# Patient Record
Sex: FEMALE | Race: WHITE | NOT HISPANIC OR LATINO
[De-identification: names, ages, dates, MRNs, and addresses within clinical notes are randomized per-mention and may not be internally consistent; named-entity substitution may affect disease eponyms.]

---

## 2017-02-03 ENCOUNTER — APPOINTMENT (OUTPATIENT)
Dept: PULMONOLOGY | Facility: CLINIC | Age: 57
End: 2017-02-03

## 2017-02-03 VITALS
OXYGEN SATURATION: 94 % | WEIGHT: 165 LBS | HEIGHT: 62 IN | SYSTOLIC BLOOD PRESSURE: 130 MMHG | DIASTOLIC BLOOD PRESSURE: 80 MMHG | HEART RATE: 76 BPM | RESPIRATION RATE: 16 BRPM | BODY MASS INDEX: 30.36 KG/M2

## 2017-02-03 DIAGNOSIS — Z86.69 PERSONAL HISTORY OF OTHER DISEASES OF THE NERVOUS SYSTEM AND SENSE ORGANS: ICD-10-CM

## 2017-02-03 DIAGNOSIS — R06.02 SHORTNESS OF BREATH: ICD-10-CM

## 2017-02-03 DIAGNOSIS — J20.9 ACUTE BRONCHITIS, UNSPECIFIED: ICD-10-CM

## 2017-02-03 DIAGNOSIS — R06.83 SNORING: ICD-10-CM

## 2017-02-03 DIAGNOSIS — J45.901 ACUTE BRONCHITIS, UNSPECIFIED: ICD-10-CM

## 2017-04-04 ENCOUNTER — APPOINTMENT (OUTPATIENT)
Dept: PULMONOLOGY | Facility: CLINIC | Age: 57
End: 2017-04-04

## 2017-04-27 ENCOUNTER — RX RENEWAL (OUTPATIENT)
Age: 57
End: 2017-04-27

## 2017-04-27 ENCOUNTER — MEDICATION RENEWAL (OUTPATIENT)
Age: 57
End: 2017-04-27

## 2017-05-24 ENCOUNTER — APPOINTMENT (OUTPATIENT)
Dept: PULMONOLOGY | Facility: CLINIC | Age: 57
End: 2017-05-24

## 2017-07-19 ENCOUNTER — OUTPATIENT (OUTPATIENT)
Dept: OUTPATIENT SERVICES | Facility: HOSPITAL | Age: 57
LOS: 1 days | Discharge: HOME | End: 2017-07-19

## 2017-07-19 ENCOUNTER — APPOINTMENT (OUTPATIENT)
Dept: GASTROENTEROLOGY | Facility: CLINIC | Age: 57
End: 2017-07-19

## 2017-07-19 VITALS
SYSTOLIC BLOOD PRESSURE: 120 MMHG | DIASTOLIC BLOOD PRESSURE: 80 MMHG | WEIGHT: 183 LBS | HEART RATE: 66 BPM | BODY MASS INDEX: 33.47 KG/M2 | OXYGEN SATURATION: 98 % | TEMPERATURE: 97.3 F

## 2017-07-19 DIAGNOSIS — K21.9 GASTRO-ESOPHAGEAL REFLUX DISEASE WITHOUT ESOPHAGITIS: ICD-10-CM

## 2017-07-19 DIAGNOSIS — K21.9 GASTRO-ESOPHAGEAL REFLUX DISEASE W/OUT ESOPHAGITIS: ICD-10-CM

## 2017-07-19 DIAGNOSIS — R13.10 DYSPHAGIA, UNSPECIFIED: ICD-10-CM

## 2017-07-19 DIAGNOSIS — K59.00 CONSTIPATION, UNSPECIFIED: ICD-10-CM

## 2017-07-19 DIAGNOSIS — R10.9 UNSPECIFIED ABDOMINAL PAIN: ICD-10-CM

## 2017-07-19 DIAGNOSIS — K58.1 IRRITABLE BOWEL SYNDROME WITH CONSTIPATION: ICD-10-CM

## 2017-07-19 LAB
24R-OH-CALCIDIOL SERPL-MCNC: 24.8 NG/ML — LOW (ref 30–100)
ALBUMIN SERPL ELPH-MCNC: 3.6 G/DL — SIGNIFICANT CHANGE UP (ref 3.3–5)
ALP SERPL-CCNC: 95 U/L — SIGNIFICANT CHANGE UP (ref 40–120)
ALT FLD-CCNC: 15 U/L — SIGNIFICANT CHANGE UP (ref 12–78)
ANION GAP SERPL CALC-SCNC: 8 MMOL/L — SIGNIFICANT CHANGE UP (ref 5–17)
AST SERPL-CCNC: 16 U/L — SIGNIFICANT CHANGE UP (ref 15–37)
BASOPHILS # BLD AUTO: 0.1 K/UL — SIGNIFICANT CHANGE UP (ref 0–0.2)
BASOPHILS NFR BLD AUTO: 1.6 % — SIGNIFICANT CHANGE UP (ref 0–2)
BILIRUB SERPL-MCNC: 0.4 MG/DL — SIGNIFICANT CHANGE UP (ref 0.2–1.2)
BUN SERPL-MCNC: 21 MG/DL — SIGNIFICANT CHANGE UP (ref 7–23)
CALCIUM SERPL-MCNC: 8.4 MG/DL — LOW (ref 8.5–10.1)
CHLORIDE SERPL-SCNC: 105 MMOL/L — SIGNIFICANT CHANGE UP (ref 96–108)
CO2 SERPL-SCNC: 29 MMOL/L — SIGNIFICANT CHANGE UP (ref 22–31)
CREAT SERPL-MCNC: 0.76 MG/DL — SIGNIFICANT CHANGE UP (ref 0.5–1.3)
CRP SERPL-MCNC: 0.2 MG/DL — SIGNIFICANT CHANGE UP (ref 0–0.4)
EOSINOPHIL # BLD AUTO: 0.2 K/UL — SIGNIFICANT CHANGE UP (ref 0–0.5)
EOSINOPHIL NFR BLD AUTO: 3.2 % — SIGNIFICANT CHANGE UP (ref 0–6)
GLUCOSE SERPL-MCNC: 97 MG/DL — SIGNIFICANT CHANGE UP (ref 70–99)
HCT VFR BLD CALC: 43.1 % — SIGNIFICANT CHANGE UP (ref 34.5–45)
HGB BLD-MCNC: 15.3 G/DL — SIGNIFICANT CHANGE UP (ref 11.5–15.5)
LYMPHOCYTES # BLD AUTO: 2.8 K/UL — SIGNIFICANT CHANGE UP (ref 1–3.3)
LYMPHOCYTES # BLD AUTO: 39.5 % — SIGNIFICANT CHANGE UP (ref 13–44)
MCHC RBC-ENTMCNC: 32.7 PG — SIGNIFICANT CHANGE UP (ref 27–34)
MCHC RBC-ENTMCNC: 35.5 GM/DL — SIGNIFICANT CHANGE UP (ref 32–36)
MCV RBC AUTO: 92.1 FL — SIGNIFICANT CHANGE UP (ref 80–100)
MONOCYTES # BLD AUTO: 0.5 K/UL — SIGNIFICANT CHANGE UP (ref 0–0.9)
MONOCYTES NFR BLD AUTO: 7.3 % — SIGNIFICANT CHANGE UP (ref 2–14)
NEUTROPHILS # BLD AUTO: 3.5 K/UL — SIGNIFICANT CHANGE UP (ref 1.8–7.4)
NEUTROPHILS NFR BLD AUTO: 48.4 % — SIGNIFICANT CHANGE UP (ref 43–77)
PLATELET # BLD AUTO: 326 K/UL — SIGNIFICANT CHANGE UP (ref 150–400)
POTASSIUM SERPL-MCNC: 3.5 MMOL/L — SIGNIFICANT CHANGE UP (ref 3.5–5.3)
POTASSIUM SERPL-SCNC: 3.5 MMOL/L — SIGNIFICANT CHANGE UP (ref 3.5–5.3)
PROT SERPL-MCNC: 7.2 GM/DL — SIGNIFICANT CHANGE UP (ref 6–8.3)
RBC # BLD: 4.68 M/UL — SIGNIFICANT CHANGE UP (ref 3.8–5.2)
RBC # FLD: 11.9 % — SIGNIFICANT CHANGE UP (ref 11–15)
SODIUM SERPL-SCNC: 142 MMOL/L — SIGNIFICANT CHANGE UP (ref 135–145)
T4 AB SER-ACNC: 7.9 UG/DL — SIGNIFICANT CHANGE UP (ref 4.6–12)
TSH SERPL-MCNC: 1.11 UIU/ML — SIGNIFICANT CHANGE UP (ref 0.36–3.74)
VIT B12 SERPL-MCNC: 608 PG/ML — SIGNIFICANT CHANGE UP (ref 243–894)
WBC # BLD: 7.2 K/UL — SIGNIFICANT CHANGE UP (ref 3.8–10.5)
WBC # FLD AUTO: 7.2 K/UL — SIGNIFICANT CHANGE UP (ref 3.8–10.5)

## 2017-07-19 RX ORDER — RANITIDINE HYDROCHLORIDE 300 MG/1
300 CAPSULE ORAL
Qty: 30 | Refills: 5 | Status: DISCONTINUED | COMMUNITY
Start: 2017-07-19 | End: 2017-07-19

## 2017-07-20 LAB
GLIADIN PEPTIDE IGA SER-ACNC: <5 UNITS — SIGNIFICANT CHANGE UP
GLIADIN PEPTIDE IGA SER-ACNC: NEGATIVE — SIGNIFICANT CHANGE UP
GLIADIN PEPTIDE IGG SER-ACNC: <5 UNITS — SIGNIFICANT CHANGE UP
GLIADIN PEPTIDE IGG SER-ACNC: NEGATIVE — SIGNIFICANT CHANGE UP
TTG IGA SER-ACNC: <5 UNITS — SIGNIFICANT CHANGE UP
TTG IGA SER-ACNC: NEGATIVE — SIGNIFICANT CHANGE UP
TTG IGG SER IA-ACNC: NEGATIVE — SIGNIFICANT CHANGE UP
TTG IGG SER-ACNC: <5 UNITS — SIGNIFICANT CHANGE UP

## 2017-07-25 ENCOUNTER — MESSAGE (OUTPATIENT)
Age: 57
End: 2017-07-25

## 2017-08-16 ENCOUNTER — TRANSCRIPTION ENCOUNTER (OUTPATIENT)
Age: 57
End: 2017-08-16

## 2017-08-30 ENCOUNTER — APPOINTMENT (OUTPATIENT)
Dept: GASTROENTEROLOGY | Facility: CLINIC | Age: 57
End: 2017-08-30

## 2018-02-07 ENCOUNTER — OUTPATIENT (OUTPATIENT)
Dept: OUTPATIENT SERVICES | Facility: HOSPITAL | Age: 58
LOS: 1 days | Discharge: HOME | End: 2018-02-07
Payer: MEDICARE

## 2018-02-07 DIAGNOSIS — I73.9 PERIPHERAL VASCULAR DISEASE, UNSPECIFIED: ICD-10-CM

## 2018-02-07 PROCEDURE — 93306 TTE W/DOPPLER COMPLETE: CPT | Mod: 26

## 2018-06-22 ENCOUNTER — TRANSCRIPTION ENCOUNTER (OUTPATIENT)
Age: 58
End: 2018-06-22

## 2018-11-21 ENCOUNTER — OUTPATIENT (OUTPATIENT)
Dept: OUTPATIENT SERVICES | Facility: HOSPITAL | Age: 58
LOS: 1 days | End: 2018-11-21
Payer: COMMERCIAL

## 2018-11-21 VITALS
DIASTOLIC BLOOD PRESSURE: 92 MMHG | HEIGHT: 62 IN | HEART RATE: 63 BPM | SYSTOLIC BLOOD PRESSURE: 134 MMHG | TEMPERATURE: 99 F | WEIGHT: 173.06 LBS | RESPIRATION RATE: 16 BRPM

## 2018-11-21 DIAGNOSIS — Z98.890 OTHER SPECIFIED POSTPROCEDURAL STATES: Chronic | ICD-10-CM

## 2018-11-21 DIAGNOSIS — Z98.891 HISTORY OF UTERINE SCAR FROM PREVIOUS SURGERY: Chronic | ICD-10-CM

## 2018-11-21 DIAGNOSIS — M20.11 HALLUX VALGUS (ACQUIRED), RIGHT FOOT: ICD-10-CM

## 2018-11-21 DIAGNOSIS — I10 ESSENTIAL (PRIMARY) HYPERTENSION: ICD-10-CM

## 2018-11-21 LAB
BUN SERPL-MCNC: 23 MG/DL — SIGNIFICANT CHANGE UP (ref 7–23)
CALCIUM SERPL-MCNC: 9.5 MG/DL — SIGNIFICANT CHANGE UP (ref 8.4–10.5)
CHLORIDE SERPL-SCNC: 102 MMOL/L — SIGNIFICANT CHANGE UP (ref 98–107)
CO2 SERPL-SCNC: 24 MMOL/L — SIGNIFICANT CHANGE UP (ref 22–31)
CREAT SERPL-MCNC: 0.59 MG/DL — SIGNIFICANT CHANGE UP (ref 0.5–1.3)
GLUCOSE SERPL-MCNC: 79 MG/DL — SIGNIFICANT CHANGE UP (ref 70–99)
HCT VFR BLD CALC: 45.2 % — HIGH (ref 34.5–45)
HGB BLD-MCNC: 14.9 G/DL — SIGNIFICANT CHANGE UP (ref 11.5–15.5)
MCHC RBC-ENTMCNC: 31.8 PG — SIGNIFICANT CHANGE UP (ref 27–34)
MCHC RBC-ENTMCNC: 33 % — SIGNIFICANT CHANGE UP (ref 32–36)
MCV RBC AUTO: 96.4 FL — SIGNIFICANT CHANGE UP (ref 80–100)
NRBC # FLD: 0 — SIGNIFICANT CHANGE UP
PLATELET # BLD AUTO: 384 K/UL — SIGNIFICANT CHANGE UP (ref 150–400)
PMV BLD: 11.6 FL — SIGNIFICANT CHANGE UP (ref 7–13)
POTASSIUM SERPL-MCNC: 4 MMOL/L — SIGNIFICANT CHANGE UP (ref 3.5–5.3)
POTASSIUM SERPL-SCNC: 4 MMOL/L — SIGNIFICANT CHANGE UP (ref 3.5–5.3)
RBC # BLD: 4.69 M/UL — SIGNIFICANT CHANGE UP (ref 3.8–5.2)
RBC # FLD: 12.3 % — SIGNIFICANT CHANGE UP (ref 10.3–14.5)
SODIUM SERPL-SCNC: 140 MMOL/L — SIGNIFICANT CHANGE UP (ref 135–145)
WBC # BLD: 5.82 K/UL — SIGNIFICANT CHANGE UP (ref 3.8–10.5)
WBC # FLD AUTO: 5.82 K/UL — SIGNIFICANT CHANGE UP (ref 3.8–10.5)

## 2018-11-21 PROCEDURE — 93010 ELECTROCARDIOGRAM REPORT: CPT

## 2018-11-21 NOTE — H&P PST ADULT - HISTORY OF PRESENT ILLNESS
57y/o female presents for preop eval for scheduled right foot cheilectomy proximal phalanx osteotomy  correction hammertoe 2, 3 capsulotomy metatarsophalangeal joint 2, 3, osteotomy other metatarsal extensor lengthening 2,3, flexor tenotomy 2,3 extensor tenotomy 2,3, correction of hammertoe right on 12/3/18.  Pt with c/o right bunion & pain for past several years that has progressively worsened.

## 2018-11-21 NOTE — H&P PST ADULT - NSANTHOSAYNRD_GEN_A_CORE
No. NELLIE screening performed.  STOP BANG Legend: 0-2 = LOW Risk; 3-4 = INTERMEDIATE Risk; 5-8 = HIGH Risk

## 2018-11-21 NOTE — H&P PST ADULT - PROBLEM SELECTOR PLAN 1
for scheduled right foot cheilectomy proximal phalanx osteotomy  correction hammertoe 2, 3 capsulotomy metatarsophalangeal joint 2, 3, osteotomy other metatarsal extensor lengthening 2,3, flexor tenotomy 2,3 extensor tenotomy 2,3, correction of hammertoe right on 12/3/18.    preop instructions, gi prophylaxis & surgical soap given  pt verbalized understanding for scheduled right foot cheilectomy proximal phalanx osteotomy  correction hammertoe 2, 3 capsulotomy metatarsophalangeal joint 2, 3, osteotomy other metatarsal extensor lengthening 2,3, flexor tenotomy 2,3 extensor tenotomy 2,3, correction of hammertoe right on 12/3/18.    preop instructions, gi prophylaxis & surgical soap given  pt verbalized understanding  NELLIE precautions recommended.  OR booking notified via fax.

## 2018-12-02 ENCOUNTER — TRANSCRIPTION ENCOUNTER (OUTPATIENT)
Age: 58
End: 2018-12-02

## 2018-12-03 ENCOUNTER — OUTPATIENT (OUTPATIENT)
Dept: OUTPATIENT SERVICES | Facility: HOSPITAL | Age: 58
LOS: 1 days | Discharge: HOME | End: 2018-12-03

## 2018-12-03 VITALS
SYSTOLIC BLOOD PRESSURE: 138 MMHG | OXYGEN SATURATION: 98 % | HEIGHT: 62 IN | TEMPERATURE: 98 F | RESPIRATION RATE: 16 BRPM | WEIGHT: 173.06 LBS | HEART RATE: 65 BPM | DIASTOLIC BLOOD PRESSURE: 65 MMHG

## 2018-12-03 VITALS
TEMPERATURE: 98 F | HEART RATE: 60 BPM | DIASTOLIC BLOOD PRESSURE: 68 MMHG | SYSTOLIC BLOOD PRESSURE: 124 MMHG | RESPIRATION RATE: 12 BRPM | OXYGEN SATURATION: 100 %

## 2018-12-03 DIAGNOSIS — Z98.890 OTHER SPECIFIED POSTPROCEDURAL STATES: Chronic | ICD-10-CM

## 2018-12-03 DIAGNOSIS — Z98.891 HISTORY OF UTERINE SCAR FROM PREVIOUS SURGERY: Chronic | ICD-10-CM

## 2018-12-03 DIAGNOSIS — M20.11 HALLUX VALGUS (ACQUIRED), RIGHT FOOT: ICD-10-CM

## 2018-12-03 RX ORDER — OXYCODONE HYDROCHLORIDE 5 MG/1
5 TABLET ORAL EVERY 4 HOURS
Qty: 0 | Refills: 0 | Status: DISCONTINUED | OUTPATIENT
Start: 2018-12-03 | End: 2018-12-03

## 2018-12-03 RX ORDER — ACETAMINOPHEN 500 MG
650 TABLET ORAL EVERY 6 HOURS
Qty: 0 | Refills: 0 | Status: DISCONTINUED | OUTPATIENT
Start: 2018-12-03 | End: 2018-12-18

## 2018-12-03 RX ORDER — SODIUM CHLORIDE 9 MG/ML
1000 INJECTION, SOLUTION INTRAVENOUS
Qty: 0 | Refills: 0 | Status: DISCONTINUED | OUTPATIENT
Start: 2018-12-03 | End: 2018-12-18

## 2018-12-03 NOTE — ASU DISCHARGE PLAN (ADULT/PEDIATRIC). - NOTIFY
Fever greater than 101/Bleeding that does not stop/Pain not relieved by Medications Fever greater than 101/Numbness, color, or temperature change to extremity/Unable to Urinate/Persistent Nausea and Vomiting/Pain not relieved by Medications/Bleeding that does not stop/Swelling that continues Swelling that continues/Inability to Tolerate Liquids or Foods/Unable to Urinate/Pain not relieved by Medications/Numbness, color, or temperature change to extremity/Fever greater than 101/Bleeding that does not stop/Persistent Nausea and Vomiting

## 2018-12-03 NOTE — ASU DISCHARGE PLAN (ADULT/PEDIATRIC). - FOLLOWUP APPOINTMENT CLINIC/PHYSICIAN
Please follow up with Dr. Grijalva within 1-2 weeks. You may call [phone] to schedule an appointment.

## 2018-12-03 NOTE — ASU DISCHARGE PLAN (ADULT/PEDIATRIC). - MEDICATION SUMMARY - MEDICATIONS TO TAKE
I will START or STAY ON the medications listed below when I get home from the hospital:    prevagen  -- 1 tab(s) by mouth once a day, last dose 11/25/18  -- Indication: For Home Med    hydroCHLOROthiazide 12.5 mg oral capsule  -- 1 cap(s) by mouth once a day (at bedtime)  -- Indication: For HTN    Vitamin D3  -- 1 tab(s) by mouth once a day  -- Indication: For Supplement

## 2018-12-03 NOTE — BRIEF OPERATIVE NOTE - PRE-OP DX
Hallux valgus  12/03/2018    Active  Lucien Alvarez Hallux valgus  12/03/2018    Active  Lucien Alvarez  Hammer toe  12/03/2018    Active  Lucien Alvarez

## 2018-12-03 NOTE — ASU DISCHARGE PLAN (ADULT/PEDIATRIC). - INSTRUCTIONS
Please do not participate in heavy lifting or strenuous exercise. Do not drive while taking pain medication.    Please go to the emergency department if you experience pain not controlled by pain medication, bleeding that will not stop, fevers or chills. start with clear liquids and gradually increase your diet as you can, until you return to your normal diet. Please Remain Non-Weight Bearing on your right foot. Please do not participate in heavy lifting or strenuous exercise. Do not drive while taking pain medication.    Please go to the emergency department if you experience pain not controlled by pain medication, bleeding that will not stop, fevers or chills.

## 2018-12-03 NOTE — BRIEF OPERATIVE NOTE - PROCEDURE
<<-----Click on this checkbox to enter Procedure Hallux valgus correction  12/03/2018    Active  CHAU Hammer toe correction  12/03/2018    Active  CHAU

## 2018-12-03 NOTE — BRIEF OPERATIVE NOTE - OPERATION/FINDINGS
Hallux Valgus Correction, proximal metatarsal osteotomy, hammertoe correction R Great Toe Proximal Phalanx Osteotomy, percutaneous pinning  R 2nd/3rd Toe Proximal Phalanx slide osteotomy, percutaneous pinning

## 2019-02-01 ENCOUNTER — TRANSCRIPTION ENCOUNTER (OUTPATIENT)
Age: 59
End: 2019-02-01

## 2019-04-01 PROBLEM — I10 ESSENTIAL (PRIMARY) HYPERTENSION: Chronic | Status: ACTIVE | Noted: 2018-11-21

## 2019-05-13 ENCOUNTER — OUTPATIENT (OUTPATIENT)
Dept: OUTPATIENT SERVICES | Facility: HOSPITAL | Age: 59
LOS: 1 days | End: 2019-05-13

## 2019-05-13 VITALS
HEIGHT: 62 IN | SYSTOLIC BLOOD PRESSURE: 110 MMHG | WEIGHT: 160.06 LBS | TEMPERATURE: 98 F | RESPIRATION RATE: 16 BRPM | DIASTOLIC BLOOD PRESSURE: 70 MMHG | HEART RATE: 64 BPM | OXYGEN SATURATION: 97 %

## 2019-05-13 DIAGNOSIS — Z98.891 HISTORY OF UTERINE SCAR FROM PREVIOUS SURGERY: Chronic | ICD-10-CM

## 2019-05-13 DIAGNOSIS — M20.12 HALLUX VALGUS (ACQUIRED), LEFT FOOT: ICD-10-CM

## 2019-05-13 DIAGNOSIS — Z98.890 OTHER SPECIFIED POSTPROCEDURAL STATES: Chronic | ICD-10-CM

## 2019-05-13 LAB
ANION GAP SERPL CALC-SCNC: 16 MMO/L — HIGH (ref 7–14)
BUN SERPL-MCNC: 16 MG/DL — SIGNIFICANT CHANGE UP (ref 7–23)
CALCIUM SERPL-MCNC: 9.5 MG/DL — SIGNIFICANT CHANGE UP (ref 8.4–10.5)
CHLORIDE SERPL-SCNC: 108 MMOL/L — HIGH (ref 98–107)
CO2 SERPL-SCNC: 27 MMOL/L — SIGNIFICANT CHANGE UP (ref 22–31)
CREAT SERPL-MCNC: 0.59 MG/DL — SIGNIFICANT CHANGE UP (ref 0.5–1.3)
GLUCOSE SERPL-MCNC: 78 MG/DL — SIGNIFICANT CHANGE UP (ref 70–99)
HCT VFR BLD CALC: 45.5 % — HIGH (ref 34.5–45)
HGB BLD-MCNC: 14.8 G/DL — SIGNIFICANT CHANGE UP (ref 11.5–15.5)
MCHC RBC-ENTMCNC: 31.4 PG — SIGNIFICANT CHANGE UP (ref 27–34)
MCHC RBC-ENTMCNC: 32.5 % — SIGNIFICANT CHANGE UP (ref 32–36)
MCV RBC AUTO: 96.6 FL — SIGNIFICANT CHANGE UP (ref 80–100)
NRBC # FLD: 0 K/UL — SIGNIFICANT CHANGE UP (ref 0–0)
PLATELET # BLD AUTO: 369 K/UL — SIGNIFICANT CHANGE UP (ref 150–400)
PMV BLD: 11.9 FL — SIGNIFICANT CHANGE UP (ref 7–13)
POTASSIUM SERPL-MCNC: 3.9 MMOL/L — SIGNIFICANT CHANGE UP (ref 3.5–5.3)
POTASSIUM SERPL-SCNC: 3.9 MMOL/L — SIGNIFICANT CHANGE UP (ref 3.5–5.3)
RBC # BLD: 4.71 M/UL — SIGNIFICANT CHANGE UP (ref 3.8–5.2)
RBC # FLD: 12.1 % — SIGNIFICANT CHANGE UP (ref 10.3–14.5)
SODIUM SERPL-SCNC: 151 MMOL/L — HIGH (ref 135–145)
WBC # BLD: 6.19 K/UL — SIGNIFICANT CHANGE UP (ref 3.8–10.5)
WBC # FLD AUTO: 6.19 K/UL — SIGNIFICANT CHANGE UP (ref 3.8–10.5)

## 2019-05-13 RX ORDER — CHOLECALCIFEROL (VITAMIN D3) 125 MCG
1 CAPSULE ORAL
Qty: 0 | Refills: 0 | DISCHARGE

## 2019-05-13 NOTE — H&P PST ADULT - HISTORY OF PRESENT ILLNESS
59 y/o female presents for preop eval for scheduled left foot correction hammertoe 2nd, 3rd, capsulotomy metatarsophalangeal joint 2nd 3rd osteotomy other metatarsal 2nd, extensor lengthening 2nd 3rd flexor tenotomy 2nd 3rd extensor tenotomy 2nd 3rd Brush/cavanaugh bunionectomy, combined osteotomy for 5/23/19. Pt underwent right foot bunion and hammertoe surgery in 11/2018 now planning correction of left foot.

## 2019-05-13 NOTE — H&P PST ADULT - NSICDXFAMILYHX_GEN_ALL_CORE_FT
FAMILY HISTORY:  Father  Still living? No  Family history of heart disease, Age at diagnosis: Age Unknown    Mother  Still living? No  Family history of diabetes mellitus, Age at diagnosis: Age Unknown

## 2019-05-13 NOTE — H&P PST ADULT - NEGATIVE OPHTHALMOLOGIC SYMPTOMS
no blurred vision R/no pain L/no blurred vision L/no pain R/no loss of vision R/no diplopia/no photophobia/no loss of vision L

## 2019-05-13 NOTE — H&P PST ADULT - RS GEN PE MLT RESP DETAILS PC
respirations non-labored/breath sounds equal/airway patent/no rhonchi/no wheezes/no rales/clear to auscultation bilaterally/good air movement

## 2019-05-13 NOTE — H&P PST ADULT - MALLAMPATI CLASS
Class II - visualization of the soft palate, fauces, and uvula Class IV (difficult) - the soft palate is not visible at all Class I (easy) - visualization of the soft palate, fauces, uvula, and both anterior and posterior pillars

## 2019-05-13 NOTE — H&P PST ADULT - NEGATIVE NEUROLOGICAL SYMPTOMS
no difficulty walking/no syncope/no transient paralysis/no paresthesias/no tremors/no weakness/no generalized seizures

## 2019-05-13 NOTE — H&P PST ADULT - NSICDXPASTMEDICALHX_GEN_ALL_CORE_FT
PAST MEDICAL HISTORY:  Acquired hammertoe of left foot     Hallux valgus (acquired), right foot s/p correction 11/2018    HV (hallux valgus), left     Hypertension     Obesity

## 2019-05-13 NOTE — H&P PST ADULT - NEGATIVE ENMT SYMPTOMS
no vertigo/no dysphagia/no sinus symptoms/no ear pain/no nose bleeds/no tinnitus/no hearing difficulty

## 2019-05-13 NOTE — H&P PST ADULT - NSICDXPROBLEM_GEN_ALL_CORE_FT
PROBLEM DIAGNOSES  Problem: Hallux valgus, left  Assessment and Plan:   Pt is scheduled left foot correction hammertoe 2nd, 3rd, capsulotomy metatarsophalangeal joint 2nd 3rd osteotomy other metatarsal 2nd, extensor lengthening 2nd 3rd flexor tenotomy 2nd 3rd extensor tenotomy 2nd 3rd Brush/cavanaugh bunionectomy, combined osteotomy for 5/23/19. Pre-op instructions provided. Pepcid provided for GI prophylaxis. Chlorhexidine soap provided for skin prep. Pt stated understanding.

## 2019-05-13 NOTE — H&P PST ADULT - NEGATIVE GENERAL GENITOURINARY SYMPTOMS
no hematuria/no flank pain R/no bladder infections/no dysuria/no flank pain L/normal urinary frequency

## 2019-05-13 NOTE — H&P PST ADULT - MUSCULOSKELETAL
detailed exam ROM intact/normal strength/no calf tenderness/no joint erythema/no joint swelling/no joint warmth details…

## 2019-05-13 NOTE — H&P PST ADULT - NEGATIVE CARDIOVASCULAR SYMPTOMS
no peripheral edema/no claudication/no dyspnea on exertion/no palpitations/no paroxysmal nocturnal dyspnea/no orthopnea/no chest pain

## 2019-05-13 NOTE — H&P PST ADULT - ANESTHESIA, PREVIOUS REACTION, PROFILE
bronchospasm during endoscopy/colonoscopy, denies recurrent complications or asthma, denies family hx

## 2019-05-13 NOTE — H&P PST ADULT - NSICDXPASTSURGICALHX_GEN_ALL_CORE_FT
PAST SURGICAL HISTORY:  H/O bilateral breast reduction surgery     H/O  section X 2    H/O endoscopy & colonoscopy    S/P bunionectomy Right and hammertoe correction 2018

## 2019-05-22 ENCOUNTER — TRANSCRIPTION ENCOUNTER (OUTPATIENT)
Age: 59
End: 2019-05-22

## 2019-05-23 ENCOUNTER — OUTPATIENT (OUTPATIENT)
Dept: OUTPATIENT SERVICES | Facility: HOSPITAL | Age: 59
LOS: 1 days | Discharge: HOME | End: 2019-05-23

## 2019-05-23 VITALS
SYSTOLIC BLOOD PRESSURE: 121 MMHG | HEIGHT: 62 IN | WEIGHT: 160.06 LBS | HEART RATE: 66 BPM | RESPIRATION RATE: 16 BRPM | TEMPERATURE: 98 F | OXYGEN SATURATION: 99 % | DIASTOLIC BLOOD PRESSURE: 58 MMHG

## 2019-05-23 VITALS — HEART RATE: 65 BPM | RESPIRATION RATE: 20 BRPM | OXYGEN SATURATION: 99 %

## 2019-05-23 DIAGNOSIS — Z98.891 HISTORY OF UTERINE SCAR FROM PREVIOUS SURGERY: Chronic | ICD-10-CM

## 2019-05-23 DIAGNOSIS — Z98.890 OTHER SPECIFIED POSTPROCEDURAL STATES: Chronic | ICD-10-CM

## 2019-05-23 DIAGNOSIS — M20.12 HALLUX VALGUS (ACQUIRED), LEFT FOOT: ICD-10-CM

## 2019-05-23 RX ORDER — ONDANSETRON 8 MG/1
1 TABLET, FILM COATED ORAL
Qty: 10 | Refills: 0
Start: 2019-05-23

## 2019-05-23 NOTE — BRIEF OPERATIVE NOTE - NSICDXBRIEFPREOP_GEN_ALL_CORE_FT
PRE-OP DIAGNOSIS:  Hammertoe of right foot 23-May-2019 10:03:26  Harley Grijalva  Hallux valgus, right 23-May-2019 10:03:15  Harley Grijalva

## 2019-05-23 NOTE — ASU PREOP CHECKLIST - SURGICAL CONSENT
left foot bunionectomy and hammer toe correction left foot bunionectomy and hammer toe correction/done

## 2019-05-23 NOTE — ASU DISCHARGE PLAN (ADULT/PEDIATRIC) - CARE PROVIDER_API CALL
Harley Grijalva)  Orthopaedic Surgery  [address] [place] [address]  Phone: [phone]  Fax: [phone]  Follow Up Time:

## 2019-05-23 NOTE — BRIEF OPERATIVE NOTE - NSICDXBRIEFPROCEDURE_GEN_ALL_CORE_FT
PROCEDURES:  Double osteotomy, for bunion correction 23-May-2019 10:05:24  Harley Grijalva  Subcutaneous tenotomy of toe 23-May-2019 10:05:14  Harley Grijalva  Release, MTP joint 23-May-2019 10:05:05  Harley Grijalva  Tenotomy, extensor, foot 23-May-2019 10:05:00  Harley Grijalva  Repair of extensor tendon of toe 23-May-2019 10:04:54  Harley Grijalva  Camacho operation 23-May-2019 10:04:43  Harley Grijalva  Hammer toe repair 23-May-2019 10:04:36  Harley Grijalva  Osteotomy, metatarsal, second 23-May-2019 10:04:15  Harley Grijalva

## 2019-05-23 NOTE — ASU DISCHARGE PLAN (ADULT/PEDIATRIC) - ACTIVITY LEVEL
No weight bearing/Elevate extremity Elevate extremity/heel walk with crutches, non weight bearing first 2 days after/No weight bearing/No tub baths

## 2019-05-23 NOTE — ASU DISCHARGE PLAN (ADULT/PEDIATRIC) - ASU DC SPECIAL INSTRUCTIONSFT
Take Percocet for severe pain only. Can take up to 6 Tylenol 325 mg a day while taking Percocet. Alternate between taking Ibuprofen and Tylenol so you are taking pain medication every 3-4 hours if your pain is severe.    Narcotic pain medicine can cause extreme nausea and constipation. Drink plenty of water and take diuretics (colace, Miralax) as needed. You can get them from your local pharmacy.    It is important to ice and elevate your leg to keep swelling down and the pain manageable. Keep the ice on for 20 minutes, and then keep off for 20 minutes. Repeat while awake. Take Percocet for severe pain only. Can take up to 6 Tylenol 325 mg a day while taking Percocet. Alternate between taking Ibuprofen and Tylenol so you are taking pain medication every 3-4 hours if your pain is severe.    Narcotic pain medicine can cause extreme nausea and constipation. Drink plenty of water and take diuretics (colace, Miralax) as needed. You can get them from your local pharmacy.    It is important to ice and elevate your leg to keep swelling down and the pain manageable. Keep the ice on for 20 minutes, and then keep off for 20 minutes. Repeat while awake.    Follow up with Dr. Grijalva in 7-10 days Take Percocet for severe pain only. Can take up to 6 Tylenol 325 mg a day while taking Percocet. Alternate between taking Ibuprofen and Tylenol so you are taking pain medication every 3-4 hours if your pain is severe.    Narcotic pain medicine can cause extreme nausea and constipation. Drink plenty of water and take diuretics (colace, Miralax) as needed. You can get them from your local pharmacy.    It is important to ice and elevate your leg to keep swelling down and the pain manageable. Keep the ice on for 20 minutes, and then keep off for 20 minutes. Repeat while awake.    Tylenol last at 9am, next does of tylenol or percocet in 6 hours  Motrin last at 9am, next dose motrin in 6 hours    Follow up with Dr. Grijalva in 7-10 days

## 2019-05-23 NOTE — ASU DISCHARGE PLAN (ADULT/PEDIATRIC) - PAIN MANAGEMENT
Prescriptions electronically submitted to pharmacy from [facility] Prescriptions electronically submitted to pharmacy from doctor's office

## 2019-05-23 NOTE — ASU DISCHARGE PLAN (ADULT/PEDIATRIC) - CALL YOUR DOCTOR IF YOU HAVE ANY OF THE FOLLOWING:
Wound/Surgical Site with redness, or foul smelling discharge or pus/Swelling that gets worse/Fever greater than (need to indicate Fahrenheit or Celsius) Swelling that gets worse/Numbness, tingling, color or temperature change to extremity/Fever greater than (need to indicate Fahrenheit or Celsius)/Wound/Surgical Site with redness, or foul smelling discharge or pus

## 2019-10-01 PROBLEM — M20.11 HALLUX VALGUS (ACQUIRED), RIGHT FOOT: Chronic | Status: ACTIVE | Noted: 2018-11-21

## 2019-10-01 PROBLEM — M20.12 HALLUX VALGUS (ACQUIRED), LEFT FOOT: Chronic | Status: ACTIVE | Noted: 2019-05-13

## 2019-10-01 PROBLEM — M20.42 OTHER HAMMER TOE(S) (ACQUIRED), LEFT FOOT: Chronic | Status: ACTIVE | Noted: 2019-05-13

## 2019-10-31 ENCOUNTER — APPOINTMENT (OUTPATIENT)
Dept: INTERNAL MEDICINE | Facility: CLINIC | Age: 59
End: 2019-10-31
Payer: COMMERCIAL

## 2019-10-31 ENCOUNTER — TRANSCRIPTION ENCOUNTER (OUTPATIENT)
Age: 59
End: 2019-10-31

## 2019-10-31 VITALS
SYSTOLIC BLOOD PRESSURE: 127 MMHG | OXYGEN SATURATION: 96 % | BODY MASS INDEX: 30.51 KG/M2 | HEIGHT: 62 IN | WEIGHT: 165.8 LBS | HEART RATE: 75 BPM | RESPIRATION RATE: 16 BRPM | DIASTOLIC BLOOD PRESSURE: 70 MMHG | TEMPERATURE: 98.1 F

## 2019-10-31 VITALS — SYSTOLIC BLOOD PRESSURE: 118 MMHG | DIASTOLIC BLOOD PRESSURE: 68 MMHG

## 2019-10-31 DIAGNOSIS — Z00.00 ENCOUNTER FOR GENERAL ADULT MEDICAL EXAMINATION W/OUT ABNORMAL FINDINGS: ICD-10-CM

## 2019-10-31 DIAGNOSIS — K58.1 IRRITABLE BOWEL SYNDROME WITH CONSTIPATION: ICD-10-CM

## 2019-10-31 DIAGNOSIS — K44.9 DIAPHRAGMATIC HERNIA W/OUT OBSTRUCTION OR GANGRENE: ICD-10-CM

## 2019-10-31 PROCEDURE — 99386 PREV VISIT NEW AGE 40-64: CPT | Mod: 25

## 2019-10-31 PROCEDURE — G0444 DEPRESSION SCREEN ANNUAL: CPT | Mod: 59

## 2019-10-31 PROCEDURE — 36415 COLL VENOUS BLD VENIPUNCTURE: CPT

## 2019-10-31 NOTE — HEALTH RISK ASSESSMENT
[Yes] : Yes [No falls in past year] : Patient reported no falls in the past year [0] : 2) Feeling down, depressed, or hopeless: Not at all (0) [No Retinopathy] : No retinopathy [Patient reported mammogram was normal] : Patient reported mammogram was normal [Patient reported PAP Smear was normal] : Patient reported PAP Smear was normal [With Significant Other] : lives with significant other [] :  [Fully functional (bathing, dressing, toileting, transferring, walking, feeding)] : Fully functional (bathing, dressing, toileting, transferring, walking, feeding) [Fully functional (using the telephone, shopping, preparing meals, housekeeping, doing laundry, using] : Fully functional and needs no help or supervision to perform IADLs (using the telephone, shopping, preparing meals, housekeeping, doing laundry, using transportation, managing medications and managing finances) [Reports normal functional visual acuity (ie: able to read med bottle)] : Reports normal functional visual acuity [Smoke Detector] : smoke detector [Carbon Monoxide Detector] : carbon monoxide detector [Safety elements used in home] : safety elements used in home [Seat Belt] :  uses seat belt [With Patient/Caregiver] : With Patient/Caregiver [] : No [de-identified] : social [[id number]] : 0 [EyeExamDate] : 10/19 [Change in mental status noted] : No change in mental status noted [Reports changes in hearing] : Reports no changes in hearing [Reports changes in vision] : Reports no changes in vision [Reports changes in dental health] : Reports no changes in dental health [Guns at Home] : no guns at home [Sunscreen] : does not use sunscreen [Travel to Developing Areas] : does not  travel to developing areas [TB Exposure] : is not being exposed to tuberculosis [Caregiver Concerns] : does not have caregiver concerns [MammogramDate] : 6/16 [PapSmearDate] : 6/16 [PapSmearComments] : linda [ColonoscopyDate] : 6/16 [AdvancecareDate] : 10/19

## 2019-10-31 NOTE — HISTORY OF PRESENT ILLNESS
[de-identified] : was seeing  (cardio) at [facility]--had neg prior cardiac w/u 2016--recently lost  20lbs with lifestyle changes--was prev on benicar and inderal for palpitations

## 2019-11-01 LAB
25(OH)D3 SERPL-MCNC: 21.6 NG/ML
ALBUMIN SERPL ELPH-MCNC: 4.5 G/DL
ALP BLD-CCNC: 80 U/L
ALT SERPL-CCNC: 9 U/L
ANION GAP SERPL CALC-SCNC: 15 MMOL/L
APPEARANCE: CLEAR
AST SERPL-CCNC: 20 U/L
BACTERIA: NEGATIVE
BASOPHILS # BLD AUTO: 0.1 K/UL
BASOPHILS NFR BLD AUTO: 1.2 %
BILIRUB SERPL-MCNC: 0.2 MG/DL
BILIRUBIN URINE: NEGATIVE
BLOOD URINE: NEGATIVE
BUN SERPL-MCNC: 21 MG/DL
CALCIUM SERPL-MCNC: 9.6 MG/DL
CHLORIDE SERPL-SCNC: 102 MMOL/L
CHOLEST SERPL-MCNC: 239 MG/DL
CHOLEST/HDLC SERPL: 3.1 RATIO
CO2 SERPL-SCNC: 22 MMOL/L
COLOR: COLORLESS
CREAT SERPL-MCNC: 0.57 MG/DL
EOSINOPHIL # BLD AUTO: 0.25 K/UL
EOSINOPHIL NFR BLD AUTO: 3 %
GLUCOSE QUALITATIVE U: NEGATIVE
GLUCOSE SERPL-MCNC: 100 MG/DL
HCT VFR BLD CALC: 46.4 %
HDLC SERPL-MCNC: 77 MG/DL
HGB BLD-MCNC: 14.9 G/DL
HYALINE CASTS: 0 /LPF
IMM GRANULOCYTES NFR BLD AUTO: 0.4 %
KETONES URINE: NEGATIVE
LDLC SERPL CALC-MCNC: 147 MG/DL
LEUKOCYTE ESTERASE URINE: NEGATIVE
LYMPHOCYTES # BLD AUTO: 2.83 K/UL
LYMPHOCYTES NFR BLD AUTO: 34 %
MAN DIFF?: NORMAL
MCHC RBC-ENTMCNC: 31.6 PG
MCHC RBC-ENTMCNC: 32.1 GM/DL
MCV RBC AUTO: 98.5 FL
MICROSCOPIC-UA: NORMAL
MONOCYTES # BLD AUTO: 0.71 K/UL
MONOCYTES NFR BLD AUTO: 8.5 %
NEUTROPHILS # BLD AUTO: 4.41 K/UL
NEUTROPHILS NFR BLD AUTO: 52.9 %
NITRITE URINE: NEGATIVE
PH URINE: 5.5
PLATELET # BLD AUTO: 354 K/UL
POTASSIUM SERPL-SCNC: 4 MMOL/L
PROT SERPL-MCNC: 7 G/DL
PROTEIN URINE: NEGATIVE
RBC # BLD: 4.71 M/UL
RBC # FLD: 12.3 %
RED BLOOD CELLS URINE: 0 /HPF
SODIUM SERPL-SCNC: 139 MMOL/L
SPECIFIC GRAVITY URINE: 1.01
SQUAMOUS EPITHELIAL CELLS: 0 /HPF
TRIGL SERPL-MCNC: 75 MG/DL
TSH SERPL-ACNC: 1.26 UIU/ML
UROBILINOGEN URINE: NORMAL
WBC # FLD AUTO: 8.33 K/UL
WHITE BLOOD CELLS URINE: 0 /HPF

## 2019-11-18 ENCOUNTER — TRANSCRIPTION ENCOUNTER (OUTPATIENT)
Age: 59
End: 2019-11-18

## 2020-04-13 ENCOUNTER — RX RENEWAL (OUTPATIENT)
Age: 60
End: 2020-04-13

## 2020-04-25 ENCOUNTER — MESSAGE (OUTPATIENT)
Age: 60
End: 2020-04-25

## 2020-05-02 LAB
SARS-COV-2 IGG SERPL IA-ACNC: 0 INDEX
SARS-COV-2 IGG SERPL QL IA: NEGATIVE

## 2020-09-23 ENCOUNTER — RX RENEWAL (OUTPATIENT)
Age: 60
End: 2020-09-23

## 2020-10-05 ENCOUNTER — RX RENEWAL (OUTPATIENT)
Age: 60
End: 2020-10-05

## 2020-10-08 ENCOUNTER — APPOINTMENT (OUTPATIENT)
Dept: INTERNAL MEDICINE | Facility: CLINIC | Age: 60
End: 2020-10-08
Payer: COMMERCIAL

## 2020-10-08 VITALS
WEIGHT: 164 LBS | BODY MASS INDEX: 30.18 KG/M2 | OXYGEN SATURATION: 98 % | TEMPERATURE: 97.6 F | RESPIRATION RATE: 16 BRPM | DIASTOLIC BLOOD PRESSURE: 73 MMHG | SYSTOLIC BLOOD PRESSURE: 131 MMHG | HEART RATE: 77 BPM | HEIGHT: 62 IN

## 2020-10-08 PROCEDURE — 99213 OFFICE O/P EST LOW 20 MIN: CPT

## 2020-10-08 NOTE — HISTORY OF PRESENT ILLNESS
[FreeTextEntry1] : f/u htn [de-identified] : went of hctz 12.5 for 3 wks--noted elevated bps to 150-170's and leg edema\par Had prior cardiac w/u neg --no cardiac symps

## 2020-12-11 NOTE — H&P PST ADULT - MALLAMPATI CLASS
Per spouse pt has hx of tonsil cancer, NEEDS ALL LIQUIDS THICKENED   Class II - visualization of the soft palate, fauces, and uvula

## 2020-12-15 PROBLEM — Z86.69 HISTORY OF ACUTE OTITIS MEDIA: Status: RESOLVED | Noted: 2017-02-03 | Resolved: 2020-12-15

## 2020-12-21 ENCOUNTER — TRANSCRIPTION ENCOUNTER (OUTPATIENT)
Age: 60
End: 2020-12-21

## 2021-02-07 ENCOUNTER — TRANSCRIPTION ENCOUNTER (OUTPATIENT)
Age: 61
End: 2021-02-07

## 2021-02-08 ENCOUNTER — EMERGENCY (EMERGENCY)
Facility: HOSPITAL | Age: 61
LOS: 0 days | Discharge: HOME | End: 2021-02-08
Attending: EMERGENCY MEDICINE
Payer: MEDICARE

## 2021-02-08 VITALS
HEART RATE: 82 BPM | SYSTOLIC BLOOD PRESSURE: 149 MMHG | TEMPERATURE: 99 F | RESPIRATION RATE: 18 BRPM | DIASTOLIC BLOOD PRESSURE: 98 MMHG | OXYGEN SATURATION: 99 % | HEIGHT: 62 IN
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
## 2021-02-08 DIAGNOSIS — Z98.890 OTHER SPECIFIED POSTPROCEDURAL STATES: Chronic | ICD-10-CM

## 2021-02-08 DIAGNOSIS — Z98.891 HISTORY OF UTERINE SCAR FROM PREVIOUS SURGERY: Chronic | ICD-10-CM

## 2021-02-08 DIAGNOSIS — M79.645 PAIN IN LEFT FINGER(S): ICD-10-CM

## 2021-02-08 DIAGNOSIS — S62.617A DISPLACED FRACTURE OF PROXIMAL PHALANX OF LEFT LITTLE FINGER, INITIAL ENCOUNTER FOR CLOSED FRACTURE: ICD-10-CM

## 2021-02-08 PROCEDURE — 73130 X-RAY EXAM OF HAND: CPT | Mod: 26,LT

## 2021-02-08 PROCEDURE — 99284 EMERGENCY DEPT VISIT MOD MDM: CPT

## 2021-02-08 PROCEDURE — 73120 X-RAY EXAM OF HAND: CPT | Mod: 26,LT

## 2021-02-08 NOTE — ED PROVIDER NOTE - PHYSICAL EXAMINATION
Gen: Alert, Well appearing. NAD    Head: NC, AT, normal lids/conjunctiva   Mskel: left 5th finger in splint  Skin: no rash, no bruising  Neuro: AAOx3

## 2021-02-08 NOTE — ED PROVIDER NOTE - PATIENT PORTAL LINK FT
You can access the FollowMyHealth Patient Portal offered by [facility] by registering at the following website: [email or web address]. By joining [employer]’s FollowMyHealth portal, you will also be able to view your health information using other applications (apps) compatible with our system.

## 2021-02-08 NOTE — ED PROVIDER NOTE - OBJECTIVE STATEMENT
61yo female with no significant medical history presents with left fifth phalanx pain, s/p twisting her finger yesterday. Pt had unofficial xray showing fifth phalanx spiral fx, sent for official xray and meeting dr hendrickson for reduction and splint. Denies any other symptoms.     No fever/chills, No ear pain/sore throat/dysphagia, No chest pain/palpitations, no SOB/cough,  No abdominal pain, No N/V/D, No neck/back pain, no rash, no changes in neurological status/function.

## 2021-02-08 NOTE — ED ADULT NURSE NOTE - PMH
Acquired hammertoe of left foot    Hallux valgus (acquired), right foot  s/p correction 11/2018  HV (hallux valgus), left    Hypertension    Obesity

## 2021-02-08 NOTE — ED PROVIDER NOTE - PRINCIPAL DIAGNOSIS
Closed displaced fracture of distal phalanx of left little finger, initial encounter Closed displaced fracture of proximal phalanx of left little finger, initial encounter

## 2021-02-08 NOTE — ED PROVIDER NOTE - CARE PLAN
Principal Discharge DX:	Closed displaced fracture of distal phalanx of left little finger, initial encounter   Principal Discharge DX:	Closed displaced fracture of proximal phalanx of left little finger, initial encounter

## 2021-02-08 NOTE — ED ADULT TRIAGE NOTE - CHIEF COMPLAINT QUOTE
pt states she tripped and fell yesterday. complaining of left pinky finger pain and swelling. sent by dr hendrickson for eval

## 2021-02-09 ENCOUNTER — APPOINTMENT (OUTPATIENT)
Dept: ORTHOPEDIC SURGERY | Facility: CLINIC | Age: 61
End: 2021-02-09
Payer: COMMERCIAL

## 2021-02-09 DIAGNOSIS — S62.619D DISPLACED FRACTURE OF PROXIMAL PHALANX OF UNSPECIFIED FINGER, SUBSEQUENT ENCOUNTER FOR FRACTURE WITH ROUTINE HEALING: ICD-10-CM

## 2021-02-09 PROCEDURE — 99204 OFFICE O/P NEW MOD 45 MIN: CPT | Mod: 25

## 2021-02-09 PROCEDURE — 99072 ADDL SUPL MATRL&STAF TM PHE: CPT

## 2021-02-09 PROCEDURE — 29085 APPL CAST HAND&LWR FOREARM: CPT | Mod: LT

## 2021-02-16 PROCEDURE — 73130 X-RAY EXAM OF HAND: CPT | Mod: 26,LT

## 2021-03-16 ENCOUNTER — APPOINTMENT (OUTPATIENT)
Dept: ORTHOPEDIC SURGERY | Facility: CLINIC | Age: 61
End: 2021-03-16

## 2021-03-30 ENCOUNTER — APPOINTMENT (OUTPATIENT)
Dept: PULMONOLOGY | Facility: CLINIC | Age: 61
End: 2021-03-30
Payer: COMMERCIAL

## 2021-03-30 DIAGNOSIS — U07.1 COVID-19: ICD-10-CM

## 2021-03-30 PROCEDURE — 99204 OFFICE O/P NEW MOD 45 MIN: CPT | Mod: GC,95

## 2021-03-30 NOTE — PLAN
[FreeTextEntry1] : 60F hx HTN, GERD, obesity (BMI 30), who comes for initial CROWN visit. Pt has symptoms of fever (now resolved), diarrhea, headache, chest congestion, nonproductive cough. Symptoms initially started on 3/17. Had COVID vaccine 12/29 and 1/19 (Pfizer). Tested COVID positive x2 (3/22 PCR, 3/23 rapid). At this time, she does not qualify for monoclonal antibodies because she is outside the treatment window. She appears to have mild symptoms. However she does not have a pulse oximeter to evaluate oxygen saturation. Will order home labs (sent to home draw) and will order pt a pulse oximeter.

## 2021-03-30 NOTE — HISTORY OF PRESENT ILLNESS
[Home] : at home, [unfilled] , at the time of the visit. [Medical Office: ([facility])___] : at the medical office located in  [Verbal consent obtained from patient] : the patient, [unfilled] [FreeTextEntry1] : 60F hx HTN, GERD, obesity (BMI 30), who comes for initial CROWN visit. Pt initially had symptoms of headache starting 3/17. Thought that her symptoms were a head cold since she had received her COVID vaccine on 12/29 and 1/19 (Pfizer). But pt then developed diarrhea, chest congestion and abdominal pain. She was tested with COVID PCR on 3/22 which was positive and had a rapid COVID test on 3/23. She had a fever initially but fever curve has improved. She also reported loss of smell/taste. Reports BRITO now. Does not have a working pulse oximeter at home.\par \par No history of smoking or asthma in the past. But she did have pneumonia and bronchitis.

## 2021-03-31 ENCOUNTER — NON-APPOINTMENT (OUTPATIENT)
Age: 61
End: 2021-03-31

## 2021-03-31 LAB
ALBUMIN SERPL ELPH-MCNC: 4.4 G/DL
ALP BLD-CCNC: 92 U/L
ALT SERPL-CCNC: 10 U/L
ANION GAP SERPL CALC-SCNC: 14 MMOL/L
AST SERPL-CCNC: 19 U/L
BASOPHILS # BLD AUTO: 0.06 K/UL
BASOPHILS NFR BLD AUTO: 0.8 %
BILIRUB SERPL-MCNC: 0.2 MG/DL
BUN SERPL-MCNC: 25 MG/DL
CALCIUM SERPL-MCNC: 9.6 MG/DL
CHLORIDE SERPL-SCNC: 102 MMOL/L
CO2 SERPL-SCNC: 25 MMOL/L
CREAT SERPL-MCNC: 0.64 MG/DL
CRP SERPL-MCNC: <3 MG/L
DEPRECATED D DIMER PPP IA-ACNC: <150 NG/ML DDU
EOSINOPHIL # BLD AUTO: 0.19 K/UL
EOSINOPHIL NFR BLD AUTO: 2.6 %
FERRITIN SERPL-MCNC: 182 NG/ML
GLUCOSE SERPL-MCNC: 93 MG/DL
HCT VFR BLD CALC: 43.5 %
HGB BLD-MCNC: 14.4 G/DL
IMM GRANULOCYTES NFR BLD AUTO: 0.3 %
LYMPHOCYTES # BLD AUTO: 3.03 K/UL
LYMPHOCYTES NFR BLD AUTO: 41.8 %
MAN DIFF?: NORMAL
MCHC RBC-ENTMCNC: 32.1 PG
MCHC RBC-ENTMCNC: 33.1 GM/DL
MCV RBC AUTO: 96.9 FL
MONOCYTES # BLD AUTO: 0.61 K/UL
MONOCYTES NFR BLD AUTO: 8.4 %
NEUTROPHILS # BLD AUTO: 3.34 K/UL
NEUTROPHILS NFR BLD AUTO: 46.1 %
PLATELET # BLD AUTO: 374 K/UL
POTASSIUM SERPL-SCNC: 4.1 MMOL/L
PROCALCITONIN SERPL-MCNC: 0.04 NG/ML
PROT SERPL-MCNC: 6.7 G/DL
RBC # BLD: 4.49 M/UL
RBC # FLD: 11.6 %
SODIUM SERPL-SCNC: 141 MMOL/L
WBC # FLD AUTO: 7.25 K/UL

## 2021-04-09 ENCOUNTER — RX RENEWAL (OUTPATIENT)
Age: 61
End: 2021-04-09

## 2021-05-05 ENCOUNTER — RX RENEWAL (OUTPATIENT)
Age: 61
End: 2021-05-05

## 2021-05-08 ENCOUNTER — APPOINTMENT (OUTPATIENT)
Dept: INTERNAL MEDICINE | Facility: CLINIC | Age: 61
End: 2021-05-08
Payer: COMMERCIAL

## 2021-05-08 VITALS
OXYGEN SATURATION: 97 % | TEMPERATURE: 97.8 F | WEIGHT: 173.25 LBS | HEART RATE: 73 BPM | BODY MASS INDEX: 31.88 KG/M2 | HEIGHT: 62 IN

## 2021-05-08 VITALS — DIASTOLIC BLOOD PRESSURE: 82 MMHG | SYSTOLIC BLOOD PRESSURE: 142 MMHG

## 2021-05-08 DIAGNOSIS — U07.1 COVID-19: ICD-10-CM

## 2021-05-08 PROCEDURE — 99213 OFFICE O/P EST LOW 20 MIN: CPT

## 2021-05-08 PROCEDURE — 99072 ADDL SUPL MATRL&STAF TM PHE: CPT

## 2021-05-08 NOTE — ASSESSMENT
[FreeTextEntry1] : increase hctz to 25mg--pt declines adding additional bp rxn\par pt to message provider in few wks\par pt to loose wt. and increase activity

## 2021-05-08 NOTE — HISTORY OF PRESENT ILLNESS
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

## 2021-06-02 ENCOUNTER — TRANSCRIPTION ENCOUNTER (OUTPATIENT)
Age: 61
End: 2021-06-02

## 2021-06-02 ENCOUNTER — RX RENEWAL (OUTPATIENT)
Age: 61
End: 2021-06-02

## 2021-07-21 ENCOUNTER — TRANSCRIPTION ENCOUNTER (OUTPATIENT)
Age: 61
End: 2021-07-21

## 2021-07-21 RX ORDER — HYDROCHLOROTHIAZIDE 12.5 MG/1
12.5 CAPSULE ORAL
Qty: 90 | Refills: 1 | Status: COMPLETED | COMMUNITY
Start: 2017-05-10 | End: 2021-07-21

## 2021-07-22 ENCOUNTER — TRANSCRIPTION ENCOUNTER (OUTPATIENT)
Age: 61
End: 2021-07-22

## 2021-11-06 ENCOUNTER — APPOINTMENT (OUTPATIENT)
Dept: INTERNAL MEDICINE | Facility: CLINIC | Age: 61
End: 2021-11-06

## 2021-11-09 ENCOUNTER — INPATIENT (INPATIENT)
Facility: HOSPITAL | Age: 61
LOS: 0 days | Discharge: HOME | End: 2021-11-10
Attending: HOSPITALIST | Admitting: HOSPITALIST
Payer: MEDICARE

## 2021-11-09 VITALS
WEIGHT: 169.54 LBS | OXYGEN SATURATION: 100 % | SYSTOLIC BLOOD PRESSURE: 126 MMHG | HEIGHT: 62 IN | HEART RATE: 99 BPM | TEMPERATURE: 99 F | DIASTOLIC BLOOD PRESSURE: 85 MMHG | RESPIRATION RATE: 22 BRPM

## 2021-11-09 DIAGNOSIS — Z98.891 HISTORY OF UTERINE SCAR FROM PREVIOUS SURGERY: Chronic | ICD-10-CM

## 2021-11-09 DIAGNOSIS — Z98.890 OTHER SPECIFIED POSTPROCEDURAL STATES: Chronic | ICD-10-CM

## 2021-11-09 LAB
ALBUMIN SERPL ELPH-MCNC: 3.8 G/DL — SIGNIFICANT CHANGE UP (ref 3.3–5)
ALP SERPL-CCNC: 77 U/L — SIGNIFICANT CHANGE UP (ref 40–120)
ALT FLD-CCNC: 15 U/L — SIGNIFICANT CHANGE UP (ref 12–78)
ANION GAP SERPL CALC-SCNC: 13 MMOL/L — SIGNIFICANT CHANGE UP (ref 5–17)
ANION GAP SERPL CALC-SCNC: 5 MMOL/L — SIGNIFICANT CHANGE UP (ref 5–17)
APPEARANCE UR: CLEAR — SIGNIFICANT CHANGE UP
APTT BLD: 24.9 SEC — LOW (ref 27.5–35.5)
AST SERPL-CCNC: 21 U/L — SIGNIFICANT CHANGE UP (ref 15–37)
BACTERIA # UR AUTO: ABNORMAL
BASE EXCESS BLDA CALC-SCNC: 2.8 MMOL/L — HIGH (ref -2–2)
BASOPHILS # BLD AUTO: 0.06 K/UL — SIGNIFICANT CHANGE UP (ref 0–0.2)
BASOPHILS NFR BLD AUTO: 0.8 % — SIGNIFICANT CHANGE UP (ref 0–2)
BILIRUB SERPL-MCNC: 0.7 MG/DL — SIGNIFICANT CHANGE UP (ref 0.2–1.2)
BILIRUB UR-MCNC: NEGATIVE — SIGNIFICANT CHANGE UP
BLOOD GAS COMMENTS: SIGNIFICANT CHANGE UP
BLOOD GAS COMMENTS: SIGNIFICANT CHANGE UP
BLOOD GAS SOURCE: SIGNIFICANT CHANGE UP
BUN SERPL-MCNC: 12 MG/DL — SIGNIFICANT CHANGE UP (ref 7–23)
BUN SERPL-MCNC: 8 MG/DL — SIGNIFICANT CHANGE UP (ref 7–23)
CALCIUM SERPL-MCNC: 7.6 MG/DL — LOW (ref 8.5–10.1)
CALCIUM SERPL-MCNC: 9.5 MG/DL — SIGNIFICANT CHANGE UP (ref 8.5–10.1)
CHLORIDE SERPL-SCNC: 103 MMOL/L — SIGNIFICANT CHANGE UP (ref 96–108)
CHLORIDE SERPL-SCNC: 98 MMOL/L — SIGNIFICANT CHANGE UP (ref 96–108)
CO2 SERPL-SCNC: 25 MMOL/L — SIGNIFICANT CHANGE UP (ref 22–31)
CO2 SERPL-SCNC: 28 MMOL/L — SIGNIFICANT CHANGE UP (ref 22–31)
COLOR SPEC: YELLOW — SIGNIFICANT CHANGE UP
CREAT SERPL-MCNC: 0.5 MG/DL — SIGNIFICANT CHANGE UP (ref 0.5–1.3)
CREAT SERPL-MCNC: 0.59 MG/DL — SIGNIFICANT CHANGE UP (ref 0.5–1.3)
DIFF PNL FLD: NEGATIVE — SIGNIFICANT CHANGE UP
EOSINOPHIL # BLD AUTO: 0.14 K/UL — SIGNIFICANT CHANGE UP (ref 0–0.5)
EOSINOPHIL NFR BLD AUTO: 1.8 % — SIGNIFICANT CHANGE UP (ref 0–6)
EPI CELLS # UR: SIGNIFICANT CHANGE UP
FLUAV AG NPH QL: SIGNIFICANT CHANGE UP
FLUBV AG NPH QL: SIGNIFICANT CHANGE UP
GLUCOSE SERPL-MCNC: 126 MG/DL — HIGH (ref 70–99)
GLUCOSE SERPL-MCNC: 142 MG/DL — HIGH (ref 70–99)
GLUCOSE UR QL: 100 MG/DL
HCO3 BLDA-SCNC: 27 MMOL/L — SIGNIFICANT CHANGE UP (ref 21–29)
HCT VFR BLD CALC: 42 % — SIGNIFICANT CHANGE UP (ref 34.5–45)
HGB BLD-MCNC: 15.1 G/DL — SIGNIFICANT CHANGE UP (ref 11.5–15.5)
HOROWITZ INDEX BLDA+IHG-RTO: 28 — SIGNIFICANT CHANGE UP
IMM GRANULOCYTES NFR BLD AUTO: 0.3 % — SIGNIFICANT CHANGE UP (ref 0–1.5)
INR BLD: 1.05 RATIO — SIGNIFICANT CHANGE UP (ref 0.88–1.16)
KETONES UR-MCNC: NEGATIVE — SIGNIFICANT CHANGE UP
LACTATE SERPL-SCNC: 1.6 MMOL/L — SIGNIFICANT CHANGE UP (ref 0.7–2)
LACTATE SERPL-SCNC: 2.4 MMOL/L — HIGH (ref 0.7–2)
LEUKOCYTE ESTERASE UR-ACNC: ABNORMAL
LYMPHOCYTES # BLD AUTO: 2.84 K/UL — SIGNIFICANT CHANGE UP (ref 1–3.3)
LYMPHOCYTES # BLD AUTO: 36.3 % — SIGNIFICANT CHANGE UP (ref 13–44)
MAGNESIUM SERPL-MCNC: 1.8 MG/DL — SIGNIFICANT CHANGE UP (ref 1.6–2.6)
MCHC RBC-ENTMCNC: 32.3 PG — SIGNIFICANT CHANGE UP (ref 27–34)
MCHC RBC-ENTMCNC: 36 GM/DL — SIGNIFICANT CHANGE UP (ref 32–36)
MCV RBC AUTO: 89.9 FL — SIGNIFICANT CHANGE UP (ref 80–100)
MONOCYTES # BLD AUTO: 0.69 K/UL — SIGNIFICANT CHANGE UP (ref 0–0.9)
MONOCYTES NFR BLD AUTO: 8.8 % — SIGNIFICANT CHANGE UP (ref 2–14)
NEUTROPHILS # BLD AUTO: 4.07 K/UL — SIGNIFICANT CHANGE UP (ref 1.8–7.4)
NEUTROPHILS NFR BLD AUTO: 52 % — SIGNIFICANT CHANGE UP (ref 43–77)
NITRITE UR-MCNC: NEGATIVE — SIGNIFICANT CHANGE UP
NRBC # BLD: 0 /100 WBCS — SIGNIFICANT CHANGE UP (ref 0–0)
PCO2 BLDA: 40 MMHG — SIGNIFICANT CHANGE UP (ref 32–46)
PH BLD: 7.44 — SIGNIFICANT CHANGE UP (ref 7.35–7.45)
PH UR: 7 — SIGNIFICANT CHANGE UP (ref 5–8)
PHOSPHATE SERPL-MCNC: 2.3 MG/DL — LOW (ref 2.5–4.5)
PLATELET # BLD AUTO: 353 K/UL — SIGNIFICANT CHANGE UP (ref 150–400)
PO2 BLDA: 84 MMHG — SIGNIFICANT CHANGE UP (ref 74–108)
POTASSIUM SERPL-MCNC: 2.6 MMOL/L — CRITICAL LOW (ref 3.5–5.3)
POTASSIUM SERPL-MCNC: 3.5 MMOL/L — SIGNIFICANT CHANGE UP (ref 3.5–5.3)
POTASSIUM SERPL-SCNC: 2.6 MMOL/L — CRITICAL LOW (ref 3.5–5.3)
POTASSIUM SERPL-SCNC: 3.5 MMOL/L — SIGNIFICANT CHANGE UP (ref 3.5–5.3)
PROT SERPL-MCNC: 7.5 GM/DL — SIGNIFICANT CHANGE UP (ref 6–8.3)
PROT UR-MCNC: NEGATIVE MG/DL — SIGNIFICANT CHANGE UP
PROTHROM AB SERPL-ACNC: 12.2 SEC — SIGNIFICANT CHANGE UP (ref 10.6–13.6)
RBC # BLD: 4.67 M/UL — SIGNIFICANT CHANGE UP (ref 3.8–5.2)
RBC # FLD: 11.7 % — SIGNIFICANT CHANGE UP (ref 10.3–14.5)
SAO2 % BLDA: 96 % — SIGNIFICANT CHANGE UP (ref 92–96)
SARS-COV-2 RNA SPEC QL NAA+PROBE: SIGNIFICANT CHANGE UP
SODIUM SERPL-SCNC: 136 MMOL/L — SIGNIFICANT CHANGE UP (ref 135–145)
SODIUM SERPL-SCNC: 136 MMOL/L — SIGNIFICANT CHANGE UP (ref 135–145)
SP GR SPEC: 1.01 — SIGNIFICANT CHANGE UP (ref 1.01–1.02)
TROPONIN I, HIGH SENSITIVITY RESULT: 3.3 NG/L — SIGNIFICANT CHANGE UP
TROPONIN I, HIGH SENSITIVITY RESULT: 5.3 NG/L — SIGNIFICANT CHANGE UP
UROBILINOGEN FLD QL: NEGATIVE MG/DL — SIGNIFICANT CHANGE UP
WBC # BLD: 7.82 K/UL — SIGNIFICANT CHANGE UP (ref 3.8–10.5)
WBC # FLD AUTO: 7.82 K/UL — SIGNIFICANT CHANGE UP (ref 3.8–10.5)
WBC UR QL: SIGNIFICANT CHANGE UP

## 2021-11-09 PROCEDURE — 0042T: CPT

## 2021-11-09 PROCEDURE — 70498 CT ANGIOGRAPHY NECK: CPT | Mod: 26,MA

## 2021-11-09 PROCEDURE — 99285 EMERGENCY DEPT VISIT HI MDM: CPT

## 2021-11-09 PROCEDURE — 93010 ELECTROCARDIOGRAM REPORT: CPT

## 2021-11-09 PROCEDURE — 71045 X-RAY EXAM CHEST 1 VIEW: CPT | Mod: 26

## 2021-11-09 PROCEDURE — 99223 1ST HOSP IP/OBS HIGH 75: CPT

## 2021-11-09 PROCEDURE — 70496 CT ANGIOGRAPHY HEAD: CPT | Mod: 26,MA

## 2021-11-09 RX ORDER — ALTEPLASE 100 MG
62.3 KIT INTRAVENOUS ONCE
Refills: 0 | Status: DISCONTINUED | OUTPATIENT
Start: 2021-11-09 | End: 2021-11-09

## 2021-11-09 RX ORDER — POTASSIUM CHLORIDE 20 MEQ
10 PACKET (EA) ORAL
Refills: 0 | Status: COMPLETED | OUTPATIENT
Start: 2021-11-09 | End: 2021-11-09

## 2021-11-09 RX ORDER — ONDANSETRON 8 MG/1
4 TABLET, FILM COATED ORAL EVERY 6 HOURS
Refills: 0 | Status: DISCONTINUED | OUTPATIENT
Start: 2021-11-09 | End: 2021-11-10

## 2021-11-09 RX ORDER — ONDANSETRON 8 MG/1
4 TABLET, FILM COATED ORAL ONCE
Refills: 0 | Status: COMPLETED | OUTPATIENT
Start: 2021-11-09 | End: 2021-11-09

## 2021-11-09 RX ORDER — POTASSIUM CHLORIDE 20 MEQ
40 PACKET (EA) ORAL EVERY 4 HOURS
Refills: 0 | Status: COMPLETED | OUTPATIENT
Start: 2021-11-09 | End: 2021-11-09

## 2021-11-09 RX ORDER — ENOXAPARIN SODIUM 100 MG/ML
40 INJECTION SUBCUTANEOUS DAILY
Refills: 0 | Status: DISCONTINUED | OUTPATIENT
Start: 2021-11-09 | End: 2021-11-10

## 2021-11-09 RX ORDER — IBUPROFEN 200 MG
600 TABLET ORAL EVERY 8 HOURS
Refills: 0 | Status: DISCONTINUED | OUTPATIENT
Start: 2021-11-09 | End: 2021-11-10

## 2021-11-09 RX ORDER — SODIUM CHLORIDE 9 MG/ML
1000 INJECTION INTRAMUSCULAR; INTRAVENOUS; SUBCUTANEOUS
Refills: 0 | Status: DISCONTINUED | OUTPATIENT
Start: 2021-11-09 | End: 2021-11-10

## 2021-11-09 RX ORDER — MORPHINE SULFATE 50 MG/1
2 CAPSULE, EXTENDED RELEASE ORAL EVERY 6 HOURS
Refills: 0 | Status: DISCONTINUED | OUTPATIENT
Start: 2021-11-09 | End: 2021-11-10

## 2021-11-09 RX ORDER — POTASSIUM CHLORIDE 20 MEQ
40 PACKET (EA) ORAL ONCE
Refills: 0 | Status: COMPLETED | OUTPATIENT
Start: 2021-11-09 | End: 2021-11-09

## 2021-11-09 RX ORDER — ALTEPLASE 100 MG
6.9 KIT INTRAVENOUS ONCE
Refills: 0 | Status: DISCONTINUED | OUTPATIENT
Start: 2021-11-09 | End: 2021-11-09

## 2021-11-09 RX ORDER — DIPHENHYDRAMINE HCL 50 MG
25 CAPSULE ORAL ONCE
Refills: 0 | Status: COMPLETED | OUTPATIENT
Start: 2021-11-09 | End: 2021-11-09

## 2021-11-09 RX ORDER — SODIUM CHLORIDE 9 MG/ML
2400 INJECTION INTRAMUSCULAR; INTRAVENOUS; SUBCUTANEOUS ONCE
Refills: 0 | Status: COMPLETED | OUTPATIENT
Start: 2021-11-09 | End: 2021-11-09

## 2021-11-09 RX ORDER — METOCLOPRAMIDE HCL 10 MG
10 TABLET ORAL ONCE
Refills: 0 | Status: COMPLETED | OUTPATIENT
Start: 2021-11-09 | End: 2021-11-09

## 2021-11-09 RX ADMIN — SODIUM CHLORIDE 125 MILLILITER(S): 9 INJECTION INTRAMUSCULAR; INTRAVENOUS; SUBCUTANEOUS at 13:51

## 2021-11-09 RX ADMIN — Medication 10 MILLIGRAM(S): at 13:51

## 2021-11-09 RX ADMIN — ENOXAPARIN SODIUM 40 MILLIGRAM(S): 100 INJECTION SUBCUTANEOUS at 16:30

## 2021-11-09 RX ADMIN — Medication 600 MILLIGRAM(S): at 19:55

## 2021-11-09 RX ADMIN — Medication 40 MILLIEQUIVALENT(S): at 14:45

## 2021-11-09 RX ADMIN — Medication 100 MILLIEQUIVALENT(S): at 16:28

## 2021-11-09 RX ADMIN — Medication 100 MILLIEQUIVALENT(S): at 20:31

## 2021-11-09 RX ADMIN — Medication 25 MILLIGRAM(S): at 14:14

## 2021-11-09 RX ADMIN — Medication 600 MILLIGRAM(S): at 18:45

## 2021-11-09 RX ADMIN — Medication 40 MILLIEQUIVALENT(S): at 22:51

## 2021-11-09 RX ADMIN — SODIUM CHLORIDE 2400 MILLILITER(S): 9 INJECTION INTRAMUSCULAR; INTRAVENOUS; SUBCUTANEOUS at 16:29

## 2021-11-09 RX ADMIN — ONDANSETRON 4 MILLIGRAM(S): 8 TABLET, FILM COATED ORAL at 13:51

## 2021-11-09 RX ADMIN — Medication 100 MILLIEQUIVALENT(S): at 18:22

## 2021-11-09 RX ADMIN — Medication 40 MILLIEQUIVALENT(S): at 18:21

## 2021-11-09 NOTE — ED PROVIDER NOTE - OBJECTIVE STATEMENT
presents with headache and n/v for the past hour.  actively vomiting and has remote hx of migraine.  dizzy and the headache was 'driving her crazy'    Some dyarthria presents with headache and n/v for the past hour.  actively vomiting and has remote hx of migraine.  dizzy and the headache was 'driving her crazy'    Some dysarthria

## 2021-11-09 NOTE — ED PROVIDER NOTE - PHYSICAL EXAMINATION
See NIHSS    [place]:  General: No distress.  Mentation at baseline.   HEENT: WNL  Chest/Lungs: CTAB, No wheeze, No retractions, No increased work of breathing, Normal rate  Heart: S1S2 RRR, No M/R/G, Pules equal Bilaterally in upper and lower extremities distally  Abd: soft, NT/ND, No guarding, No rebound.  No hernias, no palpable masses.  Extrem: FROM in all joints, no significant edema noted, No ulcers.  Cap refil < 2sec.  Skin: No rash noted, warm dry.  Neuro:  Grossly normal.  No difficulty ambulating. No focal deficits.  Psychiatric: No evidence of delusions. No SI/HI.

## 2021-11-09 NOTE — H&P ADULT - ASSESSMENT
59 y/o female employee with history of hypertension presents with N/V  near syncopal event secondary to intractable migraine, Patient is hypokalemic secondary to thiazide diuretic use and N/V.    #Admit to Medicine Telemetry Attending [place]  #Fluid Hydration with LR or NS   #Pain control with ibuprofen or Opoid if severe patient to follow up as ot patient wi neurology   #Agressive repletion of Hypokalenmia with K-riders and PO will follow basic in AM     DVT prophalaxis with lovenox     Dispo Likely home in am

## 2021-11-09 NOTE — STROKE CODE NOTE - NIH STROKE SCALE: 10. DYSARTHRIA, QM
(1) Mild-to-moderate dysarthria; patient slurs at least some words and, at worst, can be understood with some difficulty (0) Normal

## 2021-11-09 NOTE — H&P ADULT - NSHPPHYSICALEXAM_GEN_ALL_CORE
GENERAL: pale diaphoretic   HEAD:  Atraumatic, Normocephalic slight facial droop?  EYES: EOMI, PERRLA, conjunctiva and sclera clear  ENMT: No tonsillar erythema, exudates, or enlargement; Moist mucous membranes, Good dentition, No lesions  NECK: Supple, No JVD, Normal thyroid  NERVOUS SYSTEM:  Alert & Oriented X3, Good concentration; Motor Strength 5/5 B/L upper and lower extremities; DTRs 2+ intact and symmetric  CHEST/LUNG: Clear to percussion bilaterally; No rales, rhonchi, wheezing, or rubs  HEART: Regular rate and rhythm; No murmurs, rubs, or gallops  ABDOMEN: Soft, Nontender, Nondistended; Bowel sounds present  EXTREMITIES:  2+ Peripheral Pulses, No clubbing, cyanosis, or edema  SKIN: pale diaphoretic

## 2021-11-09 NOTE — ED ADULT NURSE NOTE - OBJECTIVE STATEMENT
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 Multiple episodes of vomiting. HX migraines. Code stroke called. No facial droop, no pronator drift. Slight weakness on left upper extremity compared to right.  Speech slightly slurred. IV placed, labs drawn and sent. Sent to CT on CM, NSR. Aox3, Breathing on 2LNC

## 2021-11-09 NOTE — H&P ADULT - NSHPREVIEWOFSYSTEMS_GEN_ALL_CORE
CONSTITUTIONAL: fatigue  EYES: No eye pain, visual disturbances, or discharge  ENMT:  No difficulty hearing, tinnitus, vertigo; No sinus or throat pain  NECK: No pain or stiffness  BREASTS: No pain, masses, or nipple discharge  RESPIRATORY: No cough, wheezing, chills or hemoptysis; No shortness of breath  CARDIOVASCULAR: No chest pain, palpitations, dizziness, or leg swelling  GASTROINTESTINAL: No abdominal or epigastric pain. No nausea, vomiting, or hematemesis; No diarrhea or constipation. No melena or hematochezia.  GENITOURINARY: No dysuria, frequency, hematuria, or incontinence  NEUROLOGICAL:  headaches  loss of strength,   SKIN: No itching, burning, rashes, or lesions   LYMPH NODES: No enlarged glands  ENDOCRINE: No heat or cold intolerance; No hair loss  MUSCULOSKELETAL: No joint pain or swelling; No muscle, back, or extremity pain  PSYCHIATRIC: No depression, anxiety, mood swings, or difficulty sleeping  HEME/LYMPH: No easy bruising, or bleeding gums  ALLERGY AND IMMUNOLOGIC: No hives or eczema

## 2021-11-09 NOTE — H&P ADULT - NSHPLABSRESULTS_GEN_ALL_CORE
15.1   7.82  )-----------( 353      ( 09 Nov 2021 13:48 )             42.0     11-09    136  |  98  |  12  ----------------------------<  126<H>  2.6<LL>   |  25  |  0.59    Ca    9.5      09 Nov 2021 13:49    TPro  7.5  /  Alb  3.8  /  TBili  0.7  /  DBili  x   /  AST  21  /  ALT  15  /  AlkPhos  77  11-09    PT/INR - ( 09 Nov 2021 13:48 )   PT: 12.2 sec;   INR: 1.05 ratio         PTT - ( 09 Nov 2021 13:48 )  PTT:24.9 sec  < from: CT Angio Head w/ IV Cont (11.09.21 @ 13:41) >    Mismatch ratio: infinite    IMPRESSION:      1. No significant hypoperfusion or penumbra is suggested. There is no area of core infarction.  2. Vessels are widely patent. There isno intraluminal thrombus in the right middle cerebral artery. No carotid stenosis or dissection or occlusion noted. Vertebrals are patent, dominant on the left. Intracerebral vasculature is unremarkable.  3. Degenerative changes are noted in the cervical spine with facet disease at multiple levels but no significant central stenosis.    < end of copied text >

## 2021-11-09 NOTE — ED ADULT TRIAGE NOTE - CHIEF COMPLAINT QUOTE
pt a&o x4 diaphoretic n//v/headache, numbness to right side of face, right had grasp weak. code stroke initiated in triage md covington. last known normal this morning unknown time.

## 2021-11-09 NOTE — STROKE CODE NOTE - ASSESSMENT/PLAN
HISTORY OF PRESENT ILLNESS:  Ms. Xavier is a 59 y/o Female with history of HTN migraines who was evaluated in [facility] ED for sudden onset headache with possible dysarthria. CODE STROKE was called for headache and dysarthria with possible L Nasolabial fold flattening. Patient reported sudden onset headache with nausea, vomiting -- reported similar to prior migraines but more sudden onset than prior migraines. Denies any unilateral weakness, problems with coordination, visual changes, or word finding difficulty.  At baseline, patient is IADL.    LKN: 10:50am  NIHSS 0  pre-MRS: 0    . Stroke risk factors are () and PMH/PSH is PAST MEDICAL & SURGICAL HISTORY:  Hypertension    Hallux valgus (acquired), right foot  s/p correction 2018    Obesity    HV (hallux valgus), left    Acquired hammertoe of left foot    H/O  section  X 2    H/O bilateral breast reduction surgery    H/O endoscopy  &amp; colonoscopy    S/P bunionectomy  Right and hammertoe correction 2018    . Presented to (OSH) on (date/time). Last known normal time was ().     Pre-stroke MRS= 0     *Modified Barnwell Score: 0      HOME MEDICATIONS:  Home Medications:  hydroCHLOROthiazide 25 mg oral tablet: 1 tab(s) orally once a day (2021 15:55)      VITALS/DATA/ORDERS: [x] Reviewed    CT Head: Negative to my eye  CTA Neck: No significant stenosis  CTA Head: No LVO  CTP: CBF<30%= 0                                                     Tmax>6 secs= 8 cc (likely artifactual)    Labs:  CAPILLARY BLOOD GLUCOSE        Platelet Count - Automated: 353 K/uL (21 @ 13:48)    PT/INR - ( 2021 13:48 )   PT: 12.2 sec;   INR: 1.05 ratio         PTT - ( 2021 13:48 )  PTT:24.9 sec  CT Head:     EXAMINATION: Assisted by (OSH staff)  NIHSS: 0    NEUROLOGIC EXAMINATION (choose or modify optional elements which supplement NIHSS): Assisted by CONCHITA Reno and ED physician, Dr. Grullon  Mental status: The patient is oriented to person, place, time and date.   Fund of knowledge is intact and normal.  Language is fluent with normal repetition, comprehension and naming.  No dysarthria.    Cranial nerves:  VFF.  EOMI w/o nystagmus or skew.  No ptosis.  Facial sensation is normal.  No facial asymmetry.    Motor examination:   Normal tone, bulk and range of motion.  No tremors or involuntary movements.  Formal Muscle Strength Testing: (MRC grade R/L) 5/5 UE; 5/5 LE.  No drift.  Sensory examination:   Intact to light touch and pinprick, pain  Cerebellum:   FTN/HKS intact.        IV Alteplase CONTRAINDICATIONS  [] None    ABSOLUTE EXCLUSION CRITERIA:       RELATIVE EXCLUSION CRITERIA: Isolated/resolved deficits    ASSESSMENT: Patient with sudden onset headache with minimal dysarthria likely in the setting of migraine; low suspicion for cerebrovascular event. tPA not given as symptoms mild and isolated. Not a candidate for endovascular therapy as no LVO.        RECOMMENDATIONS:  [] Given isolated symptoms with NIHSS 0, would not administer tPA at this time  [] No indication for transfer as no LVO noted on CTA     Plan discussed with Stroke Attending, Dr. Libman.        Consultation provided via live, two-way video streaming. Consultation provided in patient's preferred language. HISTORY OF PRESENT ILLNESS:  Ms. Xavier is a 61 y/o Female with history of HTN migraines who was evaluated in [facility] ED for sudden onset headache with possible dysarthria. CODE STROKE was called for headache and dysarthria with possible L Nasolabial fold flattening. Patient reported sudden onset headache with nausea, vomiting -- reported similar to prior migraines but more sudden onset than prior migraines. Denies any unilateral weakness, problems with coordination, visual changes, or word finding difficulty.  At baseline, patient is IADL.    LKN: 10:50am  NIHSS 0  pre-MRS: 0    . Stroke risk factors are () and PMH/PSH is PAST MEDICAL & SURGICAL HISTORY:  Hypertension    Hallux valgus (acquired), right foot  s/p correction 2018    Obesity    HV (hallux valgus), left    Acquired hammertoe of left foot    H/O  section  X 2    H/O bilateral breast reduction surgery    H/O endoscopy  &amp; colonoscopy    S/P bunionectomy  Right and hammertoe correction 2018    . Presented to (OSH) on (date/time). Last known normal time was ().     Pre-stroke MRS= 0     *Modified Kendall Score: 0      HOME MEDICATIONS:  Home Medications:  hydroCHLOROthiazide 25 mg oral tablet: 1 tab(s) orally once a day (2021 15:55)      VITALS/DATA/ORDERS: [x] Reviewed    CT Head: Negative to my eye  CTA Neck: No significant stenosis  CTA Head: No LVO  CTP: CBF<30%= 0                                                     Tmax>6 secs= 8 cc (likely artifactual)    Labs:  CAPILLARY BLOOD GLUCOSE        Platelet Count - Automated: 353 K/uL (21 @ 13:48)    PT/INR - ( 2021 13:48 )   PT: 12.2 sec;   INR: 1.05 ratio         PTT - ( 2021 13:48 )  PTT:24.9 sec  CT Head:     EXAMINATION: Assisted by (OSH staff)  NIHSS: 0    NEUROLOGIC EXAMINATION (choose or modify optional elements which supplement NIHSS): Assisted by CONCHITA Reno and ED physician, Dr. Grullon  Mental status: The patient is oriented to person, place, time and date.   Fund of knowledge is intact and normal.  Language is fluent with normal repetition, comprehension and naming.  No dysarthria.    Cranial nerves:  VFF.  EOMI w/o nystagmus or skew.  No ptosis.  Facial sensation is normal.  No facial asymmetry.    Motor examination:   Normal tone, bulk and range of motion.  No tremors or involuntary movements.  Formal Muscle Strength Testing: (MRC grade R/L) 5/5 UE; 5/5 LE.  No drift.  Sensory examination:   Intact to light touch and pinprick, pain  Cerebellum:   FTN/HKS intact.        IV Alteplase CONTRAINDICATIONS  [] None    ABSOLUTE EXCLUSION CRITERIA:       RELATIVE EXCLUSION CRITERIA: Isolated/resolved deficits    ASSESSMENT: Patient with sudden onset headache with minimal dysarthria likely in the setting of migraine; low suspicion for cerebrovascular event. tPA not given as symptoms mild and isolated. Not a candidate for endovascular therapy as no LVO.        RECOMMENDATIONS:  [] Given isolated symptoms with NIHSS 0, would not administer tPA at this time  [] No indication for transfer as no LVO noted on CTA     Plan discussed with Stroke Attending, Dr. Libman.        Consultation provided via live, two-way video streaming. Consultation provided in patient's preferred language.    Stroke attending. Agree with above

## 2021-11-10 ENCOUNTER — TRANSCRIPTION ENCOUNTER (OUTPATIENT)
Age: 61
End: 2021-11-10

## 2021-11-10 VITALS
TEMPERATURE: 98 F | RESPIRATION RATE: 18 BRPM | DIASTOLIC BLOOD PRESSURE: 67 MMHG | HEART RATE: 86 BPM | SYSTOLIC BLOOD PRESSURE: 130 MMHG | OXYGEN SATURATION: 98 %

## 2021-11-10 LAB
A1C WITH ESTIMATED AVERAGE GLUCOSE RESULT: 5.4 % — SIGNIFICANT CHANGE UP (ref 4–5.6)
CA-I BLD-SCNC: 1.09 MMOL/L — LOW (ref 1.15–1.33)
CA-I BLD-SCNC: 1.15 MMOL/L — SIGNIFICANT CHANGE UP (ref 1.15–1.33)
CHOLEST SERPL-MCNC: 155 MG/DL — SIGNIFICANT CHANGE UP
COVID-19 NUCLEOCAPSID GAM AB INTERP: POSITIVE
COVID-19 NUCLEOCAPSID TOTAL GAM ANTIBODY RESULT: 6.61 INDEX — HIGH
COVID-19 SPIKE DOMAIN AB INTERP: POSITIVE
COVID-19 SPIKE DOMAIN ANTIBODY RESULT: >250 U/ML — HIGH
ESTIMATED AVERAGE GLUCOSE: 108 MG/DL — SIGNIFICANT CHANGE UP (ref 68–114)
HCT VFR BLD CALC: 38.4 % — SIGNIFICANT CHANGE UP (ref 34.5–45)
HCV AB S/CO SERPL IA: 0.1 S/CO — SIGNIFICANT CHANGE UP (ref 0–0.99)
HCV AB SERPL-IMP: SIGNIFICANT CHANGE UP
HDLC SERPL-MCNC: 50 MG/DL — LOW
HGB BLD-MCNC: 12.9 G/DL — SIGNIFICANT CHANGE UP (ref 11.5–15.5)
LACTATE SERPL-SCNC: 0.9 MMOL/L — SIGNIFICANT CHANGE UP (ref 0.7–2)
LIPID PNL WITH DIRECT LDL SERPL: 85 MG/DL — SIGNIFICANT CHANGE UP
MAGNESIUM SERPL-MCNC: 2.2 MG/DL — SIGNIFICANT CHANGE UP (ref 1.6–2.6)
MCHC RBC-ENTMCNC: 32.1 PG — SIGNIFICANT CHANGE UP (ref 27–34)
MCHC RBC-ENTMCNC: 33.6 GM/DL — SIGNIFICANT CHANGE UP (ref 32–36)
MCV RBC AUTO: 95.5 FL — SIGNIFICANT CHANGE UP (ref 80–100)
NON HDL CHOLESTEROL: 104 MG/DL — SIGNIFICANT CHANGE UP
NRBC # BLD: 0 /100 WBCS — SIGNIFICANT CHANGE UP (ref 0–0)
PHOSPHATE SERPL-MCNC: 2.7 MG/DL — SIGNIFICANT CHANGE UP (ref 2.5–4.5)
PLATELET # BLD AUTO: 264 K/UL — SIGNIFICANT CHANGE UP (ref 150–400)
RBC # BLD: 4.02 M/UL — SIGNIFICANT CHANGE UP (ref 3.8–5.2)
RBC # FLD: 12.1 % — SIGNIFICANT CHANGE UP (ref 10.3–14.5)
SARS-COV-2 IGG+IGM SERPL QL IA: 6.61 INDEX — HIGH
SARS-COV-2 IGG+IGM SERPL QL IA: >250 U/ML — HIGH
SARS-COV-2 IGG+IGM SERPL QL IA: POSITIVE
SARS-COV-2 IGG+IGM SERPL QL IA: POSITIVE
TRIGL SERPL-MCNC: 97 MG/DL — SIGNIFICANT CHANGE UP
WBC # BLD: 5.1 K/UL — SIGNIFICANT CHANGE UP (ref 3.8–10.5)
WBC # FLD AUTO: 5.1 K/UL — SIGNIFICANT CHANGE UP (ref 3.8–10.5)

## 2021-11-10 PROCEDURE — 99239 HOSP IP/OBS DSCHRG MGMT >30: CPT

## 2021-11-10 PROCEDURE — 70551 MRI BRAIN STEM W/O DYE: CPT | Mod: 26

## 2021-11-10 RX ORDER — IBUPROFEN 200 MG
1 TABLET ORAL
Qty: 0 | Refills: 0 | DISCHARGE
Start: 2021-11-10

## 2021-11-10 RX ADMIN — ENOXAPARIN SODIUM 40 MILLIGRAM(S): 100 INJECTION SUBCUTANEOUS at 13:29

## 2021-11-10 RX ADMIN — SODIUM CHLORIDE 125 MILLILITER(S): 9 INJECTION INTRAMUSCULAR; INTRAVENOUS; SUBCUTANEOUS at 03:24

## 2021-11-10 RX ADMIN — Medication 600 MILLIGRAM(S): at 09:33

## 2021-11-10 RX ADMIN — Medication 600 MILLIGRAM(S): at 10:30

## 2021-11-10 NOTE — DISCHARGE NOTE PROVIDER - HOSPITAL COURSE
HPI:   59 y/o female with PMH significant  for HTN  presents with headache and n/v for the past hour.  Actively vomiting and has remote hx of migraine.  Dizzy and the headache was 'driving her crazy'   (09 Nov 2021 15:22)     Patient  admitted to   and observed on telemetry  no evidence of u waves  patient had headache control with NSAIDs       hypokalemia was replaced with return to normal   hypocalcemia was noted resolved with fluid    discussed with patient need to follow up with PMD and to HOLD HCTZ for now     patient would like to follow up with a Neurologist for migraine

## 2021-11-10 NOTE — DISCHARGE NOTE PROVIDER - CARE PROVIDER_API CALL
PMD, Your  Phone: (   )    -  Fax: (   )    -  Follow Up Time: 1 week    Nitin Tavares)  Clinical Neurophysiology; Neurology  [address]  [place] [address]  Phone: [phone]  Fax: [phone]  Follow Up Time: 1 week    Clint Morales)  Cardiology; Cardiovascular Disease; Nuclear Cardiology  [address] [place] [address]  Phone: [phone]  Fax: [phone]  Follow Up Time: Routine

## 2021-11-10 NOTE — DISCHARGE NOTE PROVIDER - NSDCCPCAREPLAN_GEN_ALL_CORE_FT
PRINCIPAL DISCHARGE DIAGNOSIS  Diagnosis: Vascular migraine  Assessment and Plan of Treatment: patient to follow up with neurology NSAIDs (ibuprofen as needed)      SECONDARY DISCHARGE DIAGNOSES  Diagnosis: Dehydration  Assessment and Plan of Treatment: rewsolved with IV hydration

## 2021-11-10 NOTE — PHYSICAL THERAPY INITIAL EVALUATION ADULT - ADDITIONAL COMMENTS
Pt reporting that she lives in a private home with 5 steps to enter with 1 handrail. Home is split level with varies steps at home all with 1 handrail. Pt does not have/use assistive devices, is R handed, uses contacts, drives. Pt with no previous history of falls, buckling.

## 2021-11-10 NOTE — DISCHARGE NOTE PROVIDER - PROVIDER TOKENS
FREE:[LAST:[PMD],FIRST:[Your],PHONE:[(   )    -],FAX:[(   )    -],FOLLOWUP:[1 week]],PROVIDER:[TOKEN:[[id number]],FOLLOWUP:[1 week]],PROVIDER:[TOKEN:[[id number]],FOLLOWUP:[Routine]]

## 2021-11-10 NOTE — DISCHARGE NOTE NURSING/CASE MANAGEMENT/SOCIAL WORK - PATIENT PORTAL LINK FT
You can access the FollowMyHealth Patient Portal offered by [facility] by registering at the following website: [email or web address]. By joining [employer]’s FollowMyHealth portal, you will also be able to view your health information using other applications (apps) compatible with our system.

## 2021-11-10 NOTE — DISCHARGE NOTE PROVIDER - CARE PROVIDERS DIRECT ADDRESSES
,DirectAddress_Unknown,[email or web address]

## 2021-11-10 NOTE — PHYSICAL THERAPY INITIAL EVALUATION ADULT - PERTINENT HX OF CURRENT PROBLEM, REHAB EVAL
59 y/o female with PMH significant  for HTN  presents with headache and n/v for the past hour.  Actively vomiting and has remote hx of migraine.  Dizzy and the headache was 'driving her crazy'. CT imaging suggestive MRI follow up secondary finding of subtle R hyperdensity in R MCA - possible thrombus.

## 2021-11-11 ENCOUNTER — APPOINTMENT (OUTPATIENT)
Dept: NEUROLOGY | Facility: CLINIC | Age: 61
End: 2021-11-11
Payer: COMMERCIAL

## 2021-11-11 VITALS
HEIGHT: 62 IN | WEIGHT: 170 LBS | SYSTOLIC BLOOD PRESSURE: 131 MMHG | BODY MASS INDEX: 31.28 KG/M2 | HEART RATE: 89 BPM | DIASTOLIC BLOOD PRESSURE: 82 MMHG

## 2021-11-11 PROCEDURE — 99204 OFFICE O/P NEW MOD 45 MIN: CPT

## 2021-11-11 RX ORDER — PREDNISONE 10 MG/1
10 TABLET ORAL
Qty: 100 | Refills: 0 | Status: DISCONTINUED | COMMUNITY
Start: 2017-02-03 | End: 2021-11-11

## 2021-11-11 RX ORDER — OMEPRAZOLE, SODIUM BICARBONATE 20; 1100 MG/1; MG/1
20-1100 CAPSULE ORAL
Refills: 0 | Status: DISCONTINUED | COMMUNITY
End: 2021-11-11

## 2021-11-11 RX ORDER — OMEPRAZOLE 20 MG/1
20 CAPSULE, DELAYED RELEASE ORAL TWICE DAILY
Qty: 180 | Refills: 3 | Status: DISCONTINUED | COMMUNITY
Start: 2017-07-19 | End: 2021-11-11

## 2021-11-11 RX ORDER — OFLOXACIN OTIC 3 MG/ML
0.3 SOLUTION AURICULAR (OTIC) TWICE DAILY
Qty: 1 | Refills: 0 | Status: DISCONTINUED | COMMUNITY
Start: 2017-02-03 | End: 2021-11-11

## 2021-11-11 RX ORDER — OLOPATADINE HYDROCHLORIDE 665 UG/1
0.6 SPRAY, METERED NASAL
Qty: 3 | Refills: 1 | Status: DISCONTINUED | COMMUNITY
Start: 2017-02-03 | End: 2021-11-11

## 2021-11-11 RX ORDER — LINACLOTIDE 290 UG/1
290 CAPSULE, GELATIN COATED ORAL
Qty: 30 | Refills: 5 | Status: DISCONTINUED | COMMUNITY
Start: 2017-07-19 | End: 2021-11-11

## 2021-11-11 RX ORDER — OFLOXACIN OTIC 3 MG/ML
0.3 SOLUTION AURICULAR (OTIC) TWICE DAILY
Qty: 30 | Refills: 0 | Status: DISCONTINUED | COMMUNITY
Start: 2017-02-03 | End: 2021-11-11

## 2021-11-11 RX ORDER — ALBUTEROL SULFATE 90 UG/1
108 (90 BASE) AEROSOL, METERED RESPIRATORY (INHALATION) EVERY 6 HOURS
Qty: 3 | Refills: 1 | Status: DISCONTINUED | COMMUNITY
Start: 2017-02-03 | End: 2021-11-11

## 2021-11-11 RX ORDER — TRAMADOL HYDROCHLORIDE 50 MG/1
50 TABLET, COATED ORAL
Qty: 20 | Refills: 0 | Status: DISCONTINUED | COMMUNITY
Start: 2021-02-09 | End: 2021-11-11

## 2021-11-11 RX ORDER — CLARITHROMYCIN 500 MG/1
500 TABLET, FILM COATED ORAL
Qty: 20 | Refills: 0 | Status: DISCONTINUED | COMMUNITY
Start: 2017-02-03 | End: 2021-11-11

## 2021-11-11 RX ORDER — PROPRANOLOL HYDROCHLORIDE 10 MG/1
10 TABLET ORAL TWICE DAILY
Qty: 180 | Refills: 2 | Status: DISCONTINUED | COMMUNITY
Start: 2017-05-11 | End: 2021-11-11

## 2021-11-11 RX ORDER — LINACLOTIDE 145 UG/1
145 CAPSULE, GELATIN COATED ORAL
Qty: 90 | Refills: 3 | Status: DISCONTINUED | COMMUNITY
Start: 2017-04-27 | End: 2021-11-11

## 2021-11-11 RX ORDER — FLUTICASONE FUROATE AND VILANTEROL TRIFENATATE 200; 25 UG/1; UG/1
200-25 POWDER RESPIRATORY (INHALATION) DAILY
Qty: 3 | Refills: 1 | Status: DISCONTINUED | COMMUNITY
Start: 2017-02-03 | End: 2021-11-11

## 2021-11-11 NOTE — REVIEW OF SYSTEMS
[As Noted in HPI] : as noted in HPI [Confused or Disoriented] : no confusion [Memory Lapses or Loss] : no memory loss [Decr. Concentrating Ability] : no decrease in concentrating ability [Difficulty with Language] : no ~M difficulty with language [Changed Thought Patterns] : no change in thought patterns [Repeating Questions] : no repeated questioning about recent events [Facial Weakness] : no facial weakness [Arm Weakness] : no arm weakness [Hand Weakness] : no hand weakness [Leg Weakness] : no leg weakness [Poor Coordination] : good coordination [Difficulty Writing] : no difficulty writing [Difficulties in Speech] : no speech difficulties [Numbness] : no numbness [Tingling] : no tingling [Abnormal Sensation] : no abnormal sensation [Hypersensitivity] : no hypersensitivity [Seizures] : no convulsions [Dizziness] : no dizziness [Fainting] : no fainting [Lightheadedness] : no lightheadedness [Vertigo] : no vertigo [Cluster Headache] : no cluster headache [Migraine Headache] : migraine headaches [Tension Headache] : no tension-type headache [Difficulty Walking] : no difficulty walking [Inability to Walk] : able to walk [Ataxia] : no ataxia [Frequent Falls] : not falling [Limping] : not limping [Negative] : Endocrine

## 2021-11-11 NOTE — CONSULT LETTER
[Dear  ___] : Dear  [unfilled], [Please see my note below.] : Please see my note below. [Sincerely,] : Sincerely, [FreeTextEntry3] : Jessica Somers, FNP-BC

## 2021-11-11 NOTE — DISCUSSION/SUMMARY
[Intensive Blood Pressure Control] : intensive blood pressure control [Patient encouraged to discuss with Primary MD] : I encouraged the patient to discuss these important issues with ~his/her~ primary care doctor [Goals and Counseling] : I have reviewed the goals of stroke risk factor modification. I counseled the patient on measures to reduce stroke risk, including the importance of medication compliance, risk factor control, exercise, healthy diet and avoidance of smoking. I reviewed stroke warning signs and symptoms and appropriate actions to take if such occur. [FreeTextEntry1] : Gila Xavier is a 60 year old female who presents following hospitalization from [facility] for workup following episode of headache, nausea, vomiting, slight speech hesitancy, numbness in hands b/l L>R, numbness in legs b/l, and feeling like vision was "closing in" on her. NIHSS was 0.  MRI head was negative for acute infarction, acute hemorrhage, cerebral edema, or intracranial mass effect. She was found to have some metabolic derangements; hypocalcemia and hypokalemia. The headache felt similar to her typical migraines except it came in more suddenly than usual. She feels that she got anxious from everyone seeing her vomiting and the attention she was getting in the ED, and that her anxiety may have contributed to her other symptoms. Her symptoms resolved within an hour and today she is feeling well. Her neurologic exam is non focal. Her presentation was likely due to a migraine or metabolic derangements secondary to the nausea and vomiting. \par \par Overall, she is neurologically stable. We discussed the importance of managing risk factors to decrease risk of stroke. Her LDL is 85 and A1C is 5.4%. She exercises daily, does not smoke, and is currently on a diet to try and lose weight. We discussed the importance of managing blood pressure. I recommended she keep a headache journal and document headache occurrence along with triggers, duration, and alleviating factors. I recommend that she purchase the following vitamins for migraine prophylaxis: Vitamin B2 (Riboflavin) 400mg/day, Magnesium Oxide 400mg at night, and CoQ10 300mg in the morning. I recommended she start with one supplement for a week, prior to adding a second one, and then waiting another week prior to adding the third; in the event that she develops intolerance to one of the supplements, it will be easier to distinguish which one she cannot tolerate. I provided her with a referral to general neurology for further management of her migraines, and told he to bring her headache journal to that visit. I recommend she follow up with her PCP as soon as possible to follow up about her blood pressure management and for all routine primary care needs.

## 2021-11-11 NOTE — HISTORY OF PRESENT ILLNESS
[FreeTextEntry1] : Gila Xavier is a 59 y/o RH Female with history of hypertension and migraines who was evaluated in [facility] ED on 11/9/21 for sudden onset headache with possible L Nasolabial fold flattening. She reported a sudden onset headache with nausea, and vomiting  which was similar in quality to prior migraines but the onset was more sudden than that of her typical migraines.  She denied any unilateral weakness, problems with coordination, visual changes, or word finding difficulty. Her hospital course was significant for hypokalemia and hypocalcemia, which were corrected. NIHSS  was 0. tPA was not administered as symptoms were mild and isolated. She was not a candidate for endovascular therapy as there was no large vessel occlusion. \par CT head demonstrated no  evidence of an acute territorial infarct or hemorrhage, there is subtle hyperdensity in the right middle cerebral artery, which raised the possibility of thrombus. CT Perfusion demonstrated no significant hypoperfusion or penumbra. There is no area of core infarction. CTA Head and Neck demonstrated widely patent vessels. There is no intraluminal thrombus in the right middle cerebral artery. No carotid stenosis or dissection or occlusion noted. Vertebrals are patent, dominant on the left. Intracerebral vasculature is unremarkable.Degenerative changes are noted in the cervical spine with facet disease at multiple levels but no significant central stenosis. MRI head was negative for acute infarction, acute hemorrhage, cerebral edema, or intracranial mass effect.There are a few scattered T2/FLAIR hyperintensities in the subcortical and periventricular white matter which are nonspecific but most commonly represent chronic microvascular ischemic changes\par \par Today, she reports feeling well and states that she has a mild headache. She reports that she typically experiences about two migraines a month which are relieved by Excedrin and sleep. In the past she had used Fioricet which gave her rebound headaches and has made her apprehensive about starting any other headache therapy. She recounts that during the episode at [facility] she also felt that she was having difficulty speaking clearly , she had numbness in her hands bilaterally, but more on the left which made her hand "claw up", legs started getting numb, and she felt like vision was "closing in on her". The entire episode lasted under an hour.  She received potassium repletion and IV hydration which helped her feel much better. She states that her left arm is often numb upon awakening, she feels it can be secondary to sleeping on it, and it always resolves once she gets out of bed. She endorses a past medical history of significant uncontrollable hypertension, in 2004 she was worked up for pheochromocytoma which was negative. Her blood pressure has since been much better controlled. She is currently on Hydrochlorothiazide 25mg, but is holding it until she follows up with her PCP because of the hypokalemia she experienced in the hospital.\par \par \par Gabe Norman PCP\par

## 2021-11-12 ENCOUNTER — APPOINTMENT (OUTPATIENT)
Dept: PAIN MANAGEMENT | Facility: CLINIC | Age: 61
End: 2021-11-12

## 2021-11-12 ENCOUNTER — APPOINTMENT (OUTPATIENT)
Dept: NEUROLOGY | Facility: CLINIC | Age: 61
End: 2021-11-12
Payer: COMMERCIAL

## 2021-11-12 VITALS
HEART RATE: 69 BPM | SYSTOLIC BLOOD PRESSURE: 137 MMHG | WEIGHT: 170 LBS | BODY MASS INDEX: 31.28 KG/M2 | HEIGHT: 62 IN | DIASTOLIC BLOOD PRESSURE: 84 MMHG

## 2021-11-12 PROCEDURE — 99214 OFFICE O/P EST MOD 30 MIN: CPT

## 2021-11-12 NOTE — HISTORY OF PRESENT ILLNESS
[FreeTextEntry1] : 60-year-old woman who is here for consultation of migraine with neurologic symptoms.  Patient states that she has a history of migraines her whole life and it usually occurs around her menses.  Since menopause the headache frequency has decreased to only once in a while.\par Location: Starts in the neck and then it radiates to the right side of her head.  It occasionally switches sides\par Quality constant\par Severity 10 out of 10\par Frequency 1-2 a month\par Duration hours if she does not get to go to sleep\par Associated with nausea, vomiting, photophobia and phonophobia, no TACs.  Patient states that even when the headache is gone patient has allodynia in the back of her neck.\par Patient has tried Fioricet in the past however she had medication overuse headache and has avoided the medication since.  Patient finds that Excedrin Migraine and Tylenol does help however headache returns.\par \par Recently patient was at work when she started to experience olfactory sensitivity.  Patient started having migraine at work and she started having diaphoresis and vomited.  Patient was thought to have decreased nasolabial fold on the left so a stroke code was called at work and she was brought down to the emergency room.  Patient was noted to have decreased electrolyte levels especially in potassium and calcium and phosphorus and she was repleted.  Patient may have experienced left-sided weakness.  Patient had numbness and tingling all over. No medication changes and she has started intermittent fasting but she usually starts fasting at noon but this occurred around 1pm.  Patient was not given TPA and MRI along with evaluation with Dr. Alonso was unremarkable for stroke.

## 2021-11-12 NOTE — DISCUSSION/SUMMARY
[FreeTextEntry1] : 60-year-old woman who is here for initial consultation of migraine exacerbation at work along with possible left nasolabial fold decrease and left arm and leg weakness.  This may have been due to the electrolyte abnormalities from her excessive vomiting and hyperventilation when stroke code was called.  Will have patient continue to monitor her headache frequency by keeping a headache diary.  Will try sumatriptan nasal spray as patient has vomiting as a strong associated symptom.  Patient may also benefit from Zofran as needed along with the sumatriptan nasal spray.  The frequency of her migraine is only 1-2 a month.  If it ever increases we may then start prophylactic medication.  Patient will follow up with me in 3 months\par \par I spent the time noted on the day of this patient encounter preparing for, providing and documenting the above E/M service and counseling and educate patient on differential, workup, disease course, and treatment/management. Education was provided to the patient during this encounter. All questions and concerns were answered and addressed in detail. The patient verbalized understanding and agreed to plan. Patient was advised to continue to monitor for neurologic symptoms and to notify my office or go to the nearest emergency room if there are any changes. Any orders/referrals and communications were provided as well. \par Side effects of the above medications were discussed in detail including but not limited to applicable black box warning and teratogenicity as appropriate. \par Patient was advised to bring previous records to my office. \par \par \par

## 2021-11-12 NOTE — PHYSICAL EXAM
[General Appearance - Alert] : alert [Oriented To Time, Place, And Person] : oriented to person, place, and time [Person] : oriented to person [Place] : oriented to place [Time] : oriented to time [Short Term Intact] : short term memory intact [Fluency] : fluency intact [Current Events] : adequate knowledge of current events [Cranial Nerves Optic (II)] : visual acuity intact bilaterally,  visual fields full to confrontation, pupils equal round and reactive to light [Cranial Nerves Oculomotor (III)] : extraocular motion intact [Cranial Nerves Facial (VII)] : face symmetrical [Cranial Nerves Vestibulocochlear (VIII)] : hearing was intact bilaterally [Cranial Nerves Accessory (XI - Cranial And Spinal)] : head turning and shoulder shrug symmetric [Cranial Nerves Hypoglossal (XII)] : there was no tongue deviation with protrusion [Motor Tone] : muscle tone was normal in all four extremities [Motor Strength] : muscle strength was normal in all four extremities [Sensation Tactile Decrease] : light touch was intact [Sensation Pain / Temperature Decrease] : pain and temperature was intact [Abnormal Walk] : normal gait [Coordination - Dysmetria Impaired Finger-to-Nose Bilateral] : not present [1+] : Patella left 1+ [FreeTextEntry5] : left eye strabimus and occasional eye twitch noted.

## 2021-11-15 ENCOUNTER — NON-APPOINTMENT (OUTPATIENT)
Age: 61
End: 2021-11-15

## 2021-11-15 ENCOUNTER — TRANSCRIPTION ENCOUNTER (OUTPATIENT)
Age: 61
End: 2021-11-15

## 2021-11-16 ENCOUNTER — APPOINTMENT (OUTPATIENT)
Dept: INTERNAL MEDICINE | Facility: CLINIC | Age: 61
End: 2021-11-16
Payer: COMMERCIAL

## 2021-11-16 VITALS
WEIGHT: 174 LBS | HEIGHT: 62 IN | HEART RATE: 84 BPM | OXYGEN SATURATION: 99 % | BODY MASS INDEX: 32.02 KG/M2 | TEMPERATURE: 98.1 F

## 2021-11-16 VITALS — DIASTOLIC BLOOD PRESSURE: 76 MMHG | SYSTOLIC BLOOD PRESSURE: 128 MMHG

## 2021-11-16 PROCEDURE — 99496 TRANSJ CARE MGMT HIGH F2F 7D: CPT | Mod: 25

## 2021-11-16 PROCEDURE — 36415 COLL VENOUS BLD VENIPUNCTURE: CPT

## 2021-11-16 NOTE — HISTORY OF PRESENT ILLNESS
[FreeTextEntry1] : f/u neuro status [de-identified] : reviewed neuro notes and hosp d/c summary--\par pt without further symps\par only on prn imitrex\par had transient chest pain and b/l numbness thought to be migrane variant. Had vomiting with hypocalcemia/kalemia in er\par admitted [facility] from nov 9-10\par off hctz

## 2021-11-16 NOTE — PHYSICAL EXAM
[Normal] : soft, non-tender, non-distended, no masses palpated, no HSM and normal bowel sounds [de-identified] : anxious

## 2021-11-17 ENCOUNTER — TRANSCRIPTION ENCOUNTER (OUTPATIENT)
Age: 61
End: 2021-11-17

## 2021-11-17 LAB
ANION GAP SERPL CALC-SCNC: 12 MMOL/L
BUN SERPL-MCNC: 10 MG/DL
CALCIUM SERPL-MCNC: 9.5 MG/DL
CHLORIDE SERPL-SCNC: 105 MMOL/L
CO2 SERPL-SCNC: 23 MMOL/L
CREAT SERPL-MCNC: 0.56 MG/DL
GLUCOSE SERPL-MCNC: 80 MG/DL
MAGNESIUM SERPL-MCNC: 2.3 MG/DL
PHOSPHATE SERPL-MCNC: 3 MG/DL
POTASSIUM SERPL-SCNC: 4.4 MMOL/L
SODIUM SERPL-SCNC: 141 MMOL/L

## 2021-11-18 ENCOUNTER — TRANSCRIPTION ENCOUNTER (OUTPATIENT)
Age: 61
End: 2021-11-18

## 2021-11-18 DIAGNOSIS — G43.919 MIGRAINE, UNSPECIFIED, INTRACTABLE, WITHOUT STATUS MIGRAINOSUS: ICD-10-CM

## 2021-11-18 DIAGNOSIS — E86.0 DEHYDRATION: ICD-10-CM

## 2021-11-18 DIAGNOSIS — I10 ESSENTIAL (PRIMARY) HYPERTENSION: ICD-10-CM

## 2021-11-18 DIAGNOSIS — T50.2X5A ADVERSE EFFECT OF CARBONIC-ANHYDRASE INHIBITORS, BENZOTHIADIAZIDES AND OTHER DIURETICS, INITIAL ENCOUNTER: ICD-10-CM

## 2021-11-18 DIAGNOSIS — E83.51 HYPOCALCEMIA: ICD-10-CM

## 2021-11-18 DIAGNOSIS — G43.809 OTHER MIGRAINE, NOT INTRACTABLE, WITHOUT STATUS MIGRAINOSUS: ICD-10-CM

## 2021-11-18 DIAGNOSIS — E87.6 HYPOKALEMIA: ICD-10-CM

## 2021-12-13 ENCOUNTER — TRANSCRIPTION ENCOUNTER (OUTPATIENT)
Age: 61
End: 2021-12-13

## 2021-12-23 NOTE — ASU DISCHARGE PLAN (ADULT/PEDIATRIC) - SIGNS AND SYMPTOMS OF INFECTION: FEVER, REDNESS, SWELLING, FOUL SMELLING DISCHARGE
Acute Care - Physical Therapy Treatment Note   Valerio     Patient Name: Katarzyna Norris  : 1968  MRN: [id number]  Today's Date: 2021 Gait- individual; ther- ex -group setting; 5 participants       Visit Dx:     ICD-10-CM ICD-9-CM   1. Difficulty in walking  R26.2 719.7   2. Osteoarthritis of left knee  M17.12 715.96   3. Decreased activities of daily living (ADL)  Z78.9 V49.89     Patient Active Problem List   Diagnosis   • Gout   • Anxiety   • Hypertension   • Sinus problem   • Chronic pain of left knee   • Primary osteoarthritis of left knee   • Anemia   • Impaired fasting glucose   • Hyperlipidemia   • Screening mammogram, encounter for   • Osteoarthritis of left knee   • Acute non-recurrent maxillary sinusitis     Past Medical History:   Diagnosis Date   • Anxiety    • Arthritis     GILBERT KNEES   • Asthma     SEASONAL ALLERGIES   • Elevated cholesterol    • Gout 2021   • Hiatal hernia    • Hypertension    • Renal insufficiency     NOT ON DIALYSIS/NATALIIA   • Sinus problem     SEASONAL ALLERGIES     Past Surgical History:   Procedure Laterality Date   •  SECTION   and    • CHOLECYSTECTOMY     • COLONOSCOPY     • ENDOSCOPY     • TOTAL KNEE ARTHROPLASTY Left 2021    Procedure: TOTAL KNEE ARTHROPLASTY;  Surgeon: Marco Nelson MD;  Location: [facility] MAIN OR;  Service: Orthopedics;  Laterality: Left;   • TUBAL ABDOMINAL LIGATION       PT Assessment (last 12 hours)     PT Evaluation and Treatment     Row Name 21 1407 21 1000       Physical Therapy Time and Intention    Subjective Information complains of; pain  -RH no complaints  -BO    Document Type therapy note (daily note)  -RH evaluation  -BO    Mode of Treatment physical therapy; group therapy; individual therapy  -RH individual therapy; physical therapy  -BO    Patient Effort fair  -RH good  -BO    Row Name 21 1000          General Information    Patient Observations alert;  cooperative; agree to therapy  -BO     Prior Level of Function independent:; all household mobility; community mobility  -BO     Existing Precautions/Restrictions fall; weight bearing  -BO     Barriers to Rehab none identified  -BO     Row Name 12/23/21 1000          Living Environment    Current Living Arrangements home/apartment/condo  -BO     Lives With spouse  -BO     Row Name 12/23/21 1407          Pain Scale: Word Pre/Post-Treatment    Pain: Word Scale, Pretreatment 2 - mild pain  -RH     Posttreatment Pain Rating 2 - mild pain  -RH     Pain Location - Side Left  -RH     Pain Location knee  -RH     Row Name 12/23/21 1000          Range of Motion (ROM)    Range of Motion left lower extremity  10-60°  -BO     Row Name 12/23/21 1000          Strength (Manual Muscle Testing)    Strength (Manual Muscle Testing) left lower extremity  3+/5  -BO     [facility] Name 12/23/21 1000          Mobility    Extremity Weight-bearing Status left lower extremity  -BO     Left Lower Extremity (Weight-bearing Status) weight-bearing as tolerated (WBAT)  -BO     [facility] Name 12/23/21 1000          Bed Mobility    Bed Mobility bed mobility (all) activities; supine-sit  -BO     All Activities, [place] (Bed Mobility) minimum assist (75% patient effort)  -BO     Supine-Sit [place] (Bed Mobility) minimum assist (75% patient effort)  -BO     [facility] Name 12/23/21 1407 12/23/21 1000       Transfers    Transfers sit-stand transfer; stand-sit transfer  -RH bed-chair transfer; sit-stand transfer  -BO    Bed-Chair [place] (Transfers) -- contact guard  -BO    Assistive Device (Bed-Chair Transfers) -- walker, front-wheeled  -BO    Sit-Stand [place] (Transfers) contact guard  -RH contact guard  -BO    Stand-Sit [place] (Transfers) contact guard  -RH --    [facility] Name 12/23/21 1407 12/23/21 1000       Sit-Stand Transfer    Assistive Device (Sit-Stand Transfers) walker, front-wheeled  - walker, front-wheeled  -BO    Row Name 12/23/21 1407           Stand-Sit Transfer    Assistive Device (Stand-Sit Transfers) walker, front-wheeled  -RH     Row Name 12/23/21 1407 12/23/21 1000       Gait/Stairs (Locomotion)    Gait/Stairs Locomotion gait/ambulation independence; gait/ambulation assistive device; distance ambulated; gait pattern  - gait/ambulation assistive device  -BO    [place] Level (Gait) contact guard  -RH contact guard  -BO    Assistive Device (Gait) walker, front-wheeled  -RH walker, front-wheeled  -BO    Distance in Feet (Gait) 85  -  -BO    Pattern (Gait) 3-point; step-through  -RH step-to  -BO    Deviations/Abnormal Patterns (Gait) parth decreased; base of support, narrow; gait speed decreased; stride length decreased  -RH --    Negotiation (Stairs) stairs independence; handrail location; number of steps; ascending technique; descending technique  -RH --    [place] Level (Stairs) contact guard; minimum assist (75% patient effort)  -RH --    Handrail Location (Stairs) both sides  -RH --    Number of Steps (Stairs) 5 x 2  -RH --    Row Name 12/23/21 1000          Safety Issues, Functional Mobility    Impairments Affecting Function (Mobility) balance; endurance/activity tolerance; pain; strength; range of motion (ROM)  -BO     Row Name 12/23/21 1000          Balance    Balance Assessment standing dynamic balance  -BO     Dynamic Standing Balance mild impairment  -BO     Row Name             Wound 12/22/21 1421 Left anterior knee Incision    Wound - Properties Group Placement Date: 12/22/21  -ES Placement Time: 1421  -ES Present on Hospital Admission: N  -ES Side: Left  -ES Orientation: anterior  -ES Location: knee  -ES Primary Wound Type: Incision  -ES     Retired Wound - Properties Group Date first assessed: 12/22/21  -ES Time first assessed: 1421  -ES Present on Hospital Admission: N  -ES Side: Left  -ES Location: knee  -ES Primary Wound Type: Incision  -ES     Row Name 12/23/21 1000          Plan of Care Review    Plan of  Care Reviewed With patient  -BO     Outcome Summary Patient presents with decreased strength, transfers and ambulation.  Skilled physical therapy services will be required to address these mobility deficits.  -BO     Row Name 12/23/21 1000          Physical Therapy Goals    Transfer Goal Selection (PT) transfer, PT goal 1  -BO     Gait Training Goal Selection (PT) gait training, PT goal 1  -BO     ROM Goal Selection (PT) ROM, PT goal 1  -BO     Row Name 12/23/21 1000          Transfer Goal 1 (PT)    Activity/Assistive Device (Transfer Goal 1, PT) transfers, all  -BO     [place] Level/Cues Needed (Transfer Goal 1, PT) independent  -BO     Time Frame (Transfer Goal 1, PT) long term goal (LTG); 10 days  -BO     Row Name 12/23/21 1000          Gait Training Goal 1 (PT)    Activity/Assistive Device (Gait Training Goal 1, PT) gait (walking locomotion); assistive device use; walker, rolling  -BO     [place] Level (Gait Training Goal 1, PT) independent  -BO     Distance (Gait Training Goal 1, PT) 300  -BO     Time Frame (Gait Training Goal 1, PT) long term goal (LTG); 10 days  -BO     Row Name 12/23/21 1000          ROM Goal 1 (PT)    ROM Goal 1 (PT) Left knee 0-100°  -BO     Time Frame (ROM Goal 1, PT) long-term goal (LTG); 2 weeks  -BO     Row Name 12/23/21 1000          Therapy Assessment/Plan (PT)    Patient/Family Therapy Goals Statement (PT) Patient wants to walk better with less pain  -BO     Rehab Potential (PT) good, to achieve stated therapy goals  -BO     Criteria for Skilled Interventions Met (PT) skilled treatment is necessary  -BO     Predicted Duration of Therapy Intervention (PT) 10 days  -BO     Problem List (PT) problems related to; balance; mobility; range of motion (ROM); strength; pain  -BO     Activity Limitations Related to Problem List (PT) unable to ambulate safely; unable to transfer safely  -BO     Row Name 12/23/21 1000          PT Evaluation Complexity    History, PT Evaluation  Statement Selected Complexity no personal factors and/or comorbidities  -BO     Examination of Body Systems (PT Eval Complexity) total of 4 or more elements  -BO     Clinical Presentation (PT Evaluation Complexity) stable  -BO     Clinical Decision Making (PT Evaluation Complexity) low complexity  -BO     Overall Complexity (PT Evaluation Complexity) low complexity  -BO     Row Name 12/23/21 1407          Progress Summary (PT)    Progress Toward Functional Goals (PT) progress toward functional goals is fair  -RH           User Key  (r) = Recorded By, (t) = Taken By, (c) = Cosigned By    Initials Name Provider Type    RH Joshua Haddad, PTA Physical Therapy Assistant    John Sawyer, PT Physical Therapist    Meri Perez RN Registered Nurse               Left Knee Ther-ex   Exercise  Reps  Sets    Long arc Quads   10 2   Short arc Quads   10 2   Heel Slides  10 2   Ankle Pumps  10 2   Quad sets  10 2   Glut sets  10 2   Straight leg raise  10 2          Physical Therapy Education                 Title: PT OT SLP Therapies (Done)     Topic: Physical Therapy (Done)     Point: Mobility training (Done)     Learning Progress Summary           Patient Acceptance, E,TB, VU by BO at 12/23/2021 1026                   Point: Precautions (Done)     Learning Progress Summary           Patient Acceptance, E,TB, VU by BO at 12/23/2021 1026                               User Key     Initials Effective Dates Name Provider Type Discipline     06/03/21 -  John Ospina, PT Physical Therapist PT              PT Recommendation and Plan     Progress Summary (PT)  Progress Toward Functional Goals (PT): progress toward functional goals is fair   Outcome Measures     Row Name 12/23/21 1000             How much help from another person do you currently need...    Turning from your back to your side while in flat bed without using bedrails? 3  -BO      Moving from lying on back to sitting on the side of a flat bed without bedrails? 3  -BO       Moving to and from a bed to a chair (including a wheelchair)? 3  -BO      Standing up from a chair using your arms (e.g., wheelchair, bedside chair)? 3  -BO      Climbing 3-5 steps with a railing? 3  -BO      To walk in hospital room? 3  -BO      AM-PAC 6 Clicks Score (PT) 18  -BO              Functional Assessment    Outcome Measure Options AM-PAC 6 Clicks Basic Mobility (PT)  -BO            User Key  (r) = Recorded By, (t) = Taken By, (c) = Cosigned By    Initials Name Provider Type    John Sawyer, PT Physical Therapist                 Time Calculation:    PT Charges     Row Name 12/23/21 1406 12/23/21 1018          Time Calculation    PT Received On 12/23/21  -RH 12/23/21  -BO     PT Goal Re-Cert Due Date -- 01/01/22  -BO            Timed Charges    33256 - PT Therapeutic Exercise Minutes -- 5  -BO     51386 - Gait Training Minutes  9  -RH 12  -BO     87712 - PT Therapeutic Activity Minutes 3  -RH --            Untimed Charges    PT Eval/Re-eval Minutes -- 20  -BO     PT Group Therapy Minutes 30  -RH --            Total Minutes    Timed Charges Total Minutes 12  -RH 17  -BO     Untimed Charges Total Minutes 30  -RH 20  -BO      Total Minutes 42  -RH 37  -BO           User Key  (r) = Recorded By, (t) = Taken By, (c) = Cosigned By    Initials Name Provider Type     Joshua Haddad PTA Physical Therapy Assistant    John Sawyer, PT Physical Therapist              Therapy Charges for Today     Code Description Service Date Service Provider Modifiers Qty    49250316985 HC GAIT TRAINING EA 15 MIN 12/23/2021 Joshua Haddad PTA GP 1    13468016451 HC PT THER PROC GROUP 12/23/2021 Joshua Haddad PTA GP 1          PT G-Codes  Outcome Measure Options: AM-PAC 6 Clicks Daily Activity (OT), Optimal Instrument  AM-PAC 6 Clicks Score (PT): 18  AM-PAC 6 Clicks Score (OT): 21    Joshua Haddad PTA  12/23/2021

## 2022-01-01 PROBLEM — Z01.419 VISIT FOR GYNECOLOGIC EXAMINATION: Status: ACTIVE | Noted: 2022-01-01

## 2022-01-06 ENCOUNTER — APPOINTMENT (OUTPATIENT)
Dept: OBGYN | Facility: CLINIC | Age: 62
End: 2022-01-06

## 2022-01-06 DIAGNOSIS — Z01.419 ENCOUNTER FOR GYNECOLOGICAL EXAMINATION (GENERAL) (ROUTINE) W/OUT ABNORMAL FINDINGS: ICD-10-CM

## 2022-01-21 ENCOUNTER — RX RENEWAL (OUTPATIENT)
Age: 62
End: 2022-01-21

## 2022-03-07 ENCOUNTER — APPOINTMENT (OUTPATIENT)
Dept: NEUROLOGY | Facility: CLINIC | Age: 62
End: 2022-03-07

## 2022-03-11 ENCOUNTER — APPOINTMENT (OUTPATIENT)
Dept: OBGYN | Facility: CLINIC | Age: 62
End: 2022-03-11
Payer: COMMERCIAL

## 2022-03-11 VITALS
HEIGHT: 62 IN | BODY MASS INDEX: 32.39 KG/M2 | SYSTOLIC BLOOD PRESSURE: 160 MMHG | HEART RATE: 82 BPM | WEIGHT: 176 LBS | DIASTOLIC BLOOD PRESSURE: 71 MMHG

## 2022-03-11 DIAGNOSIS — Z01.419 ENCOUNTER FOR GYNECOLOGICAL EXAMINATION (GENERAL) (ROUTINE) W/OUT ABNORMAL FINDINGS: ICD-10-CM

## 2022-03-11 DIAGNOSIS — Z13.820 ENCOUNTER FOR SCREENING FOR OSTEOPOROSIS: ICD-10-CM

## 2022-03-11 DIAGNOSIS — R92.2 INCONCLUSIVE MAMMOGRAM: ICD-10-CM

## 2022-03-11 DIAGNOSIS — K21.9 GASTRO-ESOPHAGEAL REFLUX DISEASE W/OUT ESOPHAGITIS: ICD-10-CM

## 2022-03-11 DIAGNOSIS — E87.8 OTHER DISORDERS OF ELECTROLYTE AND FLUID BALANCE, NOT ELSEWHERE CLASSIFIED: ICD-10-CM

## 2022-03-11 DIAGNOSIS — Z87.09 PERSONAL HISTORY OF OTHER DISEASES OF THE RESPIRATORY SYSTEM: ICD-10-CM

## 2022-03-11 DIAGNOSIS — R07.89 OTHER CHEST PAIN: ICD-10-CM

## 2022-03-11 DIAGNOSIS — Z87.19 PERSONAL HISTORY OF OTHER DISEASES OF THE DIGESTIVE SYSTEM: ICD-10-CM

## 2022-03-11 DIAGNOSIS — R10.13 EPIGASTRIC PAIN: ICD-10-CM

## 2022-03-11 PROCEDURE — 99386 PREV VISIT NEW AGE 40-64: CPT

## 2022-03-12 LAB — HPV HIGH+LOW RISK DNA PNL CVX: NOT DETECTED

## 2022-03-17 LAB — CYTOLOGY CVX/VAG DOC THIN PREP: ABNORMAL

## 2022-04-13 ENCOUNTER — TRANSCRIPTION ENCOUNTER (OUTPATIENT)
Age: 62
End: 2022-04-13

## 2022-04-13 ENCOUNTER — APPOINTMENT (OUTPATIENT)
Dept: OPHTHALMOLOGY | Facility: CLINIC | Age: 62
End: 2022-04-13

## 2022-05-02 ENCOUNTER — NON-APPOINTMENT (OUTPATIENT)
Age: 62
End: 2022-05-02

## 2022-05-04 ENCOUNTER — APPOINTMENT (OUTPATIENT)
Dept: NEUROLOGY | Facility: CLINIC | Age: 62
End: 2022-05-04

## 2022-06-09 ENCOUNTER — TRANSCRIPTION ENCOUNTER (OUTPATIENT)
Age: 62
End: 2022-06-09

## 2022-06-15 ENCOUNTER — APPOINTMENT (OUTPATIENT)
Dept: INTERNAL MEDICINE | Facility: CLINIC | Age: 62
End: 2022-06-15
Payer: COMMERCIAL

## 2022-06-15 VITALS
HEIGHT: 62 IN | SYSTOLIC BLOOD PRESSURE: 136 MMHG | OXYGEN SATURATION: 97 % | WEIGHT: 175.13 LBS | BODY MASS INDEX: 32.23 KG/M2 | DIASTOLIC BLOOD PRESSURE: 86 MMHG | TEMPERATURE: 98.2 F | HEART RATE: 86 BPM

## 2022-06-15 DIAGNOSIS — G43.809 OTHER MIGRAINE, NOT INTRACTABLE, W/OUT STATUS MIGRAINOSUS: ICD-10-CM

## 2022-06-15 PROCEDURE — 99213 OFFICE O/P EST LOW 20 MIN: CPT

## 2022-06-15 NOTE — HISTORY OF PRESENT ILLNESS
[FreeTextEntry1] : f/u anxiety [de-identified] : using lorazepam prn for anxiety related to presentations\par using hctz qod with home bps gen ok\par migranes less severe--imitrex nasal spray\par ativan helped perf. anxiety

## 2022-06-16 RX ORDER — LORAZEPAM 0.5 MG/1
0.5 TABLET ORAL
Qty: 15 | Refills: 0 | Status: DISCONTINUED | COMMUNITY
Start: 2021-11-17 | End: 2022-06-16

## 2022-08-05 NOTE — H&P ADULT - HISTORY OF PRESENT ILLNESS
Dr. George,    This patient had previous SI joint injections with you and got good relief from them in the past.  She was wanting to consider another injection.  She had some increased right leg pain so I got a new MRI lumbar spine and I did not see anything regarding new nerve impingement on the right side.  The radiologist read an acute T12 compression fracture.  I ordered MRI thoracic spine.    Let me know regarding the above if you want to follow up with her in clinic or order the right SI joint injection.    Thanks,  Renetta Castellanos, [facility], PA-C  Neurosurgery  [facility]  08/05/2022    
 61 y/o female with PMH significant  for HTN  presents with headache and n/v for the past hour.  Actively vomiting and has remote hx of migraine.  Dizzy and the headache was 'driving her crazy'

## 2022-08-31 ENCOUNTER — INPATIENT (INPATIENT)
Facility: HOSPITAL | Age: 62
LOS: 5 days | Discharge: NURSING, REHAB OR LONG-TERM CARE | DRG: 70 | End: 2022-09-06
Attending: STUDENT IN AN ORGANIZED HEALTH CARE EDUCATION/TRAINING PROGRAM | Admitting: PSYCHIATRY & NEUROLOGY
Payer: COMMERCIAL

## 2022-08-31 VITALS
DIASTOLIC BLOOD PRESSURE: 105 MMHG | RESPIRATION RATE: 18 BRPM | SYSTOLIC BLOOD PRESSURE: 172 MMHG | TEMPERATURE: 99 F | HEIGHT: 62 IN | HEART RATE: 85 BPM | OXYGEN SATURATION: 98 %

## 2022-08-31 DIAGNOSIS — I63.9 CEREBRAL INFARCTION, UNSPECIFIED: ICD-10-CM

## 2022-08-31 DIAGNOSIS — Z98.891 HISTORY OF UTERINE SCAR FROM PREVIOUS SURGERY: Chronic | ICD-10-CM

## 2022-08-31 DIAGNOSIS — Z98.890 OTHER SPECIFIED POSTPROCEDURAL STATES: Chronic | ICD-10-CM

## 2022-08-31 LAB
ALBUMIN SERPL ELPH-MCNC: 4.7 G/DL — SIGNIFICANT CHANGE UP (ref 3.3–5)
ALP SERPL-CCNC: 134 U/L — HIGH (ref 40–120)
ALT FLD-CCNC: 7 U/L — LOW (ref 10–45)
ANION GAP SERPL CALC-SCNC: 15 MMOL/L — SIGNIFICANT CHANGE UP (ref 5–17)
APPEARANCE UR: CLEAR — SIGNIFICANT CHANGE UP
APTT BLD: 29.1 SEC — SIGNIFICANT CHANGE UP (ref 27.5–35.5)
AST SERPL-CCNC: 17 U/L — SIGNIFICANT CHANGE UP (ref 10–40)
BACTERIA # UR AUTO: NEGATIVE — SIGNIFICANT CHANGE UP
BASOPHILS # BLD AUTO: 0 K/UL — SIGNIFICANT CHANGE UP (ref 0–0.2)
BASOPHILS NFR BLD AUTO: 0 % — SIGNIFICANT CHANGE UP (ref 0–2)
BILIRUB SERPL-MCNC: 0.6 MG/DL — SIGNIFICANT CHANGE UP (ref 0.2–1.2)
BILIRUB UR-MCNC: NEGATIVE — SIGNIFICANT CHANGE UP
BUN SERPL-MCNC: 10 MG/DL — SIGNIFICANT CHANGE UP (ref 7–23)
CALCIUM SERPL-MCNC: 10.1 MG/DL — SIGNIFICANT CHANGE UP (ref 8.4–10.5)
CHLORIDE SERPL-SCNC: 92 MMOL/L — LOW (ref 96–108)
CO2 SERPL-SCNC: 25 MMOL/L — SIGNIFICANT CHANGE UP (ref 22–31)
COLOR SPEC: SIGNIFICANT CHANGE UP
CREAT SERPL-MCNC: 0.68 MG/DL — SIGNIFICANT CHANGE UP (ref 0.5–1.3)
DIFF PNL FLD: NEGATIVE — SIGNIFICANT CHANGE UP
EGFR: 99 ML/MIN/1.73M2 — SIGNIFICANT CHANGE UP
EOSINOPHIL # BLD AUTO: 0 K/UL — SIGNIFICANT CHANGE UP (ref 0–0.5)
EOSINOPHIL NFR BLD AUTO: 0 % — SIGNIFICANT CHANGE UP (ref 0–6)
EPI CELLS # UR: 1 /HPF — SIGNIFICANT CHANGE UP
GLUCOSE SERPL-MCNC: 152 MG/DL — HIGH (ref 70–99)
GLUCOSE UR QL: ABNORMAL
HCT VFR BLD CALC: 49.8 % — HIGH (ref 34.5–45)
HGB BLD-MCNC: 17 G/DL — HIGH (ref 11.5–15.5)
INR BLD: 1.07 RATIO — SIGNIFICANT CHANGE UP (ref 0.88–1.16)
KETONES UR-MCNC: ABNORMAL
LEUKOCYTE ESTERASE UR-ACNC: NEGATIVE — SIGNIFICANT CHANGE UP
LYMPHOCYTES # BLD AUTO: 1.54 K/UL — SIGNIFICANT CHANGE UP (ref 1–3.3)
LYMPHOCYTES # BLD AUTO: 12.9 % — LOW (ref 13–44)
MANUAL SMEAR VERIFICATION: SIGNIFICANT CHANGE UP
MCHC RBC-ENTMCNC: 31.7 PG — SIGNIFICANT CHANGE UP (ref 27–34)
MCHC RBC-ENTMCNC: 34.1 GM/DL — SIGNIFICANT CHANGE UP (ref 32–36)
MCV RBC AUTO: 92.9 FL — SIGNIFICANT CHANGE UP (ref 80–100)
MONOCYTES # BLD AUTO: 0.78 K/UL — SIGNIFICANT CHANGE UP (ref 0–0.9)
MONOCYTES NFR BLD AUTO: 6.5 % — SIGNIFICANT CHANGE UP (ref 2–14)
NEUTROPHILS # BLD AUTO: 9.63 K/UL — HIGH (ref 1.8–7.4)
NEUTROPHILS NFR BLD AUTO: 79 % — HIGH (ref 43–77)
NEUTS BAND # BLD: 1.6 % — SIGNIFICANT CHANGE UP (ref 0–8)
NITRITE UR-MCNC: NEGATIVE — SIGNIFICANT CHANGE UP
PH UR: 7 — SIGNIFICANT CHANGE UP (ref 5–8)
PLAT MORPH BLD: NORMAL — SIGNIFICANT CHANGE UP
PLATELET # BLD AUTO: 476 K/UL — HIGH (ref 150–400)
POTASSIUM SERPL-MCNC: 3.4 MMOL/L — LOW (ref 3.5–5.3)
POTASSIUM SERPL-SCNC: 3.4 MMOL/L — LOW (ref 3.5–5.3)
PROT SERPL-MCNC: 8.8 G/DL — HIGH (ref 6–8.3)
PROT UR-MCNC: ABNORMAL
PROTHROM AB SERPL-ACNC: 12.4 SEC — SIGNIFICANT CHANGE UP (ref 10.5–13.4)
RBC # BLD: 5.36 M/UL — HIGH (ref 3.8–5.2)
RBC # FLD: 11.5 % — SIGNIFICANT CHANGE UP (ref 10.3–14.5)
RBC BLD AUTO: SIGNIFICANT CHANGE UP
RBC CASTS # UR COMP ASSIST: 4 /HPF — SIGNIFICANT CHANGE UP (ref 0–4)
SARS-COV-2 RNA SPEC QL NAA+PROBE: SIGNIFICANT CHANGE UP
SODIUM SERPL-SCNC: 132 MMOL/L — LOW (ref 135–145)
SP GR SPEC: 1.05 — HIGH (ref 1.01–1.02)
TROPONIN T, HIGH SENSITIVITY RESULT: 10 NG/L — SIGNIFICANT CHANGE UP (ref 0–51)
UROBILINOGEN FLD QL: NEGATIVE — SIGNIFICANT CHANGE UP
WBC # BLD: 11.95 K/UL — HIGH (ref 3.8–10.5)
WBC # FLD AUTO: 11.95 K/UL — HIGH (ref 3.8–10.5)
WBC UR QL: 2 /HPF — SIGNIFICANT CHANGE UP (ref 0–5)

## 2022-08-31 PROCEDURE — 71045 X-RAY EXAM CHEST 1 VIEW: CPT | Mod: 26

## 2022-08-31 PROCEDURE — 70551 MRI BRAIN STEM W/O DYE: CPT | Mod: 26

## 2022-08-31 PROCEDURE — 95720 EEG PHY/QHP EA INCR W/VEEG: CPT

## 2022-08-31 PROCEDURE — 70498 CT ANGIOGRAPHY NECK: CPT | Mod: 26,MA

## 2022-08-31 PROCEDURE — 70496 CT ANGIOGRAPHY HEAD: CPT | Mod: 26,MA

## 2022-08-31 PROCEDURE — 0042T: CPT | Mod: MA

## 2022-08-31 PROCEDURE — 99285 EMERGENCY DEPT VISIT HI MDM: CPT | Mod: 25

## 2022-08-31 PROCEDURE — 93010 ELECTROCARDIOGRAM REPORT: CPT

## 2022-08-31 RX ORDER — ATORVASTATIN CALCIUM 80 MG/1
40 TABLET, FILM COATED ORAL AT BEDTIME
Refills: 0 | Status: DISCONTINUED | OUTPATIENT
Start: 2022-08-31 | End: 2022-09-06

## 2022-08-31 RX ORDER — KETOROLAC TROMETHAMINE 30 MG/ML
30 SYRINGE (ML) INJECTION ONCE
Refills: 0 | Status: DISCONTINUED | OUTPATIENT
Start: 2022-08-31 | End: 2022-08-31

## 2022-08-31 RX ORDER — ENOXAPARIN SODIUM 100 MG/ML
40 INJECTION SUBCUTANEOUS EVERY 24 HOURS
Refills: 0 | Status: DISCONTINUED | OUTPATIENT
Start: 2022-08-31 | End: 2022-09-06

## 2022-08-31 RX ORDER — ONDANSETRON 8 MG/1
4 TABLET, FILM COATED ORAL ONCE
Refills: 0 | Status: COMPLETED | OUTPATIENT
Start: 2022-08-31 | End: 2022-08-31

## 2022-08-31 RX ORDER — SODIUM CHLORIDE 9 MG/ML
1000 INJECTION INTRAMUSCULAR; INTRAVENOUS; SUBCUTANEOUS ONCE
Refills: 0 | Status: DISCONTINUED | OUTPATIENT
Start: 2022-08-31 | End: 2022-08-31

## 2022-08-31 RX ORDER — METOCLOPRAMIDE HCL 10 MG
10 TABLET ORAL ONCE
Refills: 0 | Status: DISCONTINUED | OUTPATIENT
Start: 2022-08-31 | End: 2022-08-31

## 2022-08-31 RX ORDER — DIPHENHYDRAMINE HCL 50 MG
25 CAPSULE ORAL ONCE
Refills: 0 | Status: DISCONTINUED | OUTPATIENT
Start: 2022-08-31 | End: 2022-08-31

## 2022-08-31 RX ORDER — ACETAMINOPHEN 500 MG
1000 TABLET ORAL ONCE
Refills: 0 | Status: COMPLETED | OUTPATIENT
Start: 2022-08-31 | End: 2022-08-31

## 2022-08-31 RX ORDER — METOCLOPRAMIDE HCL 10 MG
10 TABLET ORAL ONCE
Refills: 0 | Status: COMPLETED | OUTPATIENT
Start: 2022-08-31 | End: 2022-08-31

## 2022-08-31 RX ORDER — DIPHENHYDRAMINE HCL 50 MG
25 CAPSULE ORAL ONCE
Refills: 0 | Status: COMPLETED | OUTPATIENT
Start: 2022-08-31 | End: 2022-08-31

## 2022-08-31 RX ORDER — ASPIRIN/CALCIUM CARB/MAGNESIUM 324 MG
81 TABLET ORAL DAILY
Refills: 0 | Status: DISCONTINUED | OUTPATIENT
Start: 2022-08-31 | End: 2022-09-06

## 2022-08-31 RX ORDER — POTASSIUM CHLORIDE 20 MEQ
20 PACKET (EA) ORAL
Refills: 0 | Status: COMPLETED | OUTPATIENT
Start: 2022-08-31 | End: 2022-08-31

## 2022-08-31 RX ADMIN — Medication 25 MILLIGRAM(S): at 16:23

## 2022-08-31 RX ADMIN — Medication 20 MILLIEQUIVALENT(S): at 17:19

## 2022-08-31 RX ADMIN — ENOXAPARIN SODIUM 40 MILLIGRAM(S): 100 INJECTION SUBCUTANEOUS at 12:58

## 2022-08-31 RX ADMIN — Medication 30 MILLIGRAM(S): at 16:23

## 2022-08-31 RX ADMIN — Medication 81 MILLIGRAM(S): at 12:58

## 2022-08-31 RX ADMIN — Medication 20 MILLIEQUIVALENT(S): at 19:53

## 2022-08-31 RX ADMIN — Medication 30 MILLIGRAM(S): at 16:53

## 2022-08-31 RX ADMIN — ATORVASTATIN CALCIUM 40 MILLIGRAM(S): 80 TABLET, FILM COATED ORAL at 22:09

## 2022-08-31 RX ADMIN — Medication 10 MILLIGRAM(S): at 16:23

## 2022-08-31 RX ADMIN — Medication 1000 MILLIGRAM(S): at 10:00

## 2022-08-31 RX ADMIN — ONDANSETRON 4 MILLIGRAM(S): 8 TABLET, FILM COATED ORAL at 09:28

## 2022-08-31 RX ADMIN — Medication 400 MILLIGRAM(S): at 09:28

## 2022-08-31 NOTE — ED PROVIDER NOTE - PHYSICAL EXAMINATION
PHYSICAL EXAM:  GENERAL: NAD, lying in bed comfortably  HEAD:  Atraumatic, Normocephalic  EYES: EOMI, PERRLA, conjunctiva and sclera clear  ENT: No erythema/pallor/petechiae/lesions; TMs clear b/l  NECK: Supple, No JVD  LUNG: CTA b/l; no r/r/w  HEART: RRR, +S1/S2; No m/r/g  ABDOMEN: soft, NT/ND; BS audible   EXTREMITIES:  2+ Peripheral Pulses, brisk cap refill. No clubbing, cyanosis, or edema  NERVOUS SYSTEM:  AAOx3, unable to articulate consistently.  Cranial nerves 2-12 grossly intact bilaterally, negative pronator drift, cerebellar signs intact - finger-to-nose exam, ambulates with steady gait, no meningismus  MSK: FROM all 4 extremities, full and equal strength  SKIN: No rashes or lesions PHYSICAL EXAM:  GENERAL: NAD, lying in bed comfortably  HEAD:  Atraumatic, Normocephalic  EYES: EOMI, PERRLA, conjunctiva and sclera clear  ENT: No erythema/pallor/petechiae/lesions; TMs clear b/l  NECK: Supple, No JVD  LUNG: CTA b/l; no r/r/w  HEART: RRR, +S1/S2; No m/r/g  ABDOMEN: soft, NT/ND; BS audible   EXTREMITIES:  2+ Peripheral Pulses, brisk cap refill. No clubbing, cyanosis, or edema  NERVOUS SYSTEM:  Awake, alert, unable to assess orientation as pt is answering questions appropritaely (for example, when asked where she is right now, she says yes. she will also say things like, I am in a refrigerator right now), unable to articulate consistently.  Cranial nerves 2-12 grossly intact bilaterally, negative pronator drift, cerebellar signs intact - finger-to-nose exam, ambulates with steady gait, no meningismus;   MSK: FROM all 4 extremities, full and equal strength  SKIN: No rashes or lesions

## 2022-08-31 NOTE — ED PROVIDER NOTE - OBJECTIVE STATEMENT
Patient is a 61 year old female with no significant past medical history presents to the Emergency Department with chief complaint of difficulty speaking.  Patient states she had a stressful day the day prior and had a headache.  Patient went to sleep around 11pm, then awoke with difficulty speaking.  Patient states she is unable to articulate her thoughts.  Patient's  states she has been speaking inappropriately since awaking.  No other physical complaints reported.  No sick contacts or recent travel. 60 yo F (RH dominant), PMHx HTN, migraine HA, TIAs in past, presents to the ED for word-finding difficulties, headache, and answering questions inappropriately. Pt's last known well 8/31 at 6:30AM. SHe went to sleep the night prior to presentation and was nl per . Upon waking this AM,  noted that pt forgot her commonly used passwords, her mother's name, and generally confused. History is limited as pt is answering questions inappropriately.     Chart review shows that she had a Left ICA dissection found in Nov 2021.

## 2022-08-31 NOTE — H&P ADULT - HISTORY OF PRESENT ILLNESS
61 right handed, F with history of hypertension, migraine (not on any medications) and TIA (seen at [facility] in 11/2021) presented to the hospital with headache, word-finding difficulties and n/v. She woke up normal the day before as confirmed by her . TKN 8/31 at 0630. She went to work but had to drive back home because she developed a headache. Her  recalled checking up on her in bed at 2330 for which she was still responsive and answering questions. However, when the patient woke up the day of admission, the  noticed that the patient forgot her commonly used passwords, her mother's name and generally seemed confused. At that point, the  decided to send the patient to the hospital. [facility] called at 0901. The patient received CT head non con, angio, and perfusion. NIHSS 13. No tpa and thrombectomy given because the patient was outside window.     Of note, the patient presented to [facility] with headaches in 11/2021. [facility] was called and the patient received stroke imaging. 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 Patient was discharged on ibuprofen for headache and not on aspirin and plavix.

## 2022-08-31 NOTE — H&P ADULT - ASSESSMENT
ASSESSMENT     IMPRESSION     RECOMMENDATION   INCOMPLETE    Assessment: ***. Initial VS in ED: ***. Exam: ***.      mRS: ***  LKN: ***  NIHSS: ***    Impression: ***    Recommendations:    Diagnostics:  [] MRI brain without contrast   [] TTE w/ bubble   [] Lipid panel, HbA1c  [] CBC, CMP, coagulation panel, troponin    Medications:  [] ASA 81 mg PO   [] Atorvastatin 80mg (titrate to LDL < 70)   [] Lovenox SQ for DVT prophylaxis     Miscellaneous:  [] NPO unless passes dysphagia screen; swallow eval if fails  [] Keep BP permissive up to 220/110 for 48 hours followed by gradual normotension over 2-3 days   [] Telemonitoring  [] Neurological checks and vital signs per unit protocol  [] BGM goals 140-180  [] PT/OT; S/S evaluation    * Case and plan not final until Attending attestation *  The management and treatment decisions which include alteplase and mechanical thrombectomy were discussed with stroke fellow Davi Blandon under the supervision of neurovascular attending Tony Drake.  ASSESSMENT   61 right handed, F with history of hypertension, migraine (not on any medications) and TIA (seen at [facility] in 11/2021) presented to the hospital with headache, word-finding difficulties and n/v. LKN 8/31 at 0630. Imaging with no acute hemorrhage or mass effect but with a L occipital hypoattenuation and chronic L ICA dissection.     IMPRESSION   Headache and new word finding difficulties with decreased ability to repeat and comprehend possibly due to diffuse cerebral dysfunction. Mechanism ESUS.     RECOMMENDATION     mRS: 0  LKN: 8/30/2022 0630  NIHSS: 13    Diagnostics:  [] MRI brain without contrast   [] TTE w/ bubble   [] Lipid panel, HbA1c  [] CBC, CMP, coagulation panel, troponin    Medications:  [] ASA 81 mg PO   [] Atorvastatin 40mg (titrate to LDL < 70)   [] Lovenox SQ for DVT prophylaxis     Miscellaneous:  [] NPO unless passes dysphagia screen; swallow eval if fails  [] Keep BP permissive up to 220/110 for 48 hours followed by gradual normotension over 2-3 days   [] Telemonitoring  [] Neurological checks and vital signs per unit protocol  [] BGM goals 140-180  [] PT/OT; S/S evaluation    * Case and plan not final until Attending attestation *  The management and treatment decisions which include alteplase and mechanical thrombectomy were discussed with stroke fellow Davi Blandon under the supervision of neurovascular attending Tony Drake.  ASSESSMENT   61 right handed, F with history of hypertension, migraine (not on any medications) and TIA (seen at [facility] in 11/2021) presented to the hospital with headache, word-finding difficulties and n/v. LKN 8/31 at 0630. Imaging with no acute hemorrhage or mass effect but with a L occipital hypoattenuation and chronic L ICA dissection.     IMPRESSION   Headache and new word finding difficulties with decreased ability to repeat and comprehend possibly due to diffuse cerebral dysfunction. Mechanism ESUS.     RECOMMENDATION     mRS: 0  LKN: 8/30/2022 0630  NIHSS: 13    Diagnostics:  [] MRI brain without contrast   [] TTE w/ bubble   [] Lipid panel, HbA1c  [] CBC, CMP, coagulation panel, troponin    Medications:  [] ASA 81 mg PO   [] Atorvastatin 40mg (titrate to LDL < 70)   [] Lovenox SQ for DVT prophylaxis     Miscellaneous:  [] Passed S/S will start on pureed diet   [] Keep BP permissive up to 220/110 for 48 hours followed by gradual normotension over 2-3 days   [] Telemonitoring  [] Neurological checks and vital signs per unit protocol  [] BGM goals 140-180  [] PT/OT    * Case and plan not final until Attending attestation *  The management and treatment decisions which include alteplase and mechanical thrombectomy were discussed with stroke fellow Davi Blandon under the supervision of neurovascular attending Tony Drake.  ASSESSMENT   61 right handed, F with history of hypertension, migraine (not on any medications) and TIA (seen at [facility] in 11/2021) presented to the hospital with headache, word-finding difficulties and n/v. LKN 8/31 at 0630. Imaging with no acute hemorrhage or mass effect but with a L occipital hypoattenuation and chronic L ICA dissection.     IMPRESSION   Headache and new word finding difficulties with decreased ability to repeat and comprehend possibly due to diffuse cerebral dysfunction vs new onset infarct vs hypoperfusion from chronic dissection in L ICA. Mechanism ESUS.     RECOMMENDATION     mRS: 0  LKN: 8/30/2022 0630  NIHSS: 13    Diagnostics:  [] MRI brain without contrast   [] TTE w/ bubble   [] Lipid panel, HbA1c  [] CBC, CMP, coagulation panel, troponin    Medications:  [] ASA 81 mg PO   [] Atorvastatin 40mg (titrate to LDL < 70)   [] Lovenox SQ for DVT prophylaxis     Miscellaneous:  [] Passed S/S will start on pureed diet   [] Keep BP permissive up to 220/110 for 48 hours followed by gradual normotension over 2-3 days   [] Telemonitoring  [] Neurological checks and vital signs per unit protocol  [] BGM goals 140-180  [] PT/OT    * Case and plan not final until Attending attestation *  The management and treatment decisions which include alteplase and mechanical thrombectomy were discussed with stroke fellow Davi Blandon under the supervision of neurovascular attending Tony Drake.

## 2022-08-31 NOTE — H&P ADULT - NSICDXPASTMEDICALHX_GEN_ALL_CORE_FT
PAST MEDICAL HISTORY:  Acquired hammertoe of left foot     Hallux valgus (acquired), right foot s/p correction 11/2018    HV (hallux valgus), left     Hypertension     Migraine

## 2022-08-31 NOTE — H&P ADULT - NSHPLABSRESULTS_GEN_ALL_CORE
LABS: Personally reviewed labs, imaging, and ECG                          17.0   11.95 )-----------( 476      ( 31 Aug 2022 09:11 )             49.8       08-31    132<L>  |  92<L>  |  10  ----------------------------<  152<H>  3.4<L>   |  25  |  0.68    Ca    10.1      31 Aug 2022 09:11    TPro  8.8<H>  /  Alb  4.7  /  TBili  0.6  /  DBili  x   /  AST  17  /  ALT  7<L>  /  AlkPhos  134<H>  08-31       LIVER FUNCTIONS - ( 31 Aug 2022 09:11 )  Alb: 4.7 g/dL / Pro: 8.8 g/dL / ALK PHOS: 134 U/L / ALT: 7 U/L / AST: 17 U/L / GGT: x                        PT/INR - ( 31 Aug 2022 09:11 )   PT: 12.4 sec;   INR: 1.07 ratio         PTT - ( 31 Aug 2022 09:11 )  PTT:29.1 sec    Lactate Trend            CAPILLARY BLOOD GLUCOSE  148 (31 Aug 2022 09:49)      POCT Blood Glucose.: 148 mg/dL (31 Aug 2022 09:02)            RADIOLOGY & ADDITIONAL TESTS: LABS: Personally reviewed labs, imaging, and ECG                          17.0   11.95 )-----------( 476      ( 31 Aug 2022 09:11 )             49.8       08-31    132<L>  |  92<L>  |  10  ----------------------------<  152<H>  3.4<L>   |  25  |  0.68    Ca    10.1      31 Aug 2022 09:11    TPro  8.8<H>  /  Alb  4.7  /  TBili  0.6  /  DBili  x   /  AST  17  /  ALT  7<L>  /  AlkPhos  134<H>  08-31       LIVER FUNCTIONS - ( 31 Aug 2022 09:11 )  Alb: 4.7 g/dL / Pro: 8.8 g/dL / ALK PHOS: 134 U/L / ALT: 7 U/L / AST: 17 U/L / GGT: x                        PT/INR - ( 31 Aug 2022 09:11 )   PT: 12.4 sec;   INR: 1.07 ratio         PTT - ( 31 Aug 2022 09:11 )  PTT:29.1 sec    Lactate Trend            CAPILLARY BLOOD GLUCOSE  148 (31 Aug 2022 09:49)      POCT Blood Glucose.: 148 mg/dL (31 Aug 2022 09:02)            RADIOLOGY & ADDITIONAL TESTS:  < from: CT Angio Brain Stroke Protocol  w/ IV Cont (08.31.22 @ 09:35) >    MPRESSION:    CT PERFUSION: No perfusion deficit.    CTA NECK:  1. No large vessel occlusion or major stenosis.  2. 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    CTA HEAD:  1. No large vessel occlusion or major stenosis.    < end of copied text >

## 2022-08-31 NOTE — H&P ADULT - CRITICAL CARE ATTENDING COMMENT
seen in stroke unit   Briefly     61 right handed  F with hypertension, migraine (improved after menopause but recently returned) and TIA (seen at [facility] in 11/2021 - but not on asa/statin for unclear reason) presented to the hospital with headache, word-finding difficulties and n/v. LKN 8/31 at 0630.   MRI brain 11/10/21 unremarkable   CTH 8/31/22: left occipital lobe hypoattenuation c/f PRES  CTA H/N: chronic focal dissection flap of L ICA at C2   A1c 5.6   MRI prelim with no AIS but L occipital FLAIR changes  prelim EEG neg for seiuzres   mRS: 0  LKN: 8/30/2022 0630  NIHSS: 13    o/e this AM 9/1 with expressive > receptive aphasia and RHH.   BP elevated, SBP in 190s     IMPRESSION   aphasia without HP c/f embolic stroke, migraine vs seizures, however also has R HHA, posterior circulation FLAIR changes and elevated BP which also raises suspicion for PRES   - f/u MRI brain report, would pursue MRI brain w/ and w/o  - f/u official EEG  - given h/o of TIA should be on asa and statin therapy regardless for secondary stroke prevention   - needs better BP control. cardio called.  - check Utox    -  lipid panel  - TTE  - telemetry  - PT/OT/SS/SLP, OOBC  - check FS, glucose control <180  - GI/DVT ppx  - Counseling on diet, exercise, and medication adherence was done  - Counseling on smoking cessation and alcohol consumption offered when appropriate.  - Pain assessed and judicious use of narcotics when appropriate was discussed.    - Stroke education given when appropriate.  - Importance of fall prevention discussed.   - Differential diagnosis and plan of care discussed with patient and/or family and primary team  - Thank you for allowing me to participate in the care of this patient. Call with questions.   Tony Drake MD  Vascular Neurology  Office: [phone]

## 2022-08-31 NOTE — ED PROVIDER NOTE - ATTENDING CONTRIBUTION TO CARE
I, Shukri Osborne, performed a history and physical exam of the patient and discussed their management with the resident and/or advanced care provider. I reviewed the resident and/or advanced care provider's note and agree with the documented findings and plan of care except where noted. I was present and available for all procedures.     62 yo F (RH dominant), PMHx HTN, migraine HA, TIAs in past, presents to the ED for word-finding difficulties, headache, and answering questions inappropriately. Pt's last known well 8/31 at 6:30AM. SHe went to sleep the night prior to presentation and was nl per . Upon waking this AM,  noted that pt forgot her commonly used passwords, her mother's name, and generally confused. History is limited as pt is answering questions inappropriately.   Physical Exam: no gross motor or sensory deficits however, pt answering questions inappropriately and not following all simple commands. no facial droop noted. no drift. code stroke called. concern for CVA vs ICH. pt protecting her airway at this time. neurology at bedside. will obtain CT scans, labs, reassess.

## 2022-08-31 NOTE — ED PROVIDER NOTE - NS ED ROS FT
CONSTITUTIONAL: No weakness, fevers or chills  EYES/ENT: No visual changes;  No vertigo or throat pain   NECK: No pain or stiffness  RESPIRATORY: No cough, wheezing, hemoptysis; No shortness of breath  CARDIOVASCULAR: No chest pain or palpitations  GASTROINTESTINAL: No abdominal or epigastric pain. No nausea, vomiting, or hematemesis; No diarrhea or constipation. No melena or hematochezia.  GENITOURINARY: No dysuria, frequency or hematuria  NEUROLOGICAL: + difficulty speaking  SKIN: No itching, burning, rashes, or lesions   All other review of systems is negative unless indicated above.

## 2022-08-31 NOTE — H&P ADULT - NSHPREVIEWOFSYSTEMS_GEN_ALL_CORE
HEENT: +headaches  Neuro: No focal weakness or loss of consciousness. Rest as per HPI HEENT: +headaches   Neuro: No focal weakness or loss of consciousness. +nausea Rest as per HPI

## 2022-08-31 NOTE — PATIENT PROFILE ADULT - FALL HARM RISK - HARM RISK INTERVENTIONS

## 2022-08-31 NOTE — H&P ADULT - NSHPPHYSICALEXAM_GEN_ALL_CORE
Altered mental status - Confusion or Delirium  - Pupils (SIZE DIFFERENCE WITH LIGHT SHINING i.e. 2 --> 1, L,R,B)  - If seizure, any head turning preference?   - Any eye opening to pain?  - Verbalization?   - Preseverating?  - Following commands and answer questions?   - How patient reacts to noxious stimuli or spontaneously at which limb?   - Tone?   - Reflexes (1+,2+,3+?) corneal, oculocephalic, gag, babinski    Gen: Laying in bed, no distress  CVS: RRR, no carotid bruit  CHEST: No signs of resp distress, on room air  ABD: Soft, NTTP  Neuro:  MS: awake, alerts to name but not attentive , not following commands  Language: says few sentences - requires repeated stimulation to follow simple commands  CNs: Pupils b/l equal 3mm, reactive, EOMI seems intact, face symmetric, tongue midline.  Motor: Limited due to patient not following commands but moving all 4 extremities equally and spontaneously. Roughly 4+/5 throughout  Sensory: reacts to pain in all extremities  Reflexes: 2/4 throughout, bilateral flexor plantar response, no Art's, no clonus  Coordination no observed nystagmus or appendicular ataxia on spontaneous movements Gen: Laying in bed, slightly distressed that she was not able to express her words   CVS: No LE edema   CHEST: No signs of resp distress, on room air  Neuro:  MS: awake, alerts to name but not attentive , not following simple nor complex commands (ask to bring right hand to left ear, the patient raises her right leg)  Language: says few sentences - requires repeated stimulation to follow simple commands. (says, "I am in the refrigerator right now")  CNs: Pupils asymmetric in diameter but reactive to light, EOMI seems intact, face symmetric, tongue midline and can move left and right.  Motor: Limited due to patient not following commands but moving all 4 extremities equally and spontaneously. Roughly 4+/5 throughout  Sensory: reacts to pain in all extremities, can localize to light touch   Reflexes: 2/4 throughout, bilateral flexor plantar response,   Coordination no observed nystagmus but unable to follow upon command   Gait: Unable to assess due to patient not following commands

## 2022-08-31 NOTE — ED ADULT NURSE NOTE - NSIMPLEMENTINTERV_GEN_ALL_ED
Implemented All Fall Risk Interventions:  [place] to call system. Call bell, personal items and telephone within reach. Instruct patient to call for assistance. Room bathroom lighting operational. Non-slip footwear when patient is off stretcher. Physically safe environment: no spills, clutter or unnecessary equipment. Stretcher in lowest position, wheels locked, appropriate side rails in place. Provide visual cue, wrist band, yellow gown, etc. Monitor gait and stability. Monitor for mental status changes and reorient to person, place, and time. Review medications for side effects contributing to fall risk. Reinforce activity limits and safety measures with patient and family.

## 2022-08-31 NOTE — PATIENT PROFILE ADULT - NSPROPTRIGHTNOTIFY_GEN_A_NUR
[de-identified] : 3 week followup for this 48 y/o F with ETD- she has had abx and flonase. She already had mri for asymm snhl. She had CT temporal bone which was clear but showed L maxillary sinus opacification with mat/ inspissated mucus. She is here today to review CT sinuses. When she lays on her L side she reports she has L facial discomfort and feels there is something in her maxillary sinus. She has been treated with abx, nasal spray.  Denies fevers, chills, sweats. No new ENT complaints at this time.   yes

## 2022-08-31 NOTE — ED PROVIDER NOTE - CLINICAL SUMMARY MEDICAL DECISION MAKING FREE TEXT BOX
Patient is a 61 year old female with no significant past medical history presents to the Emergency Department with chief complaint of difficulty speaking.  Patient was a stroke code upon presentation and was immediately seen by neurology and taken to CT. Patient is a 61 year old female with no significant past medical history presents to the Emergency Department with chief complaint of difficulty speaking.  Patient was a stroke code upon presentation and was immediately seen by neurology and taken to CT.  CT significant for vascular disease and possible stroke.  Neurology recommended admission to stroke unit.  Patient and patient's family agreed with decision for admission and were admitted to the stroke unit.

## 2022-08-31 NOTE — STROKE CODE NOTE - ICH HIDDEN
[facility]) I50.9    Heart failure ([facility]) I50.9    Ischemic stroke ([address] I63.9     Past Medical History:   Diagnosis Date    Diabetes ([address]     Hypertension       Family History   Problem [email or web address]  Fact sheet for Patients: [email or web address]       FARZANA          CULTURE, MRSA [[id number]] [email or web address]  Fact sheet for Patients: [email or web address]       Methodology: Isothermal Nucleic Acid Amplification                 Signed By: Boo Vigil NP     March 2, 2022 hide

## 2022-08-31 NOTE — ED ADULT NURSE NOTE - OBJECTIVE STATEMENT
61 y.o F A&Ox1 (oriented to name) with PMH of HTN presents to the ED from home c.o headache, confusion and nausea. As per family, pt. was last seen normal at 0630 AM yesterday. Developed HA yesterday while at work and returned in the afternoon and went to bed when she got home from work. Family reports pt. was still complaining of HA this AM and not making sense when speaking. Pt. ataxia, having difficulty coordinating voluntary movement, and aphasia. Pt. having difficulty following commands. Pos. strength noted to all extremities. No facial droop noted. L pupil 3 and reactive and R pupil 4 and reactive. Code stroke initiated on arrival. FS - 148. Denies recent traumas, falls, fevers, chills, cp, sob, urinary symptoms, at this time. Family at bedside. 61 y.o F A&Ox1 (oriented to name) with PMH of HTN presents to the ED from home c.o headache, confusion and nausea. As per family, pt. was last seen normal at 0630 AM yesterday. Developed HA yesterday while at work and returned in the afternoon and went to bed when she got home from work. Family reports pt. was still complaining of HA this AM and not making sense when speaking. Pt. exhibiting ataxia, having difficulty coordinating voluntary movement, and aphasia. Pt. having difficulty following commands. Pos. strength noted to all extremities. No facial droop noted. L pupil 3 and reactive and R pupil 4 and reactive. Code stroke initiated on arrival. FS - 148. Denies recent traumas, falls, fevers, chills, cp, sob, urinary symptoms, at this time. Family at bedside. neuro checks being completed and placed in pt. paper chart.

## 2022-09-01 DIAGNOSIS — I10 ESSENTIAL (PRIMARY) HYPERTENSION: ICD-10-CM

## 2022-09-01 DIAGNOSIS — R41.82 ALTERED MENTAL STATUS, UNSPECIFIED: ICD-10-CM

## 2022-09-01 LAB
A1C WITH ESTIMATED AVERAGE GLUCOSE RESULT: 5.6 % — SIGNIFICANT CHANGE UP (ref 4–5.6)
AMPHET UR-MCNC: NEGATIVE — SIGNIFICANT CHANGE UP
ANION GAP SERPL CALC-SCNC: 14 MMOL/L — SIGNIFICANT CHANGE UP (ref 5–17)
BARBITURATES UR SCN-MCNC: NEGATIVE — SIGNIFICANT CHANGE UP
BENZODIAZ UR-MCNC: POSITIVE
BUN SERPL-MCNC: 12 MG/DL — SIGNIFICANT CHANGE UP (ref 7–23)
CALCIUM SERPL-MCNC: 9.4 MG/DL — SIGNIFICANT CHANGE UP (ref 8.4–10.5)
CHLORIDE SERPL-SCNC: 91 MMOL/L — LOW (ref 96–108)
CHOLEST SERPL-MCNC: 224 MG/DL — HIGH
CO2 SERPL-SCNC: 26 MMOL/L — SIGNIFICANT CHANGE UP (ref 22–31)
COCAINE METAB.OTHER UR-MCNC: NEGATIVE — SIGNIFICANT CHANGE UP
CREAT SERPL-MCNC: 0.6 MG/DL — SIGNIFICANT CHANGE UP (ref 0.5–1.3)
EGFR: 102 ML/MIN/1.73M2 — SIGNIFICANT CHANGE UP
ESTIMATED AVERAGE GLUCOSE: 114 MG/DL — SIGNIFICANT CHANGE UP (ref 68–114)
GLUCOSE SERPL-MCNC: 123 MG/DL — HIGH (ref 70–99)
HCT VFR BLD CALC: 49.4 % — HIGH (ref 34.5–45)
HDLC SERPL-MCNC: 63 MG/DL — SIGNIFICANT CHANGE UP
HGB BLD-MCNC: 16.5 G/DL — HIGH (ref 11.5–15.5)
LIPID PNL WITH DIRECT LDL SERPL: 139 MG/DL — HIGH
MCHC RBC-ENTMCNC: 31.5 PG — SIGNIFICANT CHANGE UP (ref 27–34)
MCHC RBC-ENTMCNC: 33.4 GM/DL — SIGNIFICANT CHANGE UP (ref 32–36)
MCV RBC AUTO: 94.5 FL — SIGNIFICANT CHANGE UP (ref 80–100)
METHADONE UR-MCNC: NEGATIVE — SIGNIFICANT CHANGE UP
NON HDL CHOLESTEROL: 161 MG/DL — HIGH
NRBC # BLD: 0 /100 WBCS — SIGNIFICANT CHANGE UP (ref 0–0)
OPIATES UR-MCNC: NEGATIVE — SIGNIFICANT CHANGE UP
OSMOLALITY UR: 484 MOS/KG — SIGNIFICANT CHANGE UP (ref 300–900)
OXYCODONE UR-MCNC: NEGATIVE — SIGNIFICANT CHANGE UP
PCP SPEC-MCNC: SIGNIFICANT CHANGE UP
PCP UR-MCNC: NEGATIVE — SIGNIFICANT CHANGE UP
PLATELET # BLD AUTO: 482 K/UL — HIGH (ref 150–400)
POTASSIUM SERPL-MCNC: 3.4 MMOL/L — LOW (ref 3.5–5.3)
POTASSIUM SERPL-SCNC: 3.4 MMOL/L — LOW (ref 3.5–5.3)
RBC # BLD: 5.23 M/UL — HIGH (ref 3.8–5.2)
RBC # FLD: 11.5 % — SIGNIFICANT CHANGE UP (ref 10.3–14.5)
SODIUM SERPL-SCNC: 131 MMOL/L — LOW (ref 135–145)
SODIUM UR-SCNC: 32 MMOL/L — SIGNIFICANT CHANGE UP
THC UR QL: NEGATIVE — SIGNIFICANT CHANGE UP
TRIGL SERPL-MCNC: 112 MG/DL — SIGNIFICANT CHANGE UP
WBC # BLD: 12 K/UL — HIGH (ref 3.8–10.5)
WBC # FLD AUTO: 12 K/UL — HIGH (ref 3.8–10.5)

## 2022-09-01 PROCEDURE — 99222 1ST HOSP IP/OBS MODERATE 55: CPT

## 2022-09-01 PROCEDURE — 70552 MRI BRAIN STEM W/DYE: CPT | Mod: 26

## 2022-09-01 PROCEDURE — 93010 ELECTROCARDIOGRAM REPORT: CPT

## 2022-09-01 PROCEDURE — 93306 TTE W/DOPPLER COMPLETE: CPT | Mod: 26

## 2022-09-01 RX ORDER — LOSARTAN POTASSIUM 100 MG/1
25 TABLET, FILM COATED ORAL
Refills: 0 | Status: DISCONTINUED | OUTPATIENT
Start: 2022-09-01 | End: 2022-09-03

## 2022-09-01 RX ORDER — POTASSIUM CHLORIDE 20 MEQ
40 PACKET (EA) ORAL ONCE
Refills: 0 | Status: COMPLETED | OUTPATIENT
Start: 2022-09-01 | End: 2022-09-01

## 2022-09-01 RX ORDER — ACETAMINOPHEN 500 MG
1000 TABLET ORAL ONCE
Refills: 0 | Status: COMPLETED | OUTPATIENT
Start: 2022-09-01 | End: 2022-09-01

## 2022-09-01 RX ADMIN — LOSARTAN POTASSIUM 25 MILLIGRAM(S): 100 TABLET, FILM COATED ORAL at 17:55

## 2022-09-01 RX ADMIN — ATORVASTATIN CALCIUM 40 MILLIGRAM(S): 80 TABLET, FILM COATED ORAL at 21:45

## 2022-09-01 RX ADMIN — Medication 1000 MILLIGRAM(S): at 15:30

## 2022-09-01 RX ADMIN — ENOXAPARIN SODIUM 40 MILLIGRAM(S): 100 INJECTION SUBCUTANEOUS at 11:13

## 2022-09-01 RX ADMIN — Medication 81 MILLIGRAM(S): at 11:07

## 2022-09-01 RX ADMIN — Medication 400 MILLIGRAM(S): at 14:43

## 2022-09-01 RX ADMIN — Medication 40 MILLIEQUIVALENT(S): at 11:07

## 2022-09-01 NOTE — CONSULT NOTE ADULT - NS ATTEND AMEND GEN_ALL_CORE FT
Pt care and plan discussed and reviewed with PA. Plan as outlined above edited by me to reflect our discussion. I had a prolonged conversation with the patient regarding hospital course, differential diagnosis and results of diagnostic tests.  Plan of care discussed with patient after the evaluation. Patient expresses clear understanding and satisfaction with the plan of care. Advanced care planning/advanced directives discussed with patient/family. DNR status including forceful chest compressions to attempt to restart the heart, ventilator support/artificial breathing, electric shock, artificial nutrition, health care proxy, Molst form all discussed with pt. Pt wishes to consider.

## 2022-09-01 NOTE — EEG REPORT - NS EEG TEXT BOX
EZRA MARIE [facility]-[id number]     Study Start Date:    08/30/22 17:20  Study End Date:  09-01-22 8:00   Duration x Hours: 14 hr 40 min  --------------------------------------------------------------------------------------------------    History:  CC/ HPI Patient is a 61y old  Female who presents with a chief complaint of Word finding difficulties concerning for stroke (31 Aug 2022 09:58)    MEDICATIONS  (STANDING):  aspirin enteric coated 81 milliGRAM(s) Oral daily  atorvastatin 40 milliGRAM(s) Oral at bedtime  enoxaparin Injectable 40 milliGRAM(s) SubCutaneous every 24 hours  potassium chloride    Tablet ER 40 milliEquivalent(s) Oral once      --------------------------------------------------------------------------------------------------  Study Interpretation:    [[[Abbreviation Key:  PDR=alpha rhythm/posterior dominant rhythm. A-P=anterior posterior.  Amplitude: ‘very low’:<20; ‘low’:20-49; ‘medium’:; ‘high’:>150uV.  Persistence for periodic/rhythmic patterns (% of epoch) ‘rare’:<1%; ‘occasional’:1-10%; ‘frequent’:10-50%; ‘abundant’:50-90%; ‘continuous’:>90%.  Persistence for sporadic discharges: ‘rare’:<1/hr; ‘occasional’:1/min-1/hr; ‘frequent’:>1/min; ‘abundant’:>1/10 sec.  RPP=rhythmic and periodic patterns; GRDA=generalized rhythmic delta activity; FIRDA=frontal intermittent GRDA; LRDA=lateralized rhythmic delta activity; TIRDA=temporal intermittent rhythmic delta activity;  LPD=PLED=lateralized periodic discharges; GPD=generalized periodic discharges; BIPDs =bilateral independent periodic discharges; Mf=multifocal; SIRPDs=stimulus induced rhythmic, periodic, or ictal appearing discharges; BIRDs=brief potentially ictal rhythmic discharges >4 Hz, lasting .5-10s; PFA (paroxysmal bursts >13 Hz or =8 Hz <10s).  Modifiers: +F=with fast component; +S=with spike component; +R=with rhythmic component.  S-B=burst suppression pattern.  Max=maximal. N1-drowsy; N2-stage II sleep; N3-slow wave sleep. SSS/BETS=small sharp spikes/benign epileptiform transients of sleep. HV=hyperventilation; PS=photic stimulation]]]    Daily EEG Visual Analysis    FINDINGS:      Background:  Continuous: continuous  Symmetry: Assymmetric  PDR:  10 Hz activity, with amplitude to 40 uV, that attenuated to eye opening well formed on right hemisphere, no discerbnable PDR over the right hemisphere.  Low amplitude frontal beta noted in wakefulness.  Reactivity: present  Voltage: normal, mostly 20-150uV  Anterior Posterior Gradient: present  Other background findings: none  Breach: absent    Background Slowing:  Generalized slowing: Diffuse theta and delta slowing.   Focal slowing: Continuous left temporal polymorphic theta and delta slowing.   Brief fragmented runs of frontally predominant generalized rhythmic delta    State Changes:   -Drowsiness noted with increased slowing, attenuation of fast activity, vertex transients.  -Present with N2 sleep transients with symmetric spindles and K-complexes.    Sporadic Epileptiform Discharges:    None    Rhythmic and Periodic Patterns (RPPs):  None     Electrographic and Electroclinical seizures:  None    Other Clinical Events:  None    Activation Procedures:   Hyperventilation was not performed.    Photic stimulation was not performed.    Artifacts:  Intermittent myogenic and movement artifacts were noted. Poor impedence of left posterior electrodes.     ECG:  Leads off     EEG Classification / Summary:  Abnormal  EEG in the awake / drowsy / asleep state(s).    - Continuous slowing, focal, left temporal polymorphic theta and delta  - Asymmetry, absence of PDR and faster frequencies over the left hemisphere   - Background slowing, generalized, mild     Clinical Impression:    This is an abnormal recording due to:     - Structural abnormality over the left temporal region.   - Mild cognitive dysfunction, a non-specific finding.     There were no epileptiform abnormalities / seizures recorded.        Sita Santacruz M.D.   Epilepsy Fellow      -------------------------------------------------------------------------------------------------------  [facility] EEG Reading Room Ph#: [phone]  Epilepsy Answering Service after 5PM and before 8:30AM: Ph#: [phone]      This is a preliminary report  EZRA MARIE [facility]-[id number]     Study Start Date:    08/30/22 17:20  Study End Date:  09-01-22 8:00   Duration x Hours: 14 hr 40 min  --------------------------------------------------------------------------------------------------    History:  CC/ HPI Patient is a 61y old  Female who presents with a chief complaint of Word finding difficulties concerning for stroke (31 Aug 2022 09:58)    MEDICATIONS  (STANDING):  aspirin enteric coated 81 milliGRAM(s) Oral daily  atorvastatin 40 milliGRAM(s) Oral at bedtime  enoxaparin Injectable 40 milliGRAM(s) SubCutaneous every 24 hours  potassium chloride    Tablet ER 40 milliEquivalent(s) Oral once      --------------------------------------------------------------------------------------------------  Study Interpretation:    [[[Abbreviation Key:  PDR=alpha rhythm/posterior dominant rhythm. A-P=anterior posterior.  Amplitude: ‘very low’:<20; ‘low’:20-49; ‘medium’:; ‘high’:>150uV.  Persistence for periodic/rhythmic patterns (% of epoch) ‘rare’:<1%; ‘occasional’:1-10%; ‘frequent’:10-50%; ‘abundant’:50-90%; ‘continuous’:>90%.  Persistence for sporadic discharges: ‘rare’:<1/hr; ‘occasional’:1/min-1/hr; ‘frequent’:>1/min; ‘abundant’:>1/10 sec.  RPP=rhythmic and periodic patterns; GRDA=generalized rhythmic delta activity; FIRDA=frontal intermittent GRDA; LRDA=lateralized rhythmic delta activity; TIRDA=temporal intermittent rhythmic delta activity;  LPD=PLED=lateralized periodic discharges; GPD=generalized periodic discharges; BIPDs =bilateral independent periodic discharges; Mf=multifocal; SIRPDs=stimulus induced rhythmic, periodic, or ictal appearing discharges; BIRDs=brief potentially ictal rhythmic discharges >4 Hz, lasting .5-10s; PFA (paroxysmal bursts >13 Hz or =8 Hz <10s).  Modifiers: +F=with fast component; +S=with spike component; +R=with rhythmic component.  S-B=burst suppression pattern.  Max=maximal. N1-drowsy; N2-stage II sleep; N3-slow wave sleep. SSS/BETS=small sharp spikes/benign epileptiform transients of sleep. HV=hyperventilation; PS=photic stimulation]]]    Daily EEG Visual Analysis    FINDINGS:      Background:  Continuous: continuous  Symmetry: Assymmetric  PDR:  10 Hz activity, with amplitude to 40 uV, that attenuated to eye opening well formed on right hemisphere, no discerbnable PDR over the right hemisphere.  Low amplitude frontal beta noted in wakefulness.  Reactivity: present  Voltage: normal, mostly 20-150uV  Anterior Posterior Gradient: present  Other background findings: none  Breach: absent    Background Slowing:  Generalized slowing: Diffuse theta and delta slowing.   Focal slowing: Continuous left temporal polymorphic theta and delta slowing.   Brief fragmented runs of frontally predominant generalized rhythmic delta    State Changes:   -Drowsiness noted with increased slowing, attenuation of fast activity, vertex transients.  -Present with N2 sleep transients with symmetric spindles and K-complexes.    Sporadic Epileptiform Discharges:    None    Rhythmic and Periodic Patterns (RPPs):  None     Electrographic and Electroclinical seizures:  None    Other Clinical Events:  None    Activation Procedures:   Hyperventilation was not performed.    Photic stimulation was not performed.    Artifacts:  Intermittent myogenic and movement artifacts were noted. Poor impedence of left posterior electrodes.     ECG:  Leads off     EEG Classification / Summary:  Abnormal  EEG in the awake / drowsy / asleep state(s).    - Continuous slowing, focal, left temporal polymorphic theta and delta  - Asymmetry, absence of PDR and faster frequencies over the left hemisphere   - Background slowing, generalized, mild     Clinical Impression:    This is an abnormal recording due to:     - Structural abnormality over the left temporal region.   - Mild cognitive dysfunction, a non-specific finding.     There were no epileptiform abnormalities / seizures recorded.        Siat Santacruz M.D.   Epilepsy Fellow    Jorge Horvath MD  EEG/Epilepsy Attending     -------------------------------------------------------------------------------------------------------  [facility] EEG Reading Room Ph#: [phone]  Epilepsy Answering Service after 5PM and before 8:30AM: Ph#: [phone]     EZRA MARIE [facility]-[id number]     Study Start Date:    08/30/22 17:20  Study End Date:  09-01-22 1300   Duration x Hours: 19 hr 40 min  --------------------------------------------------------------------------------------------------    History:  CC/ HPI Patient is a 61y old  Female who presents with a chief complaint of Word finding difficulties concerning for stroke (31 Aug 2022 09:58)    MEDICATIONS  (STANDING):  aspirin enteric coated 81 milliGRAM(s) Oral daily  atorvastatin 40 milliGRAM(s) Oral at bedtime  enoxaparin Injectable 40 milliGRAM(s) SubCutaneous every 24 hours  potassium chloride    Tablet ER 40 milliEquivalent(s) Oral once      --------------------------------------------------------------------------------------------------  Study Interpretation:    [[[Abbreviation Key:  PDR=alpha rhythm/posterior dominant rhythm. A-P=anterior posterior.  Amplitude: ‘very low’:<20; ‘low’:20-49; ‘medium’:; ‘high’:>150uV.  Persistence for periodic/rhythmic patterns (% of epoch) ‘rare’:<1%; ‘occasional’:1-10%; ‘frequent’:10-50%; ‘abundant’:50-90%; ‘continuous’:>90%.  Persistence for sporadic discharges: ‘rare’:<1/hr; ‘occasional’:1/min-1/hr; ‘frequent’:>1/min; ‘abundant’:>1/10 sec.  RPP=rhythmic and periodic patterns; GRDA=generalized rhythmic delta activity; FIRDA=frontal intermittent GRDA; LRDA=lateralized rhythmic delta activity; TIRDA=temporal intermittent rhythmic delta activity;  LPD=PLED=lateralized periodic discharges; GPD=generalized periodic discharges; BIPDs =bilateral independent periodic discharges; Mf=multifocal; SIRPDs=stimulus induced rhythmic, periodic, or ictal appearing discharges; BIRDs=brief potentially ictal rhythmic discharges >4 Hz, lasting .5-10s; PFA (paroxysmal bursts >13 Hz or =8 Hz <10s).  Modifiers: +F=with fast component; +S=with spike component; +R=with rhythmic component.  S-B=burst suppression pattern.  Max=maximal. N1-drowsy; N2-stage II sleep; N3-slow wave sleep. SSS/BETS=small sharp spikes/benign epileptiform transients of sleep. HV=hyperventilation; PS=photic stimulation]]]    Daily EEG Visual Analysis    FINDINGS:      Background:  Continuous: continuous  Symmetry: Assymmetric  PDR:  10 Hz activity, with amplitude to 40 uV, that attenuated to eye opening well formed on right hemisphere, no discerbnable PDR over the right hemisphere.  Low amplitude frontal beta noted in wakefulness.  Reactivity: present  Voltage: normal, mostly 20-150uV  Anterior Posterior Gradient: present  Other background findings: none  Breach: absent    Background Slowing:  Generalized slowing: Diffuse theta and delta slowing.   Focal slowing: Continuous left temporal polymorphic theta and delta slowing.   Brief fragmented runs of frontally predominant generalized rhythmic delta    State Changes:   -Drowsiness noted with increased slowing, attenuation of fast activity, vertex transients.  -Present with N2 sleep transients with symmetric spindles and K-complexes.    Sporadic Epileptiform Discharges:    None    Rhythmic and Periodic Patterns (RPPs):  None     Electrographic and Electroclinical seizures:  None    Other Clinical Events:  None    Activation Procedures:   Hyperventilation was not performed.    Photic stimulation was not performed.    Artifacts:  Intermittent myogenic and movement artifacts were noted. Poor impedence of left posterior electrodes.     ECG:  Leads off     EEG Classification / Summary:  Abnormal  EEG in the awake / drowsy / asleep state(s).    - Continuous slowing, focal, left temporal polymorphic theta and delta  - Asymmetry, absence of PDR and faster frequencies over the left hemisphere   - Background slowing, generalized, mild     Clinical Impression:    This is an abnormal recording due to:     - Structural abnormality over the left temporal region.   - Mild cognitive dysfunction, a non-specific finding.     There were no epileptiform abnormalities / seizures recorded.        Erkam Zengin, M.D.   Epilepsy Fellow    Jorge Horvath MD  EEG/Epilepsy Attending     -------------------------------------------------------------------------------------------------------  [facility] EEG Reading Room Ph#: [phone]  Epilepsy Answering Service after 5PM and before 8:30AM: Ph#: [phone]

## 2022-09-01 NOTE — SPEECH LANGUAGE PATHOLOGY EVALUATION - SLP CONVERSATIONAL SPEECH
Fluent and grammatical. Frequent pauses for word-finding or self-correction. "I was doing a presentation at work and then all of a sudden I had a bad headache. I called my son to open the door for me- wait that's not right. I'm confused. I called my  because I was nauseous. What am I trying to say?"

## 2022-09-01 NOTE — CONSULT NOTE ADULT - ASSESSMENT
61 right handed, F with history of hypertension, migraine (not on any medications) and TIA (seen at [facility] in 11/2021) presented to the hospital with headache, word-finding difficulties and n/v. She woke up normal the day before as confirmed by her . TKN 8/31 at 0630. She went to work but had to drive back home because she developed a headache. Her  recalled checking up on her in bed at 2330 for which she was still responsive and answering questions. However, when the patient woke up the day of admission, the  noticed that the patient forgot her commonly used passwords, her mother's name and generally seemed confused. At that point, the  decided to send the patient to the hospital. [facility] called at 0901. The patient received CT head non con, angio, and perfusion. NIHSS 13. No tpa and thrombectomy given because the patient was outside window.     Of note, the patient presented to [facility] with headaches in 11/2021. [facility] was called and the patient received stroke imaging. 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 Patient was discharged on ibuprofen for headache and not on aspirin and plavix.   (31 Aug 2022 09:58)      Internal Medicine has been consulted on Ms. Xavier for medical management. Patient admits to a PMHx of hypertension (States that she is not currently on any medications as an outpatient). Patient states that about 1 week ago she experienced an episode of chest pain. Troponin X 1 is negative. Denies PMHx of HLD, DM, or cardiac history. Patient currently denies CP, palpitations, SOB or dyspnea. Denies nausea or vomiting.         Altered Mental Status   - Likely due to PRESS vs. CVA vs. 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 Patchy signal hyperintensity involving the left greater than right occipital lobe and right cerebellum which may be related to hypertensive encephalopathy. --   - A1C of 5.6  - LDL if 139; C/w Statin   - C/w ASA   - F/U EEG results   - Neuro checks per protocol   - PT/OT as able to   - Monitor on Tele   - Diet per speech and swallow   - Fall, Seizure and aspiration precautions   - Care as per neurology     Hypertension   - Started on Losartan 25 TID per cardiology  - Check EKG  - Check TTE  - F/u Cardiology recs    Benzodiazepine on UA  - Patient denies benzodiazepine use   - Monitor for signs/symptoms of withdrawal     Prolonged QTc  - QTc of 502 on EKG  - Recommend to check repeat EKG to assess QTc and for routine monitoring  - Monitor and replete electrolytes PRN  - Avoid QT prolonging agents   - Check Magnesium    Nausea  - Patient reports improvement in nausea  - Recommend to check EKG   - If QTc allows, can trial Ativan PRN or Zofran PRN for nausea, pending repeat QTc check     Leukocytosis  - WBC of 12K, continue to monitor and trend on AM labs  - Afebrile   - CXR with clear lungs  - If febrile, recommend to check pan culture   - Monitor CBC, Temp curve, VS and patient very closely     Hyponatremia   - ? SIADH   - Na of 131 on AM labs  - Recommend to continue to monitor and trend on labs  - Check Urine Na & Osmolality and Serum Na & Osmolality     Hypokalemia   - S/P K supplementation today   - Monitor closely on AM labs    PPX  - Lovenox 40 daily  - Recommend to start Pantoprazole 40 daily for GI PPX if cleared from Neuro perspective     Discussed with patient and her sister at the bedside.    61 right handed, F with history of hypertension, migraine (not on any medications) and TIA (seen at [facility] in 11/2021) presented to the hospital with headache, word-finding difficulties and n/v. She woke up normal the day before as confirmed by her . TKN 8/31 at 0630. She went to work but had to drive back home because she developed a headache. Her  recalled checking up on her in bed at 2330 for which she was still responsive and answering questions. However, when the patient woke up the day of admission, the  noticed that the patient forgot her commonly used passwords, her mother's name and generally seemed confused. At that point, the  decided to send the patient to the hospital. [facility] STROKE called at 0901. The patient received CT head non con, angio, and perfusion. NIHSS 13. No tpa and thrombectomy given because the patient was outside window.     Of note, the patient presented to [facility] with headaches in 11/2021. CODE STROKE was called and the patient received stroke imaging. 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 Patient was discharged on ibuprofen for headache and not on aspirin and plavix.   (31 Aug 2022 09:58)      Internal Medicine has been consulted on Ms. Xavier for medical management. Patient admits to a PMHx of hypertension (States that she is not currently on any medications as an outpatient). Patient states that about 1 week ago she experienced an episode of chest pain. Troponin X 1 is negative. Denies PMHx of HLD, DM, or cardiac history. Patient currently denies CP, palpitations, SOB or dyspnea. Denies nausea or vomiting.       Altered Mental Status   - Likely due to PRESS vs. CVA vs. 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 Patchy signal hyperintensity involving the left greater than right occipital lobe and right cerebellum which may be related to hypertensive encephalopathy. --   - A1C of 5.6  - LDL if 139; C/w Statin   - C/w ASA   - F/U EEG results   - Neuro checks per protocol   - PT/OT as able to   - Monitor on Tele   - Diet per speech and swallow   - Fall, Seizure and aspiration precautions   - Care as per neurology     Hypertension   - Started on Losartan 25 TID per cardiology  - Check EKG  - Check TTE  - F/u Cardiology recs    Benzodiazepine on UA  - Patient denies benzodiazepine use   - Monitor for signs/symptoms of withdrawal     Prolonged QTc  - QTc of 502 on EKG  - Recommend to check repeat EKG to assess QTc and for routine monitoring  - Monitor and replete electrolytes PRN  - Avoid QT prolonging agents   - Check Magnesium; Supplement to maintain Mg > 2    Nausea  - Patient reports improvement in nausea  - Recommend to check EKG   - If QTc allows, can trial Ativan PRN or Zofran PRN for nausea, pending repeat QTc check     Polycythemia  - Elevated Hgb  - Continue to monitor and trend  - If continues to be elevated; suggest Heme eval    Leukocytosis  - WBC of 12K, continue to monitor and trend on AM labs  - Afebrile   - CXR with clear lungs  - If febrile, recommend to check pan culture   - Monitor CBC, Temp curve, VS and patient very closely     Hyponatremia   - ? SIADH   - Na of 131 on AM labs  - Recommend to continue to monitor and trend on labs  - Check Urine Na & Osmolality and Serum Na & Osmolality     Hypokalemia   - S/P K supplementation today   - Monitor closely on AM labs    PPX  - Lovenox 40 daily  - Recommend to start Pantoprazole 40 daily for GI PPX if cleared from Neuro perspective     Discussed with patient and her sister at the bedside.

## 2022-09-01 NOTE — SPEECH LANGUAGE PATHOLOGY EVALUATION - SLP VERBAL STRATEGIES
Pt arrived to the ED from home. Pt states he has been having a headache for the last week, but over the last couple of days it has gotten worse and has also developed a chest tightness. Pt states that he does have HTN, and his pcp has been adjusting his meds to help lower his bp. Pt is alert and oriented x4.  
Pt reports to the ED via private vehicle.  Pt states that he has been having chest pain and a headache.  PT states his blood pressure has been elevated at home with his blood pressure  (195/125) this morning.  Pt states that he has been taking his medications with no missed doses  
gestures/phonemic cues/semantic cues/sentence completion

## 2022-09-01 NOTE — CONSULT NOTE ADULT - ASSESSMENT
61 right handed, F with history of hypertension, migraine (not on any medications) and TIA (seen at [facility] in 11/2021) presented to the hospital with headache, word-finding difficulties and n/v. She woke up normal the day before as confirmed by her . TKN 8/31 at 0630. She went to work but had to drive back home because she developed a headache. Her  recalled checking up on her in bed at 2330 for which she was still responsive and answering questions. However, when the patient woke up the day of admission, the  noticed that the patient forgot her commonly used passwords, her mother's name and generally seemed confused. At that point, the  decided to send the patient to the hospital. CODE STROKE called at 0901. The patient received CT head non con, angio, and perfusion. NIHSS 13. No tpa and thrombectomy given because the patient was outside window.     Of note, the patient presented to [facility] with headaches in 11/2021. CODE STROKE was called and the patient received stroke imaging. 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 Patient was discharged on ibuprofen for headache and not on aspirin and plavix.    States she had chest pain couple days ago   Feels ok now   No history of any cardiac issues.

## 2022-09-01 NOTE — SPEECH LANGUAGE PATHOLOGY EVALUATION - SLP PATIENT/FAMILY GOALS STATEMENT
"I'm saying the wrong thing. I want to say something different. All the words are messed up. 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

## 2022-09-01 NOTE — SPEECH LANGUAGE PATHOLOGY EVALUATION - SLP DIAGNOSIS
61 RH F p/w headache and new word finding difficulties possibly 2/2 diffuse cerebral dysfunction vs new onset infarct vs hypoperfusion from chronic dissection in L ICA. Pt was seen today for Speech Language Evaluation. Pt presents with Transcortical Sensory Aphasia. Speech was fluent and grammatical. Frequent pauses or self-corrections noted during conversation. Pt self-aware of language deficits, "I'm saying the wrong thing, that's not what I meant to say." Repetition intact at word/phrase level. Semantic/phonemic paraphasias and perseverations during object naming tasks. Receptive language deficits characterized by difficulty accurately following basic one step commands and answering complex Y/N questions. Pt was AAOx4. Cognitive-linguistic deficits noted in the domains of reasoning, memory, and executive function. She was distractible, impulsive, and demonstrated difficulty with topic maintenance (able to be re-directed). No evidence of dysarthria. Reading and writing impaired.

## 2022-09-01 NOTE — CONSULT NOTE ADULT - PROBLEM SELECTOR RECOMMENDATION 2
Question PRESS vs Encephalitis vs. CVA   Follow up EEG   Awaiting MRI  Monitor on Tele  Neuro checks

## 2022-09-01 NOTE — CONSULT NOTE ADULT - SUBJECTIVE AND OBJECTIVE BOX
HPI:  61 right handed, F with history of hypertension, migraine (not on any medications) and TIA (seen at [facility] in 2021) presented to the hospital with headache, word-finding difficulties and n/v. She woke up normal the day before as confirmed by her . LKN  at 0630. She went to work but had to drive back home because she developed a headache. Her  recalled checking up on her in bed at 2330 for which she was still responsive and answering questions. However, when the patient woke up the day of admission, the  noticed that the patient forgot her commonly used passwords, her mother's name and generally seemed confused. At that point, the  decided to send the patient to the hospital. CODE STROKE called at 0901. The patient received CT head non con, angio, and perfusion. NIHSS 13. No tpa and thrombectomy given because the patient was outside window.     Of note, the patient presented to [facility] with headaches in 2021. CODE STROKE was called and the patient received stroke imaging. 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 Patient was discharged on ibuprofen for headache and not on aspirin and plavix.   (31 Aug 2022 09:58)      Internal Medicine has been consulted on Ms. Xavier for medical management. Patient admits to a PMHx of hypertension (States that she is not currently on any medications as an outpatient). Patient states that about 1 week ago she experienced an episode of chest pain. Troponin X 1 is negative. Denies PMHx of HLD, DM, or cardiac history. Patient currently denies CP, palpitations, SOB or dyspnea. Denies nausea or vomiting.       REVIEW OF SYSTEMS:    CONSTITUTIONAL: Generalized weakness   EYES/ENT: + Reports she had visual changes in the past; No vertigo or throat pain   NECK: No pain or stiffness  RESPIRATORY: No cough, wheezing, hemoptysis; No shortness of breath  CARDIOVASCULAR: No chest pain or palpitations  GASTROINTESTINAL: No abdominal or epigastric pain. No nausea, vomiting, or hematemesis; No diarrhea or constipation. No melena or hematochezia.  GENITOURINARY: No dysuria, frequency or hematuria  NEUROLOGICAL: + Generalized headache; + Generalized weakness   SKIN: No itching, burning, rashes, or lesions   All other review of systems is negative unless indicated above.    PAST MEDICAL & SURGICAL HISTORY:  Hypertension      Hallux valgus (acquired), right foot  s/p correction 2018      HV (hallux valgus), left      Acquired hammertoe of left foot      Migraine      H/O  section  X 2      H/O bilateral breast reduction surgery      H/O endoscopy  &amp; colonoscopy      S/P bunionectomy  Right and hammertoe correction 2018      MEDICATIONS  (STANDING):  aspirin enteric coated 81 milliGRAM(s) Oral daily  atorvastatin 40 milliGRAM(s) Oral at bedtime  enoxaparin Injectable 40 milliGRAM(s) SubCutaneous every 24 hours  losartan 25 milliGRAM(s) Oral two times a day      Vital Signs Last 24 Hrs  T(C): 36.7 (01 Sep 2022 08:00), Max: 36.7 (01 Sep 2022 08:00)  T(F): 98.1 (01 Sep 2022 08:00), Max: 98.1 (01 Sep 2022 08:00)  HR: 97 (01 Sep 2022 14:18) (72 - 97)  BP: 150/97 (01 Sep 2022 14:18) (150/97 - 199/106)  BP(mean): 111 (01 Sep 2022 14:18) (111 - 137)  RR: 16 (01 Sep 2022 14:18) (14 - 22)  SpO2: 100% (01 Sep 2022 14:18) (92% - 100%)    Parameters below as of 01 Sep 2022 14:18  Patient On (Oxygen Delivery Method): room air        Appearance: Awake, Some word finding difficulty; EEG leads on  HEENT:  Eyes are open; EEG leads on   Lymphatic: No lymphadenopathy , no edema  Cardiovascular: Normal S1 S2, No JVD, No murmurs , Peripheral pulses palpable 2+ bilaterally  Respiratory: Lungs clear to auscultation, normal effort 	  Gastrointestinal:  Soft, Non-tender, + BS	  Skin: No rashes, No ecchymoses, No cyanosis, warm to touch  Musculoskeletal: Normal range of motion, normal strength  Psychiatry:  Mood & affect appropriate; Calm   Ext: No edema                              16.5   12.00 )-----------( 482      ( 01 Sep 2022 06:07 )             49.4               09-01    131<L>  |  91<L>  |  12  ----------------------------<  123<H>  3.4<L>   |  26  |  0.60    Ca    9.4      01 Sep 2022 06:09    TPro  8.8<H>  /  Alb  4.7  /  TBili  0.6  /  DBili  x   /  AST  17  /  ALT  7<L>  /  AlkPhos  134<H>      PT/INR - ( 31 Aug 2022 09:11 )   PT: 12.4 sec;   INR: 1.07 ratio         PTT - ( 31 Aug 2022 09:11 )  PTT:29.1 sec                   Urinalysis Basic - ( 31 Aug 2022 19:32 )    Color: Light Yellow / Appearance: Clear / S.049 / pH: x  Gluc: x / Ketone: Moderate  / Bili: Negative / Urobili: Negative   Blood: x / Protein: 300 mg/dL / Nitrite: Negative   Leuk Esterase: Negative / RBC: 4 /hpf / WBC 2 /HPF   Sq Epi: x / Non Sq Epi: 1 /hpf / Bacteria: Negative            < from: CT Angio Brain Stroke Protocol  w/ IV Cont (22 @ 09:35) >  IMPRESSION:    CT PERFUSION: No perfusion deficit.    CTA NECK:  1. No large vessel occlusion or major stenosis.  2. 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    CTA HEAD:  1. No large vessel occlusion or major stenosis.    Dr. Zheng Gonzales discussed findings with neurology resident Dr. Snyder on   2022 at 9:36 AM      --- End of Report ---    < end of copied text >      < from: MR Head w/ IV Cont (22 @ 12:55) >    IMPRESSION: No enhancement within the previously seen signal   abnormalities involving the bilateral occipitallobes, left side larger   than right, and right cerebellar hemisphere.      < end of copied text >      < from: MR Head No Cont (22 @ 18:51) >  IMPRESSION: Small vessel white matter ischemic changes. Patchy signal   hyperintensity involving the left greater than right occipital lobe and   right cerebellum which may be related to hypertensive encephalopathy.    < end of copied text >  
HPI:  61 right handed, F with history of hypertension, migraine (not on any medications) and TIA (seen at [facility] in 2021) presented to the hospital with headache, word-finding difficulties and n/v. She woke up normal the day before as confirmed by her . LKN  at 0630. She went to work but had to drive back home because she developed a headache. Her  recalled checking up on her in bed at 2330 for which she was still responsive and answering questions. However, when the patient woke up the day of admission, the  noticed that the patient forgot her commonly used passwords, her mother's name and generally seemed confused. At that point, the  decided to send the patient to the hospital. CODE STROKE called at 09. The patient received CT head non con, angio, and perfusion. NIHSS 13. No tpa and thrombectomy given because the patient was outside window.     Of note, the patient presented to [facility] with headaches in 2021. CODE STROKE was called and the patient received stroke imaging. 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 Patient was discharged on ibuprofen for headache and not on aspirin and plavix.   (31 Aug 2022 09:58)    Patient was admitted on , seen today on 4 maldonado, stated she is not feeling well.     REVIEW OF SYSTEMS  Constitutional - No fever, No weight loss, No fatigue  HEENT - No eye pain, No visual disturbances, No difficulty hearing, No tinnitus, No vertigo, No neck pain  Respiratory - No cough, No wheezing, No shortness of breath  Cardiovascular - No chest pain, No palpitations  Gastrointestinal - No abdominal pain, No nausea, No vomiting, No diarrhea, No constipation  Genitourinary - No dysuria, No frequency, No hematuria, No incontinence  Psychiatric - No depression, No anxiety    VITALS  T(C): 36.7 (22 @ 08:00), Max: 36.7 (22 @ 08:00)  HR: 77 (22 @ 12:00) (72 - 89)  BP: 191/103 (22 @ 12:00) (172/97 - 199/106)  RR: 20 (22 @ 12:00) (14 - 22)  SpO2: 92% (22 @ 12:00) (92% - 99%)  Wt(kg): --    PAST MEDICAL & SURGICAL HISTORY  Hypertension    Hallux valgus (acquired), right foot    Obesity    HV (hallux valgus), left    Status post hammer toe correction    Acquired hammertoe of left foot    Migraine    H/O  section    H/O bilateral breast reduction surgery    H/O endoscopy    S/P bunionectomy        SOCIAL HISTORY  Smoking - Denied  EtOH - Denied   Drugs - Denied    FUNCTIONAL HISTORY  Lives in private home, 5 SAI  Independent AMB and ADLs PTA     CURRENT FUNCTIONAL STATUS   SLP  transcortical sensory aphasia      PT  bed mobility min assist  transfers min assist  gait min assist x 25 feet     OT  bed mobility min assist  transfers min assist     FAMILY HISTORY   Family history of diabetes mellitus (Mother)    Family history of heart disease (Father)        RECENT LABS/IMAGING  CBC Full  -  ( 01 Sep 2022 06:07 )  WBC Count : 12.00 K/uL  RBC Count : 5.23 M/uL  Hemoglobin : 16.5 g/dL  Hematocrit : 49.4 %  Platelet Count - Automated : 482 K/uL  Mean Cell Volume : 94.5 fl  Mean Cell Hemoglobin : 31.5 pg  Mean Cell Hemoglobin Concentration : 33.4 gm/dL  Auto Neutrophil # : x  Auto Lymphocyte # : x  Auto Monocyte # : x  Auto Eosinophil # : x  Auto Basophil # : x  Auto Neutrophil % : x  Auto Lymphocyte % : x  Auto Monocyte % : x  Auto Eosinophil % : x  Auto Basophil % : x        131<L>  |  91<L>  |  12  ----------------------------<  123<H>  3.4<L>   |  26  |  0.60    Ca    9.4      01 Sep 2022 06:09    TPro  8.8<H>  /  Alb  4.7  /  TBili  0.6  /  DBili  x   /  AST  17  /  ALT  7<L>  /  AlkPhos  134<H>      Urinalysis Basic - ( 31 Aug 2022 19:32 )    Color: Light Yellow / Appearance: Clear / S.049 / pH: x  Gluc: x / Ketone: Moderate  / Bili: Negative / Urobili: Negative   Blood: x / Protein: 300 mg/dL / Nitrite: Negative   Leuk Esterase: Negative / RBC: 4 /hpf / WBC 2 /HPF   Sq Epi: x / Non Sq Epi: 1 /hpf / Bacteria: Negative    < from: CT Angio Brain Stroke Protocol  w/ IV Cont (22 @ 09:35) >    IMPRESSION:    CT PERFUSION: No perfusion deficit.    CTA NECK:  1. No large vessel occlusion or major stenosis.  2. 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    CTA HEAD:  1. No large vessel occlusion or major stenosis.    < end of copied text >    < from: MR Head No Cont (22 @ 18:51) >    IMPRESSION: Small vessel white matter ischemic changes. Patchy signal   hyperintensity involving the left greater than right occipital lobe and   right cerebellum which may be related to hypertensive encephalopathy.    < end of copied text >      ALLERGIES  No Known Allergies      MEDICATIONS   aspirin enteric coated 81 milliGRAM(s) Oral daily  atorvastatin 40 milliGRAM(s) Oral at bedtime  enoxaparin Injectable 40 milliGRAM(s) SubCutaneous every 24 hours  losartan 25 milliGRAM(s) Oral two times a day      ----------------------------------------------------------------------------------------  PHYSICAL EXAM  Constitutional - NAD, Comfortable, in chair   Chest - Breathing comfortably  Cardiovascular - S1S2   Abdomen - Soft   Extremities - No C/C/E, No calf tenderness   Neurologic Exam -          difficulty with following commands           Cognitive - Awake, Alert     Communication - aphasia         Motor - moves all ext        Sensory - Intact to LT     Psychiatric - Mood stable, Affect WNL  ----------------------------------------------------------------------------------------  ASSESSMENT/PLAN  61yFemale h/o HTN, migraine with functional deficits due to AMS, encephalopathy, with aphasia  DVT PPX - SCDs, lovenox   Rehab - Will continue to follow for ongoing rehab needs and recommendations.   continue bedside therapy    Recommend ACUTE inpatient rehabilitation for the functional deficits consisting of 3 hours of therapy/day & 24 hour RN/daily PMR physician for comorbid medical management. Patient will be able to tolerate 3 hours a day.  
CHIEF COMPLAINT:    HISTORY OF PRESENT ILLNESS:  61 right handed, F with history of hypertension, migraine (not on any medications) and TIA (seen at [facility] in 2021) presented to the hospital with headache, word-finding difficulties and n/v. She woke up normal the day before as confirmed by her . TKN  at 0630. She went to work but had to drive back home because she developed a headache. Her  recalled checking up on her in bed at 2330 for which she was still responsive and answering questions. However, when the patient woke up the day of admission, the  noticed that the patient forgot her commonly used passwords, her mother's name and generally seemed confused. At that point, the  decided to send the patient to the hospital. CODE STROKE called at 09. The patient received CT head non con, angio, and perfusion. NIHSS 13. No tpa and thrombectomy given because the patient was outside window.     Of note, the patient presented to [facility] with headaches in 2021. CODE STROKE was called and the patient received stroke imaging. 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 Patient was discharged on ibuprofen for headache and not on aspirin and plavix.    States she had chest pain couple days ago   Feels ok now   No history of any cardiac issues.     PAST MEDICAL & SURGICAL HISTORY:  Hypertension      Hallux valgus (acquired), right foot  s/p correction 2018      HV (hallux valgus), left      Acquired hammertoe of left foot      Migraine      H/O  section  X 2      H/O bilateral breast reduction surgery      H/O endoscopy  &amp; colonoscopy      S/P bunionectomy  Right and hammertoe correction 2018              MEDICATIONS:  aspirin enteric coated 81 milliGRAM(s) Oral daily  enoxaparin Injectable 40 milliGRAM(s) SubCutaneous every 24 hours            atorvastatin 40 milliGRAM(s) Oral at bedtime    potassium chloride    Tablet ER 40 milliEquivalent(s) Oral once      FAMILY HISTORY:  Family history of diabetes mellitus (Mother)    Family history of heart disease (Father)        SOCIAL HISTORY:    [ ] Non-smoker  [ ] Smoker  [ ] Alcohol    Allergies    No Known Allergies    Intolerances    	    REVIEW OF SYSTEMS:  CONSTITUTIONAL: No fever, weight loss, + fatigue  EYES: No eye pain, visual disturbances, or discharge  ENMT:  No difficulty hearing, tinnitus, vertigo; No sinus or throat pain  NECK: No pain or stiffness  RESPIRATORY: No cough, wheezing, chills or hemoptysis; No Shortness of Breath  CARDIOVASCULAR:+ chest pain, palpitations, passing out, dizziness, or leg swelling  GASTROINTESTINAL: No abdominal or epigastric pain. No nausea, vomiting, or hematemesis; No diarrhea or constipation. No melena or hematochezia.  GENITOURINARY: No dysuria, frequency, hematuria, or incontinence  NEUROLOGICAL: No headaches, memory loss, loss of strength, numbness, or tremors  SKIN: No itching, burning, rashes, or lesions   LYMPH Nodes: No enlarged glands  ENDOCRINE: No heat or cold intolerance; No hair loss  MUSCULOSKELETAL: No joint pain or swelling; No muscle, back, or extremity pain  PSYCHIATRIC: No depression, anxiety, mood swings, or difficulty sleeping  HEME/LYMPH: No easy bruising, or bleeding gums  ALLERY AND IMMUNOLOGIC: No hives or eczema	    [ ] All others negative	  [ ] Unable to obtain    PHYSICAL EXAM:  T(C): 36.7 (22 @ 08:00), Max: 36.7 (22 @ 10:37)  HR: 80 (22 @ 08:00) (72 - 86)  BP: 193/110 (22 @ 08:00) (178/95 - 200/114)  RR: 14 (22 @ 08:00) (14 - 22)  SpO2: 93% (22 @ 08:00) (93% - 98%)  Wt(kg): --  I&O's Summary    31 Aug 2022 07:01  -  01 Sep 2022 07:00  --------------------------------------------------------  IN: 0 mL / OUT: 300 mL / NET: -300 mL        Appearance: NAD EEG in progress   HEENT:   Normal oral mucosa, PERRL, EOMI	  Lymphatic: No lymphadenopathy  Cardiovascular: Normal S1 S2, No JVD, No murmurs, No edema  Respiratory: Lungs clear to auscultation	  Psychiatry: A & O x 3, Mood & affect appropriate  Gastrointestinal:  Soft, Non-tender, + BS	  Skin: No rashes, No ecchymoses, No cyanosis	  Extremities: Normal range of motion, No clubbing, cyanosis or edema  Vascular: Peripheral pulses palpable 2+ bilaterally  Neuro:  MS: awake, alerts to name but not attentive , not following simple nor complex commands (ask to bring right hand to left ear, the patient raises her right leg)  Language: says few sentences - requires repeated stimulation to follow simple commands. (says, "I am in the refrigerator right now")  CNs: Pupils asymmetric in diameter but reactive to light, EOMI seems intact, face symmetric, tongue midline and can move left and right.  Motor: Limited due to patient not following commands but moving all 4 extremities equally and spontaneously. Roughly 4+/5 throughout  Sensory: reacts to pain in all extremities, can localize to light touch   Reflexes: 2/4 throughout, bilateral flexor plantar response,   Coordination no observed nystagmus but unable to follow upon command   Gait: Unable to assess due to patient not following commands    TELEMETRY: 	 SR   ECG:  	NSR Qtc 502   RADIOLOGY:  < from: CT Angio Brain Stroke Protocol  w/ IV Cont (22 @ 09:35) >    ACC: [id number] EXAM:  CT ANGIO BRAIN STROKE PROT IC                        ACC: [id number] EXAM:  CT ANGIO NECK STROKE PROT IC                        ACC: [id number] EXAM:  CT BRAIN PERFUSION MAPS STROKE                          PROCEDURE DATE:  2022          INTERPRETATION:  .    CLINICAL INFORMATION: Code stroke. Word finding difficulty. Questionable   aphasia.    TECHNIQUE: CT perfusion was performed and parametric maps were generated   by the RAPID software package.    Thin section CT angiography from the aortic arch to the cranial vertex   was also performed using a multislice CT scanner, following the infusion   of intravenous contrast material.    120 cc's of IV Omnipaque 350 was administered without immediate   complication. 0 cc's was discarded. Sagittal and coronal MIP reformatted   images were obtained from the source data. Both the axial source and   reconstructed images were reviewed.    COMPARISON: Prior CT perfusion study and CT angiogram studies of the head   and neck dated 2021. Prior brain MRI exam from 11/10/2021.    FINDINGS:    CT PERFUSION:    CBF <30% volume: 0 mL  Tmax > 6.0s volume : 0 mL  Mismatch volume: 0  mL  Mismatch ratio: None.    CTA NECK: There is a standard anatomic configuration to the aortic arch.   The origins of the great vessels appear unremarkable as well as the   bilateral common carotid arteries. Minimal atherosclerotic plaque affects   the left carotid bifurcation without stenosis. The right carotid   bifurcation appears unremarkable. The right cervical internal carotid   artery appears unremarkable.    There is a focal chronic dissection flap involving the left internal   carotid artery at the level of C2 which appears unchanged when compared   to the prior study in retrospect (series 6, images 233-246). The left   cervical internal carotid artery remains patent extending to the skull   base.    The origins of the bilateral arteries are normal. The left vertebral   artery is dominant compared to the right. The bilateral vertebral   arteries are patent extending to the skull base.    CTA HEAD: The right V4 segment is hypoplastic compared to the left. The   left V4 segment is dominant. The vertebrobasilar confluence, basilar   artery, and basilar tip appear unremarkable as well as the bilateral   posterior cerebral arteries.    The anterior and small caliber bilateral posterior communicating arteries   are seen.    The bilateral intracranial internal carotid, anterior, and middle   cerebral arteries appear unremarkable.    The intracranial venous structures are patent.    IMPRESSION:    CT PERFUSION: No perfusion deficit.    CTA NECK:  1. No large vessel occlusion or major stenosis.  2. 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    CTA HEAD:  1. No large vessel occlusion or major stenosis.    Dr. Zheng Suazo discussed findings with neurology resident Dr. Snyder on   2022 at 9:36 AM      --- End of Report ---            ZHENG SUAZO MD; Attending Radiologist  This document has been electronically signed. Aug 31 2022  9:42AM    < end of copied text >    OTHER: 	  	  LABS:	 	    CARDIAC MARKERS:                                  16.5   12.00 )-----------( 482      ( 01 Sep 2022 06:07 )             49.4     09-01    131<L>  |  91<L>  |  12  ----------------------------<  123<H>  3.4<L>   |  26  |  0.60    Ca    9.4      01 Sep 2022 06:09    TPro  8.8<H>  /  Alb  4.7  /  TBili  0.6  /  DBili  x   /  AST  17  /  ALT  7<L>  /  AlkPhos  134<H>      proBNP:   Lipid Profile:   HgA1c:   TSH:

## 2022-09-01 NOTE — OCCUPATIONAL THERAPY INITIAL EVALUATION ADULT - ORIENTATION, REHAB EVAL
pt axo x2/3, word finding difficulties, requires increased time and repetition in direction/person/place

## 2022-09-01 NOTE — OCCUPATIONAL THERAPY INITIAL EVALUATION ADULT - LIVES WITH, PROFILE
Pt confused, however states she lives in house with . Pt I in ADLs and ambulation prior to admission

## 2022-09-01 NOTE — SPEECH LANGUAGE PATHOLOGY EVALUATION - SLP GENERAL OBSERVATIONS
Pt was received at bedside awake and alert. +room air, +tele (VSS), +vEEG. She was cooperative throughout evaluation. c/o intermittent nausea.

## 2022-09-01 NOTE — SPEECH LANGUAGE PATHOLOGY EVALUATION - SLP PERTINENT HISTORY OF CURRENT PROBLEM
61 RH F with PMH of HTN, migraines (not on any medications), and TIA (seen at [facility] in 11/2021) presented to the hospital with headache, word-finding difficulties and N/V. LKN 8/31 at 0630. Imaging with no acute hemorrhage or mass effect but with a L occipital hypoattenuation and chronic L ICA dissection. IMPRESSION: Headache and new word finding difficulties with decreased ability to repeat and comprehend possibly due to diffuse cerebral dysfunction vs new onset infarct vs hypoperfusion from chronic dissection in L ICA. Mechanism ESUS. mRS: 0. NIHSS: 13. Plan: MRI BRAIN, TTE.

## 2022-09-01 NOTE — OCCUPATIONAL THERAPY INITIAL EVALUATION ADULT - MANUAL MUSCLE TESTING RESULTS, REHAB EVAL
difficult to formally assess 2* cognition, however at least 3/5 throughout, RLE appears weaker/grossly assessed due to

## 2022-09-01 NOTE — PHYSICAL THERAPY INITIAL EVALUATION ADULT - PERTINENT HX OF CURRENT PROBLEM, REHAB EVAL
61 right handed, F with history of hypertension, migraine (not on any medications) and TIA (seen at [facility] in 11/2021) presented to the hospital with headache, word-finding difficulties and n/v. CTA head/neck 8/31, chronic focal dissection flap involving the Lt ICA. ECG 8/31, prolonged QT.

## 2022-09-01 NOTE — OCCUPATIONAL THERAPY INITIAL EVALUATION ADULT - VISUAL ACUITY
inconsistent exam 2* command follow, pt not tracking OT, pt states R eye is "not right" but unable to further explain.

## 2022-09-01 NOTE — SPEECH LANGUAGE PATHOLOGY EVALUATION - OPEN ENDED QUESTIONS
Occupational Therapy  Daily Treatment Note  Date: 11/3/2017  Patient Name: Nidia Boggs  :  1940  MRN: [id number]       Subjective Treatment started on time. Additional Pertinent Hx: Eval 10/24/17 PATRICE Olivera  Referring Practitioner: Dr. Taryn Shaikh  Diagnosis: L knee and thigh pain, lumbar DDD, and associated pain. Total # of Visits Approved: 10  Progress Note Counter: 3    Patient Currently in Pain: Yes  Pain Assessment: 0-10  Pain Level: 5  Pain Type: Chronic pain  Pain Location: Knee;Leg  Pain Orientation: Right;Left  Pain Descriptors: Aching;Burning     Treatment Activities: Provided direct treatment techniques of MFR as follows after providing infrared heat treatment as noted below. Reviewed HEP of sidelying leg stretch and posterior pelvic tilts and patient performed well. MFR Techniques:          Lumbar/Pelvic/Sacral Area:                                []      Lumbo/sacral decompression        []  Pelvic floor transv.plane                                []      Psoas release                                []  Scar releases                                []      Lateral Obliques                             []   Anterior pelvic tilts                                 [x]      Posterior Pelvic Tilts                      []  Lumbosacral junction                                 [x]      Dural tube release                                 Decompression                                 [x]      Piriformis Release L                         [x]   Soft tissue mobility    Lower Extremities:                                [x]      Suprapatellar & Quadriceps          [x]  Hamstrings                                [x]      Upper Quads soft tissue mob.        [x]  Leg pull                                [x]       Bilateral Leg pull                           [x]  Soft tissue mobility                                                                                                [x]  Transverse Planes Other: Infrared heat treatment 6J/30 seconds per site to Left piriformis area. Positioning  Bed Postion Comment: Patient is in much less pain in sidelying on her Right side. Adaptations: Patient instructed to keep a pillow between her knees for neutral positioning. Assessment  Patient tolerated treatment well today. Patient unable to tell if her pain decreased. Performance deficits / Impairments: Decreased functional mobility ; Decreased ADL status; Decreased ROM; Decreased strength;Decreased balance;Decreased high-level IADLs  Post Treatment Pain Screening  Pain at present: 5  Scale Used: Numeric Score  Intervention List: Pt educated regarding timing of pain meds  Comments / Details: Patient took pain meds prior to treatment today. Plan Continue with current POC. G-Code 10th visits     Goals  Long term goals 1-5 addressed. Time Frame for Long term goals : 2X per week, 5 weeks or 10 visits. Long term goal 1: Patient will no longer substitute hands to surfaces for mobility and balance. Patient will not furniture walk. Long term goal 2: Patient will stand for 10 minute intervals prior to resting using good postural awareness and good body mechanics for personal and household tasks. Long term goal 3: Reduce/eliminate functional leg length discrepency by reducing moderate to severe fascial restrictions of L LE. Long term goal 4: Patient will recruit neutral pelvis to support upright standing following reduction of severe fascial restrictions. Long term goal 5: Patient will follow established HEP once established.     Therapy Time   Individual Concurrent Group Co-treatment   Time In  1:00 pm         Time Out  2:00 pm         Minutes  60                 ROMARIO Lucas/L  Electronically signed by ARLIN Lucas on 11/3/17 at 3:43 PM impaired

## 2022-09-01 NOTE — SPEECH LANGUAGE PATHOLOGY EVALUATION - CONFRONTATIONAL NAMING
30% accuracy with object ID; 50% accuracy with picture ID. Frequent semantic and phonemic paraphasias and perseverations and pauses for word-finding. Improvement with semantic/phonemic/sentence completion cueing./impaired

## 2022-09-01 NOTE — SPEECH LANGUAGE PATHOLOGY EVALUATION - SLP SHORT TERM MEMORY
0/3 for recalling details of short story; 2/3 immediate recall of objects and 2/3 for delayed recall; Strimuable to semantic cue./impaired

## 2022-09-01 NOTE — OCCUPATIONAL THERAPY INITIAL EVALUATION ADULT - PERTINENT HX OF CURRENT PROBLEM, REHAB EVAL
61 right handed, F with history of hypertension, migraine (not on any medications) and TIA (seen at [facility] in 11/2021) presented to the hospital with headache, word-finding difficulties and n/v. She woke up normal the day before as confirmed by her . TKN 8/31 at 0630. She went to work but had to drive back home because she developed a headache. Her  recalled checking up on her in bed at 2330 for which she was still responsive and answering questions. However, when the patient woke up the day of admission, the  noticed that the patient forgot her commonly used passwords, her mother's name and generally seemed confused. At that point, the  decided to send the patient to the hospital. CODE STROKE called at 0901. The patient received CT head non con, angio, and perfusion. NIHSS 13. No tpa and thrombectomy given because the patient was outside window. Of note, the patient presented to [facility] with headaches in 11/2021. CODE STROKE was called and the patient received stroke imaging. 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 Patient was discharged on ibuprofen for headache and not on aspirin and plavix.    CT Head: (-)  CTA Neck/Head: (-)

## 2022-09-01 NOTE — CONSULT NOTE ADULT - REASON FOR ADMISSION
Word finding difficulties concerning for stroke

## 2022-09-01 NOTE — PHYSICAL THERAPY INITIAL EVALUATION ADULT - PLANNED THERAPY INTERVENTIONS, PT EVAL
stairs: GOAL: patient will be able to negotiated 12 stairs (I) with one HR in 2 weeks/bed mobility training/gait training/transfer training

## 2022-09-01 NOTE — SPEECH LANGUAGE PATHOLOGY EVALUATION - COMMENTS
Pt notified of stable findings, will try reduce dose of diuretics and follow-up as scheduled. IMAGING:  CT HEAD: 8/31/22  IMPRESSION:  Subtle questionable hypoattenuation in the left occipital lobe for which the differential diagnosis includes infarct versus PRES versus cerebritis versus artifact. No acute intracranial hemorrhage.    Pt is unknown to this service.    Of note, pt passed dysphagia screener 08/31 at 09:45 and is currently on a regular diet and thin liquids. Significant confusion re: clock drawing task. Unable to initiate or complete with cueing. Divergent naming abilities significantly impaired. Only able to name x2 animals in one minute.   Convergent naming abilities WFL (100% accuracy) "The words are so jumbled up. I can't read this. What does it say?" GOALS:  1. Pt will improve expressive language skills for functional communication.  2. Pt will improve receptive language skills for functional communication.  3. Pt will improve cognitive-linguistic skills for functional communication.     Discussed results and recommendations with pt, RNShukri, and Stroke Rounding Team (MD, Vidal, PA, Katherine)

## 2022-09-02 LAB
ANION GAP SERPL CALC-SCNC: 15 MMOL/L — SIGNIFICANT CHANGE UP (ref 5–17)
ANION GAP SERPL CALC-SCNC: 15 MMOL/L — SIGNIFICANT CHANGE UP (ref 5–17)
BUN SERPL-MCNC: 17 MG/DL — SIGNIFICANT CHANGE UP (ref 7–23)
BUN SERPL-MCNC: 19 MG/DL — SIGNIFICANT CHANGE UP (ref 7–23)
CALCIUM SERPL-MCNC: 9.2 MG/DL — SIGNIFICANT CHANGE UP (ref 8.4–10.5)
CALCIUM SERPL-MCNC: 9.3 MG/DL — SIGNIFICANT CHANGE UP (ref 8.4–10.5)
CHLORIDE SERPL-SCNC: 91 MMOL/L — LOW (ref 96–108)
CHLORIDE SERPL-SCNC: 93 MMOL/L — LOW (ref 96–108)
CO2 SERPL-SCNC: 23 MMOL/L — SIGNIFICANT CHANGE UP (ref 22–31)
CO2 SERPL-SCNC: 25 MMOL/L — SIGNIFICANT CHANGE UP (ref 22–31)
CREAT SERPL-MCNC: 0.85 MG/DL — SIGNIFICANT CHANGE UP (ref 0.5–1.3)
CREAT SERPL-MCNC: 0.93 MG/DL — SIGNIFICANT CHANGE UP (ref 0.5–1.3)
CULTURE RESULTS: SIGNIFICANT CHANGE UP
EGFR: 70 ML/MIN/1.73M2 — SIGNIFICANT CHANGE UP
EGFR: 78 ML/MIN/1.73M2 — SIGNIFICANT CHANGE UP
GLUCOSE SERPL-MCNC: 117 MG/DL — HIGH (ref 70–99)
GLUCOSE SERPL-MCNC: 132 MG/DL — HIGH (ref 70–99)
HCT VFR BLD CALC: 47.7 % — HIGH (ref 34.5–45)
HGB BLD-MCNC: 16 G/DL — HIGH (ref 11.5–15.5)
MAGNESIUM SERPL-MCNC: 2.3 MG/DL — SIGNIFICANT CHANGE UP (ref 1.6–2.6)
MCHC RBC-ENTMCNC: 31.3 PG — SIGNIFICANT CHANGE UP (ref 27–34)
MCHC RBC-ENTMCNC: 33.5 GM/DL — SIGNIFICANT CHANGE UP (ref 32–36)
MCV RBC AUTO: 93.3 FL — SIGNIFICANT CHANGE UP (ref 80–100)
NRBC # BLD: 0 /100 WBCS — SIGNIFICANT CHANGE UP (ref 0–0)
OSMOLALITY SERPL: 272 MOSMOL/KG — LOW (ref 280–301)
PLATELET # BLD AUTO: 441 K/UL — HIGH (ref 150–400)
POTASSIUM SERPL-MCNC: 3.2 MMOL/L — LOW (ref 3.5–5.3)
POTASSIUM SERPL-MCNC: 3.6 MMOL/L — SIGNIFICANT CHANGE UP (ref 3.5–5.3)
POTASSIUM SERPL-SCNC: 3.2 MMOL/L — LOW (ref 3.5–5.3)
POTASSIUM SERPL-SCNC: 3.6 MMOL/L — SIGNIFICANT CHANGE UP (ref 3.5–5.3)
RBC # BLD: 5.11 M/UL — SIGNIFICANT CHANGE UP (ref 3.8–5.2)
RBC # FLD: 11.4 % — SIGNIFICANT CHANGE UP (ref 10.3–14.5)
SODIUM SERPL-SCNC: 131 MMOL/L — LOW (ref 135–145)
SPECIMEN SOURCE: SIGNIFICANT CHANGE UP
WBC # BLD: 10.59 K/UL — HIGH (ref 3.8–10.5)
WBC # FLD AUTO: 10.59 K/UL — HIGH (ref 3.8–10.5)

## 2022-09-02 RX ORDER — POTASSIUM CHLORIDE 20 MEQ
40 PACKET (EA) ORAL EVERY 4 HOURS
Refills: 0 | Status: COMPLETED | OUTPATIENT
Start: 2022-09-02 | End: 2022-09-02

## 2022-09-02 RX ORDER — SODIUM CHLORIDE 9 MG/ML
1000 INJECTION INTRAMUSCULAR; INTRAVENOUS; SUBCUTANEOUS
Refills: 0 | Status: DISCONTINUED | OUTPATIENT
Start: 2022-09-02 | End: 2022-09-02

## 2022-09-02 RX ORDER — ACETAMINOPHEN 500 MG
1000 TABLET ORAL ONCE
Refills: 0 | Status: COMPLETED | OUTPATIENT
Start: 2022-09-02 | End: 2022-09-02

## 2022-09-02 RX ORDER — KETOROLAC TROMETHAMINE 30 MG/ML
15 SYRINGE (ML) INJECTION ONCE
Refills: 0 | Status: DISCONTINUED | OUTPATIENT
Start: 2022-09-02 | End: 2022-09-02

## 2022-09-02 RX ORDER — POLYETHYLENE GLYCOL 3350 17 G/17G
17 POWDER, FOR SOLUTION ORAL DAILY
Refills: 0 | Status: DISCONTINUED | OUTPATIENT
Start: 2022-09-02 | End: 2022-09-06

## 2022-09-02 RX ORDER — SENNA PLUS 8.6 MG/1
2 TABLET ORAL AT BEDTIME
Refills: 0 | Status: DISCONTINUED | OUTPATIENT
Start: 2022-09-02 | End: 2022-09-06

## 2022-09-02 RX ORDER — ONDANSETRON 8 MG/1
4 TABLET, FILM COATED ORAL ONCE
Refills: 0 | Status: COMPLETED | OUTPATIENT
Start: 2022-09-02 | End: 2022-09-02

## 2022-09-02 RX ADMIN — Medication 15 MILLIGRAM(S): at 12:20

## 2022-09-02 RX ADMIN — Medication 400 MILLIGRAM(S): at 03:55

## 2022-09-02 RX ADMIN — POLYETHYLENE GLYCOL 3350 17 GRAM(S): 17 POWDER, FOR SOLUTION ORAL at 11:50

## 2022-09-02 RX ADMIN — Medication 40 MILLIEQUIVALENT(S): at 14:25

## 2022-09-02 RX ADMIN — ONDANSETRON 4 MILLIGRAM(S): 8 TABLET, FILM COATED ORAL at 05:33

## 2022-09-02 RX ADMIN — LOSARTAN POTASSIUM 25 MILLIGRAM(S): 100 TABLET, FILM COATED ORAL at 17:14

## 2022-09-02 RX ADMIN — LOSARTAN POTASSIUM 25 MILLIGRAM(S): 100 TABLET, FILM COATED ORAL at 05:11

## 2022-09-02 RX ADMIN — Medication 81 MILLIGRAM(S): at 11:50

## 2022-09-02 RX ADMIN — Medication 15 MILLIGRAM(S): at 11:31

## 2022-09-02 RX ADMIN — Medication 1000 MILLIGRAM(S): at 14:55

## 2022-09-02 RX ADMIN — Medication 400 MILLIGRAM(S): at 14:18

## 2022-09-02 RX ADMIN — SENNA PLUS 2 TABLET(S): 8.6 TABLET ORAL at 22:22

## 2022-09-02 RX ADMIN — ATORVASTATIN CALCIUM 40 MILLIGRAM(S): 80 TABLET, FILM COATED ORAL at 22:21

## 2022-09-02 RX ADMIN — Medication 40 MILLIEQUIVALENT(S): at 11:47

## 2022-09-02 RX ADMIN — ENOXAPARIN SODIUM 40 MILLIGRAM(S): 100 INJECTION SUBCUTANEOUS at 11:57

## 2022-09-02 RX ADMIN — Medication 1000 MILLIGRAM(S): at 04:25

## 2022-09-02 NOTE — PROGRESS NOTE ADULT - ASSESSMENT
61 right handed, F with history of hypertension, migraine (not on any medications) and TIA (seen at [facility] in 11/2021) presented to the hospital with headache, word-finding difficulties and n/v. She woke up normal the day before as confirmed by her . TKN 8/31 at 0630. She went to work but had to drive back home because she developed a headache. Her  recalled checking up on her in bed at 2330 for which she was still responsive and answering questions. However, when the patient woke up the day of admission, the  noticed that the patient forgot her commonly used passwords, her mother's name and generally seemed confused. At that point, the  decided to send the patient to the hospital. CODE STROKE called at 0901. The patient received CT head non con, angio, and perfusion. NIHSS 13. No tpa and thrombectomy given because the patient was outside window.     Of note, the patient presented to [facility] with headaches in 11/2021. CODE STROKE was called and the patient received stroke imaging. 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 Patient was discharged on ibuprofen for headache and not on aspirin and plavix.    States she had chest pain couple days ago   Feels ok now   No history of any cardiac issues.

## 2022-09-02 NOTE — PROGRESS NOTE ADULT - SUBJECTIVE AND OBJECTIVE BOX
Subjective: Patient seen and examined. No new events except as noted.   remains in Stroke unit   Headache   no cp or sob   REVIEW OF SYSTEMS:    CONSTITUTIONAL: No weakness, fevers or chills + headache   EYES/ENT: No visual changes;  No vertigo or throat pain   NECK: No pain or stiffness  RESPIRATORY: No cough, wheezing, hemoptysis; No shortness of breath  CARDIOVASCULAR: No chest pain or palpitations  GASTROINTESTINAL: No abdominal or epigastric pain. No nausea, vomiting, or hematemesis; No diarrhea or constipation. No melena or hematochezia.  GENITOURINARY: No dysuria, frequency or hematuria  NEUROLOGICAL: No numbness or weakness  SKIN: No itching, burning, rashes, or lesions   All other review of systems is negative unless indicated above.    MEDICATIONS:  MEDICATIONS  (STANDING):  aspirin enteric coated 81 milliGRAM(s) Oral daily  atorvastatin 40 milliGRAM(s) Oral at bedtime  enoxaparin Injectable 40 milliGRAM(s) SubCutaneous every 24 hours  losartan 25 milliGRAM(s) Oral two times a day  polyethylene glycol 3350 17 Gram(s) Oral daily  potassium chloride    Tablet ER 40 milliEquivalent(s) Oral every 4 hours  senna 2 Tablet(s) Oral at bedtime      PHYSICAL EXAM:  T(C): 36.9 (09-01-22 @ 20:00), Max: 36.9 (09-01-22 @ 20:00)  HR: 93 (09-02-22 @ 09:12) (68 - 100)  BP: 120/62 (09-02-22 @ 09:12) (120/62 - 191/103)  RR: 15 (09-02-22 @ 09:12) (15 - 25)  SpO2: 98% (09-02-22 @ 09:12) (92% - 100%)  Wt(kg): --  I&O's Summary    01 Sep 2022 07:01  -  02 Sep 2022 07:00  --------------------------------------------------------  IN: 0 mL / OUT: 1300 mL / NET: -1300 mL          Appearance: NAD EEG in progress   HEENT:   Normal oral mucosa, PERRL, EOMI	  Lymphatic: No lymphadenopathy  Cardiovascular: Normal S1 S2, No JVD, No murmurs, No edema  Respiratory: Lungs clear to auscultation	  Psychiatry: A & O x 3, Mood & affect appropriate  Gastrointestinal:  Soft, Non-tender, + BS	  Skin: No rashes, No ecchymoses, No cyanosis	  Extremities: Normal range of motion, No clubbing, cyanosis or edema  Vascular: Peripheral pulses palpable 2+ bilaterally  Neuro:  MS: awake, alerts to name but not attentive , not following simple nor complex commands (ask to bring right hand to left ear, the patient raises her right leg)  Language: says few sentences - requires repeated stimulation to follow simple commands. (says, "I am in the refrigerator right now")  CNs: Pupils asymmetric in diameter but reactive to light, EOMI seems intact, face symmetric, tongue midline and can move left and right.  Motor: Limited due to patient not following commands but moving all 4 extremities equally and spontaneously. Roughly 4+/5 throughout  Sensory: reacts to pain in all extremities, can localize to light touch   Reflexes: 2/4 throughout, bilateral flexor plantar response,   Coordination no observed nystagmus but unable to follow upon command   Gait: Unable to assess due to patient not following commands    LABS:    CARDIAC MARKERS:                                16.0   10.59 )-----------( 441      ( 02 Sep 2022 06:00 )             47.7     09-02    131<L>  |  91<L>  |  17  ----------------------------<  117<H>  3.2<L>   |  25  |  0.85    Ca    9.3      02 Sep 2022 06:00  Mg     2.3     09-02          TELEMETRY: 	  SR  ECG:  	  RADIOLOGY:   DIAGNOSTIC TESTING:  [ ] Echocardiogram:  < from: TTE with Doppler (w/Cont) (09.01.22 @ 12:55) >    Patient name: EZRA MARIE  YOB: 1960   Age: 61 (F)   MR#: [id number]  Study Date: 9/1/2022  Location: O/PSonographer: Adeline Yoder RDCS  Study quality: Technically difficult  Referring Physician: Tony Drake MD  Blood Pressure: 191/103 mmHg  Height: 157 cm  Weight: 77 kg  BSA: 1.8 m2  ------------------------------------------------------------------------  PROCEDURE: Transthoracic echocardiogram with 2-D, M-Mode  and complete spectral and color flow Doppler. Patient was  injected with 10 cc's of aerosolized saline. Patient was  injected with 10 cc's of aerosolized saline. Verbal consent  was obtained for injection of  Ultrasonic Enhancing Agent  following a discussion of risks and benefits. Following  intravenous injection of Ultrasonic Enhancing Agent,  harmonic imaging was performed.  INDICATION: Cerebral infarction due to unspecified  occlusion or stenosis of unspecified cerebral artery  (I63.50)  ------------------------------------------------------------------------  Dimensions:    Normal Values:  LA:     3.4    2.0 - 4.0 cm  Ao:     3.3    2.0 - 3.8 cm  SEPTUM: 1.3    0.6 - 1.2 cm  PWT:    1.2    0.6 - 1.1 cm  LVIDd:  3.0    3.0 - 5.6 cm  LVIDs:  1.7    1.8 - 4.0 cm  Derived variables:  LVMI: 65 g/m2  RWT: 0.80  Fractional short: 43 %  EF (Visual Estimate): 75 %  ------------------------------------------------------------------------  Observations:  Mitral Valve: Normal mitral valve. Minimal mitral  regurgitation.  Aortic Valve/Aorta: Aortic valvenot well visualized;  appears calcified with normal opening. No aortic valve  regurgitation seen.  Aortic Root: 3.3 cm.  LVOT diameter: 1.8 cm.  Left Atrium: Left atrium not well visualized, probably  normal. Mobile interatrial septum.  Left Ventricle: Endocardial visualization enhanced with  intravenous injection of Ultrasonic Enhancing Agent  (Lumason). Left ventricle suboptimally visualized despite  injection of ultrasonic enhancing agent; grossly  hyperdynamic left ventricular systolic function. Increased  relative wall thickness with normal left ventricular mass  index, consistent with concentric left ventricular  remodeling.  Right Heart: Normal right atrium. Normal right ventricular  size and function. Tricuspid valve not well visualized,  probably normal. Minimal tricuspid regurgitation. Pulmonic  valve not well visualized. No pulmonic regurgitation.  Pericardium/Pleura: Normal pericardium with no pericardial  effusion.  Hemodynamic: Estimated right atrial pressure equals 3 mmHg.  Inadequate tricuspid regurgitation Doppler envelope  precludes estimation of RVSP. Agitated saline injection  performed with and without valsalva maneuver. Images  post-injection were technically difficult from subcostal  imaging. Agitated saline injections demonstrate no evidence  of a patent foramen ovale.  ------------------------------------------------------------------------  Conclusions:  Technically difficult study. Apical images were technically  very limited.  1. Normal mitral valve. Minimal mitral regurgitation.  2. Aortic valve not well visualized; appears calcified with  normal opening. No aortic valve regurgitation seen.  3. Endocardial visualization enhanced with intravenous  injection of Ultrasonic Enhancing Agent (Lumason). Left  ventricle suboptimally visualized despite injection of  ultrasonic enhancing agent; grossly hyperdynamic left  ventricular systolic function.  4. Normal right ventricular size and function.  5. Agitated saline injection performed with and without  valsalva maneuver. Images post-injection were technically  difficult from subcostal imaging. Agitated saline  injections demonstrate no evidence of a patent foramen  ovale.  *** No previous Echo exam.  ------------------------------------------------------------------------  Confirmed on  9/1/2022 - 18:59:10 by Trevor Campo M.D.  ------------------------------------------------------------------------    < end of copied text >    [ ]  Catheterization:  [ ] Stress Test:    OTHER: 	  TECH INFORMATION:   Patient Status: Awake, Drowsy and Asleep.     This is a Continuous Video EEG.     Recording Technique: The patient underwent continuous video-EEG monitoring, using Telemetry System hardware on the [employer] Digital [employer]ytem.  EEG and video data were stored on a computer hard drive with important events saved in digital archive files. The material was reviewed by a physician (electroencephalographer/epileptologist) on a daily basis.  Donis and seizure detection algorithms were utilized and reviewed.  An EEG Technician attended to the patient for 8 to 10 hours per day. The patient was observed by the epilepsy nursing staff 24 hours per day. The epilepsy center neurologist was available in person or on call 24 hours per day..     Placement and Labeling of Electrodes: The EEG was performed utilizing at least 20 channel referential EEG connections (coronal over temporal over parasagittal montage) with inferior temporal electrodes when indicting and using all standard 10-20 electrode placements with EKG, with additional electrodes placed in the inferior temporal region using the modified 10-10 montage electrode placements for elective admissions, or if deemed necessary.  Recording was at  a sampling rate of 256 samples per second per channel. Time synchronized digital video recording was done simultaneously with EEG recording.  A low light infrared camera was used for low light recording..    EEG REPORT:   EEG Report:  · EEG Report	  EZRA MARIE [id number]     Study Start Date:    08/30/22 17:20  Study End Date:  09-01-22 1300   Duration x Hours: 19 hr 40 min  --------------------------------------------------------------------------------------------------    History:  CC/ HPI Patient is a 61y old  Female who presents with a chief complaint of Word finding difficulties concerning for stroke (31 Aug 2022 09:58)    MEDICATIONS  (STANDING):  aspirin enteric coated 81 milliGRAM(s) Oral daily  atorvastatin 40 milliGRAM(s) Oral at bedtime  enoxaparin Injectable 40 milliGRAM(s) SubCutaneous every 24 hours  potassium chloride    Tablet ER 40 milliEquivalent(s) Oral once      --------------------------------------------------------------------------------------------------  Study Interpretation:    [[[Abbreviation Key:  PDR=alpha rhythm/posterior dominant rhythm. A-P=anterior posterior.  Amplitude: ‘very low’:<20; ‘low’:20-49; ‘medium’:; ‘high’:>150uV.  Persistence for periodic/rhythmic patterns (% of epoch) ‘rare’:<1%; ‘occasional’:1-10%; ‘frequent’:10-50%; ‘abundant’:50-90%; ‘continuous’:>90%.  Persistence for sporadic discharges: ‘rare’:<1/hr; ‘occasional’:1/min-1/hr; ‘frequent’:>1/min; ‘abundant’:>1/10 sec.  RPP=rhythmic and periodic patterns; GRDA=generalized rhythmic delta activity; FIRDA=frontal intermittent GRDA; LRDA=lateralized rhythmic delta activity; TIRDA=temporal intermittent rhythmic delta activity;  LPD=PLED=lateralized periodic discharges; GPD=generalized periodic discharges; BIPDs =bilateral independent periodic discharges; Mf=multifocal; SIRPDs=stimulus induced rhythmic, periodic, or ictal appearing discharges; BIRDs=brief potentially ictal rhythmic discharges >4 Hz, lasting .5-10s; PFA (paroxysmal bursts >13 Hz or =8 Hz <10s).  Modifiers: +F=with fast component; +S=with spike component; +R=with rhythmic component.  S-B=burst suppression pattern.  Max=maximal. N1-drowsy; N2-stage II sleep; N3-slow wave sleep. SSS/BETS=small sharp spikes/benign epileptiform transients of sleep. HV=hyperventilation; PS=photic stimulation]]]    Daily EEG Visual Analysis    FINDINGS:      Background:  Continuous: continuous  Symmetry: Assymmetric  PDR:  10 Hz activity, with amplitude to 40 uV, that attenuated to eye opening well formed on right hemisphere, no discerbnable PDR over the right hemisphere.  Low amplitude frontal beta noted in wakefulness.  Reactivity: present  Voltage: normal, mostly 20-150uV  Anterior Posterior Gradient: present  Other background findings: none  Breach: absent    Background Slowing:  Generalized slowing: Diffuse theta and delta slowing.   Focal slowing: Continuous left temporal polymorphic theta and delta slowing.   Brief fragmented runs of frontally predominant generalized rhythmic delta    State Changes:   -Drowsiness noted with increased slowing, attenuation of fast activity, vertex transients.  -Present with N2 sleep transients with symmetric spindles and K-complexes.    Sporadic Epileptiform Discharges:    None    Rhythmic and Periodic Patterns (RPPs):  None     Electrographic and Electroclinical seizures:  None    Other Clinical Events:  None    Activation Procedures:   Hyperventilation was not performed.    Photic stimulation was not performed.    Artifacts:  Intermittent myogenic and movement artifacts were noted. Poor impedence of left posterior electrodes.     ECG:  Leads off     EEG Classification / Summary:  Abnormal  EEG in the awake / drowsy / asleep state(s).    - Continuous slowing, focal, left temporal polymorphic theta and delta  - Asymmetry, absence of PDR and faster frequencies over the left hemisphere   - Background slowing, generalized, mild     Clinical Impression:    This is an abnormal recording due to:     - Structural abnormality over the left temporal region.   - Mild cognitive dysfunction, a non-specific finding.     There were no epileptiform abnormalities / seizures recorded.        Sita Santacruz M.D.   Epilepsy Fellow    Jorge Horvath MD  EEG/Epilepsy Attending     -------------------------------------------------------------------------------------------------------  [facility] EEG Reading Room Ph#: [phone]  Epilepsy Answering Service after 5PM and before 8:30AM: #: [phone]          Electronic Signatures:  Jorge Horvath)  (Signed 01-Sep-2022 14:10)  	Authored: EEG REPORT  	Co-Signer: TECH INFORMATION, EEG REPORT  Sita Santacruz)  (Signed 01-Sep-2022 09:42)  	Authored: TECH INFORMATION, EEG REPORT      Last Updated: 01-Sep-2022 14:10 by Jorge Horvath)

## 2022-09-02 NOTE — PROGRESS NOTE ADULT - PROBLEM SELECTOR PLAN 2
Neurologically with improvement, Continue close monitoring for neurologic deterioration, gradual normotension, A1C 5.6, - started on high intensity statin, CTH 8/31/22: left occipital lobe hypoattenuation c/f PRES  CTA H/N: chronic focal dissection flap of L ICA at C2. MRI brain 11/10/21 with L occipital FLAIR changes, MRI with contrast without any enhancement. EEG neg for seizures. Physical therapy/OT recommended acute TBI rehab.

## 2022-09-02 NOTE — PROGRESS NOTE ADULT - ASSESSMENT
61 right handed  F with hypertension, migraine (improved after menopause but recently returned) and TIA (seen at [facility] in 11/2021 - but not on asa/statin for unclear reason) presented to the hospital with headache, word-finding difficulties and n/v. LKN 8/31 at 0630. mRS: 0. LKN: 8/30/2022 0630. NIHSS: 13 on admission.     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 MRI brain 11/10/21 with L occipital FLAIR changes, MRI with contrast without any enhancement. EEG neg for seizures. Physical therapy/OT recommended acute TBI rehab.    ANTITHROMBOTIC THERAPY: ASA    PULMONARY: CXR clear, protecting airway, saturating well     CARDIOVASCULAR: TTE EF 75%  Minimal mitral regurgitation, no evidence of a patent foramen ovale, cardiac monitoring without reported events, hypertensive- started on losartan. Dr. Olivier following. elevated QTC- monitor EKG                             SBP goal: 100-160    GASTROINTESTINAL:  dysphagia screen passed, tolerating diet     Diet: Regular    RENAL: BUN/Cr within normal limits, good urine output ; hyponatremia      Na Goal: Greater than 135     Jackson: no    HEMATOLOGY: H/H without change, Platelets normal      DVT ppx:  LMWH      ID: afebrile, no sign of infection    OTHER: Plan endorsed to patient, her sister, her daughter and her . All questions and concerns addressed at length.     DISPOSITION: Rehab or home depending on PT eval once stable and workup is complete    CORE MEASURES:        Admission NIHSS: 13     TPA: NO      LDL/HDL: 139/63     Depression Screen: p     Statin Therapy: yes     Dysphagia Screen: PASS      Smoking  NO      Afib NO     Stroke Education YES     Obtain screening lower extremity venous ultrasound in patients who meet 1 or more of the following criteria as patient is high risk for DVT/PE on admission:   [] History of DVT/PE  []Hypercoagulable states (Factor V Leiden, Cancer, OCP, etc. )  []Prolonged immobility (hemiplegia/hemiparesis/post operative or any other extended immobilization)  [] Transferred from outside facility (Rehab or Long term care)  [] Age </= to 50 61 right handed  F with hypertension, migraine (improved after menopause but recently returned) and TIA (seen at [facility] in 11/2021 - but not on asa/statin for unclear reason) presented to the hospital with headache, word-finding difficulties and n/v. LKN 8/31 at 0630. mRS: 0. LKN: 8/30/2022 0630. NIHSS: 13 on admission.     IMPRESSION : aphasia without HP, R HHA, posterior circulation FLAIR changes and elevated BP due to PRES. etiology unclear, she reports taking benzos to deal with anxiety after the recent death of her brother, denies SSRI use.      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 MRI brain 11/10/21 with L occipital FLAIR changes, MRI with contrast without any enhancement. EEG neg for seizures. Physical therapy/OT recommended acute TBI rehab.    ANTITHROMBOTIC THERAPY: ASA    PULMONARY: CXR clear, protecting airway, saturating well     CARDIOVASCULAR: TTE EF 75%  Minimal mitral regurgitation, no evidence of a patent foramen ovale, cardiac monitoring without reported events, hypertensive- started on losartan. Dr. Olivier following. elevated QTC- monitor EKG                             SBP goal: 100-160    GASTROINTESTINAL:  dysphagia screen passed, tolerating diet     Diet: Regular    RENAL: BUN/Cr within normal limits, good urine output ; hyponatremia - r/o SIADH, ordered serum Na/Osmol, started IVF- will recheck BMP at 6pm per medicine. hypokalemia- s/p KCl supplement      Na Goal: Greater than 135     Jackson: no    HEMATOLOGY: H/H without change, Platelets 441     DVT ppx:  LMWH      ID: afebrile, no sign of infection    OTHER: Plan endorsed to patient, her sister, her daughter and her . All questions and concerns addressed at length.     DISPOSITION: Rehab or home depending on PT eval once stable and workup is complete    CORE MEASURES:        Admission NIHSS: 13     TPA: NO      LDL/HDL: 139/63     Depression Screen: p     Statin Therapy: yes     Dysphagia Screen: PASS      Smoking  NO      Afib NO     Stroke Education YES     Obtain screening lower extremity venous ultrasound in patients who meet 1 or more of the following criteria as patient is high risk for DVT/PE on admission:   [] History of DVT/PE  []Hypercoagulable states (Factor V Leiden, Cancer, OCP, etc. )  []Prolonged immobility (hemiplegia/hemiparesis/post operative or any other extended immobilization)  [] Transferred from outside facility (Rehab or Long term care)  [] Age </= to 50 61 right handed  F with hypertension, migraine (improved after menopause but recently returned) and TIA (seen at [facility] in 11/2021 - but not on asa/statin for unclear reason) presented to the hospital with headache, word-finding difficulties and n/v. LKN 8/31 at 0630. mRS: 0. LKN: 8/30/2022 0630. NIHSS: 13 on admission.     IMPRESSION : aphasia without HP, R HHA, posterior circulation FLAIR changes and elevated BP due to PRES. etiology unclear, she reports taking benzos to deal with anxiety after the recent death of her brother, denies SSRI use.      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 MRI brain 11/10/21 with L occipital FLAIR changes, MRI with contrast without any enhancement. EEG neg for seizures. Physical therapy/OT recommended acute TBI rehab.    ANTITHROMBOTIC THERAPY: ASA    PULMONARY: CXR clear, protecting airway, saturating well     CARDIOVASCULAR: TTE EF 75%  Minimal mitral regurgitation, no evidence of a patent foramen ovale, cardiac monitoring without reported events, hypertensive- started on losartan. Dr. Olivier following. elevated QTC- monitor EKG                             SBP goal: 100-160    GASTROINTESTINAL:  dysphagia screen passed, tolerating diet     Diet: Regular    RENAL: BUN/Cr within normal limits, good urine output ; hyponatremia - r/o SIADH, ordered serum Na/Osmol, started IVF- will recheck BMP at 6pm per medicine. hypokalemia- s/p KCl supplement      Na Goal: Greater than 135     Jackson: no    HEMATOLOGY: H/H without change, Platelets 441     DVT ppx:  LMWH      ID: afebrile, no sign of infection    OTHER: Plan endorsed to patient, her sister, her daughter and her . All questions and concerns addressed at length.     DISPOSITION: Rehab or home depending on PT eval once stable and workup is complete    CORE MEASURES:        Admission NIHSS: 13     TPA: NO      LDL/HDL: 139/63     Depression Screen: p     Statin Therapy: yes     Dysphagia Screen: PASS      Smoking  NO      Afib NO     Stroke Education YES     Obtain screening lower extremity venous ultrasound in patients who meet 1 or more of the following criteria as patient is high risk for DVT/PE on admission:   [] History of DVT/PE  []Hypercoagulable states (Factor V Leiden, Cancer, OCP, etc. )  []Prolonged immobility (hemiplegia/hemiparesis/post operative or any other extended immobilization)  [] Transferred from outside facility (Rehab or Long term care)  [] Age </= to 50

## 2022-09-02 NOTE — PROGRESS NOTE ADULT - SUBJECTIVE AND OBJECTIVE BOX
THE PATIENT WAS SEEN AND EXAMINED BY ME WITH THE HOUSESTAFF AND STROKE TEAM DURING MORNING ROUNDS.   HPI:  61 right handed, F with history of hypertension, migraine (not on any medications) and TIA (seen at [facility] in 11/2021) presented to the hospital with headache, word-finding difficulties and n/v. She woke up normal the day before admission as confirmed by her . LKN 8/31 at 0630. She went to work but had to drive back home because she developed a headache. Her  recalled checking up on her in bed at 2330 on 8/30 for which she was still responsive and answering questions. However, when the patient woke up the day of admission, the  noticed that the patient forgot her commonly used passwords, her mother's name and generally seemed confused. At that point, the  decided to send the patient to the hospital. [facility] called at 0901. The patient received CT head non con, angio, and perfusion. No tpa and thrombectomy given because the patient was outside window.   Of note, the patient presented to [facility] with headaches in 11/2021. [facility] was called and the patient received stroke imaging. 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 Patient was discharged on ibuprofen for headache and not on aspirin and plavix.  NIHSS 13 on admission.       SUBJECTIVE: No events overnight.  No new neurologic complaints.  ROS reported negative unless otherwise noted.    aspirin enteric coated 81 milliGRAM(s) Oral daily  atorvastatin 40 milliGRAM(s) Oral at bedtime  enoxaparin Injectable 40 milliGRAM(s) SubCutaneous every 24 hours  losartan 25 milliGRAM(s) Oral two times a day  polyethylene glycol 3350 17 Gram(s) Oral daily  senna 2 Tablet(s) Oral at bedtime      PHYSICAL EXAM:   Vital Signs Last 24 Hrs  T(C): 36.9 (01 Sep 2022 20:00), Max: 36.9 (01 Sep 2022 20:00)  T(F): 98.5 (01 Sep 2022 20:00), Max: 98.5 (01 Sep 2022 20:00)  HR: 80 (02 Sep 2022 06:00) (68 - 100)  BP: 173/84 (02 Sep 2022 06:00) (143/77 - 194/108)  BP(mean): 110 (02 Sep 2022 06:00) (95 - 131)  RR: 19 (02 Sep 2022 06:00) (14 - 25)  SpO2: 95% (02 Sep 2022 06:00) (92% - 100%)    Parameters below as of 02 Sep 2022 06:00  Patient On (Oxygen Delivery Method): room air    General: No acute distress  HEENT: EOM intact, R HHA   Abdomen: Soft, nontender, nondistended   Extremities: No edema    NEUROLOGICAL EXAM:  Mental status: Awake, alert, oriented x3, speech fluent, follows commands,   Cranial Nerves: No facial asymmetry, R HHA, no nystagmus, no dysarthria,  tongue midline  Motor exam: Normal tone, no drift, 5/5 RUE, 5/5 RLE, 5/5 LUE, 5/5 LLE  Sensation: Intact to light touch   Coordination/ Gait: No dysmetria, gait not tested    LABS:                        16.5   12.00 )-----------( 482      ( 01 Sep 2022 06:07 )             49.4    09-01    131<L>  |  91<L>  |  12  ----------------------------<  123<H>  3.4<L>   |  26  |  0.60    Ca    9.4      01 Sep 2022 06:09    TPro  8.8<H>  /  Alb  4.7  /  TBili  0.6  /  DBili  x   /  AST  17  /  ALT  7<L>  /  AlkPhos  134<H>  08-31  PT/INR - ( 31 Aug 2022 09:11 )   PT: 12.4 sec;   INR: 1.07 ratio       PTT - ( 31 Aug 2022 09:11 )  PTT:29.1 sec    IMAGING: Reviewed by me.     MR Head w/ IV Cont (09.01.2022)  No enhancement within the previously seen signal   abnormalities involving the bilateral occipital lobes, left side larger   than right, and right cerebellar hemisphere.    MR Head No Cont (08.31.2022)  Small vessel white matter ischemic changes. Patchy signal   hyperintensity involving the left greater than right occipital lobe and   right cerebellum which may be related to hypertensive encephalopathy.    (08.31.2022)  CT PERFUSION: No perfusion deficit.    CTA NECK:  1. No large vessel occlusion or major stenosis.  2. 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    CTA HEAD:  1. No large vessel occlusion or major stenosis.    CT Head No Cont (08.31.2022)  Subtle questionable hypoattenuation in the left occipital   lobe for which the differential diagnosis includes infarct versus PRES   versus cerebritis versus artifact.  No acute intracranial hemorrhage. THE PATIENT WAS SEEN AND EXAMINED BY ME WITH THE HOUSESTAFF AND STROKE TEAM DURING MORNING ROUNDS.   HPI:  61 right handed, F with history of hypertension, migraine (not on any medications) and TIA (seen at [facility] in 11/2021) presented to the hospital with headache, word-finding difficulties and n/v. She woke up normal the day before admission as confirmed by her . LKN 8/31 at 0630. She went to work but had to drive back home because she developed a headache. Her  recalled checking up on her in bed at 2330 on 8/30 for which she was still responsive and answering questions. However, when the patient woke up the day of admission, the  noticed that the patient forgot her commonly used passwords, her mother's name and generally seemed confused. At that point, the  decided to send the patient to the hospital. [facility] STRO[facility] called at 0901. The patient received CT head non con, angio, and perfusion. No tpa and thrombectomy given because the patient was outside window.   Of note, the patient presented to [facility] with headaches in 11/2021. [facility] STRO[facility] was called and the patient received stroke imaging. 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 Patient was discharged on ibuprofen for headache and not on aspirin and plavix.  NIHSS 13 on admission.       SUBJECTIVE: No events overnight.  No new neurologic complaints.  ROS reported negative unless otherwise noted.    aspirin enteric coated 81 milliGRAM(s) Oral daily  atorvastatin 40 milliGRAM(s) Oral at bedtime  enoxaparin Injectable 40 milliGRAM(s) SubCutaneous every 24 hours  losartan 25 milliGRAM(s) Oral two times a day  polyethylene glycol 3350 17 Gram(s) Oral daily  senna 2 Tablet(s) Oral at bedtime      PHYSICAL EXAM:   Vital Signs Last 24 Hrs  T(C): 36.9 (01 Sep 2022 20:00), Max: 36.9 (01 Sep 2022 20:00)  T(F): 98.5 (01 Sep 2022 20:00), Max: 98.5 (01 Sep 2022 20:00)  HR: 80 (02 Sep 2022 06:00) (68 - 100)  BP: 173/84 (02 Sep 2022 06:00) (143/77 - 194/108)  BP(mean): 110 (02 Sep 2022 06:00) (95 - 131)  RR: 19 (02 Sep 2022 06:00) (14 - 25)  SpO2: 95% (02 Sep 2022 06:00) (92% - 100%)    Parameters below as of 02 Sep 2022 06:00  Patient On (Oxygen Delivery Method): room air    General: No acute distress  HEENT: EOM intact, R HHA   Abdomen: Soft, nontender, nondistended   Extremities: No edema    NEUROLOGICAL EXAM:  Mental status: Awake, alert, oriented x3, speech fluent, follows commands, IDs objects, reduced content in speech, often has circumlocutions   Cranial Nerves: No facial asymmetry, R HHA, no nystagmus, no dysarthria,  tongue midline  Motor exam: extremities move well against gravity   Sensation: Intact to light touch   Coordination/ Gait: No dysmetria, gait not tested    LABS:                        16.5   12.00 )-----------( 482      ( 01 Sep 2022 06:07 )             49.4    09-01    131<L>  |  91<L>  |  12  ----------------------------<  123<H>  3.4<L>   |  26  |  0.60    Ca    9.4      01 Sep 2022 06:09    TPro  8.8<H>  /  Alb  4.7  /  TBili  0.6  /  DBili  x   /  AST  17  /  ALT  7<L>  /  AlkPhos  134<H>  08-31  PT/INR - ( 31 Aug 2022 09:11 )   PT: 12.4 sec;   INR: 1.07 ratio       PTT - ( 31 Aug 2022 09:11 )  PTT:29.1 sec    IMAGING: Reviewed by me.     MR Head w/ IV Cont (09.01.2022)  No enhancement within the previously seen signal   abnormalities involving the bilateral occipital lobes, left side larger   than right, and right cerebellar hemisphere.    MR Head No Cont (08.31.2022)  Small vessel white matter ischemic changes. Patchy signal   hyperintensity involving the left greater than right occipital lobe and   right cerebellum which may be related to hypertensive encephalopathy.    (08.31.2022)  CT PERFUSION: No perfusion deficit.    CTA NECK:  1. No large vessel occlusion or major stenosis.  2. 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    CTA HEAD:  1. No large vessel occlusion or major stenosis.    CT Head No Cont (08.31.2022)  Subtle questionable hypoattenuation in the left occipital   lobe for which the differential diagnosis includes infarct versus PRES   versus cerebritis versus artifact.  No acute intracranial hemorrhage.

## 2022-09-03 LAB
ANION GAP SERPL CALC-SCNC: 10 MMOL/L — SIGNIFICANT CHANGE UP (ref 5–17)
BUN SERPL-MCNC: 13 MG/DL — SIGNIFICANT CHANGE UP (ref 7–23)
CALCIUM SERPL-MCNC: 8.9 MG/DL — SIGNIFICANT CHANGE UP (ref 8.4–10.5)
CHLORIDE SERPL-SCNC: 100 MMOL/L — SIGNIFICANT CHANGE UP (ref 96–108)
CO2 SERPL-SCNC: 25 MMOL/L — SIGNIFICANT CHANGE UP (ref 22–31)
CREAT SERPL-MCNC: 0.78 MG/DL — SIGNIFICANT CHANGE UP (ref 0.5–1.3)
EGFR: 86 ML/MIN/1.73M2 — SIGNIFICANT CHANGE UP
GLUCOSE SERPL-MCNC: 104 MG/DL — HIGH (ref 70–99)
HCT VFR BLD CALC: 44.5 % — SIGNIFICANT CHANGE UP (ref 34.5–45)
HGB BLD-MCNC: 15.1 G/DL — SIGNIFICANT CHANGE UP (ref 11.5–15.5)
MCHC RBC-ENTMCNC: 31.9 PG — SIGNIFICANT CHANGE UP (ref 27–34)
MCHC RBC-ENTMCNC: 33.9 GM/DL — SIGNIFICANT CHANGE UP (ref 32–36)
MCV RBC AUTO: 93.9 FL — SIGNIFICANT CHANGE UP (ref 80–100)
NRBC # BLD: 0 /100 WBCS — SIGNIFICANT CHANGE UP (ref 0–0)
OSMOLALITY SERPL: 279 MOSMOL/KG — LOW (ref 280–301)
PLATELET # BLD AUTO: 390 K/UL — SIGNIFICANT CHANGE UP (ref 150–400)
POTASSIUM SERPL-MCNC: 4.4 MMOL/L — SIGNIFICANT CHANGE UP (ref 3.5–5.3)
POTASSIUM SERPL-SCNC: 4.4 MMOL/L — SIGNIFICANT CHANGE UP (ref 3.5–5.3)
RBC # BLD: 4.74 M/UL — SIGNIFICANT CHANGE UP (ref 3.8–5.2)
RBC # FLD: 11.5 % — SIGNIFICANT CHANGE UP (ref 10.3–14.5)
SARS-COV-2 RNA SPEC QL NAA+PROBE: SIGNIFICANT CHANGE UP
SODIUM SERPL-SCNC: 135 MMOL/L — SIGNIFICANT CHANGE UP (ref 135–145)
SODIUM UR-SCNC: 8 MMOL/L — SIGNIFICANT CHANGE UP
WBC # BLD: 10.6 K/UL — HIGH (ref 3.8–10.5)
WBC # FLD AUTO: 10.6 K/UL — HIGH (ref 3.8–10.5)

## 2022-09-03 PROCEDURE — 99232 SBSQ HOSP IP/OBS MODERATE 35: CPT

## 2022-09-03 RX ORDER — LOSARTAN POTASSIUM 100 MG/1
50 TABLET, FILM COATED ORAL
Refills: 0 | Status: DISCONTINUED | OUTPATIENT
Start: 2022-09-03 | End: 2022-09-06

## 2022-09-03 RX ORDER — ACETAMINOPHEN 500 MG
650 TABLET ORAL EVERY 6 HOURS
Refills: 0 | Status: DISCONTINUED | OUTPATIENT
Start: 2022-09-03 | End: 2022-09-03

## 2022-09-03 RX ORDER — MAGNESIUM SULFATE 500 MG/ML
1 VIAL (ML) INJECTION ONCE
Refills: 0 | Status: COMPLETED | OUTPATIENT
Start: 2022-09-03 | End: 2022-09-03

## 2022-09-03 RX ORDER — KETOROLAC TROMETHAMINE 30 MG/ML
15 SYRINGE (ML) INJECTION ONCE
Refills: 0 | Status: DISCONTINUED | OUTPATIENT
Start: 2022-09-03 | End: 2022-09-03

## 2022-09-03 RX ORDER — ACETAMINOPHEN 500 MG
1000 TABLET ORAL ONCE
Refills: 0 | Status: COMPLETED | OUTPATIENT
Start: 2022-09-03 | End: 2022-09-03

## 2022-09-03 RX ADMIN — LOSARTAN POTASSIUM 50 MILLIGRAM(S): 100 TABLET, FILM COATED ORAL at 16:36

## 2022-09-03 RX ADMIN — ATORVASTATIN CALCIUM 40 MILLIGRAM(S): 80 TABLET, FILM COATED ORAL at 22:16

## 2022-09-03 RX ADMIN — Medication 100 GRAM(S): at 08:24

## 2022-09-03 RX ADMIN — Medication 81 MILLIGRAM(S): at 13:20

## 2022-09-03 RX ADMIN — Medication 650 MILLIGRAM(S): at 07:18

## 2022-09-03 RX ADMIN — Medication 15 MILLIGRAM(S): at 14:10

## 2022-09-03 RX ADMIN — Medication 400 MILLIGRAM(S): at 10:28

## 2022-09-03 RX ADMIN — Medication 15 MILLIGRAM(S): at 22:12

## 2022-09-03 RX ADMIN — Medication 15 MILLIGRAM(S): at 13:41

## 2022-09-03 RX ADMIN — Medication 1000 MILLIGRAM(S): at 11:00

## 2022-09-03 RX ADMIN — Medication 15 MILLIGRAM(S): at 23:10

## 2022-09-03 RX ADMIN — ENOXAPARIN SODIUM 40 MILLIGRAM(S): 100 INJECTION SUBCUTANEOUS at 13:20

## 2022-09-03 RX ADMIN — LOSARTAN POTASSIUM 25 MILLIGRAM(S): 100 TABLET, FILM COATED ORAL at 05:49

## 2022-09-03 RX ADMIN — Medication 650 MILLIGRAM(S): at 06:29

## 2022-09-03 NOTE — PROGRESS NOTE ADULT - PROBLEM SELECTOR PLAN 2
SBP goal 100-160  c/w meds as ordered  continue to monitor SBP goal 100-160  increase Losartan to 50mg PO BID  continue to monitor

## 2022-09-03 NOTE — PROGRESS NOTE ADULT - SUBJECTIVE AND OBJECTIVE BOX
THE PATIENT WAS SEEN AND EXAMINED BY ME WITH THE HOUSESTAFF AND STROKE TEAM DURING MORNING ROUNDS.   HPI:61 right handed, F with history of hypertension, migraine (not on any medications) and TIA (seen at [facility] in 11/2021) presented to the hospital with headache, word-finding difficulties and n/v. She woke up normal the day before as confirmed by her . LKN 8/31 at 0630. She went to work but had to drive back home because she developed a headache. Her  recalled checking up on her in bed at 2330 for which she was still responsive and answering questions. However, when the patient woke up the day of admission, the  noticed that the patient forgot her commonly used passwords, her mother's name and generally seemed confused. At that point, the  decided to send the patient to the hospital. CODE STROKE called at 0901. The patient received CT head non con, angio, and perfusion. NIHSS 13. No tpa and thrombectomy given because the patient was outside window.  Of note, the patient presented to [facility] with headaches in 11/2021. CODE STROKE was called and the patient received stroke imaging. 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 Patient was discharged on ibuprofen for headache and not on aspirin and plavix.      SUBJECTIVE: Stopped IVF as concern for SIADH     acetaminophen     Tablet .. 650 milliGRAM(s) Oral every 6 hours PRN  aspirin enteric coated 81 milliGRAM(s) Oral daily  atorvastatin 40 milliGRAM(s) Oral at bedtime  enoxaparin Injectable 40 milliGRAM(s) SubCutaneous every 24 hours  losartan 25 milliGRAM(s) Oral two times a day  polyethylene glycol 3350 17 Gram(s) Oral daily  senna 2 Tablet(s) Oral at bedtime    PHYSICAL EXAM:   Vital Signs Last 24 Hrs  T(C): 36.7 (03 Sep 2022 04:00), Max: 36.9 (02 Sep 2022 16:00)  T(F): 98.1 (03 Sep 2022 04:00), Max: 98.4 (02 Sep 2022 16:00)  HR: 90 (03 Sep 2022 06:00) (76 - 93)  BP: 173/105 (03 Sep 2022 06:00) (120/62 - 173/105)  BP(mean): 89 (03 Sep 2022 04:00) (80 - 117)  RR: 20 (03 Sep 2022 06:00) (14 - 20)  SpO2: 97% (03 Sep 2022 06:00) (95% - 99%)    Parameters below as of 03 Sep 2022 06:00  Patient On (Oxygen Delivery Method): room air    General: No acute distress  HEENT: EOM intact, R HHA   Abdomen: Soft, nontender, nondistended   Extremities: No edema    NEUROLOGICAL EXAM:  Mental status: Awake, alert, oriented x3, speech fluent, follows commands, IDs objects, reduced content in speech, often has circumlocutions   Cranial Nerves: No facial asymmetry, R HHA, no nystagmus, no dysarthria,  tongue midline  Motor exam: extremities move well against gravity   Sensation: Intact to light touch   Coordination/ Gait: No dysmetria, gait not tested    LABS:                        15.1   10.60 )-----------( 390      ( 03 Sep 2022 06:40 )             44.5    09-03    135  |  100  |  13  ----------------------------<  104<H>  4.4   |  25  |  0.78    Ca    8.9      03 Sep 2022 06:41  Mg     2.3     09-02      IMAGING: Reviewed by me.     MR Head w/ IV Cont (09.01.2022)  No enhancement within the previously seen signal   abnormalities involving the bilateral occipital lobes, left side larger   than right, and right cerebellar hemisphere.    MR Head No Cont (08.31.2022)  Small vessel white matter ischemic changes. Patchy signal   hyperintensity involving the left greater than right occipital lobe and   right cerebellum which may be related to hypertensive encephalopathy.    (08.31.2022)  CT PERFUSION: No perfusion deficit.    CTA NECK:  1. No large vessel occlusion or major stenosis.  2. 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    CTA HEAD:  1. No large vessel occlusion or major stenosis.    CT Head No Cont (08.31.2022)  Subtle questionable hypoattenuation in the left occipital   lobe for which the differential diagnosis includes infarct versus PRES   versus cerebritis versus artifact.  No acute intracranial hemorrhage.   THE PATIENT WAS SEEN AND EXAMINED BY ME WITH THE HOUSESTAFF AND STROKE TEAM DURING MORNING ROUNDS.   HPI:61 right handed, F with history of hypertension, migraine (not on any medications) and TIA (seen at [facility] in 11/2021) presented to the hospital with headache, word-finding difficulties and n/v. She woke up normal the day before as confirmed by her . LKN 8/31 at 0630. She went to work but had to drive back home because she developed a headache. Her  recalled checking up on her in bed at 2330 for which she was still responsive and answering questions. However, when the patient woke up the day of admission, the  noticed that the patient forgot her commonly used passwords, her mother's name and generally seemed confused. At that point, the  decided to send the patient to the hospital. CODE STROKE called at 0901. The patient received CT head non con, angio, and perfusion. NIHSS 13. No tpa and thrombectomy given because the patient was outside window.  Of note, the patient presented to [facility] with headaches in 11/2021. CODE STROKE was called and the patient received stroke imaging. 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 Patient was discharged on ibuprofen for headache and not on aspirin and plavix.      SUBJECTIVE: Stopped IVF as concern for SIADH, still with headache overnight     acetaminophen     Tablet .. 650 milliGRAM(s) Oral every 6 hours PRN  aspirin enteric coated 81 milliGRAM(s) Oral daily  atorvastatin 40 milliGRAM(s) Oral at bedtime  enoxaparin Injectable 40 milliGRAM(s) SubCutaneous every 24 hours  losartan 25 milliGRAM(s) Oral two times a day  polyethylene glycol 3350 17 Gram(s) Oral daily  senna 2 Tablet(s) Oral at bedtime    PHYSICAL EXAM:   Vital Signs Last 24 Hrs  T(C): 36.7 (03 Sep 2022 04:00), Max: 36.9 (02 Sep 2022 16:00)  T(F): 98.1 (03 Sep 2022 04:00), Max: 98.4 (02 Sep 2022 16:00)  HR: 90 (03 Sep 2022 06:00) (76 - 93)  BP: 173/105 (03 Sep 2022 06:00) (120/62 - 173/105)  BP(mean): 89 (03 Sep 2022 04:00) (80 - 117)  RR: 20 (03 Sep 2022 06:00) (14 - 20)  SpO2: 97% (03 Sep 2022 06:00) (95% - 99%)    Parameters below as of 03 Sep 2022 06:00  Patient On (Oxygen Delivery Method): room air    General: No acute distress  HEENT: EOM intact, R HHA   Abdomen: Soft, nontender, nondistended   Extremities: No edema    NEUROLOGICAL EXAM:  Mental status: Awake, alert, oriented x3, speech fluent, follows commands, IDs objects, repetition intact, often has circumlocutions   Cranial Nerves: No facial asymmetry, R HHA, no nystagmus, no dysarthria,  tongue midline  Motor exam: extremities move well against gravity   Sensation: Intact to light touch   Coordination/ Gait: No dysmetria, gait not tested    LABS:                        15.1   10.60 )-----------( 390      ( 03 Sep 2022 06:40 )             44.5    09-03    135  |  100  |  13  ----------------------------<  104<H>  4.4   |  25  |  0.78    Ca    8.9      03 Sep 2022 06:41  Mg     2.3     09-02      IMAGING: Reviewed by me.     MR Head w/ IV Cont (09.01.2022)  No enhancement within the previously seen signal   abnormalities involving the bilateral occipital lobes, left side larger   than right, and right cerebellar hemisphere.    MR Head No Cont (08.31.2022)  Small vessel white matter ischemic changes. Patchy signal   hyperintensity involving the left greater than right occipital lobe and   right cerebellum which may be related to hypertensive encephalopathy.    (08.31.2022)  CT PERFUSION: No perfusion deficit.    CTA NECK:  1. No large vessel occlusion or major stenosis.  2. 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    CTA HEAD:  1. No large vessel occlusion or major stenosis.    CT Head No Cont (08.31.2022)  Subtle questionable hypoattenuation in the left occipital   lobe for which the differential diagnosis includes infarct versus PRES   versus cerebritis versus artifact.  No acute intracranial hemorrhage.   THE PATIENT WAS SEEN AND EXAMINED BY ME WITH THE HOUSESTAFF AND STROKE TEAM DURING MORNING ROUNDS.   HPI:61 right handed, F with history of hypertension, migraine (not on any medications) and TIA (seen at [facility] in 11/2021) presented to the hospital with headache, word-finding difficulties and n/v. She woke up normal the day before as confirmed by her . LKN 8/31 at 0630. She went to work but had to drive back home because she developed a headache. Her  recalled checking up on her in bed at 2330 for which she was still responsive and answering questions. However, when the patient woke up the day of admission, the  noticed that the patient forgot her commonly used passwords, her mother's name and generally seemed confused. At that point, the  decided to send the patient to the hospital. CODE STROKE called at 0901. The patient received CT head non con, angio, and perfusion. NIHSS 13. No tpa and thrombectomy given because the patient was outside window.  Of note, the patient presented to [facility] with headaches in 11/2021. CODE STROKE was called and the patient received stroke imaging. 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 Patient was discharged on ibuprofen for headache and not on aspirin and plavix.      SUBJECTIVE: Stopped IVF as concern for SIADH, still with headache    acetaminophen     Tablet .. 650 milliGRAM(s) Oral every 6 hours PRN  aspirin enteric coated 81 milliGRAM(s) Oral daily  atorvastatin 40 milliGRAM(s) Oral at bedtime  enoxaparin Injectable 40 milliGRAM(s) SubCutaneous every 24 hours  losartan 25 milliGRAM(s) Oral two times a day  polyethylene glycol 3350 17 Gram(s) Oral daily  senna 2 Tablet(s) Oral at bedtime    PHYSICAL EXAM:   Vital Signs Last 24 Hrs  T(C): 36.7 (03 Sep 2022 04:00), Max: 36.9 (02 Sep 2022 16:00)  T(F): 98.1 (03 Sep 2022 04:00), Max: 98.4 (02 Sep 2022 16:00)  HR: 90 (03 Sep 2022 06:00) (76 - 93)  BP: 173/105 (03 Sep 2022 06:00) (120/62 - 173/105)  BP(mean): 89 (03 Sep 2022 04:00) (80 - 117)  RR: 20 (03 Sep 2022 06:00) (14 - 20)  SpO2: 97% (03 Sep 2022 06:00) (95% - 99%)    Parameters below as of 03 Sep 2022 06:00  Patient On (Oxygen Delivery Method): room air    General: No acute distress  HEENT: EOM intact, R HHA   Abdomen: Soft, nontender, nondistended   Extremities: No edema    NEUROLOGICAL EXAM:  Mental status: Awake, alert, oriented x3, speech fluent, follows commands, IDs objects, repetition intact, often has circumlocutions   Cranial Nerves: No facial asymmetry, R HHA, no nystagmus, no dysarthria,  tongue midline  Motor exam: extremities move well against gravity   Sensation: Intact to light touch   Coordination/ Gait: No dysmetria, gait not tested    LABS:                        15.1   10.60 )-----------( 390      ( 03 Sep 2022 06:40 )             44.5    09-03    135  |  100  |  13  ----------------------------<  104<H>  4.4   |  25  |  0.78    Ca    8.9      03 Sep 2022 06:41  Mg     2.3     09-02      IMAGING: Reviewed by me.     MR Head w/ IV Cont (09.01.2022)  No enhancement within the previously seen signal   abnormalities involving the bilateral occipital lobes, left side larger   than right, and right cerebellar hemisphere.    MR Head No Cont (08.31.2022)  Small vessel white matter ischemic changes. Patchy signal   hyperintensity involving the left greater than right occipital lobe and   right cerebellum which may be related to hypertensive encephalopathy.    (08.31.2022)  CT PERFUSION: No perfusion deficit.    CTA NECK:  1. No large vessel occlusion or major stenosis.  2. 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    CTA HEAD:  1. No large vessel occlusion or major stenosis.    CT Head No Cont (08.31.2022)  Subtle questionable hypoattenuation in the left occipital   lobe for which the differential diagnosis includes infarct versus PRES   versus cerebritis versus artifact.  No acute intracranial hemorrhage.

## 2022-09-03 NOTE — PROGRESS NOTE ADULT - ASSESSMENT
61 right handed, F with history of hypertension, migraine (not on any medications) and TIA (seen at [facility] in 11/2021) presented to the hospital with headache, word-finding difficulties and n/v.

## 2022-09-03 NOTE — PROGRESS NOTE ADULT - SUBJECTIVE AND OBJECTIVE BOX
Subjective: Patient seen and examined. No new events except as noted.   Remains in Stroke Unit.     REVIEW OF SYSTEMS:    CONSTITUTIONAL: + weakness, fevers or chills  EYES/ENT: No visual changes;  No vertigo or throat pain   NECK: No pain or stiffness  RESPIRATORY: No cough, wheezing, hemoptysis; No shortness of breath  CARDIOVASCULAR: No chest pain or palpitations  GASTROINTESTINAL: No abdominal or epigastric pain. No nausea, vomiting, or hematemesis; No diarrhea or constipation. No melena or hematochezia.  GENITOURINARY: No dysuria, frequency or hematuria  NEUROLOGICAL: No numbness or weakness  SKIN: No itching, burning, rashes, or lesions   All other review of systems is negative unless indicated above.    MEDICATIONS:  MEDICATIONS  (STANDING):  aspirin enteric coated 81 milliGRAM(s) Oral daily  atorvastatin 40 milliGRAM(s) Oral at bedtime  enoxaparin Injectable 40 milliGRAM(s) SubCutaneous every 24 hours  losartan 25 milliGRAM(s) Oral two times a day  polyethylene glycol 3350 17 Gram(s) Oral daily  senna 2 Tablet(s) Oral at bedtime    PHYSICAL EXAM:  T(C): 36.7 (09-03-22 @ 04:00), Max: 36.9 (09-02-22 @ 16:00)  HR: 90 (09-03-22 @ 06:00) (76 - 93)  BP: 173/105 (09-03-22 @ 06:00) (120/62 - 173/105)  RR: 20 (09-03-22 @ 06:00) (14 - 20)  SpO2: 97% (09-03-22 @ 06:00) (95% - 99%)  Wt(kg): --  I&O's Summary    02 Sep 2022 07:01  -  03 Sep 2022 07:00  --------------------------------------------------------  IN: 360 mL / OUT: 750 mL / NET: -390 mL    Appearance: Normal	  HEENT:   Normal oral mucosa, PERRL, EOMI	  Lymphatic: No lymphadenopathy , no edema  Cardiovascular: Normal S1 S2, No JVD, No murmurs , Peripheral pulses palpable 2+ bilaterally  Respiratory: Lungs clear to auscultation, normal effort 	  Gastrointestinal:  Soft, Non-tender, + BS	  Skin: No rashes, No ecchymoses, No cyanosis, warm to touch  NEUROLOGICAL EXAM:  Mental status: Awake, alert, oriented x3, speech fluent, follows commands, IDs objects, reduced content in speech, often has circumlocutions   Cranial Nerves: No facial asymmetry, R HHA, no nystagmus, no dysarthria,  tongue midline  Motor exam: extremities move well against gravity   Sensation: Intact to light touch   Coordination/ Gait: No dysmetria, gait not tested  Ext: No edema    LABS:    CARDIAC MARKERS:                      15.1   10.60 )-----------( 390      ( 03 Sep 2022 06:40 )             44.5     09-03    135  |  100  |  13  ----------------------------<  104<H>  4.4   |  25  |  0.78    Ca    8.9      03 Sep 2022 06:41  Mg     2.3     09-02      proBNP:   Lipid Profile:   HgA1c:   TSH:     TELEMETRY:  SR 70-90    ECG:  	  RADIOLOGY:   DIAGNOSTIC TESTING:  [ ] Echocardiogram:  [ ]  Catheterization:  [ ] Stress Test:    OTHER:

## 2022-09-03 NOTE — PROGRESS NOTE ADULT - ASSESSMENT
61 right handed  F with hypertension, migraine (improved after menopause but recently returned) and TIA (seen at [facility] in 11/2021 - but not on asa/statin for unclear reason) presented to the hospital with headache, word-finding difficulties and n/v. LKN 8/31 at 0630. mRS: 0. LKN: 8/30/2022 0630. NIHSS: 13 on admission.     IMPRESSION : aphasia without HP, R HHA, posterior circulation FLAIR changes and elevated BP due to PRES. Etiology unclear, she reports taking benzos to deal with anxiety after the recent death of her brother, denies SSRI use.      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 MRI brain 11/10/21 with L occipital FLAIR changes, MRI with contrast without any enhancement. EEG neg for seizures. Physical therapy/OT recommended acute TBI rehab.    ANTITHROMBOTIC THERAPY: ASA    PULMONARY: CXR clear on 8/31, protecting airway, saturating well     CARDIOVASCULAR: TTE EF 75%  Minimal mitral regurgitation, no evidence of a patent foramen ovale, cardiac monitoring without reported events, hypertensive- started on losartan. Dr. Olivier following. elevated QTC- monitor EKG                             SBP goal: 100-160    GASTROINTESTINAL:  dysphagia screen passed, tolerating diet     Diet: Regular    RENAL: BUN/Cr within normal limits, good urine output ; hyponatremia - r/o SIADH, ordered serum Na/Osmol, DC IVF overnight, NA improved to 135 this AM. hypokalemia- s/p KCl supplement      Na Goal: Greater than 135     Jackson: no    HEMATOLOGY: H/H without change, Platelets 390     DVT ppx:  LMWH      ID: afebrile, no sign of infection    OTHER: Plan endorsed to patient, her sister, her daughter and her . All questions and concerns addressed at length.     DISPOSITION: Rehab once stable and workup is complete    CORE MEASURES:        Admission NIHSS: 13     TPA: NO      LDL/HDL: 139/63     Depression Screen: p     Statin Therapy: yes     Dysphagia Screen: PASS      Smoking  NO      Afib NO     Stroke Education YES     Obtain screening lower extremity venous ultrasound in patients who meet 1 or more of the following criteria as patient is high risk for DVT/PE on admission:   [] History of DVT/PE  []Hypercoagulable states (Factor V Leiden, Cancer, OCP, etc. )  []Prolonged immobility (hemiplegia/hemiparesis/post operative or any other extended immobilization)  [] Transferred from outside facility (Rehab or Long term care)  [] Age </= to 50 61 right handed  F with hypertension, migraine (improved after menopause but recently returned) and TIA (seen at [facility] in 11/2021 - but not on asa/statin for unclear reason) presented to the hospital with headache, word-finding difficulties and n/v. LKN 8/31 at 0630. mRS: 0. LKN: 8/30/2022 0630. NIHSS: 13 on admission.     IMPRESSION : aphasia without HP, R HHA, posterior circulation FLAIR changes and elevated BP due to PRES. Etiology unclear, she reports taking benzos to deal with anxiety after the recent death of her brother, denies SSRI use.      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 MRI brain 11/10/21 with L occipital FLAIR changes, MRI with contrast without any enhancement. EEG neg for seizures. Physical therapy/OT recommended acute TBI rehab.    ANTITHROMBOTIC THERAPY: ASA    PULMONARY: CXR clear on 8/31, protecting airway, saturating well     CARDIOVASCULAR: TTE EF 75%  Minimal mitral regurgitation, no evidence of a patent foramen ovale, cardiac monitoring without reported events, hypertensive- started on losartan. Dr. Olivier following. elevated QTC- monitor EKG                             SBP goal: 100-160    GASTROINTESTINAL:  dysphagia screen passed, tolerating diet     Diet: Regular    RENAL: BUN/Cr within normal limits, good urine output ; hyponatremia - r/o SIADH, ordered serum Na/Osmol, DC IVF overnight, NA improved to 135 this AM. hypokalemia- s/p KCl supplement      Na Goal: Greater than 135     Jackson: no    HEMATOLOGY: H/H without change, Platelets 390     DVT ppx:  LMWH      ID: afebrile, no sign of infection    OTHER: Plan endorsed to patient, her sister. All questions and concerns addressed at length.  Will need to avoid triptans for migraines, will need to follow up with outpatient neurologist for other migraine therapy.  IV Tylenol and magnesium given for headache.     DISPOSITION: Rehab once stable and workup is complete    CORE MEASURES:        Admission NIHSS: 13     TPA: NO      LDL/HDL: 139/63     Depression Screen: p     Statin Therapy: yes     Dysphagia Screen: PASS      Smoking  NO      Afib NO     Stroke Education YES     Obtain screening lower extremity venous ultrasound in patients who meet 1 or more of the following criteria as patient is high risk for DVT/PE on admission:   [] History of DVT/PE  []Hypercoagulable states (Factor V Leiden, Cancer, OCP, etc. )  []Prolonged immobility (hemiplegia/hemiparesis/post operative or any other extended immobilization)  [] Transferred from outside facility (Rehab or Long term care)  [] Age </= to 50 61 right handed  F with hypertension, migraine (improved after menopause but recently returned) and TIA (seen at [facility] in 11/2021 - but not on asa/statin for unclear reason) presented to the hospital with headache, word-finding difficulties and n/v. LKN 8/31 at 0630. mRS: 0. LKN: 8/30/2022 0630. NIHSS: 13 on admission.     IMPRESSION : aphasia without HP, R HHA, posterior circulation FLAIR changes and elevated BP due to PRES. Etiology unclear, she reports taking benzos to deal with anxiety after the recent death of her brother, denies SSRI use.      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 MRI brain 11/10/21 with L occipital FLAIR changes, MRI with contrast without any enhancement. EEG neg for seizures. Physical therapy/OT recommended acute TBI rehab.    ANTITHROMBOTIC THERAPY: ASA    PULMONARY: CXR clear on 8/31, protecting airway, saturating well     CARDIOVASCULAR: TTE EF 75%  Minimal mitral regurgitation, no evidence of a patent foramen ovale, cardiac monitoring without reported events, hypertensive- started on losartan, increased dose today to 50mg BID. Dr. Olivier following. elevated QTC- monitor EKG                             SBP goal: 100-160    GASTROINTESTINAL:  dysphagia screen passed, tolerating diet     Diet: Regular    RENAL: BUN/Cr within normal limits, good urine output ; hyponatremia - r/o SIADH, ordered serum Na/Osmol, DC IVF overnight, NA improved to 135 this AM. hypokalemia- s/p KCl supplement      Na Goal: Greater than 135     Jackson: no    HEMATOLOGY: H/H without change, Platelets 390     DVT ppx:  LMWH      ID: afebrile, no sign of infection    OTHER: Plan endorsed to patient, her sister. All questions and concerns addressed at length.  Will need to avoid triptans for migraines, will need to follow up with outpatient neurologist for other migraine therapy.  IV Tylenol and magnesium given for headache.     DISPOSITION: Rehab once stable and workup is complete    CORE MEASURES:        Admission NIHSS: 13     TPA: NO      LDL/HDL: 139/63     Depression Screen: p     Statin Therapy: yes     Dysphagia Screen: PASS      Smoking  NO      Afib NO     Stroke Education YES     Obtain screening lower extremity venous ultrasound in patients who meet 1 or more of the following criteria as patient is high risk for DVT/PE on admission:   [] History of DVT/PE  []Hypercoagulable states (Factor V Leiden, Cancer, OCP, etc. )  []Prolonged immobility (hemiplegia/hemiparesis/post operative or any other extended immobilization)  [] Transferred from outside facility (Rehab or Long term care)  [] Age </= to 50

## 2022-09-04 LAB
ANION GAP SERPL CALC-SCNC: 10 MMOL/L — SIGNIFICANT CHANGE UP (ref 5–17)
BUN SERPL-MCNC: 12 MG/DL — SIGNIFICANT CHANGE UP (ref 7–23)
CALCIUM SERPL-MCNC: 9 MG/DL — SIGNIFICANT CHANGE UP (ref 8.4–10.5)
CHLORIDE SERPL-SCNC: 99 MMOL/L — SIGNIFICANT CHANGE UP (ref 96–108)
CO2 SERPL-SCNC: 25 MMOL/L — SIGNIFICANT CHANGE UP (ref 22–31)
CREAT SERPL-MCNC: 0.76 MG/DL — SIGNIFICANT CHANGE UP (ref 0.5–1.3)
EGFR: 89 ML/MIN/1.73M2 — SIGNIFICANT CHANGE UP
GLUCOSE SERPL-MCNC: 105 MG/DL — HIGH (ref 70–99)
HCT VFR BLD CALC: 43 % — SIGNIFICANT CHANGE UP (ref 34.5–45)
HGB BLD-MCNC: 14.3 G/DL — SIGNIFICANT CHANGE UP (ref 11.5–15.5)
MCHC RBC-ENTMCNC: 31.6 PG — SIGNIFICANT CHANGE UP (ref 27–34)
MCHC RBC-ENTMCNC: 33.3 GM/DL — SIGNIFICANT CHANGE UP (ref 32–36)
MCV RBC AUTO: 95.1 FL — SIGNIFICANT CHANGE UP (ref 80–100)
NRBC # BLD: 0 /100 WBCS — SIGNIFICANT CHANGE UP (ref 0–0)
PLATELET # BLD AUTO: 402 K/UL — HIGH (ref 150–400)
POTASSIUM SERPL-MCNC: 3.8 MMOL/L — SIGNIFICANT CHANGE UP (ref 3.5–5.3)
POTASSIUM SERPL-SCNC: 3.8 MMOL/L — SIGNIFICANT CHANGE UP (ref 3.5–5.3)
RBC # BLD: 4.52 M/UL — SIGNIFICANT CHANGE UP (ref 3.8–5.2)
RBC # FLD: 11.6 % — SIGNIFICANT CHANGE UP (ref 10.3–14.5)
SODIUM SERPL-SCNC: 134 MMOL/L — LOW (ref 135–145)
WBC # BLD: 8.8 K/UL — SIGNIFICANT CHANGE UP (ref 3.8–10.5)
WBC # FLD AUTO: 8.8 K/UL — SIGNIFICANT CHANGE UP (ref 3.8–10.5)

## 2022-09-04 RX ORDER — ACETAMINOPHEN 500 MG
1000 TABLET ORAL ONCE
Refills: 0 | Status: COMPLETED | OUTPATIENT
Start: 2022-09-04 | End: 2022-09-04

## 2022-09-04 RX ORDER — MAGNESIUM SULFATE 500 MG/ML
1 VIAL (ML) INJECTION ONCE
Refills: 0 | Status: COMPLETED | OUTPATIENT
Start: 2022-09-04 | End: 2022-09-04

## 2022-09-04 RX ORDER — KETOROLAC TROMETHAMINE 30 MG/ML
15 SYRINGE (ML) INJECTION ONCE
Refills: 0 | Status: DISCONTINUED | OUTPATIENT
Start: 2022-09-04 | End: 2022-09-04

## 2022-09-04 RX ADMIN — LOSARTAN POTASSIUM 50 MILLIGRAM(S): 100 TABLET, FILM COATED ORAL at 05:29

## 2022-09-04 RX ADMIN — POLYETHYLENE GLYCOL 3350 17 GRAM(S): 17 POWDER, FOR SOLUTION ORAL at 11:16

## 2022-09-04 RX ADMIN — Medication 15 MILLIGRAM(S): at 08:26

## 2022-09-04 RX ADMIN — ENOXAPARIN SODIUM 40 MILLIGRAM(S): 100 INJECTION SUBCUTANEOUS at 11:16

## 2022-09-04 RX ADMIN — Medication 1000 MILLIGRAM(S): at 13:15

## 2022-09-04 RX ADMIN — Medication 100 GRAM(S): at 18:48

## 2022-09-04 RX ADMIN — Medication 81 MILLIGRAM(S): at 11:16

## 2022-09-04 RX ADMIN — Medication 15 MILLIGRAM(S): at 09:00

## 2022-09-04 RX ADMIN — LOSARTAN POTASSIUM 50 MILLIGRAM(S): 100 TABLET, FILM COATED ORAL at 17:41

## 2022-09-04 RX ADMIN — Medication 400 MILLIGRAM(S): at 12:39

## 2022-09-04 RX ADMIN — SENNA PLUS 2 TABLET(S): 8.6 TABLET ORAL at 21:15

## 2022-09-04 RX ADMIN — ATORVASTATIN CALCIUM 40 MILLIGRAM(S): 80 TABLET, FILM COATED ORAL at 21:15

## 2022-09-04 NOTE — PROGRESS NOTE ADULT - ASSESSMENT
61 right handed  F with hypertension, migraine (improved after menopause but recently returned) and TIA (seen at [facility] in 11/2021 - but not on asa/statin for unclear reason) presented to the hospital with headache, word-finding difficulties and n/v. LKN 8/31 at 0630. mRS: 0. LKN: 8/30/2022 0630. NIHSS: 13 on admission.     IMPRESSION : aphasia without HP, R HHA, posterior circulation FLAIR changes and elevated BP due to PRES. Etiology unclear, she reports taking benzos to deal with anxiety after the recent death of her brother, denies SSRI use.      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 MRI brain 11/10/21 with L occipital FLAIR changes, MRI with contrast without any enhancement. EEG neg for seizures. Physical therapy/OT recommended acute TBI rehab.    ANTITHROMBOTIC THERAPY: ASA    PULMONARY: CXR clear on 8/31, protecting airway, saturating well     CARDIOVASCULAR: TTE EF 75%  Minimal mitral regurgitation, no evidence of a patent foramen ovale, cardiac monitoring without reported events, hypertensive- started on losartan, increased dose to 50mg BID. Dr. Olivier following. elevated QTC- monitor EKG                             SBP goal: 100-160    GASTROINTESTINAL:  dysphagia screen passed, tolerating diet     Diet: Regular    RENAL: BUN/Cr within normal limits, good urine output ; hyponatremia - r/o SIADH, ordered serum Na/Osmol, DC IVF on 9/3, NA improved to 134 this AM. hypokalemia- s/p KCl supplement      Na Goal: Greater than 135     Jakcson: no    HEMATOLOGY: H/H without change, Platelets 402     DVT ppx:  LMWH      ID: afebrile, no sign of infection    OTHER: Plan endorsed to patient, her sister. All questions and concerns addressed at length.  Will need to avoid triptans for migraines, will need to follow up with outpatient neurologist for other migraine therapy.  IV Tylenol, magnesium, and toradol given for headache.     DISPOSITION: Rehab once stable and workup is complete    CORE MEASURES:        Admission NIHSS: 13     TPA: NO      LDL/HDL: 139/63     Depression Screen: p     Statin Therapy: yes     Dysphagia Screen: PASS      Smoking  NO      Afib NO     Stroke Education YES     Obtain screening lower extremity venous ultrasound in patients who meet 1 or more of the following criteria as patient is high risk for DVT/PE on admission:   [] History of DVT/PE  []Hypercoagulable states (Factor V Leiden, Cancer, OCP, etc. )  []Prolonged immobility (hemiplegia/hemiparesis/post operative or any other extended immobilization)  [] Transferred from outside facility (Rehab or Long term care)  [] Age </= to 50

## 2022-09-04 NOTE — PROGRESS NOTE ADULT - ASSESSMENT
61 right handed, F with history of hypertension, migraine (not on any medications) and TIA (seen at [facility] in 11/2021) presented to the hospital with headache, word-finding difficulties and n/v.

## 2022-09-04 NOTE — PROGRESS NOTE ADULT - SUBJECTIVE AND OBJECTIVE BOX
Subjective: Patient seen and examined. No new events except as noted.   remains in Stroke unit   no cp or sob    Toradol given for headache.   REVIEW OF SYSTEMS:    CONSTITUTIONAL: + weakness, fevers or chills  EYES/ENT: No visual changes;  No vertigo or throat pain   NECK: No pain or stiffness  RESPIRATORY: No cough, wheezing, hemoptysis; No shortness of breath  CARDIOVASCULAR: No chest pain or palpitations  GASTROINTESTINAL: No abdominal or epigastric pain. No nausea, vomiting, or hematemesis; No diarrhea or constipation. No melena or hematochezia.  GENITOURINARY: No dysuria, frequency or hematuria  NEUROLOGICAL: No numbness or weakness  SKIN: No itching, burning, rashes, or lesions   All other review of systems is negative unless indicated above.    MEDICATIONS:  MEDICATIONS  (STANDING):  aspirin enteric coated 81 milliGRAM(s) Oral daily  atorvastatin 40 milliGRAM(s) Oral at bedtime  enoxaparin Injectable 40 milliGRAM(s) SubCutaneous every 24 hours  losartan 50 milliGRAM(s) Oral two times a day  polyethylene glycol 3350 17 Gram(s) Oral daily  senna 2 Tablet(s) Oral at bedtime      PHYSICAL EXAM:  T(C): 36.8 (09-04-22 @ 08:40), Max: 36.8 (09-04-22 @ 08:40)  HR: 92 (09-04-22 @ 08:00) (73 - 99)  BP: 117/93 (09-04-22 @ 08:00) (117/92 - 160/97)  RR: 22 (09-04-22 @ 08:00) (16 - 22)  SpO2: 99% (09-04-22 @ 08:00) (95% - 100%)  Wt(kg): --  I&O's Summary    03 Sep 2022 07:01  -  04 Sep 2022 07:00  --------------------------------------------------------  IN: 240 mL / OUT: 0 mL / NET: 240 mL          Appearance: NAD  HEENT:   Dry  oral mucosa, PERRL, EOMI	  Lymphatic: No lymphadenopathy , no edema  Cardiovascular: Normal S1 S2, No JVD, No murmurs , Peripheral pulses palpable 2+ bilaterally  Respiratory: Lungs clear to auscultation, normal effort 	  Gastrointestinal:  Soft, Non-tender, + BS	  Skin: No rashes, No ecchymoses, No cyanosis, warm to touch  NEUROLOGICAL EXAM:  Mental status: Awake, alert, oriented x3, speech fluent, follows commands, IDs objects, reduced content in speech, often has circumlocutions   Cranial Nerves: No facial asymmetry, R HHA, no nystagmus, no dysarthria,  tongue midline  Motor exam: extremities move well against gravity   Sensation: Intact to light touch   Coordination/ Gait: No dysmetria, gait not tested  Ext: No edema        LABS:    CARDIAC MARKERS:                                14.3   8.80  )-----------( 402      ( 04 Sep 2022 05:42 )             43.0     09-04    134<L>  |  99  |  12  ----------------------------<  105<H>  3.8   |  25  |  0.76    Ca    9.0      04 Sep 2022 05:42      proBNP:   Lipid Profile:   HgA1c:   TSH:             TELEMETRY: 	  SR   ECG:  	  RADIOLOGY:   DIAGNOSTIC TESTING:  [ ] Echocardiogram: < from: TTE with Doppler (w/Cont) (09.01.22 @ 12:55) >    Patient name: EZRA MARIE  YOB: 1960   Age: 61 (F)   MR#: [id number]  Study Date: 9/1/2022  Location: O/PSonographer: Adeline Yoder LORA  Study quality: Technically difficult  Referring Physician: Tony Drake MD  Blood Pressure: 191/103 mmHg  Height: 157 cm  Weight: 77 kg  BSA: 1.8 m2  ------------------------------------------------------------------------  PROCEDURE: Transthoracic echocardiogram with 2-D, M-Mode  and complete spectral and color flow Doppler. Patient was  injected with 10 cc's of aerosolized saline. Patient was  injected with 10 cc's of aerosolized saline. Verbal consent  was obtained for injection of  Ultrasonic Enhancing Agent  following a discussion of risks and benefits. Following  intravenous injection of Ultrasonic Enhancing Agent,  harmonic imaging was performed.  INDICATION: Cerebral infarction due to unspecified  occlusion or stenosis of unspecified cerebral artery  (I63.50)  ------------------------------------------------------------------------  Dimensions:    Normal Values:  LA:     3.4    2.0 - 4.0 cm  Ao:     3.3    2.0 - 3.8 cm  SEPTUM: 1.3    0.6 - 1.2 cm  PWT:    1.2    0.6 - 1.1 cm  LVIDd:  3.0    3.0 - 5.6 cm  LVIDs:  1.7    1.8 - 4.0 cm  Derived variables:  LVMI: 65 g/m2  RWT: 0.80  Fractional short: 43 %  EF (Visual Estimate): 75 %  ------------------------------------------------------------------------  Observations:  Mitral Valve: Normal mitral valve. Minimal mitral  regurgitation.  Aortic Valve/Aorta: Aortic valvenot well visualized;  appears calcified with normal opening. No aortic valve  regurgitation seen.  Aortic Root: 3.3 cm.  LVOT diameter: 1.8 cm.  Left Atrium: Left atrium not well visualized, probably  normal. Mobile interatrial septum.  Left Ventricle: Endocardial visualization enhanced with  intravenous injection of Ultrasonic Enhancing Agent  (Lumason). Left ventricle suboptimally visualized despite  injection of ultrasonic enhancing agent; grossly  hyperdynamic left ventricular systolic function. Increased  relative wall thickness with normal left ventricular mass  index, consistent with concentric left ventricular  remodeling.  Right Heart: Normal right atrium. Normal right ventricular  size and function. Tricuspid valve not well visualized,  probably normal. Minimal tricuspid regurgitation. Pulmonic  valve not well visualized. No pulmonic regurgitation.  Pericardium/Pleura: Normal pericardium with no pericardial  effusion.  Hemodynamic: Estimated right atrial pressure equals 3 mmHg.  Inadequate tricuspid regurgitation Doppler envelope  precludes estimation of RVSP. Agitated saline injection  performed with and without valsalva maneuver. Images  post-injection were technically difficult from subcostal  imaging. Agitated saline injections demonstrate no evidence  of a patent foramen ovale.  ------------------------------------------------------------------------  Conclusions:  Technically difficult study. Apical images were technically  very limited.  1. Normal mitral valve. Minimal mitral regurgitation.  2. Aortic valve not well visualized; appears calcified with  normal opening. No aortic valve regurgitation seen.  3. Endocardial visualization enhanced with intravenous  injection of Ultrasonic Enhancing Agent (Lumason). Left  ventricle suboptimally visualized despite injection of  ultrasonic enhancing agent; grossly hyperdynamic left  ventricular systolic function.  4. Normal right ventricular size and function.  5. Agitated saline injection performed with and without  valsalva maneuver. Images post-injection were technically  difficult from subcostal imaging. Agitated saline  injections demonstrate no evidence of a patent foramen  ovale.  *** No previous Echo exam.  ------------------------------------------------------------------------  Confirmed on  9/1/2022 - 18:59:10 by Trevor Campo M.D.  ------------------------------------------------------------------------    < end of copied text >    [ ]  Catheterization:  [ ] Stress Test:    OTHER: 	  TECH INFORMATION:   Patient Status: Awake, Drowsy and Asleep.     This is a Continuous Video EEG.     Recording Technique: The patient underwent continuous video-EEG monitoring, using Telemetry System hardware on the [employer] Digital Sytem.  EEG and video data were stored on a computer hard drive with important events saved in digital archive files. The material was reviewed by a physician (electroencephalographer/epileptologist) on a daily basis.  Donis and seizure detection algorithms were utilized and reviewed.  An EEG Technician attended to the patient for 8 to 10 hours per day. The patient was observed by the epilepsy nursing staff 24 hours per day. The epilepsy center neurologist was available in person or on call 24 hours per day..     Placement and Labeling of Electrodes: The EEG was performed utilizing at least 20 channel referential EEG connections (coronal over temporal over parasagittal montage) with inferior temporal electrodes when indicting and using all standard 10-20 electrode placements with EKG, with additional electrodes placed in the inferior temporal region using the modified 10-10 montage electrode placements for elective admissions, or if deemed necessary.  Recording was at  a sampling rate of 256 samples per second per channel. Time synchronized digital video recording was done simultaneously with EEG recording.  A low light infrared camera was used for low light recording..    EEG REPORT:   EEG Report:  · EEG Report	  EZRA MARIE [id number]     Study Start Date:    08/30/22 17:20  Study End Date:  09-01-22 1300   Duration x Hours: 19 hr 40 min  --------------------------------------------------------------------------------------------------    History:  CC/ HPI Patient is a 61y old  Female who presents with a chief complaint of Word finding difficulties concerning for stroke (31 Aug 2022 09:58)    MEDICATIONS  (STANDING):  aspirin enteric coated 81 milliGRAM(s) Oral daily  atorvastatin 40 milliGRAM(s) Oral at bedtime  enoxaparin Injectable 40 milliGRAM(s) SubCutaneous every 24 hours  potassium chloride    Tablet ER 40 milliEquivalent(s) Oral once      --------------------------------------------------------------------------------------------------  Study Interpretation:    [[[Abbreviation Key:  PDR=alpha rhythm/posterior dominant rhythm. A-P=anterior posterior.  Amplitude: ‘very low’:<20; ‘low’:20-49; ‘medium’:; ‘high’:>150uV.  Persistence for periodic/rhythmic patterns (% of epoch) ‘rare’:<1%; ‘occasional’:1-10%; ‘frequent’:10-50%; ‘abundant’:50-90%; ‘continuous’:>90%.  Persistence for sporadic discharges: ‘rare’:<1/hr; ‘occasional’:1/min-1/hr; ‘frequent’:>1/min; ‘abundant’:>1/10 sec.  RPP=rhythmic and periodic patterns; GRDA=generalized rhythmic delta activity; FIRDA=frontal intermittent GRDA; LRDA=lateralized rhythmic delta activity; TIRDA=temporal intermittent rhythmic delta activity;  LPD=PLED=lateralized periodic discharges; GPD=generalized periodic discharges; BIPDs =bilateral independent periodic discharges; Mf=multifocal; SIRPDs=stimulus induced rhythmic, periodic, or ictal appearing discharges; BIRDs=brief potentially ictal rhythmic discharges >4 Hz, lasting .5-10s; PFA (paroxysmal bursts >13 Hz or =8 Hz <10s).  Modifiers: +F=with fast component; +S=with spike component; +R=with rhythmic component.  S-B=burst suppression pattern.  Max=maximal. N1-drowsy; N2-stage II sleep; N3-slow wave sleep. SSS/BETS=small sharp spikes/benign epileptiform transients of sleep. HV=hyperventilation; PS=photic stimulation]]]    Daily EEG Visual Analysis    FINDINGS:      Background:  Continuous: continuous  Symmetry: Assymmetric  PDR:  10 Hz activity, with amplitude to 40 uV, that attenuated to eye opening well formed on right hemisphere, no discerbnable PDR over the right hemisphere.  Low amplitude frontal beta noted in wakefulness.  Reactivity: present  Voltage: normal, mostly 20-150uV  Anterior Posterior Gradient: present  Other background findings: none  Breach: absent    Background Slowing:  Generalized slowing: Diffuse theta and delta slowing.   Focal slowing: Continuous left temporal polymorphic theta and delta slowing.   Brief fragmented runs of frontally predominant generalized rhythmic delta    State Changes:   -Drowsiness noted with increased slowing, attenuation of fast activity, vertex transients.  -Present with N2 sleep transients with symmetric spindles and K-complexes.    Sporadic Epileptiform Discharges:    None    Rhythmic and Periodic Patterns (RPPs):  None     Electrographic and Electroclinical seizures:  None    Other Clinical Events:  None    Activation Procedures:   Hyperventilation was not performed.    Photic stimulation was not performed.    Artifacts:  Intermittent myogenic and movement artifacts were noted. Poor impedence of left posterior electrodes.     ECG:  Leads off     EEG Classification / Summary:  Abnormal  EEG in the awake / drowsy / asleep state(s).    - Continuous slowing, focal, left temporal polymorphic theta and delta  - Asymmetry, absence of PDR and faster frequencies over the left hemisphere   - Background slowing, generalized, mild     Clinical Impression:    This is an abnormal recording due to:     - Structural abnormality over the left temporal region.   - Mild cognitive dysfunction, a non-specific finding.     There were no epileptiform abnormalities / seizures recorded.        Sita Santacruz M.D.   Epilepsy Fellow    Jorge Horvath MD  EEG/Epilepsy Attending     -------------------------------------------------------------------------------------------------------  [facility] EEG Reading Room Ph#: [phone]  Epilepsy Answering Service after 5PM and before 8:30AM: Ph#: [phone]          Electronic Signatures:  Jorge Horvath)  (Signed 01-Sep-2022 14:10)  	Authored: EEG REPORT  	Co-Signer: [employer] INFORMATION, EEG REPORT  Sita Santacruz)  (Signed 01-Sep-2022 09:42)  	Authored: [employer] INFORMATION, EEG REPORT      Last Updated: 01-Sep-2022 14:10 by Jorge Horvath)

## 2022-09-05 LAB
ANION GAP SERPL CALC-SCNC: 9 MMOL/L — SIGNIFICANT CHANGE UP (ref 5–17)
BUN SERPL-MCNC: 11 MG/DL — SIGNIFICANT CHANGE UP (ref 7–23)
CALCIUM SERPL-MCNC: 8.8 MG/DL — SIGNIFICANT CHANGE UP (ref 8.4–10.5)
CHLORIDE SERPL-SCNC: 103 MMOL/L — SIGNIFICANT CHANGE UP (ref 96–108)
CO2 SERPL-SCNC: 25 MMOL/L — SIGNIFICANT CHANGE UP (ref 22–31)
CREAT SERPL-MCNC: 0.66 MG/DL — SIGNIFICANT CHANGE UP (ref 0.5–1.3)
EGFR: 100 ML/MIN/1.73M2 — SIGNIFICANT CHANGE UP
GLUCOSE SERPL-MCNC: 105 MG/DL — HIGH (ref 70–99)
HCT VFR BLD CALC: 44.4 % — SIGNIFICANT CHANGE UP (ref 34.5–45)
HGB BLD-MCNC: 14.5 G/DL — SIGNIFICANT CHANGE UP (ref 11.5–15.5)
MAGNESIUM SERPL-MCNC: 2.3 MG/DL — SIGNIFICANT CHANGE UP (ref 1.6–2.6)
MCHC RBC-ENTMCNC: 31.6 PG — SIGNIFICANT CHANGE UP (ref 27–34)
MCHC RBC-ENTMCNC: 32.7 GM/DL — SIGNIFICANT CHANGE UP (ref 32–36)
MCV RBC AUTO: 96.7 FL — SIGNIFICANT CHANGE UP (ref 80–100)
NRBC # BLD: 0 /100 WBCS — SIGNIFICANT CHANGE UP (ref 0–0)
PHOSPHATE SERPL-MCNC: 2.8 MG/DL — SIGNIFICANT CHANGE UP (ref 2.5–4.5)
PLATELET # BLD AUTO: 367 K/UL — SIGNIFICANT CHANGE UP (ref 150–400)
POTASSIUM SERPL-MCNC: 4.4 MMOL/L — SIGNIFICANT CHANGE UP (ref 3.5–5.3)
POTASSIUM SERPL-SCNC: 4.4 MMOL/L — SIGNIFICANT CHANGE UP (ref 3.5–5.3)
RBC # BLD: 4.59 M/UL — SIGNIFICANT CHANGE UP (ref 3.8–5.2)
RBC # FLD: 11.5 % — SIGNIFICANT CHANGE UP (ref 10.3–14.5)
SARS-COV-2 RNA SPEC QL NAA+PROBE: SIGNIFICANT CHANGE UP
SODIUM SERPL-SCNC: 137 MMOL/L — SIGNIFICANT CHANGE UP (ref 135–145)
WBC # BLD: 9.36 K/UL — SIGNIFICANT CHANGE UP (ref 3.8–10.5)
WBC # FLD AUTO: 9.36 K/UL — SIGNIFICANT CHANGE UP (ref 3.8–10.5)

## 2022-09-05 RX ORDER — ACETAMINOPHEN 500 MG
1000 TABLET ORAL ONCE
Refills: 0 | Status: COMPLETED | OUTPATIENT
Start: 2022-09-05 | End: 2022-09-05

## 2022-09-05 RX ADMIN — Medication 1000 MILLIGRAM(S): at 01:10

## 2022-09-05 RX ADMIN — ENOXAPARIN SODIUM 40 MILLIGRAM(S): 100 INJECTION SUBCUTANEOUS at 11:21

## 2022-09-05 RX ADMIN — Medication 400 MILLIGRAM(S): at 00:13

## 2022-09-05 RX ADMIN — ATORVASTATIN CALCIUM 40 MILLIGRAM(S): 80 TABLET, FILM COATED ORAL at 21:48

## 2022-09-05 RX ADMIN — Medication 81 MILLIGRAM(S): at 11:21

## 2022-09-05 RX ADMIN — Medication 400 MILLIGRAM(S): at 21:47

## 2022-09-05 RX ADMIN — LOSARTAN POTASSIUM 50 MILLIGRAM(S): 100 TABLET, FILM COATED ORAL at 17:05

## 2022-09-05 RX ADMIN — LOSARTAN POTASSIUM 50 MILLIGRAM(S): 100 TABLET, FILM COATED ORAL at 05:34

## 2022-09-05 RX ADMIN — Medication 1000 MILLIGRAM(S): at 22:28

## 2022-09-05 NOTE — PROGRESS NOTE ADULT - ASSESSMENT
61 right handed  F with hypertension, migraine (improved after menopause but recently returned) and TIA (seen at [facility] in 11/2021 - but not on asa/statin for unclear reason) presented to the hospital with headache, word-finding difficulties and n/v. LKN 8/31 at 0630. mRS: 0. LKN: 8/30/2022 0630. NIHSS: 13 on admission.     IMPRESSION : aphasia without HP, R HHA, posterior circulation FLAIR changes and elevated BP due to PRES. Etiology unclear, she reports taking benzos to deal with anxiety after the recent death of her brother, denies SSRI use.      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 MRI brain 11/10/21 with L occipital FLAIR changes, MRI with contrast without any enhancement. EEG neg for seizures. Physical therapy/OT recommended acute TBI rehab.    ANTITHROMBOTIC THERAPY: ASA    PULMONARY: CXR clear on 8/31, protecting airway, saturating well     CARDIOVASCULAR: TTE EF 75%  Minimal mitral regurgitation, no evidence of a patent foramen ovale, cardiac monitoring without reported events, hypertensive- started on losartan, increased dose to 50mg BID. Dr. Olivier following. elevated QTC- monitor EKG                             SBP goal: 100-160    GASTROINTESTINAL:  dysphagia screen passed, tolerating diet     Diet: Regular    RENAL: BUN/Cr within normal limits, good urine output ; hyponatremia - r/o SIADH, ordered serum Na/Osmol, DC IVF on 9/3, NA improved to 137 this AM. hypokalemia- s/p KCl supplement      Na Goal: Greater than 135     Jackson: no    HEMATOLOGY: H/H without change, Platelets 367     DVT ppx:  LMWH      ID: afebrile, no sign of infection    OTHER: Plan endorsed to patient, her sister. All questions and concerns addressed at length.  Will need to avoid triptans for migraines, will need to follow up with outpatient neurologist for other migraine therapy.  IV Tylenol, magnesium, and toradol given for headache.     DISPOSITION: Rehab once stable and workup is complete    CORE MEASURES:        Admission NIHSS: 13     TPA: NO      LDL/HDL: 139/63     Depression Screen: p     Statin Therapy: yes     Dysphagia Screen: PASS      Smoking  NO      Afib NO     Stroke Education YES     Obtain screening lower extremity venous ultrasound in patients who meet 1 or more of the following criteria as patient is high risk for DVT/PE on admission:   [] History of DVT/PE  []Hypercoagulable states (Factor V Leiden, Cancer, OCP, etc. )  []Prolonged immobility (hemiplegia/hemiparesis/post operative or any other extended immobilization)  [] Transferred from outside facility (Rehab or Long term care)  [] Age </= to 50

## 2022-09-05 NOTE — PROGRESS NOTE ADULT - ASSESSMENT
61 right handed, F with history of hypertension, migraine (not on any medications) and TIA (seen at [facility] in 11/2021) presented to the hospital with headache, word-finding difficulties and n/v.

## 2022-09-05 NOTE — PROGRESS NOTE ADULT - SUBJECTIVE AND OBJECTIVE BOX
THE PATIENT WAS SEEN AND EXAMINED BY ME WITH THE HOUSESTAFF AND STROKE TEAM DURING MORNING ROUNDS.   HPI:61 right handed, F with history of hypertension, migraine (not on any medications) and TIA (seen at [facility] in 11/2021) presented to the hospital with headache, word-finding difficulties and n/v. She woke up normal the day before as confirmed by her . LKN 8/31 at 0630. She went to work but had to drive back home because she developed a headache. Her  recalled checking up on her in bed at 2330 for which she was still responsive and answering questions. However, when the patient woke up the day of admission, the  noticed that the patient forgot her commonly used passwords, her mother's name and generally seemed confused. At that point, the  decided to send the patient to the hospital. [facility] called at 0901. The patient received CT head non con, angio, and perfusion. NIHSS 13. No tpa and thrombectomy given because the patient was outside window. Of note, the patient presented to [facility] with headaches in 11/2021. [facility] was called and the patient received stroke imaging. 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 Patient was discharged on ibuprofen for headache and not on aspirin and plavix.      SUBJECTIVE: Headache overnight.     aspirin enteric coated 81 milliGRAM(s) Oral daily  atorvastatin 40 milliGRAM(s) Oral at bedtime  enoxaparin Injectable 40 milliGRAM(s) SubCutaneous every 24 hours  losartan 50 milliGRAM(s) Oral two times a day  polyethylene glycol 3350 17 Gram(s) Oral daily  senna 2 Tablet(s) Oral at bedtime    PHYSICAL EXAM:   Vital Signs Last 24 Hrs  T(C): 36.9 (05 Sep 2022 07:24), Max: 36.9 (05 Sep 2022 07:24)  T(F): 98.4 (05 Sep 2022 07:24), Max: 98.4 (05 Sep 2022 07:24)  HR: 85 (05 Sep 2022 06:00) (80 - 101)  BP: 154/91 (05 Sep 2022 06:00) (117/67 - 163/99)  BP(mean): 88 (05 Sep 2022 04:00) (80 - 109)  RR: 19 (05 Sep 2022 06:00) (15 - 26)  SpO2: 97% (05 Sep 2022 06:00) (94% - 100%)    Parameters below as of 05 Sep 2022 06:00  Patient On (Oxygen Delivery Method): room air    General: No acute distress  HEENT: EOM intact, R HHA   Abdomen: Soft, nontender, nondistended   Extremities: No edema    NEUROLOGICAL EXAM:  Mental status: Awake, alert, oriented x3, speech fluent, follows commands, IDs objects, repetition intact  Cranial Nerves: No facial asymmetry, R HHA, no nystagmus, no dysarthria,  tongue midline  Motor exam: extremities move well against gravity   Sensation: Intact to light touch   Coordination/ Gait: No dysmetria, gait not tested    LABS:                        14.5   9.36  )-----------( 367      ( 05 Sep 2022 05:56 )             44.4    09-05    137  |  103  |  11  ----------------------------<  105<H>  4.4   |  25  |  0.66    Ca    8.8      05 Sep 2022 05:58  Phos  2.8     09-05  Mg     2.3     09-05      IMAGING: Reviewed by me.     MR Head w/ IV Cont (09.01.2022)  No enhancement within the previously seen signal   abnormalities involving the bilateral occipital lobes, left side larger   than right, and right cerebellar hemisphere.    MR Head No Cont (08.31.2022)  Small vessel white matter ischemic changes. Patchy signal   hyperintensity involving the left greater than right occipital lobe and   right cerebellum which may be related to hypertensive encephalopathy.    (08.31.2022)  CT PERFUSION: No perfusion deficit.    CTA NECK:  1. No large vessel occlusion or major stenosis.  2. 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    CTA HEAD:  1. No large vessel occlusion or major stenosis.    CT Head No Cont (08.31.2022)  Subtle questionable hypoattenuation in the left occipital   lobe for which the differential diagnosis includes infarct versus PRES   versus cerebritis versus artifact.  No acute intracranial hemorrhage.

## 2022-09-05 NOTE — PROGRESS NOTE ADULT - SUBJECTIVE AND OBJECTIVE BOX
Subjective: Patient seen and examined. No new events except as noted.   Remains in Stroke Unit.     REVIEW OF SYSTEMS:    CONSTITUTIONAL: + weakness, fevers or chills  EYES/ENT: No visual changes;  No vertigo or throat pain   NECK: No pain or stiffness  RESPIRATORY: No cough, wheezing, hemoptysis; No shortness of breath  CARDIOVASCULAR: No chest pain or palpitations  GASTROINTESTINAL: No abdominal or epigastric pain. No nausea, vomiting, or hematemesis; No diarrhea or constipation. No melena or hematochezia.  GENITOURINARY: No dysuria, frequency or hematuria  NEUROLOGICAL: No numbness or weakness  SKIN: No itching, burning, rashes, or lesions   All other review of systems is negative unless indicated above.    MEDICATIONS:  MEDICATIONS  (STANDING):  aspirin enteric coated 81 milliGRAM(s) Oral daily  atorvastatin 40 milliGRAM(s) Oral at bedtime  enoxaparin Injectable 40 milliGRAM(s) SubCutaneous every 24 hours  losartan 50 milliGRAM(s) Oral two times a day  polyethylene glycol 3350 17 Gram(s) Oral daily  senna 2 Tablet(s) Oral at bedtime    PHYSICAL EXAM:  T(C): 36.9 (09-05-22 @ 07:24), Max: 36.9 (09-05-22 @ 07:24)  HR: 85 (09-05-22 @ 06:00) (80 - 101)  BP: 154/91 (09-05-22 @ 06:00) (117/67 - 163/99)  RR: 19 (09-05-22 @ 06:00) (15 - 26)  SpO2: 97% (09-05-22 @ 06:00) (94% - 100%)  Wt(kg): --  I&O's Summary    04 Sep 2022 07:01  -  05 Sep 2022 07:00  --------------------------------------------------------  IN: 340 mL / OUT: 0 mL / NET: 340 mL    Appearance: NAD  HEENT: Dry oral mucosa, PERRL, EOMI	  Lymphatic: No lymphadenopathy , no edema  Cardiovascular: Normal S1 S2, No JVD, No murmurs , Peripheral pulses palpable 2+ bilaterally  Respiratory: Lungs clear to auscultation, normal effort 	  Gastrointestinal: Soft, Non-tender, + BS	  Skin: No rashes, No ecchymoses, No cyanosis, warm to touch  NEUROLOGICAL EXAM:  Mental status: Awake, alert, oriented x3, speech fluent, follows commands, IDs objects, reduced content in speech, often has circumlocutions   Cranial Nerves: No facial asymmetry, R HHA, no nystagmus, no dysarthria,  tongue midline  Motor exam: extremities move well against gravity   Sensation: Intact to light touch   Coordination/ Gait: No dysmetria, gait not tested  Ext: No edema    LABS:    CARDIAC MARKERS:                      14.5   9.36  )-----------( 367      ( 05 Sep 2022 05:56 )             44.4     09-05    137  |  103  |  11  ----------------------------<  105<H>  4.4   |  25  |  0.66    Ca    8.8      05 Sep 2022 05:58  Phos  2.8     09-05  Mg     2.3     09-05    proBNP:   Lipid Profile:   HgA1c:   TSH:     TELEMETRY: SR   ECG:  	  RADIOLOGY:   DIAGNOSTIC TESTING:  [ ] Echocardiogram:  [ ]  Catheterization:  [ ] Stress Test:    OTHER:

## 2022-09-05 NOTE — PROGRESS NOTE ADULT - SUBJECTIVE AND OBJECTIVE BOX
Name of Patient : EZRA MARIE  MRN: [id number]  Date of visit: 09-05-22 @ 16:19      Subjective: Patient seen and examined. No new events except as noted.   Patient seen earlier this AM. Sister at the bedside.   Patient denies CP, palpitations, SOB or dyspnea. Denies headache or lightheadedness. Denies abdominal pain or discomfort.     REVIEW OF SYSTEMS:    CONSTITUTIONAL: No weakness, fevers or chills  EYES/ENT: No visual changes;  No vertigo or throat pain   NECK: No pain or stiffness  RESPIRATORY: No cough, wheezing, hemoptysis; No shortness of breath  CARDIOVASCULAR: No chest pain or palpitations  GASTROINTESTINAL: No abdominal or epigastric pain. No nausea, vomiting, or hematemesis; No diarrhea or constipation. No melena or hematochezia.  GENITOURINARY: No dysuria, frequency or hematuria  NEUROLOGICAL: No numbness or weakness  SKIN: No itching, burning, rashes, or lesions   All other review of systems is negative unless indicated above.    MEDICATIONS:  MEDICATIONS  (STANDING):  aspirin enteric coated 81 milliGRAM(s) Oral daily  atorvastatin 40 milliGRAM(s) Oral at bedtime  enoxaparin Injectable 40 milliGRAM(s) SubCutaneous every 24 hours  losartan 50 milliGRAM(s) Oral two times a day  polyethylene glycol 3350 17 Gram(s) Oral daily  senna 2 Tablet(s) Oral at bedtime      PHYSICAL EXAM:  T(C): 36.9 (09-05-22 @ 07:24), Max: 36.9 (09-05-22 @ 07:24)  HR: 87 (09-05-22 @ 14:21) (79 - 101)  BP: 161/88 (09-05-22 @ 14:21) (117/67 - 163/99)  RR: 20 (09-05-22 @ 14:21) (11 - 21)  SpO2: 98% (09-05-22 @ 14:21) (94% - 100%)  Wt(kg): --  I&O's Summary    04 Sep 2022 07:01  -  05 Sep 2022 07:00  --------------------------------------------------------  IN: 340 mL / OUT: 0 mL / NET: 340 mL          Appearance: Normal	  HEENT:  PERRLA   Lymphatic: No lymphadenopathy   Cardiovascular: Normal S1 S2, no JVD  Respiratory: normal effort , clear  Gastrointestinal:  Soft, Non-tender  Skin: No rashes,  warm to touch  Psychiatry:  Mood & affect appropriate  Musculoskeletal: No edema        09-04-22 @ 07:01  -  09-05-22 @ 07:00  --------------------------------------------------------  IN: 340 mL / OUT: 0 mL / NET: 340 mL                              14.5   9.36  )-----------( 367      ( 05 Sep 2022 05:56 )             44.4               09-05    137  |  103  |  11  ----------------------------<  105<H>  4.4   |  25  |  0.66    Ca    8.8      05 Sep 2022 05:58  Phos  2.8     09-05  Mg     2.3     09-05                         < from: TTE with Doppler (w/Cont) (09.01.22 @ 12:55) >    Patient name: EZRA MARIE  YOB: 1960   Age: 61 (F)   MR#: [id number]  Study Date: 9/1/2022  Location: O/PSonographer: Adeline Yoder RDCS  Study quality: Technically difficult  Referring Physician: Tony Drake MD  Blood Pressure: 191/103 mmHg  Height: 157 cm  Weight: 77 kg  BSA: 1.8 m2  ------------------------------------------------------------------------  PROCEDURE: Transthoracic echocardiogram with 2-D, M-Mode  and complete spectral and color flow Doppler. Patient was  injected with 10 cc's of aerosolized saline. Patient was  injected with 10 cc's of aerosolized saline. Verbal consent  was obtained for injection of  Ultrasonic Enhancing Agent  following a discussion of risks and benefits. Following  intravenous injection of Ultrasonic Enhancing Agent,  harmonic imaging was performed.  INDICATION: Cerebral infarction due to unspecified  occlusion or stenosis of unspecified cerebral artery  (I63.50)  ------------------------------------------------------------------------  Dimensions:    Normal Values:  LA:     3.4    2.0 - 4.0 cm  Ao:     3.3    2.0 - 3.8 cm  SEPTUM: 1.3    0.6 - 1.2 cm  PWT:    1.2    0.6 - 1.1 cm  LVIDd:  3.0    3.0 - 5.6 cm  LVIDs:  1.7    1.8 - 4.0 cm  Derived variables:  LVMI: 65 g/m2  RWT: 0.80  Fractional short: 43 %  EF (Visual Estimate): 75 %  ------------------------------------------------------------------------  Observations:  Mitral Valve: Normal mitral valve. Minimal mitral  regurgitation.  Aortic Valve/Aorta: Aortic valvenot well visualized;  appears calcified with normal opening. No aortic valve  regurgitation seen.  Aortic Root: 3.3 cm.  LVOT diameter: 1.8 cm.  Left Atrium: Left atrium not well visualized, probably  normal. Mobile interatrial septum.  Left Ventricle: Endocardial visualization enhanced with  intravenous injection of Ultrasonic Enhancing Agent  (Lumason). Left ventricle suboptimally visualized despite  injection of ultrasonic enhancing agent; grossly  hyperdynamic left ventricular systolic function. Increased  relative wall thickness with normal left ventricular mass  index, consistent with concentric left ventricular  remodeling.  Right Heart: Normal right atrium. Normal right ventricular  size and function. Tricuspid valve not well visualized,  probably normal. Minimal tricuspid regurgitation. Pulmonic  valve not well visualized. No pulmonic regurgitation.  Pericardium/Pleura: Normal pericardium with no pericardial  effusion.  Hemodynamic: Estimated right atrial pressure equals 3 mmHg.  Inadequate tricuspid regurgitation Doppler envelope  precludes estimation of RVSP. Agitated saline injection  performed with and without valsalva maneuver. Images  post-injection were technically difficult from subcostal  imaging. Agitated saline injections demonstrate no evidence  of a patent foramen ovale.  ------------------------------------------------------------------------  Conclusions:  Technically difficult study. Apical images were technically  very limited.  1. Normal mitral valve. Minimal mitral regurgitation.  2. Aortic valve not well visualized; appears calcified with  normal opening. No aortic valve regurgitation seen.  3. Endocardial visualization enhanced with intravenous  injection of Ultrasonic Enhancing Agent (Lumason). Left  ventricle suboptimally visualized despite injection of  ultrasonic enhancing agent; grossly hyperdynamic left  ventricular systolic function.  4. Normal right ventricular size and function.  5. Agitated saline injection performed with and without  valsalva maneuver. Images post-injection were technically  difficult from subcostal imaging. Agitated saline  injections demonstrate no evidence of a patent foramen  ovale.  *** No previous Echo exam.  ------------------------------------------------------------------------  Confirmed on  9/1/2022 - 18:59:10 by Trevor Campo M.D.  ------------------------------------------------------------------------    < end of copied text >

## 2022-09-05 NOTE — PROGRESS NOTE ADULT - ASSESSMENT
61 right handed, F with history of hypertension, migraine (not on any medications) and TIA (seen at [facility] in 11/2021) presented to the hospital with headache, word-finding difficulties and n/v. She woke up normal the day before as confirmed by her . TKN 8/31 at 0630. She went to work but had to drive back home because she developed a headache. Her  recalled checking up on her in bed at 2330 for which she was still responsive and answering questions. However, when the patient woke up the day of admission, the  noticed that the patient forgot her commonly used passwords, her mother's name and generally seemed confused. At that point, the  decided to send the patient to the hospital. [facility] called at 0901. The patient received CT head non con, angio, and perfusion. NIHSS 13. No tpa and thrombectomy given because the patient was outside window.     Of note, the patient presented to [facility] with headaches in 11/2021. [facility] STROKE was called and the patient received stroke imaging. 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 Patient was discharged on ibuprofen for headache and not on aspirin and plavix.   (31 Aug 2022 09:58)      Internal Medicine has been consulted on Ms. Xavier for medical management. Patient admits to a PMHx of hypertension (States that she is not currently on any medications as an outpatient). Patient states that about 1 week ago she experienced an episode of chest pain. Troponin X 1 is negative. Denies PMHx of HLD, DM, or cardiac history. Patient currently denies CP, palpitations, SOB or dyspnea. Denies nausea or vomiting.       Altered Mental Status   - Likely due to PRESS vs. CVA vs. 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 Patchy signal hyperintensity involving the left greater than right occipital lobe and right cerebellum which may be related to hypertensive encephalopathy. --   - A1C of 5.6  - LDL if 139; C/w Statin   - C/w ASA   - F/U EEG results   - Neuro checks per protocol   - PT/OT as able to   - Monitor on Tele   - Diet per speech and swallow   - Fall, Seizure and aspiration precautions   - Care as per neurology     Hypertension   - Started on Losartan 25 TID per cardiology  - Check EKG  - Check TTE --> EF of 75%; minimal MR, Hyperdynamic LV Systolic function, minimal TR, negative for PFO; F/u cardio recs  - F/u Cardiology recs    Benzodiazepine on UA  - Patient denies benzodiazepine use   - Monitor for signs/symptoms of withdrawal     Prolonged QTc  - QTc of 502 on EKG  - Recommend to check repeat EKG to assess QTc and for routine monitoring  - Monitor and replete electrolytes PRN  - Avoid QT prolonging agents   - Check Magnesium; Supplement to maintain Mg > 2  - Repeat QTc of 481     Nausea  - Patient reports improvement in nausea  - Recommend to check EKG   - If QTc allows, can trial Ativan PRN or Zofran PRN for nausea, pending repeat QTc check   - Reports resolution of nausea     Polycythemia  - Elevated Hgb  - Continue to monitor and trend  - If continues to be elevated; suggest Heme eval  - Improved     Leukocytosis  - Continue to monitor and trend on AM labs  - Afebrile   - CXR with clear lungs  - If febrile, recommend to check pan culture   - Monitor CBC, Temp curve, VS and patient very closely   - Resolved, WBC WNL, Continue to monitor     Hyponatremia   - Likely due to SIADH; Urine Osmo elevated, serum decreased    - Na of 137 on AM labs  - Recommend to continue to monitor and trend on labs    Hypokalemia   - S/P K supplementation   - Monitor closely on AM labs    PPX  - Lovenox 40 daily  - Recommend to start Pantoprazole 40 daily for GI PPX if cleared from Neuro perspective     Discussed with patient and her sister at the bedside.

## 2022-09-06 ENCOUNTER — TRANSCRIPTION ENCOUNTER (OUTPATIENT)
Age: 62
End: 2022-09-06

## 2022-09-06 ENCOUNTER — NON-APPOINTMENT (OUTPATIENT)
Age: 62
End: 2022-09-06

## 2022-09-06 ENCOUNTER — INPATIENT (INPATIENT)
Facility: HOSPITAL | Age: 62
LOS: 17 days | Discharge: HOME | DRG: 949 | End: 2022-09-24
Attending: STUDENT IN AN ORGANIZED HEALTH CARE EDUCATION/TRAINING PROGRAM | Admitting: STUDENT IN AN ORGANIZED HEALTH CARE EDUCATION/TRAINING PROGRAM
Payer: COMMERCIAL

## 2022-09-06 VITALS
HEART RATE: 84 BPM | RESPIRATION RATE: 16 BRPM | DIASTOLIC BLOOD PRESSURE: 89 MMHG | WEIGHT: 168.21 LBS | OXYGEN SATURATION: 97 % | TEMPERATURE: 98 F | SYSTOLIC BLOOD PRESSURE: 155 MMHG | HEIGHT: 61 IN

## 2022-09-06 VITALS — HEART RATE: 101 BPM | RESPIRATION RATE: 17 BRPM

## 2022-09-06 DIAGNOSIS — Z98.890 OTHER SPECIFIED POSTPROCEDURAL STATES: Chronic | ICD-10-CM

## 2022-09-06 DIAGNOSIS — G93.40 ENCEPHALOPATHY, UNSPECIFIED: ICD-10-CM

## 2022-09-06 DIAGNOSIS — Z98.891 HISTORY OF UTERINE SCAR FROM PREVIOUS SURGERY: Chronic | ICD-10-CM

## 2022-09-06 PROBLEM — G43.909 MIGRAINE, UNSPECIFIED, NOT INTRACTABLE, WITHOUT STATUS MIGRAINOSUS: Chronic | Status: ACTIVE | Noted: 2022-08-31

## 2022-09-06 LAB
ANION GAP SERPL CALC-SCNC: 10 MMOL/L — SIGNIFICANT CHANGE UP (ref 5–17)
BUN SERPL-MCNC: 10 MG/DL — SIGNIFICANT CHANGE UP (ref 7–23)
CALCIUM SERPL-MCNC: 9.2 MG/DL — SIGNIFICANT CHANGE UP (ref 8.4–10.5)
CHLORIDE SERPL-SCNC: 101 MMOL/L — SIGNIFICANT CHANGE UP (ref 96–108)
CO2 SERPL-SCNC: 27 MMOL/L — SIGNIFICANT CHANGE UP (ref 22–31)
CREAT SERPL-MCNC: 0.71 MG/DL — SIGNIFICANT CHANGE UP (ref 0.5–1.3)
EGFR: 97 ML/MIN/1.73M2 — SIGNIFICANT CHANGE UP
GLUCOSE SERPL-MCNC: 99 MG/DL — SIGNIFICANT CHANGE UP (ref 70–99)
HCT VFR BLD CALC: 43 % — SIGNIFICANT CHANGE UP (ref 34.5–45)
HGB BLD-MCNC: 14.1 G/DL — SIGNIFICANT CHANGE UP (ref 11.5–15.5)
MCHC RBC-ENTMCNC: 31.8 PG — SIGNIFICANT CHANGE UP (ref 27–34)
MCHC RBC-ENTMCNC: 32.8 GM/DL — SIGNIFICANT CHANGE UP (ref 32–36)
MCV RBC AUTO: 96.8 FL — SIGNIFICANT CHANGE UP (ref 80–100)
NRBC # BLD: 0 /100 WBCS — SIGNIFICANT CHANGE UP (ref 0–0)
PLATELET # BLD AUTO: 432 K/UL — HIGH (ref 150–400)
POTASSIUM SERPL-MCNC: 4 MMOL/L — SIGNIFICANT CHANGE UP (ref 3.5–5.3)
POTASSIUM SERPL-SCNC: 4 MMOL/L — SIGNIFICANT CHANGE UP (ref 3.5–5.3)
RBC # BLD: 4.44 M/UL — SIGNIFICANT CHANGE UP (ref 3.8–5.2)
RBC # FLD: 11.4 % — SIGNIFICANT CHANGE UP (ref 10.3–14.5)
SARS-COV-2 RNA SPEC QL NAA+PROBE: SIGNIFICANT CHANGE UP
SODIUM SERPL-SCNC: 138 MMOL/L — SIGNIFICANT CHANGE UP (ref 135–145)
WBC # BLD: 8.48 K/UL — SIGNIFICANT CHANGE UP (ref 3.8–10.5)
WBC # FLD AUTO: 8.48 K/UL — SIGNIFICANT CHANGE UP (ref 3.8–10.5)

## 2022-09-06 PROCEDURE — 82435 ASSAY OF BLOOD CHLORIDE: CPT

## 2022-09-06 PROCEDURE — 85610 PROTHROMBIN TIME: CPT

## 2022-09-06 PROCEDURE — 97166 OT EVAL MOD COMPLEX 45 MIN: CPT

## 2022-09-06 PROCEDURE — 87086 URINE CULTURE/COLONY COUNT: CPT

## 2022-09-06 PROCEDURE — 93306 TTE W/DOPPLER COMPLETE: CPT

## 2022-09-06 PROCEDURE — 85014 HEMATOCRIT: CPT

## 2022-09-06 PROCEDURE — 85027 COMPLETE CBC AUTOMATED: CPT

## 2022-09-06 PROCEDURE — 36415 COLL VENOUS BLD VENIPUNCTURE: CPT

## 2022-09-06 PROCEDURE — 0042T: CPT | Mod: MA

## 2022-09-06 PROCEDURE — 93005 ELECTROCARDIOGRAM TRACING: CPT

## 2022-09-06 PROCEDURE — 80053 COMPREHEN METABOLIC PANEL: CPT

## 2022-09-06 PROCEDURE — 80061 LIPID PANEL: CPT

## 2022-09-06 PROCEDURE — 81001 URINALYSIS AUTO W/SCOPE: CPT

## 2022-09-06 PROCEDURE — 70496 CT ANGIOGRAPHY HEAD: CPT | Mod: MA

## 2022-09-06 PROCEDURE — 70552 MRI BRAIN STEM W/DYE: CPT

## 2022-09-06 PROCEDURE — 70498 CT ANGIOGRAPHY NECK: CPT | Mod: MA

## 2022-09-06 PROCEDURE — 87635 SARS-COV-2 COVID-19 AMP PRB: CPT

## 2022-09-06 PROCEDURE — 85025 COMPLETE CBC W/AUTO DIFF WBC: CPT

## 2022-09-06 PROCEDURE — 71045 X-RAY EXAM CHEST 1 VIEW: CPT

## 2022-09-06 PROCEDURE — 82947 ASSAY GLUCOSE BLOOD QUANT: CPT

## 2022-09-06 PROCEDURE — 70450 CT HEAD/BRAIN W/O DYE: CPT | Mod: MA

## 2022-09-06 PROCEDURE — 92523 SPEECH SOUND LANG COMPREHEN: CPT

## 2022-09-06 PROCEDURE — 83935 ASSAY OF URINE OSMOLALITY: CPT

## 2022-09-06 PROCEDURE — 84100 ASSAY OF PHOSPHORUS: CPT

## 2022-09-06 PROCEDURE — U0005: CPT

## 2022-09-06 PROCEDURE — 84300 ASSAY OF URINE SODIUM: CPT

## 2022-09-06 PROCEDURE — 83036 HEMOGLOBIN GLYCOSYLATED A1C: CPT

## 2022-09-06 PROCEDURE — 85018 HEMOGLOBIN: CPT

## 2022-09-06 PROCEDURE — 80048 BASIC METABOLIC PNL TOTAL CA: CPT

## 2022-09-06 PROCEDURE — 97530 THERAPEUTIC ACTIVITIES: CPT

## 2022-09-06 PROCEDURE — 99223 1ST HOSP IP/OBS HIGH 75: CPT

## 2022-09-06 PROCEDURE — 82962 GLUCOSE BLOOD TEST: CPT

## 2022-09-06 PROCEDURE — 99232 SBSQ HOSP IP/OBS MODERATE 35: CPT

## 2022-09-06 PROCEDURE — U0003: CPT

## 2022-09-06 PROCEDURE — 84132 ASSAY OF SERUM POTASSIUM: CPT

## 2022-09-06 PROCEDURE — 82330 ASSAY OF CALCIUM: CPT

## 2022-09-06 PROCEDURE — 84295 ASSAY OF SERUM SODIUM: CPT

## 2022-09-06 PROCEDURE — 96372 THER/PROPH/DIAG INJ SC/IM: CPT

## 2022-09-06 PROCEDURE — 95714 VEEG EA 12-26 HR UNMNTR: CPT

## 2022-09-06 PROCEDURE — 83605 ASSAY OF LACTIC ACID: CPT

## 2022-09-06 PROCEDURE — 97116 GAIT TRAINING THERAPY: CPT

## 2022-09-06 PROCEDURE — 80307 DRUG TEST PRSMV CHEM ANLYZR: CPT

## 2022-09-06 PROCEDURE — 83735 ASSAY OF MAGNESIUM: CPT

## 2022-09-06 PROCEDURE — 96375 TX/PRO/DX INJ NEW DRUG ADDON: CPT

## 2022-09-06 PROCEDURE — 97162 PT EVAL MOD COMPLEX 30 MIN: CPT

## 2022-09-06 PROCEDURE — 95700 EEG CONT REC W/VID EEG TECH: CPT

## 2022-09-06 PROCEDURE — 96374 THER/PROPH/DIAG INJ IV PUSH: CPT

## 2022-09-06 PROCEDURE — 84484 ASSAY OF TROPONIN QUANT: CPT

## 2022-09-06 PROCEDURE — 83930 ASSAY OF BLOOD OSMOLALITY: CPT

## 2022-09-06 PROCEDURE — 99285 EMERGENCY DEPT VISIT HI MDM: CPT

## 2022-09-06 PROCEDURE — 70551 MRI BRAIN STEM W/O DYE: CPT | Mod: MA

## 2022-09-06 PROCEDURE — 82803 BLOOD GASES ANY COMBINATION: CPT

## 2022-09-06 PROCEDURE — 85730 THROMBOPLASTIN TIME PARTIAL: CPT

## 2022-09-06 RX ORDER — POLYETHYLENE GLYCOL 3350 17 G/17G
17 POWDER, FOR SOLUTION ORAL
Qty: 0 | Refills: 0 | DISCHARGE
Start: 2022-09-06

## 2022-09-06 RX ORDER — NYSTATIN CREAM 100000 [USP'U]/G
1 CREAM TOPICAL
Refills: 0 | Status: DISCONTINUED | OUTPATIENT
Start: 2022-09-06 | End: 2022-09-24

## 2022-09-06 RX ORDER — LOSARTAN POTASSIUM 100 MG/1
1 TABLET, FILM COATED ORAL
Qty: 0 | Refills: 0 | DISCHARGE
Start: 2022-09-06

## 2022-09-06 RX ORDER — ACETAMINOPHEN 500 MG
1000 TABLET ORAL EVERY 8 HOURS
Refills: 0 | Status: DISCONTINUED | OUTPATIENT
Start: 2022-09-06 | End: 2022-09-06

## 2022-09-06 RX ORDER — POLYETHYLENE GLYCOL 3350 17 G/17G
17 POWDER, FOR SOLUTION ORAL
Refills: 0 | Status: DISCONTINUED | OUTPATIENT
Start: 2022-09-06 | End: 2022-09-13

## 2022-09-06 RX ORDER — LOSARTAN POTASSIUM 100 MG/1
50 TABLET, FILM COATED ORAL
Refills: 0 | Status: DISCONTINUED | OUTPATIENT
Start: 2022-09-06 | End: 2022-09-08

## 2022-09-06 RX ORDER — DIPHENHYDRAMINE HCL 50 MG
25 CAPSULE ORAL ONCE
Refills: 0 | Status: COMPLETED | OUTPATIENT
Start: 2022-09-06 | End: 2022-09-06

## 2022-09-06 RX ORDER — KETOROLAC TROMETHAMINE 30 MG/ML
15 SYRINGE (ML) INJECTION ONCE
Refills: 0 | Status: DISCONTINUED | OUTPATIENT
Start: 2022-09-06 | End: 2022-09-06

## 2022-09-06 RX ORDER — METOCLOPRAMIDE HCL 10 MG
10 TABLET ORAL ONCE
Refills: 0 | Status: COMPLETED | OUTPATIENT
Start: 2022-09-06 | End: 2022-09-06

## 2022-09-06 RX ORDER — ASPIRIN/CALCIUM CARB/MAGNESIUM 324 MG
81 TABLET ORAL DAILY
Refills: 0 | Status: DISCONTINUED | OUTPATIENT
Start: 2022-09-07 | End: 2022-09-24

## 2022-09-06 RX ORDER — ENOXAPARIN SODIUM 100 MG/ML
40 INJECTION SUBCUTANEOUS
Qty: 0 | Refills: 0 | DISCHARGE
Start: 2022-09-06

## 2022-09-06 RX ORDER — ASPIRIN/CALCIUM CARB/MAGNESIUM 324 MG
1 TABLET ORAL
Qty: 0 | Refills: 0 | DISCHARGE
Start: 2022-09-06

## 2022-09-06 RX ORDER — TRAMADOL HYDROCHLORIDE 50 MG/1
25 TABLET ORAL ONCE
Refills: 0 | Status: DISCONTINUED | OUTPATIENT
Start: 2022-09-06 | End: 2022-09-06

## 2022-09-06 RX ORDER — ATORVASTATIN CALCIUM 80 MG/1
40 TABLET, FILM COATED ORAL AT BEDTIME
Refills: 0 | Status: DISCONTINUED | OUTPATIENT
Start: 2022-09-06 | End: 2022-09-24

## 2022-09-06 RX ORDER — ACETAMINOPHEN 500 MG
650 TABLET ORAL EVERY 6 HOURS
Refills: 0 | Status: DISCONTINUED | OUTPATIENT
Start: 2022-09-06 | End: 2022-09-24

## 2022-09-06 RX ORDER — SENNA PLUS 8.6 MG/1
2 TABLET ORAL
Qty: 0 | Refills: 0 | DISCHARGE
Start: 2022-09-06

## 2022-09-06 RX ORDER — SENNA PLUS 8.6 MG/1
2 TABLET ORAL AT BEDTIME
Refills: 0 | Status: DISCONTINUED | OUTPATIENT
Start: 2022-09-06 | End: 2022-09-08

## 2022-09-06 RX ORDER — ENOXAPARIN SODIUM 100 MG/ML
40 INJECTION SUBCUTANEOUS EVERY 24 HOURS
Refills: 0 | Status: DISCONTINUED | OUTPATIENT
Start: 2022-09-07 | End: 2022-09-24

## 2022-09-06 RX ORDER — ACETAMINOPHEN 500 MG
2 TABLET ORAL
Qty: 0 | Refills: 0 | DISCHARGE
Start: 2022-09-06

## 2022-09-06 RX ORDER — ATORVASTATIN CALCIUM 80 MG/1
1 TABLET, FILM COATED ORAL
Qty: 0 | Refills: 0 | DISCHARGE
Start: 2022-09-06

## 2022-09-06 RX ADMIN — LOSARTAN POTASSIUM 50 MILLIGRAM(S): 100 TABLET, FILM COATED ORAL at 05:00

## 2022-09-06 RX ADMIN — ATORVASTATIN CALCIUM 40 MILLIGRAM(S): 80 TABLET, FILM COATED ORAL at 21:14

## 2022-09-06 RX ADMIN — Medication 15 MILLIGRAM(S): at 08:32

## 2022-09-06 RX ADMIN — Medication 81 MILLIGRAM(S): at 12:58

## 2022-09-06 RX ADMIN — Medication 1000 MILLIGRAM(S): at 10:38

## 2022-09-06 RX ADMIN — Medication 650 MILLIGRAM(S): at 19:24

## 2022-09-06 RX ADMIN — Medication 25 MILLIGRAM(S): at 08:03

## 2022-09-06 RX ADMIN — Medication 15 MILLIGRAM(S): at 13:30

## 2022-09-06 RX ADMIN — Medication 1000 MILLIGRAM(S): at 10:08

## 2022-09-06 RX ADMIN — ENOXAPARIN SODIUM 40 MILLIGRAM(S): 100 INJECTION SUBCUTANEOUS at 12:58

## 2022-09-06 RX ADMIN — Medication 650 MILLIGRAM(S): at 20:23

## 2022-09-06 RX ADMIN — TRAMADOL HYDROCHLORIDE 25 MILLIGRAM(S): 50 TABLET ORAL at 21:14

## 2022-09-06 RX ADMIN — Medication 15 MILLIGRAM(S): at 08:02

## 2022-09-06 RX ADMIN — Medication 10 MILLIGRAM(S): at 08:02

## 2022-09-06 RX ADMIN — Medication 15 MILLIGRAM(S): at 13:00

## 2022-09-06 RX ADMIN — LOSARTAN POTASSIUM 50 MILLIGRAM(S): 100 TABLET, FILM COATED ORAL at 17:56

## 2022-09-06 NOTE — PROGRESS NOTE ADULT - SUBJECTIVE AND OBJECTIVE BOX
THE PATIENT WAS SEEN AND EXAMINED BY ME WITH THE HOUSESTAFF AND STROKE TEAM DURING MORNING ROUNDS.   HPI:61 right handed, F with history of hypertension, migraine (not on any medications) and TIA (seen at [facility] in 11/2021) presented to the hospital with headache, word-finding difficulties and n/v. She woke up normal the day before as confirmed by her . LKN 8/31 at 0630. She went to work but had to drive back home because she developed a headache. Her  recalled checking up on her in bed at 2330 for which she was still responsive and answering questions. However, when the patient woke up the day of admission, the  noticed that the patient forgot her commonly used passwords, her mother's name and generally seemed confused. At that point, the  decided to send the patient to the hospital. CODE STROKE called at 0901. The patient received CT head non con, angio, and perfusion. NIHSS 13. No tpa and thrombectomy given because the patient was outside window. Of note, the patient presented to [facility] with headaches in 11/2021. CODE STROKE was called and the patient received stroke imaging. 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 Patient was discharged on ibuprofen for headache and not on aspirin and plavix.      SUBJECTIVE: No events overnight.  No new neurologic complaints.  ROS reported negative unless otherwise noted.    aspirin enteric coated 81 milliGRAM(s) Oral daily  atorvastatin 40 milliGRAM(s) Oral at bedtime  enoxaparin Injectable 40 milliGRAM(s) SubCutaneous every 24 hours  losartan 50 milliGRAM(s) Oral two times a day  polyethylene glycol 3350 17 Gram(s) Oral daily  senna 2 Tablet(s) Oral at bedtime    PHYSICAL EXAM:   Vital Signs Last 24 Hrs  T(C): 36.7 (06 Sep 2022 04:00), Max: 36.9 (05 Sep 2022 07:24)  T(F): 98.1 (06 Sep 2022 04:00), Max: 98.4 (05 Sep 2022 07:24)  HR: 78 (06 Sep 2022 06:00) (76 - 91)  BP: 154/95 (06 Sep 2022 06:00) (115/69 - 168/101)  BP(mean): 114 (06 Sep 2022 06:00) (84 - 122)  RR: 17 (06 Sep 2022 06:00) (11 - 24)  SpO2: 94% (06 Sep 2022 06:00) (93% - 100%)    Parameters below as of 06 Sep 2022 06:00  Patient On (Oxygen Delivery Method): room air      General: No acute distress  HEENT: EOM intact, R HHA   Abdomen: Soft, nontender, nondistended   Extremities: No edema    NEUROLOGICAL EXAM:  Mental status: Awake, alert, oriented x3, speech fluent, follows commands, IDs objects, repetition intact  Cranial Nerves: No facial asymmetry, R HHA, no nystagmus, no dysarthria,  tongue midline  Motor exam: extremities move well against gravity   Sensation: Intact to light touch   Coordination/ Gait: No dysmetria, gait not tested    LABS:                        14.1   8.48  )-----------( 432      ( 06 Sep 2022 05:42 )             43.0    09-06    138  |  101  |  10  ----------------------------<  99  4.0   |  27  |  0.71    Ca    9.2      06 Sep 2022 05:42  Phos  2.8     09-05  Mg     2.3     09-05    IMAGING: Reviewed by me.       MR Head w/ IV Cont (09.01.2022)  No enhancement within the previously seen signal   abnormalities involving the bilateral occipital lobes, left side larger   than right, and right cerebellar hemisphere.    MR Head No Cont (08.31.2022)  Small vessel white matter ischemic changes. Patchy signal   hyperintensity involving the left greater than right occipital lobe and   right cerebellum which may be related to hypertensive encephalopathy.    (08.31.2022)  CT PERFUSION: No perfusion deficit.    CTA NECK:  1. No large vessel occlusion or major stenosis.  2. 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    CTA HEAD:  1. No large vessel occlusion or major stenosis.    CT Head No Cont (08.31.2022)  Subtle questionable hypoattenuation in the left occipital   lobe for which the differential diagnosis includes infarct versus PRES   versus cerebritis versus artifact.  No acute intracranial hemorrhage.

## 2022-09-06 NOTE — PROGRESS NOTE ADULT - REASON FOR ADMISSION
Word finding difficulties concerning for stroke

## 2022-09-06 NOTE — PROGRESS NOTE ADULT - PROBLEM SELECTOR PROBLEM 1
Altered mental status
Hypertension
Altered mental status

## 2022-09-06 NOTE — PROGRESS NOTE ADULT - PROBLEM/PLAN-2
DISPLAY PLAN FREE TEXT
Normal for race
DISPLAY PLAN FREE TEXT

## 2022-09-06 NOTE — DISCHARGE NOTE PROVIDER - NSDCMRMEDTOKEN_GEN_ALL_CORE_FT
acetaminophen 500 mg oral tablet: 2 tab(s) orally every 8 hours, As needed, Temp greater or equal to 38C (100.4F), Mild Pain (1 - 3), Moderate Pain (4 - 6)  aspirin 81 mg oral delayed release tablet: 1 tab(s) orally once a day  atorvastatin 40 mg oral tablet: 1 tab(s) orally once a day (at bedtime)  enoxaparin: 40 unit(s) subcutaneous once a day for DVT ppx   losartan 50 mg oral tablet: 1 tab(s) orally 2 times a day  polyethylene glycol 3350 oral powder for reconstitution: 17 gram(s) orally once a day  senna leaf extract oral tablet: 2 tab(s) orally once a day (at bedtime)

## 2022-09-06 NOTE — H&P ADULT - NSHPREVIEWOFSYSTEMS_GEN_ALL_CORE
REVIEW OF SYSTEMS  Constitutional: No fever, No Chills  HEENT: (+) Blurry vision when ambulating. No Dysphagia, but has noticed some coughing while eating/drinking  Pulm: No cough, No shortness of breath  Cardio: No chest pain, No palpitations  GI:  No abdominal pain, No nausea, No vomiting, BM this AM  : No dysuria  Neuro: (+) 3-4/10 headaches, (+) reports memory loss, No loss of strength, No numbness  Skin: No lesions  MSK: No joint pain, No muscle pain  Psych: (+) anxiety

## 2022-09-06 NOTE — H&P ADULT - ASSESSMENT
(age and gender) with hx of (pertinent PMH) who presented to (acute hospital) on (date of acute admission) with (presenting sx) found to have (rehab diagnosis). Hospital Course complicated by ___. Admitted for multidisciplinary rehab program    #Comprehensive Multidisciplinary Rehab Program:  - Gait, ADL, Functional impairments  - PT/OT/ SLP 3 hours a day 5 days a week    #    #Mood / Cognition:  - Neuropsych evaluation     #Sleep:  - Maintain quiet hours and low stim environment  - Melatonin PRN    #Pain:  - Tylenol PRN  - avoid sedating meds that may affect cognitive recovery    #GI/Bowel:  - Senna 2 tabs daily  - GI ppx:     #/Bladder:  - Monitor PVR if no void in 8h; SC for >400 cc  - Toileting schedule q4h    #Diet / Dysphagia:    - Diet:   - ongoing SLP assessment  - Nutrition to follow    #Skin/ Pressure Injury Prevention:  - assessment on admission   - Incisions:  - Turn Q2hrs in bed while awake, OOB to Chair, PT/OT/SLP     #DVT prophylaxis:  -   - SCDs  - Last doppler on ___    #Precautions/ Restrictions  - Falls, Cardiac, Seizures, Spine, Hip  - Ortho:      Weight bearing status:     ROM restrictions:   - Lungs: Aspiration, Incentive Spirometer    - COVID PCR:     --------------------------------------------  Outpatient Follow up:    --------------------------------------------      MEDICAL PROGNOSIS: GOOD            REHAB POTENTIAL: GOOD             ESTIMATED DISPOSITION: HOME WITH HOME CARE            ELOS: 10-14 Days   EXPECTED THERAPY:     P.T. 1hr/day       O.T. 1hr/day      S.L.P. 1hr/day     P&O Unnecessary     EXP FREQUENCY: 5 days per 7 day period     PRESCREEN COMPARISON:   I have reviewed the prescreen information and I have found no relevant changes between the preadmission screening and my post admission evaluation     RATIONALE FOR INPATIENT ADMISSION - Patient demonstrates the following: (check all that apply)  [X] Medically appropriate for rehabilitation admission  [X] Has attainable rehab goals with an appropriate initial discharge plan  [X] Has rehabilitation potential (expected to make a significant improvement within a reasonable period of time)   [X] Requires close medical management by a rehab physician, rehab nursing care, Hospitalist and comprehensive interdisciplinary team (including PT, OT, & or SLP, Prosthetics and Orthotics)  EZRA MARIE is a 62yo Female with PMHx of HTN, migraines, and TIA (last seen at [facility] in 11/2021), who presented to [facility] on 08/31/2022 with HA, nausea, vomiting, and word-finding difficulties; was found to have findings on CT and MRI head consistent with PRES. 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 Admitted for multidisciplinary rehab program.      #Comprehensive Multidisciplinary Rehab Program:  - Gait, ADL, Functional impairments  - PT/OT/ SLP 3 hours a day 5 days a week    #PRES, unclear etiology  - suggestive based on findings on MRI and CT head: hyperintense signal to left occipital lobe and right cerebellum   - c/w ASA 81mg daily    #HTN  - c/w Losartan 50mg daily    #HLD  - c/w Atorvastatin 40mg qhs    #Sleep:  - Maintain quiet hours and low stim environment  - Melatonin PRN    #Pain:  - Tylenol PRN  - avoid sedating meds that may affect cognitive recovery    #GI/Bowel:  - Senna 2 tabs qhs and Miralax PRN    #/Bladder:  - Monitor PVR if no void in 8h; SC for >400 cc  - Toileting schedule q4h    #Diet / Dysphagia:    - Diet: Regular with thins  - ongoing SLP assessment  - Nutrition to follow    #Skin/ Pressure Injury Prevention:  - assessment on admission   - Turn Q2hrs in bed while awake, OOB to Chair, PT/OT/SLP     #DVT prophylaxis:  - Lovenox    #Precautions/ Restrictions  - Falls  - Lungs: Aspiration, Incentive Spirometer    - COVID PCR: 9/5    --------------------------------------------  Outpatient Follow up:  Omar Dominguez (DO)  Neurology; Vascular Neurology  [address] [facility] [address] [facility] [address]  [place] [address]  Phone: [phone]  Fax: [phone]  Follow Up Time: 2 weeks    Gabriele Olivier (DO)  Cardiology; Internal Medicine  [address] [facility], [address]  [place] [address]  Phone: [phone]  Fax: [phone]  Follow Up Time: 1 month    Froy Craig ()  Medicine  [address], [address]  [place] [address]  Phone: [phone]  Fax: [phone]  Follow Up Time: Routine  --------------------------------------------      MEDICAL PROGNOSIS: GOOD            REHAB POTENTIAL: GOOD             ESTIMATED DISPOSITION: HOME WITH HOME CARE            ELOS: 10-14 Days   EXPECTED THERAPY:     P.T. 1hr/day       O.T. 1hr/day      S.L.P. 1hr/day     P&O Unnecessary     EXP FREQUENCY: 5 days per 7 day period     PRESCREEN COMPARISON:   I have reviewed the prescreen information and I have found no relevant changes between the preadmission screening and my post admission evaluation     RATIONALE FOR INPATIENT ADMISSION - Patient demonstrates the following: (check all that apply)  [X] Medically appropriate for rehabilitation admission  [X] Has attainable rehab goals with an appropriate initial discharge plan  [X] Has rehabilitation potential (expected to make a significant improvement within a reasonable period of time)   [X] Requires close medical management by a rehab physician, rehab nursing care, Hospitalist and comprehensive interdisciplinary team (including PT, OT, & or SLP, Prosthetics and Orthotics)  EZRA MARIE is a 62yo Female with PMHx of HTN, migraines, and TIA (last seen at [facility] in 11/2021), who presented to [facility] on 08/31/2022 with HA, nausea, vomiting, and word-finding difficulties; was found to have findings on CT and MRI head consistent with PRES. 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 Admitted for multidisciplinary rehab program.      #Comprehensive Multidisciplinary Rehab Program:  - Gait, ADL, Functional impairments  - PT/OT/ SLP 3 hours a day 5 days a week    #PRES, unclear etiology  - suggestive based on findings on MRI and CT head: hyperintense signal to left occipital lobe and right cerebellum. No enhancement observed with IV contrast.   - repeat MRI brain in 1 month (09/30/2022)  - c/w ASA 81mg daily    #HTN  - c/w Losartan 50mg daily    #HLD  - c/w Atorvastatin 40mg qhs    #Sleep:  - Maintain quiet hours and low stim environment  - Melatonin PRN    #Pain:  - Tylenol PRN  - avoid sedating meds that may affect cognitive recovery    #GI/Bowel:  - Senna 2 tabs qhs and Miralax PRN    #/Bladder:  - Monitor PVR if no void in 8h; SC for >400 cc  - Toileting schedule q4h    #Diet / Dysphagia:    - Diet: Regular with thins  - ongoing SLP assessment  - Nutrition to follow    #Skin/ Pressure Injury Prevention:  - assessment on admission   - Turn Q2hrs in bed while awake, OOB to Chair, PT/OT/SLP     #DVT prophylaxis:  - Lovenox    #Precautions/ Restrictions  - Falls  - Lungs: Aspiration, Incentive Spirometer    - COVID PCR: 9/5    --------------------------------------------  Outpatient Follow up:  Omar Dominguez (DO)  Neurology; Vascular Neurology  [address] [facility] [address]  [place] [address]  Phone: [phone]  Fax: [phone]  Follow Up Time: 2 weeks    Gabriele Olivier)  Cardiology; Internal Medicine  [address] [facility], [address]  [place] [address]  Phone: [phone]  Fax: [phone]  Follow Up Time: 1 month    Froy Craig ()  [address], [address]  [place] [address]  Phone: [phone]  Fax: [phone]  Follow Up Time: Routine  --------------------------------------------      MEDICAL PROGNOSIS: GOOD            REHAB POTENTIAL: GOOD             ESTIMATED DISPOSITION: HOME WITH HOME CARE            ELOS: 10-14 Days   EXPECTED THERAPY:     P.T. 1hr/day       O.T. 1hr/day      S.L.P. 1hr/day     P&O Unnecessary     EXP FREQUENCY: 5 days per 7 day period     PRESCREEN COMPARISON:   I have reviewed the prescreen information and I have found no relevant changes between the preadmission screening and my post admission evaluation     RATIONALE FOR INPATIENT ADMISSION - Patient demonstrates the following: (check all that apply)  [X] Medically appropriate for rehabilitation admission  [X] Has attainable rehab goals with an appropriate initial discharge plan  [X] Has rehabilitation potential (expected to make a significant improvement within a reasonable period of time)   [X] Requires close medical management by a rehab physician, rehab nursing care, Hospitalist and comprehensive interdisciplinary team (including PT, OT, & or SLP, Prosthetics and Orthotics)  EZRA MARIE is a 60yo Female with PMHx of HTN, migraines, and TIA ([facility]), who presented to [facility] on 08/31/2022 with HA, nausea, vomiting, and word-finding difficulties; was found to have findings on CT and MRI head consistent with PRES. 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 Admitted for multidisciplinary rehab program.      #Comprehensive Multidisciplinary Rehab Program:  - Gait, ADL, Functional impairments  - PT/OT/ SLP 3 hours a day 5 days a week    #PRES, unclear etiology  - suggestive based on findings on MRI and CT head: hyperintense signal to left occipital lobe and right cerebellum. No enhancement observed with IV contrast.   - repeat MRI brain in 1 month (09/30/2022)  - c/w ASA 81mg daily    #HTN  - c/w Losartan 50mg BID    #HLD  - c/w Atorvastatin 40mg qhs    #Sleep:  - Maintain quiet hours and low stim environment  - Melatonin PRN    #Pain:  - Tylenol PRN  - avoid sedating meds that may affect cognitive recovery    #GI/Bowel:  - Senna 2 tabs qhs and Miralax PRN    #/Bladder:  - Monitor PVR if no void in 8h; SC for >400 cc  - Toileting schedule q4h    #Diet / Dysphagia:    - Diet: Regular with thins  - ongoing SLP assessment  - Nutrition to follow    #Skin/ Pressure Injury Prevention:  - assessment on admission   - Turn Q2hrs in bed while awake, OOB to Chair, PT/OT/SLP     #DVT prophylaxis:  - Lovenox    #Precautions/ Restrictions  - Falls  - Lungs: Aspiration, Incentive Spirometer    - COVID PCR: 9/5    --------------------------------------------  Outpatient Follow up:  Omar Dominguez (DO)  Neurology; Vascular Neurology  [address] [facility], [address]  [place] [address]  Phone: [phone]  Fax: [phone]  Follow Up Time: 2 weeks    Gabriele Olivier)  Cardiology; Internal Medicine  [address] [facility], [address]  [place] [address]  Phone: [phone]  Fax: [phone]  Follow Up Time: 1 month    Froy Craig ()  [address], [address]  [place] [address]  Phone: [phone]  Fax: [phone]  Follow Up Time: Routine  --------------------------------------------      MEDICAL PROGNOSIS: GOOD            REHAB POTENTIAL: GOOD             ESTIMATED DISPOSITION: HOME WITH HOME CARE            ELOS: 10-14 Days   EXPECTED THERAPY:     P.T. 1hr/day       O.T. 1hr/day      S.L.P. 1hr/day     P&O Unnecessary     EXP FREQUENCY: 5 days per 7 day period     PRESCREEN COMPARISON:   I have reviewed the prescreen information and I have found no relevant changes between the preadmission screening and my post admission evaluation     RATIONALE FOR INPATIENT ADMISSION - Patient demonstrates the following: (check all that apply)  [X] Medically appropriate for rehabilitation admission  [X] Has attainable rehab goals with an appropriate initial discharge plan  [X] Has rehabilitation potential (expected to make a significant improvement within a reasonable period of time)   [X] Requires close medical management by a rehab physician, rehab nursing care, Hospitalist and comprehensive interdisciplinary team (including PT, OT, & or SLP, Prosthetics and Orthotics)  EZRA MARIE is a 60yo Female with PMHx of HTN, migraines, and TIA (last seen at [facility] in 11/2021), who presented to [facility] on 08/31/2022 with HA, nausea, vomiting, and word-finding difficulties; was found to have findings on CT and MRI head consistent with PRES.  Admitted for multidisciplinary rehab program with Aphasia, gait and ADL impairment.      #Comprehensive Multidisciplinary Rehab Program:  - Gait, ADL, Functional impairments  - PT/OT/ SLP 3 hours a day 5 days a week    #PRES, unclear etiology  - suggestive based on findings on MRI and CT head: hyperintense signal to left occipital lobe and right cerebellum. No enhancement observed with IV contrast.   - repeat MRI brain in 1 month (09/30/2022)  - c/w ASA 81mg daily    #HTN  - c/w Losartan 50mg BID    #HLD  - c/w Atorvastatin 40mg qhs    #Sleep:  - Maintain quiet hours and low stim environment      #Pain:  - Tylenol PRN  - avoid sedating meds that may affect cognitive recovery    #GI/Bowel:  - Senna 2 tabs qhs and Miralax PRN    #/Bladder:  - Monitor PVR if no void in 8h; SC for >400 cc  --continent    #Diet :    - Diet: Regular with thins  -  #Skin/ Pressure Injury Prevention:  - assessment on admission --erythema under left breast--Nystatin ordered  - Turn Q2hrs in bed while awake, OOB to Chair, PT/OT/SLP     #DVT prophylaxis:  - Lovenox    #Precautions/ Restrictions  - Falls  - COVID PCR: 9/5    --------------------------------------------  Outpatient Follow up:  Omar Dominguez (DO)  Neurology; Vascular Neurology  [address] [facility]  [place] [address]  Phone: [phone]  Fax: [phone]  Follow Up Time: 2 weeks    Gabriele Olivier (DO)  Cardiology; Internal Medicine  [address] [facility], [address]  [place] [address]  Phone: [phone]  Fax: [phone]  Follow Up Time: 1 month    Froy Craig (DO)  Medicine  [address] [facility] [facility], [address]  [place] [address]  Phone: [phone]  Fax: [phone]  Follow Up Time: Routine  --------------------------------------------      MEDICAL PROGNOSIS: GOOD            REHAB POTENTIAL: GOOD             ESTIMATED DISPOSITION: HOME WITH HOME CARE            ELOS: 7-10 Days   EXPECTED THERAPY:     P.T. 1hr/day       O.T. 1hr/day      S.L.P. 1hr/day     P&O Unnecessary     EXP FREQUENCY: 5 days per 7 day period     PRESCREEN COMPARISON:   I have reviewed the prescreen information and I have found no relevant changes between the preadmission screening and my post admission evaluation     RATIONALE FOR INPATIENT ADMISSION - Patient demonstrates the following: (check all that apply)  [X] Medically appropriate for rehabilitation admission  [X] Has attainable rehab goals with an appropriate initial discharge plan  [X] Has rehabilitation potential (expected to make a significant improvement within a reasonable period of time)   [X] Requires close medical management by a rehab physician, rehab nursing care, Hospitalist and comprehensive interdisciplinary team (including PT, OT, & or SLP, Prosthetics and Orthotics)

## 2022-09-06 NOTE — PROGRESS NOTE ADULT - PROVIDER SPECIALTY LIST ADULT
Neurology
Rehab Medicine
Internal Medicine
Internal Medicine
Cardiology
Cardiology
Neurology
Cardiology

## 2022-09-06 NOTE — PROGRESS NOTE ADULT - NS ATTEND AMEND GEN_ALL_CORE FT
Pt care and plan discussed and reviewed with PA. Plan as outlined above edited by me to reflect our discussion.
Pt care and plan discussed and reviewed with PA. Plan as outlined above edited by me to reflect our discussion. Advanced care planning/advanced directives discussed with patient/family. DNR status including forceful chest compressions to attempt to restart the heart, ventilator support/artificial breathing, electric shock, artificial nutrition, health care proxy, Molst form all discussed with pt. Pt wishes to consider.
Patient was seen and examined by me. History and exam as documented above by PA/NP was confirmed by me.  Agree with plan as outlined above.

## 2022-09-06 NOTE — H&P ADULT - ATTENDING COMMENTS
Pt. seen with resident.  Agree with documentation above as per resident with amendments made as appropriate. Patient medically stable. Appropriate for acute rehabilitation.     60yo Female with PMHx of HTN, migraines, and TIA (last seen at [facility] in 11/2021), who presented to [facility] on 08/31/2022 with HA, nausea, vomiting, and word-finding difficulties; was found to have findings on CT and MRI head consistent with PRES.  Admitted for multidisciplinary rehab program with Aphasia, gait and ADL impairment.    cont. current meds.  Rehab plan of care d/w pt.  all questions answered

## 2022-09-06 NOTE — PATIENT PROFILE ADULT - FALL HARM RISK - HARM RISK INTERVENTIONS

## 2022-09-06 NOTE — DISCHARGE NOTE NURSING/CASE MANAGEMENT/SOCIAL WORK - PATIENT PORTAL LINK FT
You can access the FollowMyHealth Patient Portal offered by [facility] by registering at the following website: [email or web address]. By joining [facility]’s FollowMyHealth portal, you will also be able to view your health information using other applications (apps) compatible with our system.

## 2022-09-06 NOTE — DISCHARGE NOTE PROVIDER - HOSPITAL COURSE
61 right handed  F with hypertension, migraine (improved after menopause but recently returned) and TIA (seen at [facility] in 11/2021 - but not on asa/statin for unclear reason) presented to the hospital with headache, word-finding difficulties and n/v. LKN 8/31 at 0630. mRS: 0. LKN: 8/30/2022 0630. NIHSS: 13 on admission.     IMPRESSION : aphasia without HP, R HHA, posterior circulation FLAIR changes and elevated BP due to PRES. Etiology unclear, she reports taking benzos to deal with anxiety after the recent death of her brother, denies SSRI use.    MR Head w/ IV Cont (09.01.2022)  No enhancement within the previously seen signal   abnormalities involving the bilateral occipital lobes, left side larger   than right, and right cerebellar hemisphere.    MR Head No Cont (08.31.2022)  Small vessel white matter ischemic changes. Patchy signal   hyperintensity involving the left greater than right occipital lobe and   right cerebellum which may be related to hypertensive encephalopathy.    (08.31.2022)  CT PERFUSION: No perfusion deficit.    CTA NECK:  1. No large vessel occlusion or major stenosis.  2. 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    CTA HEAD:  1. No large vessel occlusion or major stenosis.    CT Head No Cont (08.31.2022)  Subtle questionable hypoattenuation in the left occipital   lobe for which the differential diagnosis includes infarct versus PRES   versus cerebritis versus artifact.  No acute intracranial hemorrhage.    Started on aspirin.     A1C 5.6, - started on high intensity statin   Will need repeat MRI Head in 1 month (9/30/22) as outpatient.   EEG neg for seizures.    TTE EF 75%  Minimal mitral regurgitation, no evidence of a patent foramen ovale, cardiac monitoring without reported events.  Hypertensive: continue on losartan 50mg BID.  Dr. Olivier was following. elevated QTC- monitor EKG.        hyponatremia likely due to SIADH - resolved.       Will need to avoid triptans for migraines, will need to follow up with outpatient neurologist for other migraine therapy.  IV Tylenol, magnesium, and toradol was given as needed for headache. Can have toradol as outpatient for headache management.     Physical therapy/OT recommended acute TBI rehab. Stable for discharge.

## 2022-09-06 NOTE — PROGRESS NOTE ADULT - ASSESSMENT
61 right handed, F with history of hypertension, migraine (not on any medications) and TIA (seen at [facility] in 11/2021) presented to the hospital with headache, word-finding difficulties and n/v.

## 2022-09-06 NOTE — PROGRESS NOTE ADULT - SUBJECTIVE AND OBJECTIVE BOX
Name of Patient : EZRA MARIE  MRN: [id number]  Date of visit: 09-06-22 @ 10:50      Subjective: Patient seen and examined. No new events except as noted.   Patient seen earlier this AM. Lying down in bed   Discharge planning for today  Na improving to 138 on labs    REVIEW OF SYSTEMS:    CONSTITUTIONAL: No weakness, fevers or chills  EYES/ENT: No visual changes;  No vertigo or throat pain   NECK: No pain or stiffness  RESPIRATORY: No cough, wheezing, hemoptysis; No shortness of breath  CARDIOVASCULAR: No chest pain or palpitations  GASTROINTESTINAL: No abdominal or epigastric pain. No nausea, vomiting, or hematemesis; No diarrhea or constipation. No melena or hematochezia.  GENITOURINARY: No dysuria, frequency or hematuria  NEUROLOGICAL: No numbness or weakness  SKIN: No itching, burning, rashes, or lesions   All other review of systems is negative unless indicated above.    MEDICATIONS:  MEDICATIONS  (STANDING):  aspirin enteric coated 81 milliGRAM(s) Oral daily  atorvastatin 40 milliGRAM(s) Oral at bedtime  enoxaparin Injectable 40 milliGRAM(s) SubCutaneous every 24 hours  losartan 50 milliGRAM(s) Oral two times a day  polyethylene glycol 3350 17 Gram(s) Oral daily  senna 2 Tablet(s) Oral at bedtime      PHYSICAL EXAM:  T(C): 36.7 (09-06-22 @ 04:00), Max: 36.8 (09-05-22 @ 20:00)  HR: 98 (09-06-22 @ 12:00) (71 - 98)  BP: 115/64 (09-06-22 @ 10:00) (115/64 - 168/101)  RR: 15 (09-06-22 @ 12:00) (15 - 24)  SpO2: 91% (09-06-22 @ 12:00) (91% - 99%)  Wt(kg): --  I&O's Summary    05 Sep 2022 07:01  -  06 Sep 2022 07:00  --------------------------------------------------------  IN: 100 mL / OUT: 0 mL / NET: 100 mL          Appearance: Normal	  HEENT:  PERRLA   Lymphatic: No lymphadenopathy   Cardiovascular: Normal S1 S2, no JVD  Respiratory: normal effort , clear  Gastrointestinal:  Soft, Non-tender  Skin: No rashes,  warm to touch  Psychiatry:  Mood & affect appropriate  Musculuskeletal: No edema      09-05-22 @ 07:01  -  09-06-22 @ 07:00  --------------------------------------------------------  IN: 100 mL / OUT: 0 mL / NET: 100 mL                              14.1   8.48  )-----------( 432      ( 06 Sep 2022 05:42 )             43.0               09-06    138  |  101  |  10  ----------------------------<  99  4.0   |  27  |  0.71    Ca    9.2      06 Sep 2022 05:42  Phos  2.8     09-05  Mg     2.3     09-05

## 2022-09-06 NOTE — PROGRESS NOTE ADULT - SUBJECTIVE AND OBJECTIVE BOX
Subjective: Patient seen and examined. No new events except as noted.     REVIEW OF SYSTEMS:    CONSTITUTIONAL: + weakness, fevers or chills  EYES/ENT: No visual changes;  No vertigo or throat pain   NECK: No pain or stiffness  RESPIRATORY: No cough, wheezing, hemoptysis; No shortness of breath  CARDIOVASCULAR: No chest pain or palpitations  GASTROINTESTINAL: No abdominal or epigastric pain. No nausea, vomiting, or hematemesis; No diarrhea or constipation. No melena or hematochezia.  GENITOURINARY: No dysuria, frequency or hematuria  NEUROLOGICAL: No numbness or weakness  SKIN: No itching, burning, rashes, or lesions   All other review of systems is negative unless indicated above.    MEDICATIONS:  MEDICATIONS  (STANDING):  aspirin enteric coated 81 milliGRAM(s) Oral daily  atorvastatin 40 milliGRAM(s) Oral at bedtime  enoxaparin Injectable 40 milliGRAM(s) SubCutaneous every 24 hours  losartan 50 milliGRAM(s) Oral two times a day  polyethylene glycol 3350 17 Gram(s) Oral daily  senna 2 Tablet(s) Oral at bedtime    PHYSICAL EXAM:  T(C): 36.7 (09-06-22 @ 04:00), Max: 36.8 (09-05-22 @ 20:00)  HR: 78 (09-06-22 @ 10:00) (76 - 91)  BP: 115/64 (09-06-22 @ 10:00) (115/64 - 168/101)  RR: 20 (09-06-22 @ 10:00) (15 - 24)  SpO2: 95% (09-06-22 @ 10:00) (93% - 99%)  Wt(kg): --  I&O's Summary    05 Sep 2022 07:01  -  06 Sep 2022 07:00  --------------------------------------------------------  IN: 100 mL / OUT: 0 mL / NET: 100 mL    Appearance: NAD  HEENT: Normal oral mucosa, PERRL, EOMI	  Lymphatic: No lymphadenopathy , no edema  Cardiovascular: Normal S1 S2, No JVD, No murmurs , Peripheral pulses palpable 2+ bilaterally  Respiratory: Lungs clear to auscultation, normal effort 	  Gastrointestinal:  Soft, Non-tender, + BS	  Skin: No rashes, No ecchymoses, No cyanosis, warm to touch  NEUROLOGICAL EXAM:  Mental status: Awake, alert, oriented x3, speech fluent, follows commands, IDs objects, repetition intact  Cranial Nerves: No facial asymmetry, R HHA, no nystagmus, no dysarthria,  tongue midline  Motor exam: extremities move well against gravity   Sensation: Intact to light touch   Coordination/ Gait: No dysmetria, gait not tested  Ext: No edema    LABS:    CARDIAC MARKERS:                        14.1   8.48  )-----------( 432      ( 06 Sep 2022 05:42 )             43.0     09-06    138  |  101  |  10  ----------------------------<  99  4.0   |  27  |  0.71    Ca    9.2      06 Sep 2022 05:42  Phos  2.8     09-05  Mg     2.3     09-05    proBNP:   Lipid Profile:   HgA1c:   TSH:     TELEMETRY: SR 80-90	    ECG:  	  RADIOLOGY:   DIAGNOSTIC TESTING:  [ ] Echocardiogram:  [ ]  Catheterization:  [ ] Stress Test:    OTHER:

## 2022-09-06 NOTE — DISCHARGE NOTE PROVIDER - CARE PROVIDER_API CALL
Omar Dominguez (DO)  Neurology; Vascular Neurology  [address], [address]  [place] [address]  Phone: [phone]  Fax: [phone]  Follow Up Time: 2 weeks    Gabriele Olivier (DO)  Cardiology; Internal Medicine  [address], [address]  [place] [address]  Phone: [phone]  Fax: [phone]  Follow Up Time: 1 month    Froy Craig (DO)  Medicine  [address], [address]  [place] [address]  Phone: [phone]  Fax: [phone]  Follow Up Time: Routine

## 2022-09-06 NOTE — DISCHARGE NOTE PROVIDER - NSDCCPCAREPLAN_GEN_ALL_CORE_FT
PRINCIPAL DISCHARGE DIAGNOSIS  Diagnosis: Posterior reversible encephalopathy syndrome  Assessment and Plan of Treatment: Please follow up with neurologist in 1 week. The office will call you to schedule an appointment, if you do not hear from them please call [phone]. Continue taking medications as prescribed. Monitor your blood pressure. Reduce fat, cholesterol and salt in your diet. Increase intake of fruits and vegetables. Limit alcohol to minimum and do not smoke. You may be at risk for falling, make changes to your home to help you walk easier. Keep up to date on vaccinations.  If you experience any symptoms of facial drooping, slurred speech, arm or leg weakness, severe headache, vision changes or any worsening symptoms, notify provider immediately and return to ER.

## 2022-09-06 NOTE — DISCHARGE NOTE PROVIDER - PROVIDER TOKENS
PROVIDER:[TOKEN:[[id number]],FOLLOWUP:[2 weeks]],PROVIDER:[TOKEN:[[id number]],FOLLOWUP:[1 month]],PROVIDER:[TOKEN:[[id number]],FOLLOWUP:[Routine]]

## 2022-09-06 NOTE — H&P ADULT - NSICDXFAMILYHX_GEN_ALL_CORE_FT
FAMILY HISTORY:  Father  Still living? No  Family history of heart disease, Age at diagnosis: Age Unknown    Mother  Still living? No  Family history of diabetes mellitus, Age at diagnosis: Age Unknown     FAMILY HISTORY:  Father  Still living? No  Family history of heart disease, Age at diagnosis: Age Unknown    Mother  Still living? No  Family history of diabetes mellitus, Age at diagnosis: Age Unknown    Sibling  Still living? Yes, Estimated age: Age Unknown  FH: HTN (hypertension), Age at diagnosis: Age Unknown

## 2022-09-06 NOTE — PROGRESS NOTE ADULT - ASSESSMENT
61 right handed, F with history of hypertension, migraine (not on any medications) and TIA (seen at [facility] in 11/2021) presented to the hospital with headache, word-finding difficulties and n/v. She woke up normal the day before as confirmed by her . TKN 8/31 at 0630. She went to work but had to drive back home because she developed a headache. Her  recalled checking up on her in bed at 2330 for which she was still responsive and answering questions. However, when the patient woke up the day of admission, the  noticed that the patient forgot her commonly used passwords, her mother's name and generally seemed confused. At that point, the  decided to send the patient to the hospital. [facility] called at 0901. The patient received CT head non con, angio, and perfusion. NIHSS 13. No tpa and thrombectomy given because the patient was outside window.     Of note, the patient presented to [facility] with headaches in 11/2021. [facility] was called and the patient received stroke imaging. 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 Patient was discharged on ibuprofen for headache and not on aspirin and plavix.   (31 Aug 2022 09:58)      Internal Medicine has been consulted on Ms. Xavier for medical management. Patient admits to a PMHx of hypertension (States that she is not currently on any medications as an outpatient). Patient states that about 1 week ago she experienced an episode of chest pain. Troponin X 1 is negative. Denies PMHx of HLD, DM, or cardiac history. Patient currently denies CP, palpitations, SOB or dyspnea. Denies nausea or vomiting.       Altered Mental Status   - Likely due to PRESS vs. CVA vs. 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 Patchy signal hyperintensity involving the left greater than right occipital lobe and right cerebellum which may be related to hypertensive encephalopathy. --   - A1C of 5.6  - LDL if 139; C/w Statin   - C/w ASA   - F/U EEG results   - Neuro checks per protocol   - PT/OT as able to   - Monitor on Tele   - Diet per speech and swallow   - Fall, Seizure and aspiration precautions   - Care as per neurology     Hypertension   - Started on Losartan 25 TID per cardiology  - Check EKG  - Check TTE --> EF of 75%; minimal MR, Hyperdynamic LV Systolic function, minimal TR, negative for PFO; F/u cardio recs  - F/u Cardiology recs    Benzodiazepine on UA  - Patient denies benzodiazepine use   - Monitor for signs/symptoms of withdrawal     Prolonged QTc  - QTc of 502 on EKG  - Recommend to check repeat EKG to assess QTc and for routine monitoring  - Monitor and replete electrolytes PRN  - Avoid QT prolonging agents   - Check Magnesium; Supplement to maintain Mg > 2  - Repeat QTc of 481     Nausea  - Patient reports improvement in nausea  - Recommend to check EKG   - If QTc allows, can trial Ativan PRN or Zofran PRN for nausea, pending repeat QTc check   - Reports resolution of nausea     Polycythemia  - Elevated Hgb  - Continue to monitor and trend  - If continues to be elevated; suggest Heme eval  - Improved     Leukocytosis  - Continue to monitor and trend on AM labs  - Afebrile   - CXR with clear lungs  - If febrile, recommend to check pan culture   - Monitor CBC, Temp curve, VS and patient very closely   - Resolved, WBC WNL, Continue to monitor     Hyponatremia   - Likely due to SIADH; Urine Osmo elevated, serum decreased    - Na of 138 on AM labs  - Recommend to continue to monitor and trend on labs; c/w fluid restriction, discussed with patient     Hypokalemia   - S/P K supplementation   - Monitor closely on AM labs    PPX  - Lovenox 40 daily  - Recommend to start Pantoprazole 40 daily for GI PPX if cleared from Neuro perspective     Discussed with patient. Discussed with Neurology team.

## 2022-09-06 NOTE — H&P ADULT - NSHPSOCIALHISTORY_GEN_ALL_CORE
SOCIAL HISTORY  Smoking - denied  EtOH - 1-2 drinks per month  Lives alone in  with 5STE; home is split-level with multiple steps.       FUNCTIONAL HISTORY  Previous Functional Status:  Independent in ambulation, ADL's, transfers prior to hospitalization    Current Functional Status:  Bed mobility: supervision  Transfers: CG  Gait / ambulation: 50' RW CG  ADL's: max assist LBD  Speech: Regular with thins SOCIAL HISTORY  Smoking - denied  EtOH - 1-2 drinks per month  Lives with  and 2 adult children in  with [employer]; home is split-level with multiple steps.       FUNCTIONAL HISTORY  Previous Functional Status:  Independent in ambulation, ADL's, transfers prior to hospitalization    Current Functional Status:  Bed mobility: supervision  Transfers: CG  Gait / ambulation: 50' RW CG  ADL's: max assist LBD  Speech: Regular with thins SOCIAL HISTORY  Smoking - denied  EtOH - 1-2 drinks per month  Lives with  and 2 adult children in  with [employer]; home is split-level with multiple steps.   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

## 2022-09-06 NOTE — PROGRESS NOTE ADULT - SUBJECTIVE AND OBJECTIVE BOX
patient reports she is feeling better today  trouble sleeping due to headache    REVIEW OF SYSTEMS  Constitutional - No fever,  No fatigue  HEENT - No vertigo, No neck pain  Neurological - + headaches, No memory loss, No loss of strength, No numbness, No tremors  Skin - No rashes, No lesions   Musculoskeletal - No joint pain, No joint swelling, No muscle pain  Psychiatric - No depression, No anxiety    FUNCTIONAL PROGRESS  9/6 PT  bed mobility supervision  transfers contact guard  gait contact guard x 50 feet  standing rest break      VITALS  T(C): 36.7 (09-06-22 @ 04:00), Max: 36.8 (09-05-22 @ 20:00)  HR: 98 (09-06-22 @ 12:00) (71 - 98)  BP: 115/64 (09-06-22 @ 10:00) (115/64 - 168/101)  RR: 15 (09-06-22 @ 12:00) (15 - 24)  SpO2: 91% (09-06-22 @ 12:00) (91% - 99%)  Wt(kg): --    MEDICATIONS   acetaminophen     Tablet .. 1000 milliGRAM(s) every 8 hours PRN  aspirin enteric coated 81 milliGRAM(s) daily  atorvastatin 40 milliGRAM(s) at bedtime  enoxaparin Injectable 40 milliGRAM(s) every 24 hours  losartan 50 milliGRAM(s) two times a day  polyethylene glycol 3350 17 Gram(s) daily  senna 2 Tablet(s) at bedtime      RECENT LABS - Reviewed                        14.1   8.48  )-----------( 432      ( 06 Sep 2022 05:42 )             43.0     09-06    138  |  101  |  10  ----------------------------<  99  4.0   |  27  |  0.71    Ca    9.2      06 Sep 2022 05:42  Phos  2.8     09-05  Mg     2.3     09-05      -----------------------------------------------------------------------------------  PHYSICAL EXAM  Constitutional - NAD, Comfortable, in bed  Chest - Breathing comfortably  Cardiovascular - S1S2   Abdomen - Soft   Extremities - No C/C/E, No calf tenderness   Neurologic Exam -     following commands           Cognitive - Awake, Alert, oriented x 3     Communication - fluent         Motor - moves all ext        Sensory - Intact to LT     Psychiatric - Mood stable, Affect WNL  ----------------------------------------------------------------------------------------  ASSESSMENT/PLAN  61yFemale h/o HTN, migraine with functional deficits due to aphasia, PRES, improved   bowel regimen  pain, tylenol   DVT PPX - SCDs, lovenox   Rehab - Will continue to follow for ongoing rehab needs and recommendations.   continue bedside therapy    Recommend ACUTE inpatient rehabilitation for the functional deficits consisting of 3 hours of therapy/day & 24 hour RN/daily PMR physician for comorbid medical management. Patient will be able to tolerate 3 hours a day.

## 2022-09-06 NOTE — PROGRESS NOTE ADULT - CRITICAL CARE ATTENDING COMMENT
September 15, 2021  Randa Delmis   [address] [address]    Dear Clint Woodardole: Thank you for requesting access to [employer]. Please follow the instructions below to securely access and download your online medical record. Jumpstarter allows you to send messages to your doctor, view your test results, renew your prescriptions, schedule appointments, and more. How Do I Sign Up? 1. In your internet browser, go to [email or web address]. 2. Click on the First Time User? Click Here link in the Sign In box. You will see the New Member Sign Up page. 3. Enter your Jumpstarter Access Code exactly as it appears below. You will not need to use this code after youve completed the sign-up process. If you do not sign up before the expiration date, you must request a new code. Jumpstarter Access Code: [email or web address]  Expires: 10/30/2021  5:45 PM     4. Enter the last four digits of your Social Security Number (xxxx) and Date of Birth (mm/dd/yyyy) as indicated and click Submit. You will be taken to the next sign-up page. 5. Create a Jumpstarter ID. This will be your Jumpstarter login ID and cannot be changed, so think of one that is secure and easy to remember. 6. Create a Jumpstarter password. You can change your password at any time. 7. Enter your Password Reset Question and Answer. This can be used at a later time if you forget your password. 8. Enter your e-mail address. You will receive e-mail notification when new information is available in [address] 9. Click Sign Up. You can now view and download portions of your medical record. 10. Click the Download Summary menu link to download a portable copy of your medical information. Additional Information    If you have questions, please visit the Frequently Asked Questions section of the Jumpstarter website at [email or web address] Remember, Jumpstarter is NOT to be used for urgent needs. For medical emergencies, dial 911.     Now available
from your iPhone and Android!     Sincerely,   [employer]
Advanced care planning was discussed with patient and family.  Advanced care planning forms were reviewed and discussed as appropriate.  Differential diagnosis and plan of care discussed with patient after the evaluation.   Pain assessed and judicious use of narcotics when appropriate was discussed.  Importance of Fall prevention discussed.  Counseling on Smoking and Alcohol cessation was offered when appropriate.  Counseling on Diet, exercise, and medication compliance was done.
agree with above  seen in stroke unit with stroke team  attempted to call daughter Erica multiple times but no answer. will attempt again   Tony Drake MD  Vascular Neurology
Advanced care planning was discussed with patient and family.  Advanced care planning forms were reviewed and discussed as appropriate.  Differential diagnosis and plan of care discussed with patient after the evaluation.   Pain assessed and judicious use of narcotics when appropriate was discussed.  Importance of Fall prevention discussed.  Counseling on Smoking and Alcohol cessation was offered when appropriate.  Counseling on Diet, exercise, and medication compliance was done.

## 2022-09-06 NOTE — PROGRESS NOTE ADULT - ASSESSMENT
61 right handed  F with hypertension, migraine (improved after menopause but recently returned) and TIA (seen at [facility] in 11/2021 - but not on asa/statin for unclear reason) presented to the hospital with headache, word-finding difficulties and n/v. LKN 8/31 at 0630. mRS: 0. LKN: 8/30/2022 0630. NIHSS: 13 on admission.     IMPRESSION : aphasia without HP, R HHA, posterior circulation FLAIR changes and elevated BP due to PRES. Etiology unclear, she reports taking benzos to deal with anxiety after the recent death of her brother, denies SSRI use.      NEURO: Neurologically with improvement in speech and RHHA, Continue close monitoring for neurologic deterioration, gradual normotension, A1C 5.6, - started on high intensity statin, CTH 8/31/22: left occipital lobe hypoattenuation c/f PRES. 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 MRI brain 11/10/21 with L occipital FLAIR changes, MRI with contrast without any enhancement. EEG neg for seizures. Physical therapy/OT recommended acute TBI rehab.    ANTITHROMBOTIC THERAPY: ASA    PULMONARY: CXR clear on 8/31, protecting airway, saturating well     CARDIOVASCULAR: TTE EF 75%  Minimal mitral regurgitation, no evidence of a patent foramen ovale, cardiac monitoring without reported events, hypertensive- started on losartan, increased dose to 50mg BID. Dr. Olivier following. elevated QTC- monitor EKG                             SBP goal: 100-160    GASTROINTESTINAL:  dysphagia screen passed, tolerating diet     Diet: Regular    RENAL: BUN/Cr within normal limits, good urine output ; hyponatremia - resolved      Na Goal: Greater than 135     Jackson: no    HEMATOLOGY: H/H without change, Platelets 432- thrombocytosis- likely reactive      DVT ppx:  LMWH      ID: afebrile, no sign of infection; COVID PCR for d/c planning neg on 9/5.    OTHER: Plan endorsed to patient, her sister. All questions and concerns addressed at length.  Will need to avoid triptans for migraines, will need to follow up with outpatient neurologist for other migraine therapy.  IV Tylenol, magnesium, and toradol given as needed for headache.     DISPOSITION: Rehab once bed available. Medically cleared.     CORE MEASURES:        Admission NIHSS: 13     TPA: NO      LDL/HDL: 139/63     Depression Screen: 0     Statin Therapy: yes     Dysphagia Screen: PASS      Smoking  NO      Afib NO     Stroke Education YES     Obtain screening lower extremity venous ultrasound in patients who meet 1 or more of the following criteria as patient is high risk for DVT/PE on admission:   [] History of DVT/PE  []Hypercoagulable states (Factor V Leiden, Cancer, OCP, etc. )  []Prolonged immobility (hemiplegia/hemiparesis/post operative or any other extended immobilization)  [] Transferred from outside facility (Rehab or Long term care)  [] Age </= to 50   61 right handed  F with hypertension, migraine (improved after menopause but recently returned) and TIA (seen at [facility] in 11/2021 - but not on asa/statin for unclear reason) presented to the hospital with headache, word-finding difficulties and n/v. LKN 8/31 at 0630. mRS: 0. LKN: 8/30/2022 0630. NIHSS: 13 on admission.     IMPRESSION : aphasia without HP, R HHA, posterior circulation FLAIR changes and elevated BP due to PRES. Etiology unclear, she reports taking benzos to deal with anxiety after the recent death of her brother, denies SSRI use.      NEURO: Neurologically with improvement in speech and RHHA, Continue close monitoring for neurologic deterioration, gradual normotension, A1C 5.6, - started on high intensity statin, CTH 8/31/22: left occipital lobe hypoattenuation c/f PRES. 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 MRI brain 11/10/21 with L occipital FLAIR changes, MRI with contrast without any enhancement. Will need repeat MRI Head in 1 month (9/30/22) as outpatient. EEG neg for seizures. Physical therapy/OT recommended acute TBI rehab.    ANTITHROMBOTIC THERAPY: ASA    PULMONARY: CXR clear on 8/31, protecting airway, saturating well     CARDIOVASCULAR: TTE EF 75%  Minimal mitral regurgitation, no evidence of a patent foramen ovale, cardiac monitoring without reported events, BP better controlled- continue losartan 50mg BID. Dr. Olivier following. elevated QTC- monitor EKG                             SBP goal: 100-160    GASTROINTESTINAL:  dysphagia screen passed, tolerating diet     Diet: Regular    RENAL: BUN/Cr within normal limits, good urine output ; hyponatremia likely due to SIADH - resolved      Na Goal: Greater than 135     Jackson: no    HEMATOLOGY: H/H without change, Platelets 432- thrombocytosis- likely reactive      DVT ppx:  LMWH      ID: afebrile, no sign of infection; COVID PCR for d/c planning neg on 9/5.    OTHER: Plan endorsed to patient. All questions and concerns addressed at length.  Will need to avoid triptans for migraines, will need to follow up with outpatient neurologist for other migraine therapy.  IV Tylenol, magnesium, and toradol given as needed for headache. Can have toradol as outpatient for headache management.     DISPOSITION: Rehab once bed available. Medically cleared.     CORE MEASURES:        Admission NIHSS: 13     TPA: NO      LDL/HDL: 139/63     Depression Screen: 0     Statin Therapy: yes     Dysphagia Screen: PASS      Smoking  NO      Afib NO     Stroke Education YES     Obtain screening lower extremity venous ultrasound in patients who meet 1 or more of the following criteria as patient is high risk for DVT/PE on admission:   [] History of DVT/PE  []Hypercoagulable states (Factor V Leiden, Cancer, OCP, etc. )  []Prolonged immobility (hemiplegia/hemiparesis/post operative or any other extended immobilization)  [] Transferred from outside facility (Rehab or Long term care)  [] Age </= to 50

## 2022-09-06 NOTE — PROGRESS NOTE ADULT - PROBLEM SELECTOR PLAN 1
aphasia without HP, R HHA    - CTH 8/31/22: left occipital lobe hypoattenuation c/f PRES    - CTA H/N: chronic focal dissection flap of L ICA at C2  ASA/Statin  TTE EF 75%, minimal mitral regurgitation  neuro checks  management as per neuro team
Losartan 25 bid   Check TTE.
aphasia without HP, R HHA    - CTH 8/31/22: left occipital lobe hypoattenuation c/f PRES    - CTA H/N: chronic focal dissection flap of L ICA at C2  ASA/Statin  TTE EF 75%, minimal mitral regurgitation  neuro checks  management as per neuro team

## 2022-09-06 NOTE — H&P ADULT - NSHPLABSRESULTS_GEN_ALL_CORE
09-06    138  |  101  |  10  ----------------------------<  99  4.0   |  27  |  0.71  09-05    137  |  103  |  11  ----------------------------<  105<H>  4.4   |  25  |  0.66  09-04    134<L>  |  99  |  12  ----------------------------<  105<H>  3.8   |  25  |  0.76    Ca    9.2      06 Sep 2022 05:42  Ca    8.8      05 Sep 2022 05:58  Ca    9.0      04 Sep 2022 05:42  Phos  2.8     09-05  Mg     2.3     09-05                                                14.1   8.48  )-----------( 432      ( 06 Sep 2022 05:42 )             43.0                         14.5   9.36  )-----------( 367      ( 05 Sep 2022 05:56 )             44.4                         14.3   8.80  )-----------( 402      ( 04 Sep 2022 05:42 )             43.0     CAPILLARY BLOOD GLUCOSE 09-06    138  |  101  |  10  ----------------------------<  99  4.0   |  27  |  0.71    Ca    9.2      06 Sep 2022 05:42  Phos  2.8     09-05  Mg     2.3     09-05                          14.1   8.48  )-----------( 432      ( 06 Sep 2022 05:42 )             43.0                MR Head No Cont (08.31.22 @ 18:51)  IMPRESSION: Small vessel white matter ischemic changes. Patchy signal   hyperintensity involving the left greater than right occipital lobe and   right cerebellum which may be related to hypertensive encephalopathy.      MR Head w/ IV Cont (09.01.22 @ 12:55)  IMPRESSION: No enhancement within the previously seen signal   abnormalities involving the bilateral occipitallobes, left side larger   than right, and right cerebellar hemisphere.      CT Brain Stroke Protocol (08.31.22 @ 09:25)  IMPRESSION: Subtle questionable hypoattenuation in the left occipital   lobe for which the differential diagnosis includes infarct versus PRES   versus cerebritis versus artifact.  No acute intracranial hemorrhage.      CT Angio Brain Stroke Protocol  w/ IV Cont (08.31.22 @ 09:35)  IMPRESSION:  CT PERFUSION: No perfusion deficit.  CTA NECK:  1. No large vessel occlusion or major stenosis.  2. 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  CTA HEAD:  1. No large vessel occlusion or major stenosis.      TTE with Doppler (w/Cont) (09.01.22 @ 12:55)  EF (Visual Estimate): 75 %  Conclusions:  Technically difficult study. Apical images were technically  very limited.  1. Normal mitral valve. Minimal mitral regurgitation.  2. Aortic valve not well visualized; appears calcified with  normal opening. No aortic valve regurgitation seen.  3. Endocardial visualization enhanced with intravenous  injection of Ultrasonic Enhancing Agent (Lumason). Left  ventricle suboptimally visualized despite injection of  ultrasonic enhancing agent; grossly hyperdynamic left  ventricular systolic function.  4. Normal right ventricular size and function.  5. Agitated saline injection performed with and without  valsalva maneuver. Images post-injection were technically  difficult from subcostal imaging. Agitated saline  injections demonstrate no evidence of a patent foramen  ovale.

## 2022-09-06 NOTE — H&P ADULT - HISTORY OF PRESENT ILLNESS
(HPI)    (Hospital Course)      Patient was evaluated by PM&R and therapy for functional deficits and gait/ ADL impairments and recommended acute rehabilitation. Patient was medically optimized for discharge to [facility] on ___  EZRA MARIE is a 62yo Female with PMHx of HTN, migraines, and TIA (last seen at [facility] in 11/2021), who presented to [facility] on 08/31/2022 with HA, nausea, vomiting, and word-finding difficulties; was found to have findings on CT and MRI head consistent with PRES. No tPA was given, as she presented outside of therapeutic window and LVO not performed, as no LVO was identified on CT angio head/neck. Patient was followed by neurology and cardiology during her admission. TTE and EEG were unremarkable; started on ASA. Course c/b hyponatremia, likely SIADH now resolved.    Patient was evaluated by PM&R and therapy for functional deficits and gait/ ADL impairments and recommended acute rehabilitation. Patient was medically optimized for discharge to [facility] on 09/06/2022. EZRA MARIE is a 62yo Female with PMHx of HTN, migraines, and TIA (last seen at [facility] in 11/2021), who presented to [facility] on 08/31/2022 with HA, nausea, vomiting, and word-finding difficulties; was found to have findings on CT and MRI head consistent with PRES. No tPA was given, as she presented outside of therapeutic window and LVO not performed, as no LVO was identified on CT angio head/neck. Patient was followed by neurology and cardiology during her admission. TTE and EEG were unremarkable; started on ASA. Course c/b hyponatremia, likely SIADH now resolved.    Patient was evaluated by PM&R and therapy for functional deficits and gait/ ADL impairments and recommended acute rehabilitation. Patient was medically optimized for discharge to [facility] on 09/06/2022. Patient was seen and examined at the bedside upon arrival. Currently endorses 3-4/10 HA and anxiety. Reports that while ambulating, experiences blurry vision, dizziness, and lightheadedness. VS on admission unremarkable.

## 2022-09-06 NOTE — H&P ADULT - NSHPPHYSICALEXAM_GEN_ALL_CORE
Vital Signs Last 24 Hrs  T(C): 36.8 (09-06-22 @ 16:34), Max: 36.8 (09-05-22 @ 20:00)  T(F): 98.3 (09-06-22 @ 16:34), Max: 98.3 (09-06-22 @ 16:34)  HR: 84 (09-06-22 @ 16:34) (71 - 101)  BP: 155/89 (09-06-22 @ 16:34) (115/64 - 158/96)  BP(mean): 81 (09-06-22 @ 10:00) (81 - 115)  RR: 16 (09-06-22 @ 16:34) (15 - 22)  SpO2: 97% (09-06-22 @ 16:34) (91% - 99%)      Constitutional - NAD, Comfortable  HEENT - NCAT, EOMI  Neck - Supple, No limited ROM  Chest - good chest expansion, good respiratory effort, CTAB   Cardio - warm and well perfused, RRR, no murmur  Abdomen -  Soft, NTND  Extremities - No peripheral edema, No calf tenderness   Neurologic Exam:                    Cognitive -             Orientation: A&O x4            Attention:  Immediate and delayed recall intact. Repetition and naming intact            Thought: process, content appropriate     Speech - Fluent, Comprehensible, No dysarthria, No aphasia      Cranial Nerves - No facial asymmetry, Tongue midline, EOMI, Shoulder shrug intact     Motor -                      LEFT    UE - ShAB 5/5, EF 5/5, EE 5/5, WE 5/5,  WNL                    RIGHT UE - ShAB 5/5, EF 5/5, EE 5/5, WE 5/5,  WNL                    LEFT    LE - HF 5/5, KE 5/5, DF 5/5, PF 5/5                    RIGHT LE - HF 5/5, KE 5/5, DF 5/5, PF 5/5        Sensory - Intact to LT bilateral     Reflexes - neg Art's b/l     Coordination - FTN intact     OculoVestibular -  No nystagmus  Psychiatric - Mood stable, Affect WNL  Skin on admission: erythema under left breast; no DTIs noted Vital Signs Last 24 Hrs  T(C): 36.8 (09-06-22 @ 16:34), Max: 36.8 (09-05-22 @ 20:00)  T(F): 98.3 (09-06-22 @ 16:34), Max: 98.3 (09-06-22 @ 16:34)  HR: 84 (09-06-22 @ 16:34) (71 - 101)  BP: 155/89 (09-06-22 @ 16:34) (115/64 - 158/96)  BP(mean): 81 (09-06-22 @ 10:00) (81 - 115)  RR: 16 (09-06-22 @ 16:34) (15 - 22)  SpO2: 97% (09-06-22 @ 16:34) (91% - 99%)      Constitutional - NAD, Comfortable  HEENT - NCAT, EOMI  Chest - good chest expansion, good respiratory effort on RA  Cardio - warm and well perfused, no LE edema  Abdomen -  Soft, NTND  Extremities - No peripheral edema, No calf tenderness   Neurologic Exam:                    Cognitive -             Orientation: A&O x4            Attention:  Immediate and delayed recall intact. Repetition and naming intact            Thought: process, content appropriate     Speech - Fluent, Comprehensible, No dysarthria, No aphasia      Cranial Nerves - No facial asymmetry, Tongue midline, EOMI, Shoulder shrug intact     Motor -                      LEFT    UE - ShAB 5/5, EF 5/5, EE 5/5, WE 5/5,  WNL                    RIGHT UE - ShAB 5/5, EF 5/5, EE 5/5, WE 5/5,  WNL                    LEFT    LE - HF 5/5, KE 5/5, DF 5/5, PF 5/5                    RIGHT LE - HF 5/5, KE 5/5, DF 5/5, PF 5/5        Sensory - Intact to LT bilateral     Reflexes - neg Art's b/l     Coordination - FTN intact     OculoVestibular -  No nystagmus  Psychiatric - Mood stable, Affect WNL  Skin on admission: erythema under left breast; no DTIs noted Vital Signs Last 24 Hrs  T(C): 36.8 (09-06-22 @ 16:34), Max: 36.8 (09-05-22 @ 20:00)  T(F): 98.3 (09-06-22 @ 16:34), Max: 98.3 (09-06-22 @ 16:34)  HR: 84 (09-06-22 @ 16:34) (71 - 101)  BP: 155/89 (09-06-22 @ 16:34) (115/64 - 158/96)  BP(mean): 81 (09-06-22 @ 10:00) (81 - 115)  RR: 16 (09-06-22 @ 16:34) (15 - 22)  SpO2: 97% (09-06-22 @ 16:34) (91% - 99%)      Constitutional - NAD, Comfortable  HEENT - NCAT, EOMI  Chest - good chest expansion, good respiratory effort on RA  Cardio - warm and well perfused, no LE edema  Abdomen -  Soft, NTND  Extremities - No peripheral edema, No calf tenderness   Neurologic Exam:                    Cognitive -             Orientation: A&O x4            Attention:  Immediate and delayed recall intact. Repetition and naming intact            Thought: process, content appropriate     Speech - Fluent, Comprehensible, No dysarthria, Mild anomic aphasia      Cranial Nerves - PERRLA, EOMI, VF intact, No facial asymmetry, Tongue midline, EOMI, Shoulder shrug intact     Motor -                      LEFT    UE - ShAB 5/5, EF 5/5, EE 5/5, WE 5/5,  WNL                    RIGHT UE - ShAB 5/5, EF 5/5, EE 5/5, WE 5/5,  WNL                    LEFT    LE - HF 5/5, KE 5/5, DF 5/5, PF 5/5                    RIGHT LE - HF 5/5, KE 5/5, DF 5/5, PF 5/5        Sensory - Intact to LT bilateral     Reflexes - neg Art's b/l     Coordination - FTN & HTS intact     OculoVestibular -  No nystagmus  Psychiatric - Mood stable, Affect WNL  Skin on admission: erythema under left breast; no DTIs noted

## 2022-09-07 LAB
ALBUMIN SERPL ELPH-MCNC: 3 G/DL — LOW (ref 3.3–5)
ALP SERPL-CCNC: 94 U/L — SIGNIFICANT CHANGE UP (ref 40–120)
ALT FLD-CCNC: 23 U/L — SIGNIFICANT CHANGE UP (ref 10–45)
ANION GAP SERPL CALC-SCNC: 6 MMOL/L — SIGNIFICANT CHANGE UP (ref 5–17)
AST SERPL-CCNC: 28 U/L — SIGNIFICANT CHANGE UP (ref 10–40)
BASOPHILS # BLD AUTO: 0.06 K/UL — SIGNIFICANT CHANGE UP (ref 0–0.2)
BASOPHILS NFR BLD AUTO: 0.7 % — SIGNIFICANT CHANGE UP (ref 0–2)
BILIRUB SERPL-MCNC: 0.4 MG/DL — SIGNIFICANT CHANGE UP (ref 0.2–1.2)
BUN SERPL-MCNC: 16 MG/DL — SIGNIFICANT CHANGE UP (ref 7–23)
CALCIUM SERPL-MCNC: 9.2 MG/DL — SIGNIFICANT CHANGE UP (ref 8.4–10.5)
CHLORIDE SERPL-SCNC: 101 MMOL/L — SIGNIFICANT CHANGE UP (ref 96–108)
CO2 SERPL-SCNC: 31 MMOL/L — SIGNIFICANT CHANGE UP (ref 22–31)
CREAT SERPL-MCNC: 0.8 MG/DL — SIGNIFICANT CHANGE UP (ref 0.5–1.3)
EGFR: 84 ML/MIN/1.73M2 — SIGNIFICANT CHANGE UP
EOSINOPHIL # BLD AUTO: 0.31 K/UL — SIGNIFICANT CHANGE UP (ref 0–0.5)
EOSINOPHIL NFR BLD AUTO: 3.7 % — SIGNIFICANT CHANGE UP (ref 0–6)
GLUCOSE SERPL-MCNC: 102 MG/DL — HIGH (ref 70–99)
HCT VFR BLD CALC: 42.8 % — SIGNIFICANT CHANGE UP (ref 34.5–45)
HGB BLD-MCNC: 14.4 G/DL — SIGNIFICANT CHANGE UP (ref 11.5–15.5)
IMM GRANULOCYTES NFR BLD AUTO: 0.4 % — SIGNIFICANT CHANGE UP (ref 0–1.5)
LYMPHOCYTES # BLD AUTO: 1.98 K/UL — SIGNIFICANT CHANGE UP (ref 1–3.3)
LYMPHOCYTES # BLD AUTO: 23.9 % — SIGNIFICANT CHANGE UP (ref 13–44)
MCHC RBC-ENTMCNC: 31.6 PG — SIGNIFICANT CHANGE UP (ref 27–34)
MCHC RBC-ENTMCNC: 33.6 GM/DL — SIGNIFICANT CHANGE UP (ref 32–36)
MCV RBC AUTO: 94.1 FL — SIGNIFICANT CHANGE UP (ref 80–100)
MONOCYTES # BLD AUTO: 0.62 K/UL — SIGNIFICANT CHANGE UP (ref 0–0.9)
MONOCYTES NFR BLD AUTO: 7.5 % — SIGNIFICANT CHANGE UP (ref 2–14)
NEUTROPHILS # BLD AUTO: 5.28 K/UL — SIGNIFICANT CHANGE UP (ref 1.8–7.4)
NEUTROPHILS NFR BLD AUTO: 63.8 % — SIGNIFICANT CHANGE UP (ref 43–77)
NRBC # BLD: 0 /100 WBCS — SIGNIFICANT CHANGE UP (ref 0–0)
PLATELET # BLD AUTO: 409 K/UL — HIGH (ref 150–400)
POTASSIUM SERPL-MCNC: 4 MMOL/L — SIGNIFICANT CHANGE UP (ref 3.5–5.3)
POTASSIUM SERPL-SCNC: 4 MMOL/L — SIGNIFICANT CHANGE UP (ref 3.5–5.3)
PROT SERPL-MCNC: 7.1 G/DL — SIGNIFICANT CHANGE UP (ref 6–8.3)
RBC # BLD: 4.55 M/UL — SIGNIFICANT CHANGE UP (ref 3.8–5.2)
RBC # FLD: 11.4 % — SIGNIFICANT CHANGE UP (ref 10.3–14.5)
SODIUM SERPL-SCNC: 138 MMOL/L — SIGNIFICANT CHANGE UP (ref 135–145)
WBC # BLD: 8.28 K/UL — SIGNIFICANT CHANGE UP (ref 3.8–10.5)
WBC # FLD AUTO: 8.28 K/UL — SIGNIFICANT CHANGE UP (ref 3.8–10.5)

## 2022-09-07 PROCEDURE — 99223 1ST HOSP IP/OBS HIGH 75: CPT

## 2022-09-07 PROCEDURE — 99232 SBSQ HOSP IP/OBS MODERATE 35: CPT

## 2022-09-07 RX ORDER — TRAMADOL HYDROCHLORIDE 50 MG/1
50 TABLET ORAL EVERY 6 HOURS
Refills: 0 | Status: DISCONTINUED | OUTPATIENT
Start: 2022-09-07 | End: 2022-09-12

## 2022-09-07 RX ORDER — ONDANSETRON 8 MG/1
4 TABLET, FILM COATED ORAL EVERY 8 HOURS
Refills: 0 | Status: DISCONTINUED | OUTPATIENT
Start: 2022-09-07 | End: 2022-09-07

## 2022-09-07 RX ORDER — TRAMADOL HYDROCHLORIDE 50 MG/1
25 TABLET ORAL EVERY 6 HOURS
Refills: 0 | Status: DISCONTINUED | OUTPATIENT
Start: 2022-09-07 | End: 2022-09-12

## 2022-09-07 RX ORDER — TRAMADOL HYDROCHLORIDE 50 MG/1
25 TABLET ORAL EVERY 8 HOURS
Refills: 0 | Status: DISCONTINUED | OUTPATIENT
Start: 2022-09-07 | End: 2022-09-07

## 2022-09-07 RX ORDER — TRAMADOL HYDROCHLORIDE 50 MG/1
25 TABLET ORAL ONCE
Refills: 0 | Status: DISCONTINUED | OUTPATIENT
Start: 2022-09-07 | End: 2022-09-07

## 2022-09-07 RX ORDER — LANOLIN ALCOHOL/MO/W.PET/CERES
3 CREAM (GRAM) TOPICAL AT BEDTIME
Refills: 0 | Status: DISCONTINUED | OUTPATIENT
Start: 2022-09-07 | End: 2022-09-07

## 2022-09-07 RX ORDER — LANOLIN ALCOHOL/MO/W.PET/CERES
3 CREAM (GRAM) TOPICAL ONCE
Refills: 0 | Status: COMPLETED | OUTPATIENT
Start: 2022-09-07 | End: 2022-09-07

## 2022-09-07 RX ADMIN — TRAMADOL HYDROCHLORIDE 25 MILLIGRAM(S): 50 TABLET ORAL at 00:49

## 2022-09-07 RX ADMIN — Medication 81 MILLIGRAM(S): at 12:19

## 2022-09-07 RX ADMIN — Medication 650 MILLIGRAM(S): at 09:07

## 2022-09-07 RX ADMIN — Medication 3 MILLIGRAM(S): at 23:34

## 2022-09-07 RX ADMIN — TRAMADOL HYDROCHLORIDE 25 MILLIGRAM(S): 50 TABLET ORAL at 05:43

## 2022-09-07 RX ADMIN — TRAMADOL HYDROCHLORIDE 25 MILLIGRAM(S): 50 TABLET ORAL at 06:42

## 2022-09-07 RX ADMIN — NYSTATIN CREAM 1 APPLICATION(S): 100000 CREAM TOPICAL at 05:24

## 2022-09-07 RX ADMIN — ENOXAPARIN SODIUM 40 MILLIGRAM(S): 100 INJECTION SUBCUTANEOUS at 05:24

## 2022-09-07 RX ADMIN — TRAMADOL HYDROCHLORIDE 25 MILLIGRAM(S): 50 TABLET ORAL at 21:22

## 2022-09-07 RX ADMIN — ATORVASTATIN CALCIUM 40 MILLIGRAM(S): 80 TABLET, FILM COATED ORAL at 21:20

## 2022-09-07 RX ADMIN — LOSARTAN POTASSIUM 50 MILLIGRAM(S): 100 TABLET, FILM COATED ORAL at 05:24

## 2022-09-07 RX ADMIN — Medication 650 MILLIGRAM(S): at 22:45

## 2022-09-07 RX ADMIN — TRAMADOL HYDROCHLORIDE 25 MILLIGRAM(S): 50 TABLET ORAL at 12:20

## 2022-09-07 RX ADMIN — Medication 650 MILLIGRAM(S): at 11:19

## 2022-09-07 RX ADMIN — TRAMADOL HYDROCHLORIDE 25 MILLIGRAM(S): 50 TABLET ORAL at 23:12

## 2022-09-07 RX ADMIN — TRAMADOL HYDROCHLORIDE 25 MILLIGRAM(S): 50 TABLET ORAL at 13:34

## 2022-09-07 RX ADMIN — LOSARTAN POTASSIUM 50 MILLIGRAM(S): 100 TABLET, FILM COATED ORAL at 17:37

## 2022-09-07 NOTE — PROGRESS NOTE ADULT - NS ATTEND AMEND GEN_ALL_CORE FT
Pt. seen with NP.  Agree with documentation above as per NP with amendments made as appropriate. Patient medically stable. Making progress towards rehab goals.     PRES  Pt. with headache 5/10 this AM-- had received 25mg tramadol earlier in AM, which helped improved but still bothersome.  Received tylenol but not effective.  Increase tramadol to 25 mg mod pain and 50mg severe pain.   Nausea-- gave zofran---    Held for now as pt's EKG in [facility] with elevated QTc interval--  Repeat EKG ordered.    BP controlled.    monitor pain.

## 2022-09-07 NOTE — DIETITIAN INITIAL EVALUATION ADULT - ORAL INTAKE PTA/DIET HISTORY
Pt reports excellent appetite PTA. Tries to diet but often eats large meals at night per pt. Reports UBW of ~170 lbs. No known food allergies per pt. Pt was taking vitamin d3, magnesium, zinc, calcium, and vitamin C PTA.

## 2022-09-07 NOTE — DIETITIAN INITIAL EVALUATION ADULT - PERTINENT LABORATORY DATA
09-07    138  |  101  |  16  ----------------------------<  102<H>  4.0   |  31  |  0.80    Ca    9.2      07 Sep 2022 05:40    TPro  7.1  /  Alb  3.0<L>  /  TBili  0.4  /  DBili  x   /  AST  28  /  ALT  23  /  AlkPhos  94  09-07  A1C with Estimated Average Glucose Result: 5.6 % (09-01-22 @ 06:07)  A1C with Estimated Average Glucose Result: 5.4 % (11-10-21 @ 09:01)

## 2022-09-07 NOTE — PROGRESS NOTE ADULT - SUBJECTIVE AND OBJECTIVE BOX
EZRA MARIE is a 62yo Female with PMHx of HTN, migraines, and TIA (last seen at [facility] in 11/2021), who presented to [facility] on 08/31/2022 with HA, nausea, vomiting, and word-finding difficulties; was found to have findings on CT and MRI head consistent with PRES. No tPA was given, as she presented outside of therapeutic window and LVO not performed, as no LVO was identified on CT angio head/neck. Patient was followed by neurology and cardiology during her admission. TTE and EEG were unremarkable; started on ASA. Course c/b hyponatremia, likely SIADH now resolved.    Patient was evaluated by PM&R and therapy for functional deficits and gait/ ADL impairments and recommended acute rehabilitation. Patient was medically optimized for discharge to [facility] on 09/06/2022. Patient was seen and examined at the bedside upon arrival. Currently endorses 3-4/10 HA and anxiety. Reports that while ambulating, experiences blurry vision, dizziness, and lightheadedness. VS on admission unremarkable.    Today's Subjective and Objective Findings: Patient seen at bedside this morning. States that she slept on and off last night. She was experiencing a 5/10 headache at the time of our discussion. Admits to migraines in the past but that this time around she is easily fatigued and they are associated with some inner ear pain. Since her chief complaint she has been having visual disturbances consistent with floaters in a linear pattern.  She did have some throat discomfort yesterday which has now resolved.  She had an episode of nausea this AM and was given Zofran.     ROS: Denies fever, chills, abdominal pain, vomiting, dysuria.    Vital Signs Last 24 Hrs  T(C): 36.6 (07 Sep 2022 08:28), Max: 36.8 (06 Sep 2022 16:34)  T(F): 97.8 (07 Sep 2022 08:28), Max: 98.3 (06 Sep 2022 16:34)  HR: 91 (07 Sep 2022 08:28) (84 - 102)  BP: 152/88 (07 Sep 2022 08:28) (132/77 - 168/112)  BP(mean): --  RR: 16 (07 Sep 2022 08:28) (15 - 17)  SpO2: 99% (07 Sep 2022 08:28) (91% - 99%)    Physical Exam:  Constitutional - NAD, Comfortable  HEENT - NCAT, EOMI  Chest - good chest expansion, good respiratory effort on RA  Cardio - warm and well perfused, no LE edema  Abdomen -  Soft, NTND  Extremities - No peripheral edema, No calf tenderness   Neurologic Exam:                    Cognitive -             Orientation: A&O x4            Attention:  Immediate and delayed recall intact. Repetition and naming intact            Thought: process, content appropriate     Speech - Fluent, Comprehensible, No dysarthria, Mild anomic aphasia      Cranial Nerves - PERRLA, EOMI, VF intact, No facial asymmetry, Tongue midline, EOMI, Shoulder shrug intact     Motor -                      LEFT    UE - ShAB 5/5, EF 5/5, EE 5/5, WE 5/5,  WNL                    RIGHT UE - ShAB 5/5, EF 5/5, EE 5/5, WE 5/5,  WNL                    LEFT    LE - HF 5/5, KE 5/5, DF 5/5, PF 5/5                    RIGHT LE - HF 5/5, KE 5/5, DF 5/5, PF 5/5        Sensory - Intact to LT bilateral     Reflexes - neg Art's b/l     Coordination - FTN & HTS intact     OculoVestibular -  No nystagmus  Psychiatric - Mood stable, Affect WNL  Skin on admission: erythema under left breast; no DTIs noted      LABS                        14.4   8.28  )-----------( 409      ( 07 Sep 2022 05:40 )             42.8     09-07    138  |  101  |  16  ----------------------------<  102<H>  4.0   |  31  |  0.80    Ca    9.2      07 Sep 2022 05:40    TPro  7.1  /  Alb  3.0<L>  /  TBili  0.4  /  DBili  x   /  AST  28  /  ALT  23  /  AlkPhos  94  09-07    LIVER FUNCTIONS - ( 07 Sep 2022 05:40 )  Alb: 3.0 g/dL / Pro: 7.1 g/dL / ALK PHOS: 94 U/L / ALT: 23 U/L / AST: 28 U/L / GGT: x                 MEDICATIONS  (STANDING):  aspirin enteric coated 81 milliGRAM(s) Oral daily  atorvastatin 40 milliGRAM(s) Oral at bedtime  enoxaparin Injectable 40 milliGRAM(s) SubCutaneous every 24 hours  losartan 50 milliGRAM(s) Oral two times a day  nystatin Powder 1 Application(s) Topical two times a day  senna 2 Tablet(s) Oral at bedtime    MEDICATIONS  (PRN):  acetaminophen     Tablet .. 650 milliGRAM(s) Oral every 6 hours PRN Temp greater or equal to 38C (100.4F), Mild Pain (1 - 3)  ondansetron   Disintegrating Tablet 4 milliGRAM(s) Oral every 8 hours PRN Nausea  polyethylene glycol 3350 17 Gram(s) Oral two times a day PRN Constipation  traMADol 25 milliGRAM(s) Oral every 6 hours PRN Moderate Pain (4 - 6)  traMADol 50 milliGRAM(s) Oral every 6 hours PRN Severe Pain (7 - 10)

## 2022-09-07 NOTE — DIETITIAN INITIAL EVALUATION ADULT - PERTINENT MEDS FT
MEDICATIONS  (STANDING):  aspirin enteric coated 81 milliGRAM(s) Oral daily  atorvastatin 40 milliGRAM(s) Oral at bedtime  enoxaparin Injectable 40 milliGRAM(s) SubCutaneous every 24 hours  losartan 50 milliGRAM(s) Oral two times a day  nystatin Powder 1 Application(s) Topical two times a day  senna 2 Tablet(s) Oral at bedtime    MEDICATIONS  (PRN):  acetaminophen     Tablet .. 650 milliGRAM(s) Oral every 6 hours PRN Temp greater or equal to 38C (100.4F), Mild Pain (1 - 3)  ondansetron   Disintegrating Tablet 4 milliGRAM(s) Oral every 8 hours PRN Nausea  polyethylene glycol 3350 17 Gram(s) Oral two times a day PRN Constipation  traMADol 25 milliGRAM(s) Oral every 6 hours PRN Moderate Pain (4 - 6)  traMADol 50 milliGRAM(s) Oral every 6 hours PRN Severe Pain (7 - 10)

## 2022-09-07 NOTE — DIETITIAN INITIAL EVALUATION ADULT - REASON
Not warranted, pt appears well-nourished without loss of muscle or subcutaneous fat. bilateral leg, feet edema

## 2022-09-07 NOTE — CONSULT NOTE ADULT - SUBJECTIVE AND OBJECTIVE BOX
Patient is a 61y old  Female who presents with a chief complaint of PRES (06 Sep 2022 12:50)    HPI:  EZRA MARIE is a 62yo Female with PMHx of HTN, migraines, and TIA (last seen at [facility] in 2021), who presented to [facility] on 2022 with HA, nausea, vomiting, and word-finding difficulties; was found to have findings on CT and MRI head consistent with PRES. No tPA was given, as she presented outside of therapeutic window and LVO not performed, as no LVO was identified on CT angio head/neck. Patient was followed by neurology and cardiology during her admission. TTE and EEG were unremarkable; started on ASA. Course c/b hyponatremia, likely SIADH now resolved.    Patient was evaluated by PM&R and therapy for functional deficits and gait/ ADL impairments and recommended acute rehabilitation. Patient was medically optimized for discharge to [facility] on 2022. Patient was seen and examined at the bedside upon arrival. Currently endorses 3-4/10 HA and anxiety. Reports that while ambulating, experiences blurry vision, dizziness, and lightheadedness. VS on admission unremarkable. (06 Sep 2022 12:50)    PAST MEDICAL & SURGICAL HISTORY:  Hypertension      Hallux valgus (acquired), right foot  s/p correction 2018      HV (hallux valgus), left      Acquired hammertoe of left foot      Migraine      H/O  section  X 2      H/O bilateral breast reduction surgery      H/O endoscopy  &amp; colonoscopy      S/P bunionectomy  Right and hammertoe correction 2018        SOCIAL HISTORY:  FAMILY HISTORY:    ALLERGIES:  No Known Allergies    MEDICATIONS  (STANDING):  aspirin enteric coated 81 milliGRAM(s) Oral daily  atorvastatin 40 milliGRAM(s) Oral at bedtime  enoxaparin Injectable 40 milliGRAM(s) SubCutaneous every 24 hours  losartan 50 milliGRAM(s) Oral two times a day  nystatin Powder 1 Application(s) Topical two times a day  senna 2 Tablet(s) Oral at bedtime    MEDICATIONS  (PRN):  acetaminophen     Tablet .. 650 milliGRAM(s) Oral every 6 hours PRN Temp greater or equal to 38C (100.4F), Mild Pain (1 - 3)  polyethylene glycol 3350 17 Gram(s) Oral two times a day PRN Constipation    Review of Systems: Refer to HPI for pertinent positives and negatives. All other ROS reviewed and negative except as otherwise stated above.    Vital Signs Last 24 Hrs  T(F): 98.3 (06 Sep 2022 21:06), Max: 98.3 (06 Sep 2022 16:34)  HR: 100 (07 Sep 2022 05:47) (71 - 102)  BP: 132/77 (07 Sep 2022 05:47) (115/64 - 168/112)  RR: 16 (06 Sep 2022 21:06) (15 - 22)  SpO2: 96% (06 Sep 2022 21:06) (91% - 98%)  I&O's Summary    PHYSICAL EXAM:  GENERAL: NAD, well-groomed, well-developed  HEAD:  Atraumatic, Normocephalic  EYES: EOMI, PERRL, conjunctiva and sclera clear  ENMT: Moist mucous membranes, Good dentition  NECK: Supple, No JVD  CHEST/LUNG: Clear to auscultation bilaterally, non-labored breathing, good air entry  HEART: RRR; S1/S2, No murmur  ABDOMEN: Soft, Nontender, Nondistended; Bowel sounds present  VASCULAR: Normal pulses, Normal capillary refill  EXTREMITIES: No cyanosis, No edema  LYMPH: No lymphadenopathy noted  SKIN: Warm, Intact  PSYCH: Normal mood and affect  NERVOUS SYSTEM:  A/O x3, Good concentration; CN 2-12 intact, No focal deficits, moves all extremities    LABS:                        14.4   8.28  )-----------( 409      ( 07 Sep 2022 05:40 )             42.8     09-07    138  |  101  |  16  ----------------------------<  102  4.0   |  31  |  0.80    Ca    9.2      07 Sep 2022 05:40  Phos  2.8     09-05  Mg     2.3     09-05    TPro  7.1  /  Alb  3.0  /  TBili  0.4  /  DBili  x   /  AST  28  /  ALT  23  /  AlkPhos  94  09-07    Culture - Urine (collected 31 Aug 2022 19:32)  Source: Clean Catch Clean Catch (Midstream)  Final Report (02 Sep 2022 21:17):    >100,000 CFU/ml Streptococcus agalactiae (Group B) isolated    Group B streptococci are susceptible to ampicillin,    penicillin and cefazolin, but may be resistant to    erythromycin and clindamycin.    Recommendations for intrapartum prophylaxis for Group B    streptococci are penicillin or ampicillin.    <10,000 CFU/ml Normal Urogenital vinay present      COVID-19 PCR: NotDetec (22 @ 18:30)  COVID-19 PCR: NotDetec (22 @ 05:57)  COVID-19 PCR: NotDetec (22 @ 06:40)  COVID-19 PCR: NotDetec (22 @ 09:11)    RADIOLOGY & ADDITIONAL TESTS:    Care Discussed with Consultants/Other Providers: Patient is a 61y old  Female who presents with a chief complaint of PRES (06 Sep 2022 12:50)    HPI:  EZRA MARIE is a 62yo Female with PMHx of HTN, migraines, and TIA (last seen at [facility] in 2021), who presented to [facility] on 2022 with HA, nausea, vomiting, and word-finding difficulties; was found to have findings on CT and MRI head consistent with PRES. No tPA was given, as she presented outside of therapeutic window and LVO not performed, as no LVO was identified on CT angio head/neck. Patient was followed by neurology and cardiology during her admission. TTE and EEG were unremarkable; started on ASA. Course c/b hyponatremia, likely SIADH now resolved.    Patient was evaluated by PM&R and therapy for functional deficits and gait/ ADL impairments and recommended acute rehabilitation. Patient was medically optimized for discharge to [facility] on 2022. Patient was seen and examined at the bedside upon arrival. Currently endorses 3-4/10 HA and anxiety. Reports that while ambulating, experiences blurry vision, dizziness, and lightheadedness. VS on admission unremarkable. (06 Sep 2022 12:50)    PAST MEDICAL & SURGICAL HISTORY:  Hypertension      Hallux valgus (acquired), right foot  s/p correction 2018      HV (hallux valgus), left      Acquired hammertoe of left foot      Migraine      H/O  section  X 2      H/O bilateral breast reduction surgery      H/O endoscopy  &amp; colonoscopy      S/P bunionectomy  Right and hammertoe correction 2018        SOCIAL HISTORY:  FAMILY HISTORY:    ALLERGIES:  No Known Allergies    MEDICATIONS  (STANDING):  aspirin enteric coated 81 milliGRAM(s) Oral daily  atorvastatin 40 milliGRAM(s) Oral at bedtime  enoxaparin Injectable 40 milliGRAM(s) SubCutaneous every 24 hours  losartan 50 milliGRAM(s) Oral two times a day  nystatin Powder 1 Application(s) Topical two times a day  senna 2 Tablet(s) Oral at bedtime    MEDICATIONS  (PRN):  acetaminophen     Tablet .. 650 milliGRAM(s) Oral every 6 hours PRN Temp greater or equal to 38C (100.4F), Mild Pain (1 - 3)  polyethylene glycol 3350 17 Gram(s) Oral two times a day PRN Constipation    Review of Systems: Refer to HPI for pertinent positives and negatives. All other ROS reviewed and negative except as otherwise stated above.    Vital Signs Last 24 Hrs  T(F): 98.3 (06 Sep 2022 21:06), Max: 98.3 (06 Sep 2022 16:34)  HR: 100 (07 Sep 2022 05:47) (71 - 102)  BP: 132/77 (07 Sep 2022 05:47) (115/64 - 168/112)  RR: 16 (06 Sep 2022 21:06) (15 - 22)  SpO2: 96% (06 Sep 2022 21:06) (91% - 98%)  I&O's Summary    PHYSICAL EXAM:  GENERAL: NAD, well-groomed, well-developed  HEAD:  Atraumatic, Normocephalic  EYES: EOMI, PERRL, conjunctiva and sclera clear  ENMT: Moist mucous membranes, Good dentition  NECK: Supple, No JVD  CHEST/LUNG: Clear to auscultation bilaterally, non-labored breathing, good air entry  HEART: RRR; S1/S2, No murmur  ABDOMEN: Soft, Nontender, Nondistended; Bowel sounds present  VASCULAR: Normal pulses, Normal capillary refill  EXTREMITIES: No cyanosis, No edema  LYMPH: No lymphadenopathy noted  SKIN: Warm, Intact  PSYCH: Normal mood and affect  NERVOUS SYSTEM:  A/O x3, Good concentration; CN 2-12 intact, No focal deficits, moves all extremities    LABS:                        14.4   8.28  )-----------( 409      ( 07 Sep 2022 05:40 )             42.8     09-07    138  |  101  |  16  ----------------------------<  102  4.0   |  31  |  0.80    Ca    9.2      07 Sep 2022 05:40  Phos  2.8     09-05  Mg     2.3     09-05    TPro  7.1  /  Alb  3.0  /  TBili  0.4  /  DBili  x   /  AST  28  /  ALT  23  /  AlkPhos  94  09-07    Culture - Urine (collected 31 Aug 2022 19:32)  Source: Clean Catch Clean Catch (Midstream)  Final Report (02 Sep 2022 21:17):    >100,000 CFU/ml Streptococcus agalactiae (Group B) isolated    Group B streptococci are susceptible to ampicillin,    penicillin and cefazolin, but may be resistant to    erythromycin and clindamycin.    Recommendations for intrapartum prophylaxis for Group B    streptococci are penicillin or ampicillin.    <10,000 CFU/ml Normal Urogenital vinay present      COVID-19 PCR: NotDetec (22 @ 18:30)  COVID-19 PCR: NotDetec (22 @ 05:57)  COVID-19 PCR: NotDetec (22 @ 06:40)  COVID-19 PCR: NotDetec (22 @ 09:11)    RADIOLOGY & ADDITIONAL TESTS:    < from: MR Head No Cont (22 @ 18:51) >  IMPRESSION: Small vessel white matter ischemic changes. Patchy signal   hyperintensity involving the left greater than right occipital lobe and   right cerebellum which may be related to hypertensive encephalopathy.    < end of copied text >  < from: MR Head w/ IV Cont (22 @ 12:55) >  IMPRESSION: No enhancement within the previously seen signal   abnormalities involving the bilateral occipitallobes, left side larger   than right, and right cerebellar hemisphere.    < end of copied text >      Care Discussed with Consultants/Other Providers: Patient is a 61y old  Female who presents with a chief complaint of PRES (06 Sep 2022 12:50)    HPI:  EZRA MARIE is a 62yo Female with PMHx of HTN, migraines, and TIA (last seen at [facility] in 2021), who presented to [facility] on 2022 with HA, nausea, vomiting, and word-finding difficulties; was found to have findings on CT and MRI head consistent with PRES. No tPA was given, as she presented outside of therapeutic window and LVO not performed, as no LVO was identified on CT angio head/neck. Patient was followed by neurology and cardiology during her admission. TTE and EEG were unremarkable; started on ASA. Course c/b hyponatremia, likely SIADH now resolved.    Patient was evaluated by PM&R and therapy for functional deficits and gait/ ADL impairments and recommended acute rehabilitation. Patient was medically optimized for discharge to [facility] on 2022. Patient was seen and examined at the bedside upon arrival. Currently endorses 3-4/10 HA and anxiety. Reports that while ambulating, experiences blurry vision, dizziness, and lightheadedness. VS on admission unremarkable. (06 Sep 2022 12:50)        PAST MEDICAL & SURGICAL HISTORY:  Hypertension      Hallux valgus (acquired), right foot  s/p correction 2018      HV (hallux valgus), left      Acquired hammertoe of left foot      Migraine      H/O  section  X 2      H/O bilateral breast reduction surgery      H/O endoscopy  &amp; colonoscopy      S/P bunionectomy  Right and hammertoe correction 2018        SOCIAL HISTORY:  FAMILY HISTORY:    ALLERGIES:  No Known Allergies    MEDICATIONS  (STANDING):  aspirin enteric coated 81 milliGRAM(s) Oral daily  atorvastatin 40 milliGRAM(s) Oral at bedtime  enoxaparin Injectable 40 milliGRAM(s) SubCutaneous every 24 hours  losartan 50 milliGRAM(s) Oral two times a day  nystatin Powder 1 Application(s) Topical two times a day  senna 2 Tablet(s) Oral at bedtime    MEDICATIONS  (PRN):  acetaminophen     Tablet .. 650 milliGRAM(s) Oral every 6 hours PRN Temp greater or equal to 38C (100.4F), Mild Pain (1 - 3)  polyethylene glycol 3350 17 Gram(s) Oral two times a day PRN Constipation    Review of Systems: Refer to HPI for pertinent positives and negatives. All other ROS reviewed and negative except as otherwise stated above.    Vital Signs Last 24 Hrs  T(F): 98.3 (06 Sep 2022 21:06), Max: 98.3 (06 Sep 2022 16:34)  HR: 100 (07 Sep 2022 05:47) (71 - 102)  BP: 132/77 (07 Sep 2022 05:47) (115/64 - 168/112)  RR: 16 (06 Sep 2022 21:06) (15 - 22)  SpO2: 96% (06 Sep 2022 21:06) (91% - 98%)  I&O's Summary    PHYSICAL EXAM:  GENERAL: NAD, well-groomed, well-developed  HEAD:  Atraumatic, Normocephalic  EYES: EOMI, PERRL, conjunctiva and sclera clear  ENMT: Moist mucous membranes, Good dentition  NECK: Supple, No JVD  CHEST/LUNG: Clear to auscultation bilaterally, non-labored breathing, good air entry  HEART: RRR; S1/S2, No murmur  ABDOMEN: Soft, Nontender, Nondistended; Bowel sounds present  VASCULAR: Normal pulses, Normal capillary refill  EXTREMITIES: No cyanosis, No edema  LYMPH: No lymphadenopathy noted  SKIN: Warm, Intact  PSYCH: Normal mood and affect  NERVOUS SYSTEM:  A/O x3, Good concentration; CN 2-12 intact, No focal deficits, moves all extremities    LABS:                        14.4   8.28  )-----------( 409      ( 07 Sep 2022 05:40 )             42.8     09-07    138  |  101  |  16  ----------------------------<  102  4.0   |  31  |  0.80    Ca    9.2      07 Sep 2022 05:40  Phos  2.8     09-05  Mg     2.3     09-05    TPro  7.1  /  Alb  3.0  /  TBili  0.4  /  DBili  x   /  AST  28  /  ALT  23  /  AlkPhos  94  09-07    Culture - Urine (collected 31 Aug 2022 19:32)  Source: Clean Catch Clean Catch (Midstream)  Final Report (02 Sep 2022 21:17):    >100,000 CFU/ml Streptococcus agalactiae (Group B) isolated    Group B streptococci are susceptible to ampicillin,    penicillin and cefazolin, but may be resistant to    erythromycin and clindamycin.    Recommendations for intrapartum prophylaxis for Group B    streptococci are penicillin or ampicillin.    <10,000 CFU/ml Normal Urogenital vinay present      COVID-19 PCR: NotDetec (22 @ 18:30)  COVID-19 PCR: NotDetec (22 @ 05:57)  COVID-19 PCR: NotDetec (22 @ 06:40)  COVID-19 PCR: NotDetec (22 @ 09:11)    RADIOLOGY & ADDITIONAL TESTS:    < from: MR Head No Cont (22 @ 18:51) >  IMPRESSION: Small vessel white matter ischemic changes. Patchy signal   hyperintensity involving the left greater than right occipital lobe and   right cerebellum which may be related to hypertensive encephalopathy.    < end of copied text >  < from: MR Head w/ IV Cont (22 @ 12:55) >  IMPRESSION: No enhancement within the previously seen signal   abnormalities involving the bilateral occipitallobes, left side larger   than right, and right cerebellar hemisphere.    < end of copied text >      Care Discussed with Consultants/Other Providers: Patient is a 61y old  Female who presents with a chief complaint of PRES (06 Sep 2022 12:50)    HPI:  EZRA MARIE is a 62yo Female with PMHx of HTN, migraines, and TIA (last seen at [facility] in 2021), who presented to [facility] on 2022 with HA, nausea, vomiting, and word-finding difficulties; was found to have findings on CT and MRI head consistent with PRES. No tPA was given, as she presented outside of therapeutic window and LVO not performed, as no LVO was identified on CT angio head/neck. Patient was followed by neurology and cardiology during her admission. TTE and EEG were unremarkable; started on ASA. Course c/b hyponatremia, likely SIADH now resolved.    Patient was evaluated by PM&R and therapy for functional deficits and gait/ ADL impairments and recommended acute rehabilitation. Patient was medically optimized for discharge to [facility] on 2022. Patient was seen and examined at the bedside upon arrival. Currently endorses 3-4/10 HA and anxiety. Reports that while ambulating, experiences blurry vision, dizziness, and lightheadedness. VS on admission unremarkable. (06 Sep 2022 12:50)    Pt endorses having a "throbbing headache" all night in the front and occipital area. It is unchanged in quality from previously. She has slight nausea. she complains with dizziness on ambulation. She reports her previous visual abnormalities are improving. She endorses word finding difficulties and having "diffuse weakness," non focal.     PAST MEDICAL & SURGICAL HISTORY:  Hypertension      Hallux valgus (acquired), right foot  s/p correction 2018      HV (hallux valgus), left      Acquired hammertoe of left foot      Migraine      H/O  section  X 2      H/O bilateral breast reduction surgery      H/O endoscopy  &amp; colonoscopy      S/P bunionectomy  Right and hammertoe correction 2018        SOCIAL HISTORY: She denies tobacco use. She drinks 1 alcoholic drink on . denies drug use.  FAMILY HISTORY: brother: dementia, Father: MI, sister: HTN    ALLERGIES:  No Known Allergies    MEDICATIONS  (STANDING):  aspirin enteric coated 81 milliGRAM(s) Oral daily  atorvastatin 40 milliGRAM(s) Oral at bedtime  enoxaparin Injectable 40 milliGRAM(s) SubCutaneous every 24 hours  losartan 50 milliGRAM(s) Oral two times a day  nystatin Powder 1 Application(s) Topical two times a day  senna 2 Tablet(s) Oral at bedtime    MEDICATIONS  (PRN):  acetaminophen     Tablet .. 650 milliGRAM(s) Oral every 6 hours PRN Temp greater or equal to 38C (100.4F), Mild Pain (1 - 3)  polyethylene glycol 3350 17 Gram(s) Oral two times a day PRN Constipation    Review of Systems: Refer to HPI for pertinent positives and negatives. All other ROS reviewed and negative except as otherwise stated above.    Vital Signs Last 24 Hrs  T(F): 98.3 (06 Sep 2022 21:06), Max: 98.3 (06 Sep 2022 16:34)  HR: 100 (07 Sep 2022 05:47) (71 - 102)  BP: 132/77 (07 Sep 2022 05:47) (115/64 - 168/112)  RR: 16 (06 Sep 2022 21:06) (15 - 22)  SpO2: 96% (06 Sep 2022 21:06) (91% - 98%)  I&O's Summary    PHYSICAL EXAM:  GENERAL: NAD, well-groomed, well-developed  HEAD:  Atraumatic, Normocephalic  EYES: EOMI, PERRL, conjunctiva and sclera clear  ENMT: Moist mucous membranes, Good dentition  NECK: Supple, No JVD  CHEST/LUNG: Clear to auscultation bilaterally, non-labored breathing, good air entry  HEART: RRR; S1/S2, No murmur  ABDOMEN: Soft, Nontender, Nondistended; Bowel sounds present  VASCULAR: Normal pulses, Normal capillary refill  EXTREMITIES: No cyanosis, No edema  LYMPH: No lymphadenopathy noted  SKIN: Warm, Intact  PSYCH: anxious appearing, non tangential.   NERVOUS SYSTEM:  A/O x3, Good concentration; CN 2-12 grossly intact, No focal deficits, moves all extremities; word finding difficulties at times.    LABS:                        14.4   8.28  )-----------( 409      ( 07 Sep 2022 05:40 )             42.8     -    138  |  101  |  16  ----------------------------<  102  4.0   |  31  |  0.80    Ca    9.2      07 Sep 2022 05:40  Phos  2.8       Mg     2.3         TPro  7.1  /  Alb  3.0  /  TBili  0.4  /  DBili  x   /  AST  28  /  ALT  23  /  AlkPhos  94      Culture - Urine (collected 31 Aug 2022 19:32)  Source: Clean Catch Clean Catch (Midstream)  Final Report (02 Sep 2022 21:17):    >100,000 CFU/ml Streptococcus agalactiae (Group B) isolated    Group B streptococci are susceptible to ampicillin,    penicillin and cefazolin, but may be resistant to    erythromycin and clindamycin.    Recommendations for intrapartum prophylaxis for Group B    streptococci are penicillin or ampicillin.    <10,000 CFU/ml Normal Urogenital vinay present      COVID-19 PCR: NotDetec (22 @ 18:30)  COVID-19 PCR: NotDetec (22 @ 05:57)  COVID-19 PCR: NotDetec (22 @ 06:40)  COVID-19 PCR: NotDetec (22 @ 09:11)    RADIOLOGY & ADDITIONAL TESTS:    < from: MR Head No Cont (22 @ 18:51) >  IMPRESSION: Small vessel white matter ischemic changes. Patchy signal   hyperintensity involving the left greater than right occipital lobe and   right cerebellum which may be related to hypertensive encephalopathy.    < end of copied text >  < from: MR Head w/ IV Cont (22 @ 12:55) >  IMPRESSION: No enhancement within the previously seen signal   abnormalities involving the bilateral occipitallobes, left side larger   than right, and right cerebellar hemisphere.    < end of copied text >      EKG personal read from  is NSR. QTc is 481    Care Discussed with Consultants/Other Providers:  discussed w/ Dari Li and Tesha Lopez. Patient is a 61y old  Female who presents with a chief complaint of PRES (06 Sep 2022 12:50)    HPI:  EZRA MARIE is a 62yo Female with PMHx of HTN, migraines, and TIA (last seen at [facility] in 2021), who presented to [facility] on 2022 with HA, nausea, vomiting, and word-finding difficulties; was found to have findings on CT and MRI head consistent with PRES. No tPA was given, as she presented outside of therapeutic window and LVO not performed, as no LVO was identified on CT angio head/neck. Patient was followed by neurology and cardiology during her admission. TTE and EEG were unremarkable; started on ASA. Course c/b hyponatremia, likely SIADH now resolved.    Patient was evaluated by PM&R and therapy for functional deficits and gait/ ADL impairments and recommended acute rehabilitation. Patient was medically optimized for discharge to [facility] on 2022. Patient was seen and examined at the bedside upon arrival. Currently endorses 3-4/10 HA and anxiety. Reports that while ambulating, experiences blurry vision, dizziness, and lightheadedness. VS on admission unremarkable. (06 Sep 2022 12:50)    Pt endorses having a "throbbing headache" all night in the front and occipital area. It is unchanged in quality from previously. She has slight nausea. she complains with dizziness on ambulation. She reports her previous visual abnormalities are improving. She endorses word finding difficulties and having "diffuse weakness," non focal.     PAST MEDICAL & SURGICAL HISTORY:  Hypertension      Hallux valgus (acquired), right foot  s/p correction 2018      HV (hallux valgus), left      Acquired hammertoe of left foot      Migraine      H/O  section  X 2      H/O bilateral breast reduction surgery      H/O endoscopy  &amp; colonoscopy      S/P bunionectomy  Right and hammertoe correction 2018        SOCIAL HISTORY: She denies tobacco use. She drinks 1 alcoholic drink on . denies drug use.  FAMILY HISTORY: brother: dementia, Father: MI, sister: HTN    ALLERGIES:  No Known Allergies    MEDICATIONS  (STANDING):  aspirin enteric coated 81 milliGRAM(s) Oral daily  atorvastatin 40 milliGRAM(s) Oral at bedtime  enoxaparin Injectable 40 milliGRAM(s) SubCutaneous every 24 hours  losartan 50 milliGRAM(s) Oral two times a day  nystatin Powder 1 Application(s) Topical two times a day  senna 2 Tablet(s) Oral at bedtime    MEDICATIONS  (PRN):  acetaminophen     Tablet .. 650 milliGRAM(s) Oral every 6 hours PRN Temp greater or equal to 38C (100.4F), Mild Pain (1 - 3)  polyethylene glycol 3350 17 Gram(s) Oral two times a day PRN Constipation    Review of Systems: Refer to HPI for pertinent positives and negatives. All other ROS reviewed and negative except as otherwise stated above.    Vital Signs Last 24 Hrs  T(F): 98.3 (06 Sep 2022 21:06), Max: 98.3 (06 Sep 2022 16:34)  HR: 100 (07 Sep 2022 05:47) (71 - 102)  BP: 132/77 (07 Sep 2022 05:47) (115/64 - 168/112)  RR: 16 (06 Sep 2022 21:06) (15 - 22)  SpO2: 96% (06 Sep 2022 21:06) (91% - 98%)  I&O's Summary    PHYSICAL EXAM:  GENERAL: NAD, well-groomed, well-developed  HEAD:  Atraumatic, Normocephalic  EYES: EOMI, PERRL, conjunctiva and sclera clear  ENMT: Moist mucous membranes, Good dentition  NECK: Supple, No JVD  CHEST/LUNG: Clear to auscultation bilaterally, non-labored breathing, good air entry  HEART: RRR; S1/S2, No murmur  ABDOMEN: Soft, Nontender, Nondistended; Bowel sounds present  VASCULAR: Normal pulses, Normal capillary refill  EXTREMITIES: No cyanosis, No edema  LYMPH: No lymphadenopathy noted  SKIN: Warm, Intact  PSYCH: anxious appearing, non tangential.   NERVOUS SYSTEM:  A/O x3, Good concentration; CN 2-12 grossly intact, No focal deficits, moves all extremities; word finding difficulties at times.    LABS:                        14.4   8.28  )-----------( 409      ( 07 Sep 2022 05:40 )             42.8     -    138  |  101  |  16  ----------------------------<  102  4.0   |  31  |  0.80    Ca    9.2      07 Sep 2022 05:40  Phos  2.8       Mg     2.3         TPro  7.1  /  Alb  3.0  /  TBili  0.4  /  DBili  x   /  AST  28  /  ALT  23  /  AlkPhos  94      Culture - Urine (collected 31 Aug 2022 19:32)  Source: Clean Catch Clean Catch (Midstream)  Final Report (02 Sep 2022 21:17):    >100,000 CFU/ml Streptococcus agalactiae (Group B) isolated    Group B streptococci are susceptible to ampicillin,    penicillin and cefazolin, but may be resistant to    erythromycin and clindamycin.    Recommendations for intrapartum prophylaxis for Group B    streptococci are penicillin or ampicillin.    <10,000 CFU/ml Normal Urogenital vinay present      COVID-19 PCR: NotDetec (22 @ 18:30)  COVID-19 PCR: NotDetec (22 @ 05:57)  COVID-19 PCR: NotDetec (22 @ 06:40)  COVID-19 PCR: NotDetec (22 @ 09:11)    RADIOLOGY & ADDITIONAL TESTS:    < from: MR Head No Cont (22 @ 18:51) >  IMPRESSION: Small vessel white matter ischemic changes. Patchy signal   hyperintensity involving the left greater than right occipital lobe and   right cerebellum which may be related to hypertensive encephalopathy.    < end of copied text >  < from: MR Head w/ IV Cont (22 @ 12:55) >  IMPRESSION: No enhancement within the previously seen signal   abnormalities involving the bilateral occipitallobes, left side larger   than right, and right cerebellar hemisphere.    < end of copied text >      EKG personal read from  is NSR. QTc is 481    < from: TTE with Doppler (w/Cont) (22 @ 12:55) >  Conclusions:  Technically difficult study. Apical images were technically  very limited.  1. Normal mitral valve. Minimal mitral regurgitation.  2. Aortic valve not well visualized; appears calcified with  normal opening. No aortic valve regurgitation seen.  3. Endocardial visualization enhanced with intravenous  injection of Ultrasonic Enhancing Agent (Lumason). Left  ventricle suboptimally visualized despite injection of  ultrasonic enhancing agent; grossly hyperdynamic left  ventricular systolic function.  4. Normal right ventricular size and function.  5. Agitated saline injection performed with and without  valsalva maneuver. Images post-injection were technically  difficult from subcostal imaging. Agitated saline  injections demonstrate no evidence of a patent foramen  ovale.    < end of copied text >      Care Discussed with Consultants/Other Providers:  discussed w/ Dari Li and Tesha Lopez.

## 2022-09-07 NOTE — CONSULT NOTE ADULT - ASSESSMENT
60 yo F w/ HTN, migraine, TIA (not on ASA and statin), presented w/ HA, word finding difficulties, and n/v, admitted as a code stroke, carotid dissection in left ICA on imaging, and PRES on imaging.    # Acute metabolic encephalopathy complicated by aphasia, likely due to PRES  - statin  - PT/OT/Speech therapy    # HTN  SBP goal 100-160  - losartan    # Depression  recently on benzos for anxiety following death of brother. Not on SSRI.    # prolonged QTc    # thrombocytosis    dvt ppx: lovenox 60 yo F w/ HTN, migraine, TIA (not on ASA and statin), presented w/ HA, word finding difficulties, and n/v, admitted as a code stroke, carotid dissection in left ICA on imaging, and PRES on imaging.    # Acute metabolic encephalopathy complicated by aphasia, likely due to PRES  - statin  - PT/OT/Speech therapy    # HTN  SBP goal 100-160  - losartan    # Depression  recently on benzos for anxiety following death of brother. Not on SSRI.    # prolonged QTc  - noted to have a QTc of 481 on 9/1. I will repeat it today as rehab is wanting to give anti emetics.    # thrombocytosis    dvt ppx: lovenox 62 yo F w/ HTN, migraine, TIA (not on ASA and statin), presented w/ HA, word finding difficulties, and n/v, admitted as a code stroke, carotid dissection in left ICA on imaging, and PRES on imaging.    # Acute metabolic encephalopathy complicated by aphasia, likely due to PRES  # Headache  - statin  - PT/OT/Speech therapy  - pain control    # HTN  SBP goal 100-160  - losartan    # Depression  recently on benzos for anxiety following death of brother. Not on SSRI.    # prolonged QTc  - noted to have a QTc of 481 on 9/1. I will repeat it today.  - avoid QTc prolonging meds, can also give oral ativan for breakthrough nausea.     # thrombocytosis    dvt ppx: lovenox 60 yo F w/ HTN, migraine, TIA (not on ASA and statin), presented w/ HA, word finding difficulties, and n/v, admitted as a code stroke, carotid dissection in left ICA on imaging, and CT/MRI head suggestive of PRES.  Admitted to [facility] on 9/6/2022.    # Acute metabolic encephalopathy complicated by aphasia, likely due to   # PRES  # Headache  - Repeat MRI head in 1 mo (9/30/2022)  - ASA and statin  - PT/OT/Speech therapy  - pain control & bowel regimen per PMR  - BP control as below    # HTN  SBP goal 100-160  - losartan    # Depression  recently on benzos for anxiety following death of brother. Not on SSRI.    # prolonged QTc  - noted to have a QTc of 481 on 9/1. I will repeat EKG today.  - avoid QTc prolonging meds, can also give oral ativan for breakthrough nausea.     # thrombocytosis  - trend platelets.    # Hx of migraines  - per d/c summary, avoid triptans.  - OP f/u with Neurology for migraine therapy.   - can consider consult to Dr. López for botox injections.    dvt ppx: lovenox 62 yo F w/ HTN, migraine, TIA (not on ASA and statin), presented w/ HA, word finding difficulties, and n/v, admitted as a code stroke, carotid dissection in left ICA on imaging, and CT/MRI head suggestive of PRES.  Admitted to [facility] on 9/6/2022.    # Acute metabolic encephalopathy complicated by aphasia, likely due to   # PRES  # Headache  - Repeat MRI head in 1 mo (9/30/2022)  - ASA and statin  - PT/OT/Speech therapy  - pain control & bowel regimen per PMR  - BP control as below    # HTN  SBP goal 100-160. BP has been on the higher side closer to 150 systolic. Will follow BP next 24 hrs and may titrate BP meds pending BP readings.  - losartan  - DASH diet.    # Depression  recently on benzos for anxiety following death of brother. Not on SSRI.    # Prolonged QTc  - noted to have a QTc of 481 on 9/1. I will repeat EKG today.  - avoid QTc prolonging meds, can also give oral ativan for breakthrough nausea.     # thrombocytosis  - trend platelets.    # Hx of migraines  - per d/c summary, avoid triptans.  - OP f/u with Neurology for migraine therapy.   - can consider consult to Dr. López for botox injections.    dvt ppx: lovenox

## 2022-09-07 NOTE — DIETITIAN INITIAL EVALUATION ADULT - OTHER INFO
Pt is tolerating regular, DASH/TLC diet, eating well per nursing flow sheets. Pt reports change in sense of taste - attributes this to (possibly) medications but has been eating meals. Requests lemon ice @ lunch, fresh fruit. Pt noted with constipation at previous hospital but it has now resolved per pt. Discussed heart healthy diet. Pt denies chewing/swallowing difficulties.

## 2022-09-07 NOTE — PROGRESS NOTE ADULT - ASSESSMENT
EZRA MARIE is a 60yo Female with PMHx of HTN, migraines, and TIA (last seen at [facility] in 11/2021), who presented to [facility] on 08/31/2022 with HA, nausea, vomiting, and word-finding difficulties; was found to have findings on CT and MRI head consistent with PRES.  Admitted for multidisciplinary rehab program with Aphasia, gait and ADL impairment.  ------------------------------------------------------------------  #Comprehensive Multidisciplinary Rehab Program:  - Gait, ADL, Functional impairments  - PT/OT/ SLP 3 hours a day 5 days a week    #PRES, unclear etiology  - suggestive based on findings on MRI and CT head: hyperintense signal to left occipital lobe and right cerebellum. No enhancement observed with IV contrast.   - repeat MRI brain in 1 month (09/30/2022)  - c/w ASA 81mg daily    #HTN  - c/w Losartan 50mg BID    #HLD  - c/w Atorvastatin 40mg qhs    #Sleep:  - Maintain quiet hours and low stim environment      #Pain:  - Tylenol PRN, Tramadol PRN  - Avoid sedating meds that may affect cognitive recovery    #GI/Bowel:  - Senna 2 tabs qhs and Miralax PRN  -Zofran for nausea    #/Bladder:  - Monitor PVR if no void in 8h; SC for >400 cc  --continent    #Diet :    - Diet: Regular with thins    #Skin/ Pressure Injury Prevention:  - assessment on admission --erythema under left breast--Nystatin ordered  - Turn Q2hrs in bed while awake, OOB to Chair, PT/OT/SLP     #DVT prophylaxis:  - Lovenox    #Precautions/ Restrictions  - Falls  - COVID PCR: 9/5    --------------------------------------------  Outpatient Follow up:  Omar Dominguez (DO)  Neurology; Vascular Neurology  [address] [facility] [address]  [place] [address]  Phone: [phone]  Fax: [phone]  Follow Up Time: 2 weeks    Gabriele Olivier (DO)  Cardiology; Internal Medicine  [address] [facility], [address]  [place] [address]  Phone: [phone]  Fax: [phone]  Follow Up Time: 1 month    Froy Craig (DO)  [address]  [place] [address]  Phone: [phone]  Fax: [phone]  Follow Up Time: Routine  --------------------------------------------   EZRA MARIE is a 60yo Female with PMHx of HTN, migraines, and TIA (last seen at [facility] in 11/2021), who presented to [facility] on 08/31/2022 with HA, nausea, vomiting, and word-finding difficulties; was found to have findings on CT and MRI head consistent with PRES.  Admitted for multidisciplinary rehab program with Aphasia, gait and ADL impairment.  ------------------------------------------------------------------  #Comprehensive Multidisciplinary Rehab Program:  - Gait, ADL, Functional impairments  - PT/OT/ SLP 3 hours a day 5 days a week    #PRES, unclear etiology  - suggestive based on findings on MRI and CT head: hyperintense signal to left occipital lobe and right cerebellum. No enhancement observed with IV contrast.   - repeat MRI brain in 1 month (09/30/2022)  - c/w ASA 81mg daily    #HTN  - c/w Losartan 50mg BID    #HLD  - c/w Atorvastatin 40mg qhs    #Sleep:  - Maintain quiet hours and low stim environment      #Pain:  - Tylenol PRN, Tramadol PRN  - Avoid sedating meds that may affect cognitive recovery    #GI/Bowel:  - Senna 2 tabs qhs and Miralax PRN  -Zofran for nausea    #/Bladder:  --continent  --voiding with low PVR-- d/c monitoring    #Diet :    - Diet: Regular with thins    #Skin/ Pressure Injury Prevention:  - assessment on admission --erythema under left breast--Nystatin ordered  - Turn Q2hrs in bed while awake, OOB to Chair, PT/OT/SLP     #DVT prophylaxis:  - Lovenox    #Precautions/ Restrictions  - Falls  - COVID PCR: 9/5    --------------------------------------------  Outpatient Follow up:  Omar Dominguez (DO)  Neurology; Vascular Neurology  [address] [facility], [address]  [place] [address]  Phone: [phone]  Fax: [phone]  Follow Up Time: 2 weeks    Gabriele Olivier (DO)  Cardiology; Internal Medicine  [address] [facility], [address]  [place] [address]  Phone: [phone]  Fax: [phone]  Follow Up Time: 1 month    Froy Craig (DO)  [address], [facility] [address]  [place] [address]  Phone: [phone]  Fax: [phone]  Follow Up Time: Routine  --------------------------------------------

## 2022-09-08 LAB
ANION GAP SERPL CALC-SCNC: 8 MMOL/L — SIGNIFICANT CHANGE UP (ref 5–17)
BUN SERPL-MCNC: 13 MG/DL — SIGNIFICANT CHANGE UP (ref 7–23)
CALCIUM SERPL-MCNC: 9.1 MG/DL — SIGNIFICANT CHANGE UP (ref 8.4–10.5)
CHLORIDE SERPL-SCNC: 100 MMOL/L — SIGNIFICANT CHANGE UP (ref 96–108)
CO2 SERPL-SCNC: 30 MMOL/L — SIGNIFICANT CHANGE UP (ref 22–31)
CREAT SERPL-MCNC: 0.87 MG/DL — SIGNIFICANT CHANGE UP (ref 0.5–1.3)
EGFR: 76 ML/MIN/1.73M2 — SIGNIFICANT CHANGE UP
GLUCOSE SERPL-MCNC: 109 MG/DL — HIGH (ref 70–99)
HCT VFR BLD CALC: 44.3 % — SIGNIFICANT CHANGE UP (ref 34.5–45)
HGB BLD-MCNC: 14.6 G/DL — SIGNIFICANT CHANGE UP (ref 11.5–15.5)
MCHC RBC-ENTMCNC: 31.5 PG — SIGNIFICANT CHANGE UP (ref 27–34)
MCHC RBC-ENTMCNC: 33 GM/DL — SIGNIFICANT CHANGE UP (ref 32–36)
MCV RBC AUTO: 95.5 FL — SIGNIFICANT CHANGE UP (ref 80–100)
NRBC # BLD: 0 /100 WBCS — SIGNIFICANT CHANGE UP (ref 0–0)
PLATELET # BLD AUTO: 440 K/UL — HIGH (ref 150–400)
POTASSIUM SERPL-MCNC: 4.4 MMOL/L — SIGNIFICANT CHANGE UP (ref 3.5–5.3)
POTASSIUM SERPL-SCNC: 4.4 MMOL/L — SIGNIFICANT CHANGE UP (ref 3.5–5.3)
RBC # BLD: 4.64 M/UL — SIGNIFICANT CHANGE UP (ref 3.8–5.2)
RBC # FLD: 11.2 % — SIGNIFICANT CHANGE UP (ref 10.3–14.5)
SODIUM SERPL-SCNC: 138 MMOL/L — SIGNIFICANT CHANGE UP (ref 135–145)
WBC # BLD: 9.95 K/UL — SIGNIFICANT CHANGE UP (ref 3.8–10.5)
WBC # FLD AUTO: 9.95 K/UL — SIGNIFICANT CHANGE UP (ref 3.8–10.5)

## 2022-09-08 PROCEDURE — 99233 SBSQ HOSP IP/OBS HIGH 50: CPT

## 2022-09-08 PROCEDURE — 99232 SBSQ HOSP IP/OBS MODERATE 35: CPT

## 2022-09-08 RX ORDER — LOSARTAN POTASSIUM 100 MG/1
25 TABLET, FILM COATED ORAL AT BEDTIME
Refills: 0 | Status: DISCONTINUED | OUTPATIENT
Start: 2022-09-08 | End: 2022-09-09

## 2022-09-08 RX ORDER — LANOLIN ALCOHOL/MO/W.PET/CERES
6 CREAM (GRAM) TOPICAL AT BEDTIME
Refills: 0 | Status: DISCONTINUED | OUTPATIENT
Start: 2022-09-08 | End: 2022-09-09

## 2022-09-08 RX ADMIN — ATORVASTATIN CALCIUM 40 MILLIGRAM(S): 80 TABLET, FILM COATED ORAL at 22:02

## 2022-09-08 RX ADMIN — Medication 0.5 MILLIGRAM(S): at 08:04

## 2022-09-08 RX ADMIN — Medication 81 MILLIGRAM(S): at 11:06

## 2022-09-08 RX ADMIN — TRAMADOL HYDROCHLORIDE 50 MILLIGRAM(S): 50 TABLET ORAL at 22:02

## 2022-09-08 RX ADMIN — LOSARTAN POTASSIUM 25 MILLIGRAM(S): 100 TABLET, FILM COATED ORAL at 22:03

## 2022-09-08 RX ADMIN — ENOXAPARIN SODIUM 40 MILLIGRAM(S): 100 INJECTION SUBCUTANEOUS at 05:37

## 2022-09-08 RX ADMIN — Medication 650 MILLIGRAM(S): at 01:13

## 2022-09-08 RX ADMIN — Medication 6 MILLIGRAM(S): at 22:05

## 2022-09-08 RX ADMIN — LOSARTAN POTASSIUM 50 MILLIGRAM(S): 100 TABLET, FILM COATED ORAL at 05:37

## 2022-09-08 RX ADMIN — Medication 0.5 MILLIGRAM(S): at 23:39

## 2022-09-08 RX ADMIN — Medication 650 MILLIGRAM(S): at 19:08

## 2022-09-08 RX ADMIN — NYSTATIN CREAM 1 APPLICATION(S): 100000 CREAM TOPICAL at 05:37

## 2022-09-08 RX ADMIN — TRAMADOL HYDROCHLORIDE 25 MILLIGRAM(S): 50 TABLET ORAL at 15:04

## 2022-09-08 RX ADMIN — Medication 650 MILLIGRAM(S): at 17:49

## 2022-09-08 RX ADMIN — TRAMADOL HYDROCHLORIDE 25 MILLIGRAM(S): 50 TABLET ORAL at 16:04

## 2022-09-08 NOTE — PROGRESS NOTE ADULT - SUBJECTIVE AND OBJECTIVE BOX
Patient is a 61y old  Female who presents with a chief complaint of Encephalopathy     (07 Sep 2022 12:13)      24 HOUR EVENTS:  No overnight events reported.     SUBJECTIVE:  Patient seen and examined at bedside. she reports headache overnight, now has significantly improved. SHe reports abd pain from lovenox shots on abdomen. Feels very "sore."     ALLERGIES:  No Known Allergies    MEDICATIONS  (STANDING):  aspirin enteric coated 81 milliGRAM(s) Oral daily  atorvastatin 40 milliGRAM(s) Oral at bedtime  enoxaparin Injectable 40 milliGRAM(s) SubCutaneous every 24 hours  losartan 50 milliGRAM(s) Oral two times a day  melatonin 6 milliGRAM(s) Oral at bedtime  nystatin Powder 1 Application(s) Topical two times a day    MEDICATIONS  (PRN):  acetaminophen     Tablet .. 650 milliGRAM(s) Oral every 6 hours PRN Temp greater or equal to 38C (100.4F), Mild Pain (1 - 3)  polyethylene glycol 3350 17 Gram(s) Oral two times a day PRN Constipation  traMADol 25 milliGRAM(s) Oral every 6 hours PRN Moderate Pain (4 - 6)  traMADol 50 milliGRAM(s) Oral every 6 hours PRN Severe Pain (7 - 10)    Vital Signs Last 24 Hrs  T(F): 98.1 (08 Sep 2022 08:48), Max: 98.1 (08 Sep 2022 08:48)  HR: 72 (08 Sep 2022 08:48) (72 - 92)  BP: 131/93 (08 Sep 2022 08:48) (131/93 - 149/86)  RR: 16 (08 Sep 2022 08:48) (16 - 16)  SpO2: 97% (08 Sep 2022 08:48) (97% - 97%)  I&O's Summary    PHYSICAL EXAM:  General: NAD, A/O x 3  ENT: Moist mucous membranes, no thrush  Neck: Supple, No JVD  Lungs: Clear to auscultation bilaterally, good air entry, non-labored breathing  Cardio: RRR, S1/S2, No murmur  Abdomen: Soft, Nontender, Nondistended; Bowel sounds present, hematomas present from lovenox inj  Extremities: No calf tenderness, No pitting edema    LABS:                        14.6   9.95  )-----------( 440      ( 08 Sep 2022 06:50 )             44.3     09-08    138  |  100  |  13  ----------------------------<  109  4.4   |  30  |  0.87    Ca    9.1      08 Sep 2022 06:50    TPro  7.1  /  Alb  3.0  /  TBili  0.4  /  DBili  x   /  AST  28  /  ALT  23  /  AlkPhos  94  09-07 09-01 Chol 224 mg/dL LDL -- HDL 63 mg/dL Trig 112 mg/dL                      COVID-19 PCR: NotDetec (09-06-22 @ 18:30)  COVID-19 PCR: NotDetec (09-05-22 @ 05:57)  COVID-19 PCR: NotDetec (09-03-22 @ 06:40)  COVID-19 PCR: NotDetec (08-31-22 @ 09:11)    RADIOLOGY & ADDITIONAL TESTS:    Care Discussed with Consultants/Other Providers:

## 2022-09-08 NOTE — PROGRESS NOTE ADULT - ASSESSMENT
EZRA MARIE is a 60yo Female with PMHx of HTN, migraines, and TIA (last seen at [facility] in 11/2021), who presented to [facility] on 08/31/2022 with HA, nausea, vomiting, and word-finding difficulties; was found to have findings on CT and MRI head consistent with PRES.  Admitted for multidisciplinary rehab program with Aphasia, gait and ADL impairment.  ------------------------------------------------------------------  #Comprehensive Multidisciplinary Rehab Program:  - Gait, ADL, Functional impairments  - PT/OT/ SLP 3 hours a day 5 days a week    #PRES, unclear etiology  - suggestive based on findings on MRI and CT head: hyperintense signal to left occipital lobe and right cerebellum. No enhancement observed with IV contrast.   - repeat MRI brain in 1 month (09/30/2022)  - c/w ASA 81mg daily    #Orthostatic Hypotension  #HTN  - decrease Losartan to 25mg qhs; discussed with hospitalist  - for associated nausea, may spot dose Ativan. No Zofran or Reglan, as QTc prolonged  - continue to monitor orthostatics    #HLD  - c/w Atorvastatin 40mg qhs    #Sleep:  - Maintain quiet hours and low stim environment    #Pain:  - Tylenol PRN, Tramadol PRN  - Avoid sedating meds that may affect cognitive recovery    #GI/Bowel:  - DC Senna 2 tabs qhs and continue Miralax PRN  - Ativan, as needed for nausea    #/Bladder:  --continent  --voiding with low PVR-- d/c monitoring    #Diet :    - Diet: Regular with thins    #Skin/ Pressure Injury Prevention:  - assessment on admission --erythema under left breast--Nystatin ordered  - Turn Q2hrs in bed while awake, OOB to Chair, PT/OT/SLP     #DVT prophylaxis:  - Lovenox    #Precautions/ Restrictions  - Falls  - COVID PCR: 9/5    #Dispo: IDR 09/08/2022  - Nursing: continent of bowel and bladder  - SW: Lives in  with  and children. Has 5 SAI; split-level home with 6 steps to bedroom and 6 steps to kitchen  - OT: supervision/set-up with ADLs and functional transfers  - PT: ambulates 50' RW with min assist; CG transfers; 4 steps min assist  - SLP: Regular with thins diet; Has mild apraxia and anomic aphasia  - Team Goals: (1) Ambulate to bedroom Ivonne. (2) Express complex ideas. (3) Sequence higher-level tasks  - TDD: 9/17 to home    --------------------------------------------  Outpatient Follow up:  Omar Dominguez (DO)  Neurology; Vascular Neurology  [address] [facility]  [place] [address]  Phone: [phone]  Fax: [phone]  Follow Up Time: 2 weeks    Gabriele Olivier (DO)  Cardiology; Internal Medicine  [address] [facility], [address]  [place] [address]  Phone: [phone]  Fax: [phone]  Follow Up Time: 1 month    Froy Craig (DO)  Medicine  [address] [place] [address], [address]  [place] [address]  Phone: [phone]  Fax: [phone]  Follow Up Time: Routine  --------------------------------------------   EZRA MARIE is a 62yo Female with PMHx of HTN, migraines, and TIA (last seen at [facility] in 11/2021), who presented to [facility] on 08/31/2022 with HA, nausea, vomiting, and word-finding difficulties; was found to have findings on CT and MRI head consistent with PRES.  Admitted for multidisciplinary rehab program with Aphasia, gait and ADL impairment.  ------------------------------------------------------------------  #Comprehensive Multidisciplinary Rehab Program:  - Gait, ADL, Functional impairments  - PT/OT/ SLP 3 hours a day 5 days a week    #PRES, unclear etiology  - suggestive based on findings on MRI and CT head: hyperintense signal to left occipital lobe and right cerebellum. No enhancement observed with IV contrast.   - repeat MRI brain in 1 month (09/30/2022)  - c/w ASA 81mg daily    #Orthostatic Hypotension  #HTN  - decrease Losartan to 25mg qhs--holding parameter for SBP <120 sitting; discussed with hospitalist  - for associated nausea, may spot dose Ativan. No Zofran or Reglan, as QTc prolonged  - continue to monitor orthostatics    #HLD  - c/w Atorvastatin 40mg qhs    #Sleep:  - Maintain quiet hours and low stim environment    #Pain:  - Tylenol PRN, Tramadol PRN  - Avoid sedating meds that may affect cognitive recovery    #GI/Bowel:  - DC Senna 2 tabs qhs and continue Miralax PRN  - Ativan, as needed for nausea    #/Bladder:  --continent  --voiding with low PVR--    #Diet :    - Diet: Regular with thins    #Skin/ Pressure Injury Prevention:  - assessment on admission --erythema under left breast--Nystatin ordered  - Turn Q2hrs in bed while awake, OOB to Chair, PT/OT/SLP     #DVT prophylaxis:  - Lovenox    #Precautions/ Restrictions  - Falls  - COVID PCR: 9/5    #Dispo: IDR 09/08/2022  - Nursing: continent of bowel and bladder  - SW: Lives in  with  and children. Has 5 SAI; split-level home with 6 steps to bedroom and 6 steps to kitchen  - OT: supervision/set-up with ADLs and CG functional transfers  - PT: ambulates 50' RW with min assist; CG transfers; 4 steps min assist  - SLP: Regular with thins diet; Has mild apraxia and anomic aphasia, Mild-Mod Cognitive deficits  - Team Goals: (1) Ambulate to bedroom Ivonne. (2) Express complex ideas. (3) Sequence higher-level tasks  - TDD: 9/17 to home    --------------------------------------------  Outpatient Follow up:  Omar Dominguez (DO)  Neurology; Vascular Neurology  [address] [facility]  [place] [address]  Phone: [phone]  Fax: [phone]  Follow Up Time: 2 weeks    Gabriele Olivier (DO)  Cardiology; Internal Medicine  [address] [place] [address], [address]  [place] [address]  Phone: [phone]  Fax: [phone]  Follow Up Time: 1 month    Froy Craig (DO)  Medicine  [address], [address]  [place] [address]  Phone: [phone]  Fax: [phone]  Follow Up Time: Routine  --------------------------------------------

## 2022-09-08 NOTE — PROGRESS NOTE ADULT - SUBJECTIVE AND OBJECTIVE BOX
Subjective:  Patient was seen and examined at the bedside this AM. No acute overnight events.     Was able to sleep overnight after receiving melatonin. This AM, felt lightheaded with drop in SBP from 122-102, along with associated nausea. She was given PO Ativan, after which nausea resolved. Continues to experience intermittent HAs located to occiput and bilateral temples; states HAs are different from usual migraine HAs, but has been consistent with current condition. Improves with Tramadol. States she has been having loose BMs with senna.      ROS:  (+) HA and nausea; nausea now resolved with Ativan. Last BM was on ; loose BMs with senna.      Physical Exam:  Vital Signs Last 24 Hrs  T(C): 36.7 (08 Sep 2022 08:48), Max: 36.7 (08 Sep 2022 08:48)  T(F): 98.1 (08 Sep 2022 08:48), Max: 98.1 (08 Sep 2022 08:48)  HR: 72 (08 Sep 2022 08:48) (72 - 92)  BP: 131/93 (08 Sep 2022 08:48) (131/93 - 149/86)  BP(mean): --  RR: 16 (08 Sep 2022 08:48) (16 - 16)  SpO2: 97% (08 Sep 2022 08:48) (97% - 97%)    Parameters below as of 08 Sep 2022 08:48  Patient On (Oxygen Delivery Method): room air      Physical Exam:  Constitutional - NAD, Comfortable  HEENT - NCAT, EOMI  Chest - good chest expansion, good respiratory effort on RA  Cardio - warm and well perfused, no LE edema  Abdomen -  Soft, NTND  Extremities - No peripheral edema, No calf tenderness   Neurologic Exam:                    Cognitive -             Orientation: A&O x4            Attention:  Immediate and delayed recall intact. Repetition and naming intact            Thought: process, content appropriate     Speech - Fluent, Comprehensible, No dysarthria, Mild anomic aphasia      Cranial Nerves - PERRLA, EOMI, VF intact, No facial asymmetry, Tongue midline, EOMI, Shoulder shrug intact     Motor -                      LEFT    UE - ShAB 5/5, EF 5/5, EE 5/5, WE 5/5,  WNL                    RIGHT UE - ShAB 5/5, EF 5/5, EE 5/5, WE 5/5,  WNL                    LEFT    LE - HF 5/5, KE 5/5, DF 5/5, PF 5/5                    RIGHT LE - HF 5/5, KE 5/5, DF 5/5, PF 5/5        Sensory - Intact to LT bilateral     Reflexes - neg Art's b/l     Coordination - FTN & HTS intact     OculoVestibular -  No nystagmus  Psychiatric - Mood stable, Affect WNL  Skin on admission: erythema under left breast; no DTIs noted      Recent Labs/Imagin.6   9.95  )-----------( 440      ( 08 Sep 2022 06:50 )             44.3         138  |  100  |  13  ----------------------------<  109<H>  4.4   |  30  |  0.87    Ca    9.1      08 Sep 2022 06:50  TPro  7.1  /  Alb  3.0<L>  /  TBili  0.4  /  DBili  x   /  AST  28  /  ALT  23  /  AlkPhos  94  09-07      Medications:  acetaminophen     Tablet .. 650 milliGRAM(s) Oral every 6 hours PRN  aspirin enteric coated 81 milliGRAM(s) Oral daily  atorvastatin 40 milliGRAM(s) Oral at bedtime  enoxaparin Injectable 40 milliGRAM(s) SubCutaneous every 24 hours  losartan 25 milliGRAM(s) Oral at bedtime  melatonin 6 milliGRAM(s) Oral at bedtime  nystatin Powder 1 Application(s) Topical two times a day  polyethylene glycol 3350 17 Gram(s) Oral two times a day PRN  traMADol 25 milliGRAM(s) Oral every 6 hours PRN  traMADol 50 milliGRAM(s) Oral every 6 hours PRN

## 2022-09-08 NOTE — PROGRESS NOTE ADULT - ASSESSMENT
60 yo F w/ HTN, migraine, TIA (not on ASA and statin), presented w/ HA, word finding difficulties, and n/v, admitted as a code stroke, carotid dissection in left ICA on imaging, and CT/MRI head suggestive of PRES.  Admitted to [facility] on 9/6/2022.    # Acute metabolic encephalopathy complicated by aphasia, likely due to   # PRES  # Headache  - Repeat MRI head in 1 mo (9/30/2022)  - ASA and statin  - PT/OT/Speech therapy  - pain control & bowel regimen per PMR  - BP control as below    # HTN  SBP goal 100-160. Dr. Duval is reporting low BPs. BP recordings have been 130-150 systolic range. If BP is dropping low (<120), can consider dropping losartan to once a day at bedtime. D/w Dr. Duval during ID  - DASH diet.    # Depression  recently on benzos for anxiety following death of brother. Not on SSRI.    # Prolonged QTc  - noted to have a QTc of 481 on 9/1. EKG repeated on 9/7.  - avoid QTc prolonging meds, can also give oral ativan for breakthrough nausea.     # thrombocytosis  - trend platelets.    # Hx of migraines  - per d/c summary, avoid triptans.  - OP f/u with Neurology for migraine therapy.   - can consider consult to Dr. López for botox injections.    dvt ppx: lovenox, no evidence of anemia.

## 2022-09-09 PROCEDURE — 70450 CT HEAD/BRAIN W/O DYE: CPT | Mod: 26

## 2022-09-09 PROCEDURE — 99232 SBSQ HOSP IP/OBS MODERATE 35: CPT

## 2022-09-09 RX ORDER — LOSARTAN POTASSIUM 100 MG/1
50 TABLET, FILM COATED ORAL AT BEDTIME
Refills: 0 | Status: DISCONTINUED | OUTPATIENT
Start: 2022-09-09 | End: 2022-09-18

## 2022-09-09 RX ORDER — FAMOTIDINE 10 MG/ML
40 INJECTION INTRAVENOUS DAILY
Refills: 0 | Status: DISCONTINUED | OUTPATIENT
Start: 2022-09-09 | End: 2022-09-14

## 2022-09-09 RX ORDER — GABAPENTIN 400 MG/1
100 CAPSULE ORAL AT BEDTIME
Refills: 0 | Status: DISCONTINUED | OUTPATIENT
Start: 2022-09-09 | End: 2022-09-09

## 2022-09-09 RX ORDER — PROCHLORPERAZINE MALEATE 5 MG
5 TABLET ORAL EVERY 6 HOURS
Refills: 0 | Status: DISCONTINUED | OUTPATIENT
Start: 2022-09-09 | End: 2022-09-12

## 2022-09-09 RX ORDER — TOPIRAMATE 25 MG
25 TABLET ORAL
Refills: 0 | Status: COMPLETED | OUTPATIENT
Start: 2022-09-09 | End: 2022-09-09

## 2022-09-09 RX ORDER — LANOLIN ALCOHOL/MO/W.PET/CERES
9 CREAM (GRAM) TOPICAL AT BEDTIME
Refills: 0 | Status: DISCONTINUED | OUTPATIENT
Start: 2022-09-09 | End: 2022-09-24

## 2022-09-09 RX ADMIN — TRAMADOL HYDROCHLORIDE 50 MILLIGRAM(S): 50 TABLET ORAL at 00:13

## 2022-09-09 RX ADMIN — FAMOTIDINE 40 MILLIGRAM(S): 10 INJECTION INTRAVENOUS at 11:54

## 2022-09-09 RX ADMIN — Medication 81 MILLIGRAM(S): at 11:53

## 2022-09-09 RX ADMIN — Medication 0.5 MILLIGRAM(S): at 08:15

## 2022-09-09 RX ADMIN — NYSTATIN CREAM 1 APPLICATION(S): 100000 CREAM TOPICAL at 04:45

## 2022-09-09 RX ADMIN — Medication 650 MILLIGRAM(S): at 07:56

## 2022-09-09 RX ADMIN — TRAMADOL HYDROCHLORIDE 50 MILLIGRAM(S): 50 TABLET ORAL at 04:32

## 2022-09-09 RX ADMIN — ATORVASTATIN CALCIUM 40 MILLIGRAM(S): 80 TABLET, FILM COATED ORAL at 21:52

## 2022-09-09 RX ADMIN — Medication 25 MILLIGRAM(S): at 21:53

## 2022-09-09 RX ADMIN — Medication 9 MILLIGRAM(S): at 21:52

## 2022-09-09 RX ADMIN — TRAMADOL HYDROCHLORIDE 50 MILLIGRAM(S): 50 TABLET ORAL at 06:33

## 2022-09-09 RX ADMIN — Medication 5 MILLIGRAM(S): at 17:29

## 2022-09-09 RX ADMIN — TRAMADOL HYDROCHLORIDE 50 MILLIGRAM(S): 50 TABLET ORAL at 17:29

## 2022-09-09 RX ADMIN — ENOXAPARIN SODIUM 40 MILLIGRAM(S): 100 INJECTION SUBCUTANEOUS at 04:33

## 2022-09-09 RX ADMIN — Medication 650 MILLIGRAM(S): at 08:59

## 2022-09-09 RX ADMIN — LOSARTAN POTASSIUM 50 MILLIGRAM(S): 100 TABLET, FILM COATED ORAL at 21:53

## 2022-09-09 NOTE — CHART NOTE - NSCHARTNOTEFT_GEN_A_CORE
[facility] Interdisciplinary Plan of Care    REHABILITATION DIAGNOSIS:  Encephalopathy      COMORBIDITIES/COMPLICATING CONDITIONS IMPACTING REHABILITATION:  HEALTH ISSUES - PROBLEM Dx:  Aphasia  Orthostatin Hypotension  Insomnia    PAST MEDICAL & SURGICAL HISTORY:  Hypertension  Hallux valgus (acquired), right foot  s/p correction 2018  HV (hallux valgus), left  Acquired hammertoe of left foot  Migraine  H/O  section X 2  H/O bilateral breast reduction surgery  H/O endoscopy &amp; colonoscopy  S/P bunionectomy  Right and hammertoe correction 2018      Based upon consideration of the patient's impairments, functional status, complicating conditions and any other contributing factors and after information garnered from the assessments of all therapy disciplines involved in treating the patient and other pertinent clinicians:    INTERDISCIPLINARY REHABILITATION INTERVENTIONS:    [ X  ] Transfer Training  [ X  ] Bed Mobility  [ X  ] Therapeutic Exercise  [ X ] Balance/Coordination Exercises  [ X ] Locomotion retraining  [ X  ] Stairs  [  X ] Functional Transfer Training  [ X  ] Bowel/Bladder program  [ X  ] Pain Management  [ X  ] Skin/Wound Care  [ X  ] Visual/Perceptual Training  [ X  ] Therapeutic Recreation Activities  [  X ] Neuromuscular Re-education  [ X  ] Activities of Daily Living  [ X  ] Speech Exercise  [X   ] Swallowing Exercises  [   ] Vital Stim  [   ] Dietary Supplements  [   ] Calorie Count  [ X  ] Cognitive Exercises  [  X ] Congnitive/Linguistic Treatment  [  x ] Behavior Program  [  x ] Neuropsych Therapy  [ X  ] Patient/Family Counseling  [ X ] Family Training  [ X  ] Community Re-entry  [   ] Orthotic Evaluation  [   ] Prosthetic Eval/Training    MEDICAL PROGNOSIS: good      REHAB POTENTIAL:  Good    EXPECTED DAILY THERAPY:  3 hours/day           ESTIMATED LOS:  [  ] 5-7 Days  [  ] 7-10 Days  [x ] 10- 14 Days  [  ] 14- 18 Days  [  ] 18- 21 Days    ESTIMATED DISPOSITION:  [ x] Home   [  ] Home with Outpatient Therapies  [  ] Home with Home Therapies  [  ] Assisted Living  [  ] Nursing Home  [  ] Long Term Acute Care    INTERDISCIPLINARY FUNCTIONAL OUTCOMES/GOALS:         Gait/Mobility: ambulates 50' RW with min assist; 4 steps min assist       Transfers:  CG        ADLs: supervision/set-up       Functional Transfers: CG        Medication Management:       Communication:  mild apraxia and anomic aphasia       Cognitive: Mild-Mod Cognitive deficits       Dysphagia: Regular with thins       Bladder: continent       Bowel: continent     Functional Independent Measures: 6  7 = Independent  6 = Modified Independent  5 = Supervision  4 = Minimal Assist/ Contact Guard  3 = Moderate Assistance  2 = Maximum Assistance  1 = Total Assistance  0 = Unable to assess            `

## 2022-09-09 NOTE — PROGRESS NOTE ADULT - NS ATTEND AMEND GEN_ALL_CORE FT
I have personally seen and examined the patient with the NP. Medical records reviewed. I have made amendments to the documentation where necessary and adjusted the history, physical examination, and plan as documented by the NP.      Headache with nausea ( daily) with phonophobia and  photophobia   - history of menstrual Migraines   Head CT  Topiramate for HA ppx  Small dose Compazine for nausea  Tylenol prn

## 2022-09-09 NOTE — PROGRESS NOTE ADULT - SUBJECTIVE AND OBJECTIVE BOX
Subjective:  Patient was seen and examined at the bedside this AM. No acute overnight events.   Was able to sleep overnight until she was awakened by a headache. She experienced nausea x2 episodes, one with associated chest pressure but seemed to be relieved with Ativan. Admits to reflux in past and that she takes Pepcid at home. Her headache is mostly felt in the frontal/temporal region and at the base of the skull. Orthostatic this morning 172/94-> 140/89-> 118/84, she experienced dizziness, nausea and headache at the time.  She admits to nausea with even the slightest head movement. Otherwise, no other complaints at this time.     ROS:  Admits headache and nausea. Ativan helping with nausea.  Denies visual disturbances, chest pain, abdominal pain, numbness or tingling.      Vital Signs Last 24 Hrs  T(C): 36.5 (09 Sep 2022 08:59), Max: 36.8 (08 Sep 2022 19:44)  T(F): 97.7 (09 Sep 2022 08:59), Max: 98.2 (08 Sep 2022 19:44)  HR: 79 (09 Sep 2022 08:59) (79 - 91)  BP: 150/97 (09 Sep 2022 08:59) (146/82 - 168/100)  BP(mean): --  RR: 16 (09 Sep 2022 08:59) (16 - 16)  SpO2: 95% (09 Sep 2022 08:59) (94% - 95%)      Physical Exam:  Constitutional - NAD, Comfortable  HEENT - NCAT, EOMI  Chest - good chest expansion, good respiratory effort on RA  Cardio - warm and well perfused, no LE edema  Abdomen -  Soft, NTND  Extremities - No peripheral edema, No calf tenderness   Neurologic Exam:                    Cognitive -             Orientation: A&O x4            Attention:  Immediate and delayed recall intact. Repetition and naming intact            Thought: process, content appropriate     Speech - Fluent, Comprehensible, No dysarthria, Mild anomic aphasia      Cranial Nerves - PERRLA, EOMI, VF intact, No facial asymmetry, Tongue midline, EOMI, Shoulder shrug intact     Motor -                      LEFT    UE - ShAB 5/5, EF 5/5, EE 5/5, WE 5/5,  WNL                    RIGHT UE - ShAB 5/5, EF 5/5, EE 5/5, WE 5/5,  WNL                    LEFT    LE - HF 5/5, KE 5/5, DF 5/5, PF 5/5                    RIGHT LE - HF 5/5, KE 5/5, DF 5/5, PF 5/5        Sensory - Intact to LT bilateral     Reflexes - neg Art's b/l     Coordination - FTN & HTS intact     OculoVestibular -  No nystagmus  Psychiatric - Mood stable, Affect WNL  Skin on admission: erythema under left breast; no DTIs noted      Recent Labs/Imagin.6   9.95  )-----------( 440      ( 08 Sep 2022 06:50 )             44.3     09-    138  |  100  |  13  ----------------------------<  109<H>  4.4   |  30  |  0.87    Ca    9.1      08 Sep 2022 06:50  TPro  7.1  /  Alb  3.0<L>  /  TBili  0.4  /  DBili  x   /  AST  28  /  ALT  23  /  AlkPhos  94  09-07      MEDICATIONS  (STANDING):  aspirin enteric coated 81 milliGRAM(s) Oral daily  atorvastatin 40 milliGRAM(s) Oral at bedtime  enoxaparin Injectable 40 milliGRAM(s) SubCutaneous every 24 hours  famotidine    Tablet 40 milliGRAM(s) Oral daily  gabapentin 100 milliGRAM(s) Oral at bedtime  losartan 50 milliGRAM(s) Oral at bedtime  melatonin 9 milliGRAM(s) Oral at bedtime  nystatin Powder 1 Application(s) Topical two times a day    MEDICATIONS  (PRN):  acetaminophen     Tablet .. 650 milliGRAM(s) Oral every 6 hours PRN Temp greater or equal to 38C (100.4F), Mild Pain (1 - 3)  polyethylene glycol 3350 17 Gram(s) Oral two times a day PRN Constipation  traMADol 25 milliGRAM(s) Oral every 6 hours PRN Moderate Pain (4 - 6)  traMADol 50 milliGRAM(s) Oral every 6 hours PRN Severe Pain (7 - 10)

## 2022-09-09 NOTE — PROGRESS NOTE ADULT - ASSESSMENT
EZRA MARIE is a 60yo Female with PMHx of HTN, migraines, and TIA (last seen at [facility] in 11/2021), who presented to [facility] on 08/31/2022 with HA, nausea, vomiting, and word-finding difficulties; was found to have findings on CT and MRI head consistent with PRES.  Admitted for multidisciplinary rehab program with Aphasia, gait and ADL impairment.  ------------------------------------------------------------------  #Comprehensive Multidisciplinary Rehab Program:  - Gait, ADL, Functional impairments  - PT/OT/ SLP 3 hours a day 5 days a week    #PRES, unclear etiology  - suggestive based on findings on MRI and CT head: hyperintense signal to left occipital lobe and right cerebellum. No enhancement observed with IV contrast.   - repeat MRI brain in 1 month (09/30/2022)  - c/w ASA 81mg daily    #Orthostatic Hypotension  #HTN  - decrease Losartan to 25mg qhs--holding parameter for SBP <120 sitting; discussed with hospitalist 9/8  --Losartan increased to 50mg qHS, per hospitalist 9/9  - for associated nausea, may spot dose Ativan. No Zofran or Reglan, as QTc prolonged  - continue to monitor orthostatics    #HLD  - c/w Atorvastatin 40mg qhs    #Pain:  - Tylenol PRN, Tramadol PRN  - Avoid sedating meds that may affect cognitive recovery  -Headaches  --Gabapentin 100mg qHS, may increase if needed  -- Warm compress     #Sleep:  - Maintain quiet hours and low stim environment  - Melatonin increased from 6mg to 9mg 9/9  - Gabapentin 100mg qHS, may increase if needed    #GI/Bowel:  - DC Senna 2 tabs qhs and continue Miralax PRN  - Ativan, as needed for nausea  - Pepcid for reflux added 9/9    #/Bladder:  --continent  --voiding with low PVR--    #Diet :    - Diet: Regular with thins    #Skin/ Pressure Injury Prevention:  - assessment on admission --erythema under left breast--Nystatin ordered  - Turn Q2hrs in bed while awake, OOB to Chair, PT/OT/SLP     #DVT prophylaxis:  - Lovenox    #Precautions/ Restrictions  - Falls  - COVID PCR: 9/5    #Dispo: IDR 09/08/2022  - Nursing: continent of bowel and bladder  - SW: Lives in  with  and children. Has 5 SAI; split-level home with 6 steps to bedroom and 6 steps to kitchen  - OT: supervision/set-up with ADLs and CG functional transfers  - PT: ambulates 50' RW with min assist; CG transfers; 4 steps min assist  - SLP: Regular with thins diet; Has mild apraxia and anomic aphasia, Mild-Mod Cognitive deficits  - Team Goals: (1) Ambulate to bedroom Ivonne. (2) Express complex ideas. (3) Sequence higher-level tasks  - TDD: 9/17 to home    --------------------------------------------  Outpatient Follow up:  Omar Dominguez (DO)  Neurology; Vascular Neurology  [address], [address]  [place] [address]  Phone: [phone]  Fax: [phone]  Follow Up Time: 2 weeks    Gabriele Olivier (DO)  Cardiology; Internal Medicine  [address] [facility] [address], [address]  [place] [address]  Phone: [phone]  Fax: [phone]  Follow Up Time: 1 month    Froy Craig (DO)  Medicine  [address] [place] [address], [address]  [place] [address]  Phone: [phone]  Fax: [phone]  Follow Up Time: Routine  --------------------------------------------   EZRA MARIE is a 60yo Female with PMHx of HTN, migraines, and TIA (last seen at [facility] in 11/2021), who presented to [facility] on 08/31/2022 with HA, nausea, vomiting, and word-finding difficulties; was found to have findings on CT and MRI head consistent with PRES.  Admitted for multidisciplinary rehab program with Aphasia, gait and ADL impairment.  ------------------------------------------------------------------  #Comprehensive Multidisciplinary Rehab Program:  - Gait, ADL, Functional impairments  - PT/OT/ SLP 3 hours a day 5 days a week    #PRES, unclear etiology  - suggestive based on findings on MRI and CT head: hyperintense signal to left occipital lobe and right cerebellum. No enhancement observed with IV contrast.   - repeat MRI brain in 1 month (09/30/2022)  - c/w ASA 81mg daily    #Orthostatic Hypotension  #HTN  - decrease Losartan to 25mg qhs--holding parameter for SBP <120 sitting; discussed with hospitalist 9/8  --Losartan increased to 50mg qHS, per hospitalist 9/9  - for associated nausea, may spot dose Ativan. No Zofran or Reglan, as QTc prolonged  - TEDs, and Abdominal binder ordered  - continue to monitor orthostatics      #HLD  - c/w Atorvastatin 40mg qhs    #Pain:  - Tylenol PRN, Tramadol PRN  - Avoid sedating meds that may affect cognitive recovery  -Headaches  --Gabapentin 100mg qHS, may increase if needed  -- Warm compress     #Sleep:  - Maintain quiet hours and low stim environment  - Melatonin increased from 6mg to 9mg 9/9  - Gabapentin 100mg qHS, may increase if needed    #GI/Bowel:  - DC Senna 2 tabs qhs and continue Miralax PRN  - Ativan, as needed for nausea  - Pepcid for reflux added 9/9    #/Bladder:  --continent  --voiding with low PVR--    #Diet :    - Diet: Regular with thins    #Skin/ Pressure Injury Prevention:  - assessment on admission --erythema under left breast--Nystatin ordered  - Turn Q2hrs in bed while awake, OOB to Chair, PT/OT/SLP     #DVT prophylaxis:  - Lovenox    #Precautions/ Restrictions  - Falls  - COVID PCR: 9/5    #Dispo: IDR 09/08/2022  - Nursing: continent of bowel and bladder  - SW: Lives in  with  and children. Has 5 SAI; split-level home with 6 steps to bedroom and 6 steps to kitchen  - OT: supervision/set-up with ADLs and CG functional transfers  - PT: ambulates 50' RW with min assist; CG transfers; 4 steps min assist  - SLP: Regular with thins diet; Has mild apraxia and anomic aphasia, Mild-Mod Cognitive deficits  - Team Goals: (1) Ambulate to bedroom Ivonne. (2) Express complex ideas. (3) Sequence higher-level tasks  - TDD: 9/17 to home    --------------------------------------------  Outpatient Follow up:  Omar Dominguez (DO)  Neurology; Vascular Neurology  [address] [facility] [address] [facility], [address]  [place] [address]  Phone: [phone]  Fax: [phone]  Follow Up Time: 2 weeks    Gabriele Olivier (DO)  Cardiology; Internal Medicine  [address] [facility] [facility], [address]  [place] [address]  Phone: [phone]  Fax: [phone]  Follow Up Time: 1 month    Froy Craig (DO)  Medicine  [address] [facility] [address] [facility], [address]  [place] [address]  Phone: [phone]  Fax: [phone]  Follow Up Time: Routine  --------------------------------------------   EZRA MARIE is a 62yo Female with PMHx of HTN, migraines, and TIA (last seen at [facility] in 11/2021), who presented to [facility] on 08/31/2022 with HA, nausea, vomiting, and word-finding difficulties; was found to have findings on CT and MRI head consistent with PRES.  Admitted for multidisciplinary rehab program with Aphasia, gait and ADL impairment.  ------------------------------------------------------------------  #Comprehensive Multidisciplinary Rehab Program:  - Gait, ADL, Functional impairments  - PT/OT/ SLP 3 hours a day 5 days a week    #PRES, unclear etiology  - suggestive based on findings on MRI and CT head: hyperintense signal to left occipital lobe and right cerebellum. No enhancement observed with IV contrast.   - repeat MRI brain in 1 month (09/30/2022)  - c/w ASA 81mg daily  - Repeat CT Head ordered    #Orthostatic Hypotension  #HTN  - decrease Losartan to 25mg qhs--holding parameter for SBP <120 sitting; discussed with hospitalist 9/8  --Losartan increased to 50mg qHS, per hospitalist 9/9  - for associated nausea, may spot dose Ativan. No Zofran or Reglan, as QTc prolonged  - TEDs, and Abdominal binder ordered  - continue to monitor orthostatics    #HLD  - c/w Atorvastatin 40mg qhs    #Pain:  - Tylenol PRN, Tramadol PRN  - Avoid sedating meds that may affect cognitive recovery  -Headaches  --Topamax 25mg qHS, may consider increasing to 50mg if 25mg if tolerated.  --Repeat CT Head ordered  -- Warm compress     #Sleep:  - Maintain quiet hours and low stim environment  - Melatonin increased from 6mg to 9mg 9/9  - Topamax 25mg qHS.     #GI/Bowel:  - DC Senna 2 tabs qhs and continue Miralax PRN  - Compazine 5 mg PRN for nausea  - Pepcid for reflux added 9/9    #/Bladder:  --continent  --voiding with low PVR--    #Diet :    - Diet: Regular with thins    #Skin/ Pressure Injury Prevention:  - assessment on admission --erythema under left breast--Nystatin ordered  - Turn Q2hrs in bed while awake, OOB to Chair, PT/OT/SLP     #DVT prophylaxis:  - Lovenox    #Precautions/ Restrictions  - Falls  - COVID PCR: 9/5    #Dispo: IDR 09/08/2022  - Nursing: continent of bowel and bladder  - SW: Lives in  with  and children. Has 5 SAI; split-level home with 6 steps to bedroom and 6 steps to kitchen  - OT: supervision/set-up with ADLs and CG functional transfers  - PT: ambulates 50' RW with min assist; CG transfers; 4 steps min assist  - SLP: Regular with thins diet; Has mild apraxia and anomic aphasia, Mild-Mod Cognitive deficits  - Team Goals: (1) Ambulate to bedroom Ivonne. (2) Express complex ideas. (3) Sequence higher-level tasks  - TDD: 9/17 to home    --------------------------------------------  Outpatient Follow up:  Omar Dominguez (DO)  Neurology; Vascular Neurology  [address] [facility], [address]  [place] [address]  Phone: [phone]  Fax: [phone]  Follow Up Time: 2 weeks    Gabriele Olivier (DO)  Cardiology; Internal Medicine  [address] [facility], [address]  [place] [address]  Phone: [phone]  Fax: [phone]  Follow Up Time: 1 month    Froy Craig (DO)  Medicine  [address], [address]  [place] [address]  Phone: [phone]  Fax: [phone]  Follow Up Time: Routine  --------------------------------------------

## 2022-09-09 NOTE — PROGRESS NOTE ADULT - ASSESSMENT
60 yo F w/ HTN, migraine, TIA (not on ASA and statin), presented w/ HA, word finding difficulties, and n/v, admitted as a code stroke, carotid dissection in left ICA on imaging, and CT/MRI head suggestive of PRES.  Admitted to [facility] on 9/6/2022.    # Acute metabolic encephalopathy complicated by aphasia, likely due to   # PRES  # Headache  - Repeat MRI head in 1 mo (9/30/2022)  - ASA and statin  - PT/OT/Speech therapy  - pain control & bowel regimen per PMR  - BP control as below    # HTN  SBP goal 100-160.   - will make losartan 50 mg qhs to get closer to goal BP.  - DASH diet.    # Abdominal hematoma from lovenox inj  - will monitor sites closely and h/h. Currently only subcutaneous.   - rotate lovenox inj sites.    # Depression  recently on benzos for anxiety following death of brother. Not on SSRI.    # Prolonged QTc  - noted to have a QTc of 481 on 9/1. EKG repeated on 9/7 and it is 470.   - avoid QTc prolonging meds, can also give oral ativan for breakthrough nausea.   - will monitor QTc periodically.    # thrombocytosis  - trend platelets.    # Hx of migraines  - per d/c summary, avoid triptans.  - OP f/u with Neurology for migraine therapy.   - can consider consult to Dr. López for botox injections.  - consider trial of gabapentin for severe headaches.    dvt ppx: lovenox, no evidence of anemia.

## 2022-09-09 NOTE — PROGRESS NOTE ADULT - SUBJECTIVE AND OBJECTIVE BOX
Patient is a 61y old  Female who presents with a chief complaint of PRES (08 Sep 2022 12:37)      24 HOUR EVENTS:  No overnight events reported.     SUBJECTIVE:  Patient seen and examined at bedside. Overnight she developed a severe 10/10 headache. She had nausea this AM, but it was relieved with ativan. She complained to rehab team about reflux.   She continues to have pain at site of lovenox inj.    ALLERGIES:  No Known Allergies    MEDICATIONS  (STANDING):  aspirin enteric coated 81 milliGRAM(s) Oral daily  atorvastatin 40 milliGRAM(s) Oral at bedtime  enoxaparin Injectable 40 milliGRAM(s) SubCutaneous every 24 hours  famotidine    Tablet 40 milliGRAM(s) Oral daily  gabapentin 100 milliGRAM(s) Oral at bedtime  losartan 50 milliGRAM(s) Oral at bedtime  melatonin 9 milliGRAM(s) Oral at bedtime  nystatin Powder 1 Application(s) Topical two times a day    MEDICATIONS  (PRN):  acetaminophen     Tablet .. 650 milliGRAM(s) Oral every 6 hours PRN Temp greater or equal to 38C (100.4F), Mild Pain (1 - 3)  polyethylene glycol 3350 17 Gram(s) Oral two times a day PRN Constipation  traMADol 25 milliGRAM(s) Oral every 6 hours PRN Moderate Pain (4 - 6)  traMADol 50 milliGRAM(s) Oral every 6 hours PRN Severe Pain (7 - 10)    Vital Signs Last 24 Hrs  T(F): 97.7 (09 Sep 2022 08:59), Max: 98.2 (08 Sep 2022 19:44)  HR: 79 (09 Sep 2022 08:59) (79 - 91)  BP: 150/97 (09 Sep 2022 08:59) (146/82 - 168/100)  RR: 16 (09 Sep 2022 08:59) (16 - 16)  SpO2: 95% (09 Sep 2022 08:59) (94% - 95%)  I&O's Summary    PHYSICAL EXAM:  General: NAD, A/O x 3  ENT: Moist mucous membranes, no thrush  Neck: Supple, No JVD  Lungs: Clear to auscultation bilaterally, good air entry, non-labored breathing  Cardio: RRR, S1/S2, No murmur  Abdomen: Soft, Nontender, Nondistended; Bowel sounds present, abd hematomas.  Extremities: No calf tenderness, No pitting edema    LABS:                        14.6   9.95  )-----------( 440      ( 08 Sep 2022 06:50 )             44.3     09-08    138  |  100  |  13  ----------------------------<  109  4.4   |  30  |  0.87    Ca    9.1      08 Sep 2022 06:50    TPro  7.1  /  Alb  3.0  /  TBili  0.4  /  DBili  x   /  AST  28  /  ALT  23  /  AlkPhos  94  09-07 09-01 Chol 224 mg/dL LDL -- HDL 63 mg/dL Trig 112 mg/dL      COVID-19 PCR: NotDetec (09-06-22 @ 18:30)  COVID-19 PCR: NotDetec (09-05-22 @ 05:57)  COVID-19 PCR: NotDetec (09-03-22 @ 06:40)  COVID-19 PCR: NotDetec (08-31-22 @ 09:11)    RADIOLOGY & ADDITIONAL TESTS:    Care Discussed with Consultants/Other Providers:

## 2022-09-10 DIAGNOSIS — R51.9 HEADACHE, UNSPECIFIED: ICD-10-CM

## 2022-09-10 DIAGNOSIS — I67.83 POSTERIOR REVERSIBLE ENCEPHALOPATHY SYNDROME: ICD-10-CM

## 2022-09-10 DIAGNOSIS — R19.7 DIARRHEA, UNSPECIFIED: ICD-10-CM

## 2022-09-10 DIAGNOSIS — I10 ESSENTIAL (PRIMARY) HYPERTENSION: ICD-10-CM

## 2022-09-10 DIAGNOSIS — R94.31 ABNORMAL ELECTROCARDIOGRAM [ECG] [EKG]: ICD-10-CM

## 2022-09-10 DIAGNOSIS — I95.1 ORTHOSTATIC HYPOTENSION: ICD-10-CM

## 2022-09-10 PROCEDURE — 99232 SBSQ HOSP IP/OBS MODERATE 35: CPT

## 2022-09-10 PROCEDURE — 99233 SBSQ HOSP IP/OBS HIGH 50: CPT

## 2022-09-10 RX ORDER — TRAMADOL HYDROCHLORIDE 50 MG/1
25 TABLET ORAL ONCE
Refills: 0 | Status: DISCONTINUED | OUTPATIENT
Start: 2022-09-10 | End: 2022-09-10

## 2022-09-10 RX ORDER — LACTOBACILLUS ACIDOPHILUS 100MM CELL
1 CAPSULE ORAL DAILY
Refills: 0 | Status: DISCONTINUED | OUTPATIENT
Start: 2022-09-10 | End: 2022-09-24

## 2022-09-10 RX ORDER — TOPIRAMATE 25 MG
50 TABLET ORAL ONCE
Refills: 0 | Status: COMPLETED | OUTPATIENT
Start: 2022-09-10 | End: 2022-09-10

## 2022-09-10 RX ADMIN — TRAMADOL HYDROCHLORIDE 50 MILLIGRAM(S): 50 TABLET ORAL at 03:00

## 2022-09-10 RX ADMIN — Medication 5 MILLIGRAM(S): at 02:13

## 2022-09-10 RX ADMIN — TRAMADOL HYDROCHLORIDE 25 MILLIGRAM(S): 50 TABLET ORAL at 14:15

## 2022-09-10 RX ADMIN — TRAMADOL HYDROCHLORIDE 50 MILLIGRAM(S): 50 TABLET ORAL at 11:30

## 2022-09-10 RX ADMIN — TRAMADOL HYDROCHLORIDE 50 MILLIGRAM(S): 50 TABLET ORAL at 10:46

## 2022-09-10 RX ADMIN — NYSTATIN CREAM 1 APPLICATION(S): 100000 CREAM TOPICAL at 06:49

## 2022-09-10 RX ADMIN — ENOXAPARIN SODIUM 40 MILLIGRAM(S): 100 INJECTION SUBCUTANEOUS at 06:47

## 2022-09-10 RX ADMIN — Medication 81 MILLIGRAM(S): at 12:33

## 2022-09-10 RX ADMIN — ATORVASTATIN CALCIUM 40 MILLIGRAM(S): 80 TABLET, FILM COATED ORAL at 21:57

## 2022-09-10 RX ADMIN — TRAMADOL HYDROCHLORIDE 50 MILLIGRAM(S): 50 TABLET ORAL at 02:12

## 2022-09-10 RX ADMIN — Medication 50 MILLIGRAM(S): at 21:59

## 2022-09-10 RX ADMIN — POLYETHYLENE GLYCOL 3350 17 GRAM(S): 17 POWDER, FOR SOLUTION ORAL at 21:57

## 2022-09-10 RX ADMIN — LOSARTAN POTASSIUM 50 MILLIGRAM(S): 100 TABLET, FILM COATED ORAL at 21:57

## 2022-09-10 RX ADMIN — Medication 9 MILLIGRAM(S): at 21:57

## 2022-09-10 RX ADMIN — Medication 1 TABLET(S): at 12:33

## 2022-09-10 RX ADMIN — NYSTATIN CREAM 1 APPLICATION(S): 100000 CREAM TOPICAL at 21:58

## 2022-09-10 RX ADMIN — FAMOTIDINE 40 MILLIGRAM(S): 10 INJECTION INTRAVENOUS at 12:33

## 2022-09-10 RX ADMIN — Medication 0.5 MILLIGRAM(S): at 21:57

## 2022-09-10 RX ADMIN — TRAMADOL HYDROCHLORIDE 25 MILLIGRAM(S): 50 TABLET ORAL at 13:19

## 2022-09-10 NOTE — CHART NOTE - NSCHARTNOTEFT_GEN_A_CORE
Patient was seen at the bedside after an episode of vomiting tonight shortly after getting OOB to use the restroom and having a drink. Reports similar symptoms of HA, lightheadedness, and nausea with changes in position. No new symptoms reported. Has been having trouble eating 2/2 nausea, but no signs of dehydration based on blood work from 9/8. Of note, during the day, had repeat CT head scan with stable findings; was started on Topamax 25mg at bedtime to treat HAs and Compazine PRN for nausea; also takes Losartan at night. Patient had just taken Compazine prior to arrival to bedside. Will also trial warm compress to forehead and base of neck for HA. Continue to monitor; can repeat CT head for any new neurologic deficits.

## 2022-09-10 NOTE — PROGRESS NOTE ADULT - SUBJECTIVE AND OBJECTIVE BOX
Patient is a 61y old  Female who presents with a chief complaint of PRES (09 Sep 2022 12:02)      History, interim events and clinically pertinent issues reviewed; patient interviewed and examined. HA, N/V/D resolving feels better so far this AM. No further constipation either. All other systems were reviewed and were negative  Reports high BP's yet orthostatic drops w/ dizziness yesterday    ALLERGIES:  No Known Allergies    MEDICATIONS  (STANDING):  aspirin enteric coated 81 milliGRAM(s) Oral daily  atorvastatin 40 milliGRAM(s) Oral at bedtime  enoxaparin Injectable 40 milliGRAM(s) SubCutaneous every 24 hours  famotidine    Tablet 40 milliGRAM(s) Oral daily  losartan 50 milliGRAM(s) Oral at bedtime  melatonin 9 milliGRAM(s) Oral at bedtime  nystatin Powder 1 Application(s) Topical two times a day    MEDICATIONS  (PRN):  acetaminophen     Tablet .. 650 milliGRAM(s) Oral every 6 hours PRN Temp greater or equal to 38C (100.4F), Mild Pain (1 - 3)  polyethylene glycol 3350 17 Gram(s) Oral two times a day PRN Constipation  prochlorperazine   Tablet 5 milliGRAM(s) Oral every 6 hours PRN Nausea  traMADol 25 milliGRAM(s) Oral every 6 hours PRN Moderate Pain (4 - 6)  traMADol 50 milliGRAM(s) Oral every 6 hours PRN Severe Pain (7 - 10)    Vital Signs Last 24 Hrs  T(F): 98.4 (09 Sep 2022 21:50), Max: 98.4 (09 Sep 2022 21:50)  HR: 79 (09 Sep 2022 21:50) (79 - 79)  BP: 149/90 (10 Sep 2022 00:00) (149/90 - 164/98)  RR: 16 (09 Sep 2022 21:50) (16 - 16)  SpO2: 95% (09 Sep 2022 21:50) (95% - 95%)  I&O's Summary    BMI (kg/m2): 31.8 (09-06-22 @ 16:34)          PHYSICAL EXAM:  General: NAD, A/O x 3  ENT: MMM  Neck: Supple, No JVD  Lungs: Clear to auscultation bilaterally  Cardio: RRR, S1/S2, No murmurs  Abdomen: Soft, Nontender, Nondistended; Bowel sounds present; abdominal binder NOT in place  Extremities: No calf tenderness, No pitting edema; TEDS stockings in place  Neuro: no new deficits, moves all extremities no dysarthria      LABS:                        14.6   9.95  )-----------( 440      ( 08 Sep 2022 06:50 )             44.3       09-08    138  |  100  |  13  ----------------------------<  109  4.4   |  30  |  0.87    Ca    9.1      08 Sep 2022 06:50                  09-01 Chol 224 mg/dL LDL -- HDL 63 mg/dL Trig 112 mg/dL                COVID-19 PCR: NotDetec (09-06-22 @ 18:30)  COVID-19 PCR: NotDetec (09-05-22 @ 05:57)  COVID-19 PCR: NotDetec (09-03-22 @ 06:40)  COVID-19 PCR: NotDetec (08-31-22 @ 09:11)

## 2022-09-10 NOTE — PROGRESS NOTE ADULT - NSPROGADDITIONALINFOA_GEN_ALL_CORE
I have personally interviewed and examined this patient, reviewed pertinent clinical information, and performed the evaluation and management services provided at today's visit for inpatient medical follow up    I am available to discuss any issues related to the medical care of this patient on the unit, or by phone at [phone]
19

## 2022-09-10 NOTE — PROGRESS NOTE ADULT - SUBJECTIVE AND OBJECTIVE BOX
Cc: Gait dysfunction due to carotid dissection L ICA, CT/MRI head suggestive of PRES.      HPI: CT head reviewed, patient reports orthostatics, intermittent n/v today. reports decreased appetite.   No significant interval change on CT head.   Has been tolerating rehabilitation program.    acetaminophen     Tablet .. 650 milliGRAM(s) Oral every 6 hours PRN  aspirin enteric coated 81 milliGRAM(s) Oral daily  atorvastatin 40 milliGRAM(s) Oral at bedtime  enoxaparin Injectable 40 milliGRAM(s) SubCutaneous every 24 hours  famotidine    Tablet 40 milliGRAM(s) Oral daily  lactobacillus acidophilus 1 Tablet(s) Oral daily  losartan 50 milliGRAM(s) Oral at bedtime  melatonin 9 milliGRAM(s) Oral at bedtime  nystatin Powder 1 Application(s) Topical two times a day  polyethylene glycol 3350 17 Gram(s) Oral two times a day PRN  prochlorperazine   Tablet 5 milliGRAM(s) Oral every 6 hours PRN  traMADol 25 milliGRAM(s) Oral every 6 hours PRN  traMADol 50 milliGRAM(s) Oral every 6 hours PRN      < from: CT Head No Cont (09.09.22 @ 18:00) >    ACC: [id number] EXAM:  CT BRAIN                          PROCEDURE DATE:  09/09/2022          INTERPRETATION:  CLINICAL HISTORY: Recurrent migraine headaches.    TECHNIQUE:  CT of the head without contrast.  Contiguous transaxial images of the head were acquired from the skull   base to the vertex without the administration of iodinated contrast.   Coronal and sagittal reformatted images are provided.    COMPARISON: Brain MRI and head CT 8/31/2022.    FINDINGS:    There is no acute intracranial hemorrhage, mass effect or evidence for   acute large vascular territory infarct. Patchy hypoattenuation in the   left occipital lobe possibly to lesser extent on the right, better   evaluated on the recent MRI, is not significantly changed from the prior   head CT. Right cerebellar findings on MRI not conspicuous on the current   CT. Mild chronic microvascular changes with patchy areas of   low-attenuation in the bihemispheric white matter, stable. Low-lying   cerebellar tonsils, unchanged.    Theventricles, sulci and cisternal spaces are stable in size and   configuration and unremarkable for age.  There is no midline shift or   abnormal extra-axial fluid collection.    The calvarium is intact.  There are no osteoblastic or lytic calvarial or   skull base lesions.  The paranasal sinuses and mastoid air cells are   clear.    IMPRESSION:  No significant interval change from CT head 8/31/2022 with patchy   hypoattenuation left greater than right occipital lobe. No acute   intracranial hemorrhage, extra-axial collection or hydrocephalus.    --- End of Report ---        < end of copied text >    T(C): 36.7 (09-10-22 @ 08:10), Max: 36.9 (09-09-22 @ 21:50)  HR: 84 (09-10-22 @ 08:10) (79 - 84)  BP: 150/76 (09-10-22 @ 08:17) (93/67 - 164/98)  RR: 16 (09-10-22 @ 08:10) (16 - 16)  SpO2: 95% (09-10-22 @ 08:10) (95% - 95%)    In NAD  HEENT- EOMI  Heart- RRR, S1S2  Lungs- CTA bl.  Abd-   NT  Ext- No calf pain  Neuro- Exam unchanged        Imp: 60yo Female with PMHx of HTN, migraines, and TIA (last seen at [facility] in 11/2021), who presented to [facility] on 08/31/2022 with HA, nausea, vomiting, and word-finding difficulties; was found to have findings on CT and MRI head consistent with PRES.  Admitted for multidisciplinary rehab program with Aphasia, gait and ADL impairment.    Plan:  -monitor orthostatics  - abdominal binder  - Continue therapies  - DVT prophylaxis- lovenox  - Skin- Turn q2h, check skin daily  - Continue current medications; patient medically stable.   - Patient is stable to continue current rehabilitation program.

## 2022-09-10 NOTE — PROGRESS NOTE ADULT - ASSESSMENT
62 yo F w/ HTN, migraine, TIA (not on ASA and statin), presented w/ HA, word finding difficulties, and n/v, admitted as a code stroke, carotid dissection in left ICA on imaging, and CT/MRI head suggestive of PRES.  Admitted to [facility] on 9/6/2022.    # Acute metabolic encephalopathy complicated by aphasia, likely due to   # PRES  # Headache  - Repeat MRI head in 1 mo (9/30/2022)  - ASA and statin  - PT/OT/Speech therapy  - pain control & bowel regimen per PMR  - BP control as below    # HTN  #orthostasis  SBP goal 100-160.   - losartan 50mg qhs   - TEDS / abd binder  - encourage PO fluids  - DASH diet.    #Diarrhea  - post laxative  - add probiotic    # Abdominal hematoma from lovenox inj  - will monitor sites closely and h/h. Currently only subcutaneous.   - rotate lovenox inj sites.    # Depression  recently on benzos for anxiety following death of brother. Not on SSRI.    # Prolonged QTc  - noted to have a QTc of 481 on 9/1. EKG repeated on 9/7 and it is 470.   - avoid QTc prolonging meds, can also give oral ativan for breakthrough nausea.   - will monitor QTc periodically.    # thrombocytosis  - trend platelets.    # Hx of migraines  - per d/c summary, avoid triptans.  - OP f/u with Neurology for migraine therapy.   - can consider consult to Dr. López for botox injections.  - consider trial of gabapentin for severe headaches.    #VTE ppx:   -lovenox, no evidence of anemia.

## 2022-09-11 PROCEDURE — 99232 SBSQ HOSP IP/OBS MODERATE 35: CPT

## 2022-09-11 RX ORDER — TOPIRAMATE 25 MG
25 TABLET ORAL AT BEDTIME
Refills: 0 | Status: DISCONTINUED | OUTPATIENT
Start: 2022-09-11 | End: 2022-09-11

## 2022-09-11 RX ORDER — TOPIRAMATE 25 MG
50 TABLET ORAL AT BEDTIME
Refills: 0 | Status: DISCONTINUED | OUTPATIENT
Start: 2022-09-11 | End: 2022-09-20

## 2022-09-11 RX ADMIN — Medication 0.5 MILLIGRAM(S): at 22:14

## 2022-09-11 RX ADMIN — Medication 50 MILLIGRAM(S): at 22:13

## 2022-09-11 RX ADMIN — Medication 9 MILLIGRAM(S): at 22:12

## 2022-09-11 RX ADMIN — TRAMADOL HYDROCHLORIDE 50 MILLIGRAM(S): 50 TABLET ORAL at 04:50

## 2022-09-11 RX ADMIN — TRAMADOL HYDROCHLORIDE 50 MILLIGRAM(S): 50 TABLET ORAL at 05:50

## 2022-09-11 RX ADMIN — POLYETHYLENE GLYCOL 3350 17 GRAM(S): 17 POWDER, FOR SOLUTION ORAL at 22:13

## 2022-09-11 RX ADMIN — NYSTATIN CREAM 1 APPLICATION(S): 100000 CREAM TOPICAL at 06:42

## 2022-09-11 RX ADMIN — Medication 0.5 MILLIGRAM(S): at 09:15

## 2022-09-11 RX ADMIN — ATORVASTATIN CALCIUM 40 MILLIGRAM(S): 80 TABLET, FILM COATED ORAL at 22:12

## 2022-09-11 RX ADMIN — Medication 650 MILLIGRAM(S): at 10:14

## 2022-09-11 RX ADMIN — ENOXAPARIN SODIUM 40 MILLIGRAM(S): 100 INJECTION SUBCUTANEOUS at 04:51

## 2022-09-11 RX ADMIN — LOSARTAN POTASSIUM 50 MILLIGRAM(S): 100 TABLET, FILM COATED ORAL at 22:12

## 2022-09-11 RX ADMIN — Medication 5 MILLIGRAM(S): at 04:50

## 2022-09-11 RX ADMIN — Medication 1 TABLET(S): at 13:35

## 2022-09-11 RX ADMIN — NYSTATIN CREAM 1 APPLICATION(S): 100000 CREAM TOPICAL at 18:03

## 2022-09-11 RX ADMIN — FAMOTIDINE 40 MILLIGRAM(S): 10 INJECTION INTRAVENOUS at 13:34

## 2022-09-11 RX ADMIN — Medication 81 MILLIGRAM(S): at 13:35

## 2022-09-11 RX ADMIN — Medication 650 MILLIGRAM(S): at 09:15

## 2022-09-11 NOTE — PROGRESS NOTE ADULT - SUBJECTIVE AND OBJECTIVE BOX
Cc: Gait dysfunction    HPI: Patient with no new medical issues today. reports headache and nausea this morning which are improving.   Pain controlled, no chest pain, no N/V, no Fevers/Chills. No other new ROS  Has been tolerating rehabilitation program.    acetaminophen     Tablet .. 650 milliGRAM(s) Oral every 6 hours PRN  aspirin enteric coated 81 milliGRAM(s) Oral daily  atorvastatin 40 milliGRAM(s) Oral at bedtime  enoxaparin Injectable 40 milliGRAM(s) SubCutaneous every 24 hours  famotidine    Tablet 40 milliGRAM(s) Oral daily  lactobacillus acidophilus 1 Tablet(s) Oral daily  losartan 50 milliGRAM(s) Oral at bedtime  melatonin 9 milliGRAM(s) Oral at bedtime  nystatin Powder 1 Application(s) Topical two times a day  polyethylene glycol 3350 17 Gram(s) Oral two times a day PRN  prochlorperazine   Tablet 5 milliGRAM(s) Oral every 6 hours PRN  topiramate 50 milliGRAM(s) Oral at bedtime  traMADol 25 milliGRAM(s) Oral every 6 hours PRN  traMADol 50 milliGRAM(s) Oral every 6 hours PRN      T(C): 36.9 (09-11-22 @ 08:00), Max: 36.9 (09-11-22 @ 08:00)  HR: 98 (09-11-22 @ 08:00) (86 - 98)  BP: 105/74 (09-11-22 @ 08:00) (105/74 - 158/89)  RR: 15 (09-11-22 @ 08:00) (15 - 16)  SpO2: 96% (09-11-22 @ 08:00) (95% - 96%)         In NAD  HEENT- EOMI  Heart- RRR, S1S2  Lungs- no respiratory distress  Abd-   NTND  Ext- No calf pain  Neuro- Exam unchanged        Imp: 62yo Female with PMHx of HTN, migraines, and TIA (last seen at [facility] in 11/2021), who presented to [facility] on 08/31/2022 with HA, nausea, vomiting, and word-finding difficulties; was found to have findings on CT and MRI head consistent with PRES.  Admitted for multidisciplinary rehab program with Aphasia, gait and ADL impairment.    Plan:  -monitor orthostatics  - abdominal binder  - Continue therapies  - DVT prophylaxis- lovenox  - Skin- Turn q2h, check skin daily  - Continue current medications; patient medically stable.   - Patient is stable to continue current rehabilitation program.

## 2022-09-12 LAB
ANION GAP SERPL CALC-SCNC: 8 MMOL/L — SIGNIFICANT CHANGE UP (ref 5–17)
BUN SERPL-MCNC: 12 MG/DL — SIGNIFICANT CHANGE UP (ref 7–23)
CALCIUM SERPL-MCNC: 9.1 MG/DL — SIGNIFICANT CHANGE UP (ref 8.4–10.5)
CHLORIDE SERPL-SCNC: 100 MMOL/L — SIGNIFICANT CHANGE UP (ref 96–108)
CO2 SERPL-SCNC: 28 MMOL/L — SIGNIFICANT CHANGE UP (ref 22–31)
CREAT SERPL-MCNC: 0.92 MG/DL — SIGNIFICANT CHANGE UP (ref 0.5–1.3)
EGFR: 71 ML/MIN/1.73M2 — SIGNIFICANT CHANGE UP
GLUCOSE SERPL-MCNC: 111 MG/DL — HIGH (ref 70–99)
HCT VFR BLD CALC: 42 % — SIGNIFICANT CHANGE UP (ref 34.5–45)
HGB BLD-MCNC: 14.4 G/DL — SIGNIFICANT CHANGE UP (ref 11.5–15.5)
MCHC RBC-ENTMCNC: 31.6 PG — SIGNIFICANT CHANGE UP (ref 27–34)
MCHC RBC-ENTMCNC: 34.3 GM/DL — SIGNIFICANT CHANGE UP (ref 32–36)
MCV RBC AUTO: 92.3 FL — SIGNIFICANT CHANGE UP (ref 80–100)
NRBC # BLD: 0 /100 WBCS — SIGNIFICANT CHANGE UP (ref 0–0)
PLATELET # BLD AUTO: 461 K/UL — HIGH (ref 150–400)
POTASSIUM SERPL-MCNC: 3.3 MMOL/L — LOW (ref 3.5–5.3)
POTASSIUM SERPL-SCNC: 3.3 MMOL/L — LOW (ref 3.5–5.3)
RBC # BLD: 4.55 M/UL — SIGNIFICANT CHANGE UP (ref 3.8–5.2)
RBC # FLD: 11.1 % — SIGNIFICANT CHANGE UP (ref 10.3–14.5)
SODIUM SERPL-SCNC: 136 MMOL/L — SIGNIFICANT CHANGE UP (ref 135–145)
WBC # BLD: 9.39 K/UL — SIGNIFICANT CHANGE UP (ref 3.8–10.5)
WBC # FLD AUTO: 9.39 K/UL — SIGNIFICANT CHANGE UP (ref 3.8–10.5)

## 2022-09-12 PROCEDURE — 99232 SBSQ HOSP IP/OBS MODERATE 35: CPT

## 2022-09-12 PROCEDURE — 96116 NUBHVL XM PHYS/QHP 1ST HR: CPT

## 2022-09-12 PROCEDURE — 99233 SBSQ HOSP IP/OBS HIGH 50: CPT

## 2022-09-12 RX ORDER — TRAMADOL HYDROCHLORIDE 50 MG/1
50 TABLET ORAL EVERY 6 HOURS
Refills: 0 | Status: DISCONTINUED | OUTPATIENT
Start: 2022-09-12 | End: 2022-09-13

## 2022-09-12 RX ORDER — POTASSIUM CHLORIDE 20 MEQ
40 PACKET (EA) ORAL EVERY 4 HOURS
Refills: 0 | Status: COMPLETED | OUTPATIENT
Start: 2022-09-12 | End: 2022-09-12

## 2022-09-12 RX ORDER — TRAMADOL HYDROCHLORIDE 50 MG/1
25 TABLET ORAL EVERY 6 HOURS
Refills: 0 | Status: DISCONTINUED | OUTPATIENT
Start: 2022-09-12 | End: 2022-09-13

## 2022-09-12 RX ADMIN — Medication 650 MILLIGRAM(S): at 06:36

## 2022-09-12 RX ADMIN — Medication 40 MILLIEQUIVALENT(S): at 11:44

## 2022-09-12 RX ADMIN — Medication 40 MILLIEQUIVALENT(S): at 17:01

## 2022-09-12 RX ADMIN — Medication 650 MILLIGRAM(S): at 05:38

## 2022-09-12 RX ADMIN — Medication 50 MILLIGRAM(S): at 21:35

## 2022-09-12 RX ADMIN — NYSTATIN CREAM 1 APPLICATION(S): 100000 CREAM TOPICAL at 17:04

## 2022-09-12 RX ADMIN — ENOXAPARIN SODIUM 40 MILLIGRAM(S): 100 INJECTION SUBCUTANEOUS at 05:38

## 2022-09-12 RX ADMIN — Medication 9 MILLIGRAM(S): at 21:35

## 2022-09-12 RX ADMIN — Medication 81 MILLIGRAM(S): at 11:44

## 2022-09-12 RX ADMIN — Medication 0.5 MILLIGRAM(S): at 21:35

## 2022-09-12 RX ADMIN — LOSARTAN POTASSIUM 50 MILLIGRAM(S): 100 TABLET, FILM COATED ORAL at 21:35

## 2022-09-12 RX ADMIN — Medication 1 TABLET(S): at 11:45

## 2022-09-12 RX ADMIN — ATORVASTATIN CALCIUM 40 MILLIGRAM(S): 80 TABLET, FILM COATED ORAL at 21:35

## 2022-09-12 RX ADMIN — Medication 5 MILLIGRAM(S): at 05:39

## 2022-09-12 RX ADMIN — NYSTATIN CREAM 1 APPLICATION(S): 100000 CREAM TOPICAL at 05:43

## 2022-09-12 RX ADMIN — FAMOTIDINE 40 MILLIGRAM(S): 10 INJECTION INTRAVENOUS at 11:44

## 2022-09-12 NOTE — PROGRESS NOTE ADULT - ASSESSMENT
62 yo F w/ HTN, migraine, TIA (not on ASA and statin), presented w/ HA, word finding difficulties, and n/v, admitted as a code stroke, carotid dissection in left ICA on imaging, and CT/MRI head suggestive of PRES.  Admitted to [facility] on 9/6/2022.    # Acute metabolic encephalopathy complicated by aphasia, likely due to   # PRES  # Headache  - Repeat MRI head in 1 mo (9/30/2022)  - ASA and statin  - PT/OT/Speech therapy  - pain control & bowel regimen per PMR  - BP control as below    # HTN  #orthostasis  SBP goal 100-160.   - losartan 50mg qhs   - TEDS / abd binder  - encourage PO fluids  - DASH diet.    #Diarrhea  - post laxative  - add probiotic    # Abdominal hematoma from lovenox inj  - will monitor sites closely and h/h. Currently only subcutaneous.   - rotate lovenox inj sites.    # Depression  recently on benzos for anxiety following death of brother. Not on SSRI.    # Prolonged QTc  - noted to have a QTc of 481 on 9/1. EKG repeated on 9/7 and it is 470.   - avoid QTc prolonging meds, can also give oral ativan for breakthrough nausea.   - will monitor QTc periodically.    # thrombocytosis  - trend platelets.    # Hx of migraines  - per d/c summary, avoid triptans.  - OP f/u with Neurology for migraine therapy.   - can consider consult to Dr. López for botox injections.  - consider trial of gabapentin for severe headaches.    #VTE ppx:   -lovenox, no evidence of anemia. 60 yo F w/ HTN, migraine, TIA (not on ASA and statin), presented w/ HA, word finding difficulties, and n/v, admitted as a code stroke, carotid dissection in left ICA on imaging, and CT/MRI head suggestive of PRES.  Admitted to [facility] on 9/6/2022.    # Acute metabolic encephalopathy complicated by aphasia, likely due to   # PRES  # Headache  - Repeat MRI head in 1 mo (9/30/2022)  - ASA and statin  - PT/OT/Speech therapy  - pain control & bowel regimen per PMR  - BP control as below    # HTN  #orthostasis  SBP goal 100-160.   - losartan 50mg qhs w/ holding parameters  - TEDS / abd binder  - encourage PO fluids  - DASH diet.  - will hold compazine in favor of ativan as it has been cited to cause orthostatic hypotension.    #Diarrhea  - post laxative  - add probiotic  - replete KCl as below.     # Abdominal hematoma from lovenox inj  - will monitor sites closely and h/h. Currently only subcutaneous.   - rotate lovenox inj sites.    # Depression  recently on benzos for anxiety following death of brother. Not on SSRI.    # Prolonged QTc  - noted to have a QTc of 481 on 9/1. EKG repeated on 9/7 and it is 470.   - avoid QTc prolonging meds, can also give oral ativan for breakthrough nausea.   - will obtain updated EKG today to check QTc  - optimize electrolytes    # thrombocytosis  - trend platelets.    # Hx of migraines  - per d/c summary, avoid triptans.  - OP f/u with Neurology for migraine therapy.   - can consider consult to Dr. López for botox injections.  - pt started on topiramate.  - consider trial of gabapentin for severe headaches.    #VTE ppx:   -lovenox, no evidence of anemia. 62 yo F w/ HTN, migraine, TIA (not on ASA and statin), presented w/ HA, word finding difficulties, and n/v, admitted as a code stroke, carotid dissection in left ICA on imaging, and CT/MRI head suggestive of PRES.  Admitted to [facility] on 9/6/2022.    # Acute metabolic encephalopathy complicated by aphasia, likely due to   # PRES  # Headache  - Repeat MRI head in 1 mo (9/30/2022)  - ASA and statin  - PT/OT/Speech therapy  - pain control & bowel regimen per PMR  - BP control as below    # HTN  #orthostasis  SBP goal 100-160.   - losartan 50mg qhs w/ holding parameters  - TEDS / abd binder  - encourage PO fluids  - DASH diet.  - will hold compazine in favor of ativan as it has been cited to cause orthostatic hypotension. d/w Dr. Bryson.    #Diarrhea  - post laxative  - add probiotic  - replete KCl as below.     # Abdominal hematoma from lovenox inj  - will monitor sites closely and h/h. Currently only subcutaneous.   - rotate lovenox inj sites.    # Depression  recently on benzos for anxiety following death of brother. Not on SSRI.    # Prolonged QTc  - noted to have a QTc of 481 on 9/1. EKG repeated on 9/7 and it is 470.   - avoid QTc prolonging meds, can also give oral ativan for breakthrough nausea.   - will obtain updated EKG today to check QTc  - optimize electrolytes    # thrombocytosis  - trend platelets.    # Hx of migraines  - per d/c summary, avoid triptans.  - OP f/u with Neurology for migraine therapy.   - can consider consult to Dr. López for botox injections.  - pt started on topiramate.  - consider trial of gabapentin for severe headaches.    #VTE ppx:   -lovenox, no evidence of anemia. 60 yo F w/ HTN, migraine, TIA (not on ASA and statin), presented w/ HA, word finding difficulties, and n/v, admitted as a code stroke, carotid dissection in left ICA on imaging, and CT/MRI head suggestive of PRES.  Admitted to [facility] on 9/6/2022.    # Acute metabolic encephalopathy complicated by aphasia, likely due to   # PRES  # Headache  - Repeat MRI head in 1 mo (9/30/2022)  - ASA and statin  - PT/OT/Speech therapy  - pain control & bowel regimen per PMR  - BP control as below    # HTN  #orthostasis  SBP goal 100-160.   - losartan 50mg qhs w/ holding parameters  - TEDS / abd binder  - encourage PO fluids  - DASH diet.  - will hold compazine in favor of ativan as it has been cited to cause orthostatic hypotension. d/w Dr. Bryson.    #Diarrhea  - post laxative  - add probiotic  - replete KCl as below.     # Abdominal hematoma from lovenox inj  - will monitor sites closely and h/h. Currently only subcutaneous.   - rotate lovenox inj sites.    # Depression  recently on benzos for anxiety following death of brother. Not on SSRI.    # Prolonged QTc  - noted to have a QTc of 481 on 9/1. EKG repeated on 9/7 and it is 470.   - avoid QTc prolonging meds, can also give oral ativan for breakthrough nausea.   - will obtain updated EKG today to check QTc  - optimize electrolytes    # thrombocytosis  - trend platelets.    # Hx of migraines  - per d/c summary, avoid triptans.  - OP f/u with Neurology for migraine therapy.   - can consider consult to Dr. López for botox injections.  - pt started on topiramate.  - consider trial of gabapentin for severe headaches.    #VTE ppx:   -lovenox, no evidence of anemia.    Complex MDM: Reviewed Outpatient medical record- home meds, discussed w/ dr. bryson, new labwork today reviewed, EKG, reviewed 9/9 [facility]

## 2022-09-12 NOTE — PROGRESS NOTE ADULT - SUBJECTIVE AND OBJECTIVE BOX
Subjective:  Patient was seen and examined at the bedside this AM. No acute overnight events.     Was able to sleep well Saturday and  night with combination of Topamax and Ativan, which provided symptomatic relief of nausea and HAs. Reports compazine and Tylenol this AM also helped with initial HA and nausea from waking up. She was seen sitting up at her chair with SLP during rounds this AM and was tolerating therapy and sitting up well. She was orthostatic this morning after having just woken up, but symptoms resolved with PRN medications; later on able to better tolerate sitting. When talking about her difficulties, especially with speaking, became tearful. Reports mild HA, mild nausea, and mild lightheadedness.     Overall, general disposition appears improved compared to last week. Able to tolerate bright lights, sitting up, and was able to participate in ambulation with PT this AM.       ROS:  (+) Orthostatic, (+) nausea, (+) HA, (+) lightheadedness. Slept well.       Physical Exam:  Vital Signs Last 24 Hrs  T(C): 36.5 (12 Sep 2022 09:15), Max: 36.7 (11 Sep 2022 22:00)  T(F): 97.7 (12 Sep 2022 09:15), Max: 98.1 (11 Sep 2022 22:00)  HR: 82 (12 Sep 2022 09:15) (82 - 98)  BP: 116/71 (12 Sep 2022 09:15) (116/71 - 159/91)  BP(mean): --  RR: 14 (12 Sep 2022 09:15) (14 - 16)  SpO2: 99% (12 Sep 2022 09:15) (96% - 99%)    Parameters below as of 12 Sep 2022 09:15  Patient On (Oxygen Delivery Method): room air      Physical Exam:  Constitutional - NAD, Comfortable  HEENT - NCAT, EOMI  Chest - good chest expansion, good respiratory effort on RA  Cardio - warm and well perfused, no LE edema  Abdomen -  Soft, NTND  Extremities - No peripheral edema, No calf tenderness   Neurologic Exam:                    Cognitive -             Orientation: A&O x4     Speech - Fluent, Comprehensible, No dysarthria, Mild anomic aphasia      Sensory - Intact to LT bilateral     Motor - moving all extremities spontaneously  Psychiatric - Tearful this AM        Recent Labs/Imagin.4   9.39  )-----------( 461      ( 12 Sep 2022 07:17 )             42.0     09-12    136  |  100  |  12  ----------------------------<  111<H>  3.3<L>   |  28  |  0.92    Ca    9.1      12 Sep 2022 07:17      Medications:  acetaminophen     Tablet .. 650 milliGRAM(s) Oral every 6 hours PRN  aspirin enteric coated 81 milliGRAM(s) Oral daily  atorvastatin 40 milliGRAM(s) Oral at bedtime  enoxaparin Injectable 40 milliGRAM(s) SubCutaneous every 24 hours  famotidine    Tablet 40 milliGRAM(s) Oral daily  lactobacillus acidophilus 1 Tablet(s) Oral daily  losartan 50 milliGRAM(s) Oral at bedtime  melatonin 9 milliGRAM(s) Oral at bedtime  nystatin Powder 1 Application(s) Topical two times a day  polyethylene glycol 3350 17 Gram(s) Oral two times a day PRN  potassium chloride    Tablet ER 40 milliEquivalent(s) Oral every 4 hours  topiramate 50 milliGRAM(s) Oral at bedtime  traMADol 50 milliGRAM(s) Oral every 6 hours PRN  traMADol 25 milliGRAM(s) Oral every 6 hours PRN

## 2022-09-12 NOTE — CONSULT NOTE ADULT - ASSESSMENT
Assessment:    FIM scores: Social Interaction ; Problem Solving ; Memory .  Plan: Individual supportive psychotherapy to monitor cognition, affect/mood, and behavior. Cognitive remediation during speech therapy sessions. Participation in recreation/art therapy in order to have pleasure and mastery experiences and regain/reestablish a sense of routine.    Time spent with Pt,  minutes.   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 Pt exhibits mild difficulties with verbal fluency/processing speed and problem solving, and moderate difficulties with visuomotor integration. She no longer experiences psychotic symptoms (delusions) and neurological deficits (? prosopagnosia) she was experiencing while at the hospital. Pt's affect is depressed and anxious, and she reports numerous anxiety and depression symptoms as were listed above in response to her current medical condition. FIM scores: Social Interaction 4; Problem Solving 5; Memory 6.  Plan: Individual supportive psychotherapy to monitor cognition, affect/mood, and behavior. Pt might benefit from antidepressant medication, especially if she continues experiencing anxiety and depressive symptoms over the course of the next week. Anxiolytic medication can remain available as indicated to help Pt if/when anxiety becomes unbearable. Cognitive remediation during speech therapy sessions is recommended. Participation in recreation/art therapy in order to have pleasure and mastery experiences and regain/reestablish a sense of routine.  Time spent with Pt, 50 minutes.

## 2022-09-12 NOTE — PROGRESS NOTE ADULT - ASSESSMENT
EZRA MARIE is a 62yo Female with PMHx of HTN, migraines, and TIA (last seen at [facility] in 11/2021), who presented to [facility] on 08/31/2022 with HA, nausea, vomiting, and word-finding difficulties; was found to have findings on CT and MRI head consistent with PRES.  Admitted for multidisciplinary rehab program with Aphasia, gait and ADL impairment.  ------------------------------------------------------------------  #Comprehensive Multidisciplinary Rehab Program:  - Gait, ADL, Functional impairments  - PT/OT/ SLP 3 hours a day 5 days a week    #PRES, unclear etiology  - suggestive based on findings on MRI and CT head: hyperintense signal to left occipital lobe and right cerebellum. No enhancement observed with IV contrast.   - repeat MRI brain in 1 month (09/30/2022)  - c/w ASA 81mg daily  - Repeat CT Head stable    #Orthostatic Hypotension  #HTN  - decrease Losartan to 25mg qhs--holding parameter for SBP <120 sitting; discussed with hospitalist 9/8  --Losartan increased to 50mg qHS, per hospitalist 9/9  - for associated nausea, may spot dose Ativan. No Zofran or Reglan, as QTc prolonged  - TEDs, and Abdominal binder ordered  - continue to monitor orthostatics    #Prolonged QTc  - avoid QTc prolonging medications  - repeat ECG today    #HLD  - c/w Atorvastatin 40mg qhs    #Pain:  - Tylenol PRN, Tramadol PRN  - Avoid sedating meds that may affect cognitive recovery    #Headaches  --Topamax 50mg qHS  --Repeat CT Head stable 9/9  -- Warm compress     #Sleep:  - Maintain quiet hours and low stim environment  - Melatonin increased from 6mg to 9mg 9/9  - Topamax 25mg qHS.     #GI/Bowel:  - DC Senna 2 tabs qhs and continue Miralax PRN  - Compazine 5 mg dc'ed; Ativan PRN for nausea  - Pepcid for reflux added 9/9    #/Bladder:  --continent  --voiding with low PVR--    #Diet :    - Diet: Regular with thins    #Skin/ Pressure Injury Prevention:  - assessment on admission --erythema under left breast--Nystatin ordered  - Turn Q2hrs in bed while awake, OOB to Chair, PT/OT/SLP     #DVT prophylaxis:  - Lovenox    #Precautions/ Restrictions  - Falls  - COVID PCR: 9/5    #Dispo: IDR 09/08/2022  - Nursing: continent of bowel and bladder  - SW: Lives in  with  and children. Has 5 SAI; split-level home with 6 steps to bedroom and 6 steps to kitchen  - OT: supervision/set-up with ADLs and CG functional transfers  - PT: ambulates 50' RW with min assist; CG transfers; 4 steps min assist  - SLP: Regular with thins diet; Has mild apraxia and anomic aphasia, Mild-Mod Cognitive deficits  - Team Goals: (1) Ambulate to bedroom Ivonne. (2) Express complex ideas. (3) Sequence higher-level tasks  - TDD: 9/17 to home    --------------------------------------------  Outpatient Follow up:  Omar Dominguez (DO)  Neurology; Vascular Neurology  [address], [address]  [place] [address]  Phone: [phone]  Fax: [phone]  Follow Up Time: 2 weeks    Gabriele Olivier (DO)  Cardiology; Internal Medicine  [address], [address]  [place] [address]  Phone: [phone]  Fax: [phone]  Follow Up Time: 1 month    Froy Craig (DO)  Medicine  [address], [address]  [place] [address]  Phone: [phone]  Fax: [phone]  Follow Up Time: Routine  --------------------------------------------   EZRA MARIE is a 60yo Female with PMHx of HTN, migraines, and TIA (last seen at [facility] in 11/2021), who presented to  on 08/31/2022 with HA, nausea, vomiting, and word-finding difficulties; was found to have findings on CT and MRI head consistent with PRES.  Admitted for multidisciplinary rehab program with Aphasia, gait and ADL impairment.  ------------------------------------------------------------------  #Comprehensive Multidisciplinary Rehab Program:  - Gait, ADL, Functional impairments  - PT/OT/ SLP 3 hours a day 5 days a week    #PRES, unclear etiology  - suggestive based on findings on MRI and CT head: hyperintense signal to left occipital lobe and right cerebellum. No enhancement observed with IV contrast.   - repeat MRI brain in 1 month (09/30/2022)  - c/w ASA 81mg daily  - Repeat CT Head stable    #Orthostatic Hypotension  #HTN  -fluctuating BP readings-- Nursing order for manual BPs only to improve accuracy-  --cont. Losartan 50mg qHS, per hospitalist 9/9--Holding parameter for Sitting SBP <120  - TEDs, and Abdominal binder ordered  - continue to monitor orthostatics    #Prolonged QTc  - avoid QTc prolonging medications--reglan, zofran or prochlorperazine  - repeat ECG today    #HLD  - c/w Atorvastatin 40mg qhs    #Pain:  - Tylenol PRN, Tramadol PRN  - Avoid sedating meds that may affect cognitive recovery    #Headaches  --Topamax 50mg qHS  --Ativan 0.5mg q.8h PRN for nausea  --Repeat CT Head stable 9/9  -- Warm compress     #Sleep:  - Maintain quiet hours and low stim environment  - cont. Melatonin 9mg 9/9  - Topamax 25mg qHS.   --sleeping better    #GI/Bowel:  -  continue Miralax PRN  - Compazine 5 mg dc'ed; Ativan PRN for nausea  - Pepcid for reflux added 9/9    #/Bladder:  --continent  --voiding with low PVR--    #Diet :    - Diet: Regular with thins    #Skin/ Pressure Injury Prevention:  - assessment on admission --erythema under left breast--Nystatin ordered  - Turn Q2hrs in bed while awake, OOB to Chair, PT/OT/SLP     #DVT prophylaxis:  - Lovenox    #Precautions/ Restrictions  - Falls  - COVID PCR: 9/5    #Dispo: IDR 09/08/2022  - Nursing: continent of bowel and bladder  - SW: Lives in  with  and children. Has 5 SAI; split-level home with 6 steps to bedroom and 6 steps to kitchen  - OT: supervision/set-up with ADLs and CG functional transfers  - PT: ambulates 50' RW with min assist; CG transfers; 4 steps min assist  - SLP: Regular with thins diet; Has mild apraxia and anomic aphasia, Mild-Mod Cognitive deficits  - Team Goals: (1) Ambulate to bedroom Ivonne. (2) Express complex ideas. (3) Sequence higher-level tasks  - TDD: 9/17 to home    --------------------------------------------  Outpatient Follow up:  Omar Dominguez (DO)  Neurology; Vascular Neurology  [address], [address]  [place] [address]  Phone: [phone]  Fax: [phone]  Follow Up Time: 2 weeks    Gabriele Olivier (DO)  Cardiology; Internal Medicine  [address], [address]  [place] [address]  Phone: [phone]  Fax: [phone]  Follow Up Time: 1 month    Froy Craig (DO)  Medicine  [address], [address]  [place] [address]  Phone: [phone]  Fax: [phone]  Follow Up Time: Routine  --------------------------------------------

## 2022-09-12 NOTE — CONSULT NOTE ADULT - SUBJECTIVE AND OBJECTIVE BOX
Pt is a 62 y/o right-handed female with PMHx of HTN, migraines, and TIA (last seen at [facility] in 11/2021), who presented to [facility] on 08/31/2022 with HA, nausea, vomiting, and word-finding difficulties; was found to have findings on CT and MRI head consistent with PRES. No tPA was given, as she presented outside of therapeutic window and LVO not performed, as no LVO was identified on CT angio head/neck. Pt was followed by neurology and cardiology during her admission. TTE and EEG were unremarkable; started on ASA. Course c/b hyponatremia, likely SIADH now resolved. Pt was evaluated by PM&R and therapy for functional deficits and gait/ ADL impairments and recommended acute rehabilitation. Pt was medically optimized for discharge to [facility] on 09/06/2022. Pt was seen and examined at the bedside upon arrival. Currently endorses 3-4/10 HA and anxiety. Reports that while ambulating, experiences blurry vision, dizziness, and lightheadedness. VS on admission unremarkable. PMHx: As noted above Current meds: Please see list in Pt’s chart. Social Hx:   Findings: Pt was seen for an initial assessment of her cognitive and emotional functioning. The Modified MMSE (3MS) was administered; Pt’s results (/100) were in the range. Her scores in geriatric mood measures suggested levels of anxiety and depression (GAD7 = /21; PHQ9 = /27). Pt was alert,  Ox3, and cooperative.  Attn/Conc: Simple auditory attention - .  Concentration/Working memory for reversed counting and spelling – (/7). Language: Pt’s speech was of  . Naming - . Sentence repetition - . Auditory Comprehension - . Reading - . Writing - . Memory: Encoding of 3 words was (/3); short-delayed verbal recall – (/3, improving to /3 with cueing); long-delayed verbal recall – (/3, improving to /3 with cueing). LTM was for US presidents (/4, improving to /4 with cueing). Visual memory –. Visuospatial: Visuomotor integration – for copy of a 2D figure, was noted. Executive Functions: Motor Planning - . Organizational skills - . Abstract reasoning - . Initiation/Phonemic Fluency - .Verbal problem solving – .   Emotional functioning: Affect - 	. Mood - ; Pt reported experiencing . On mood measures s/he additionally reported .  Thought processes were.  No abnormal thought contents were observed.  Pt denied any AH/VH. Pt also denied SI/HI/I/P. Insight - WFL. Judgment - fair.   Pt is a 60 y/o right-handed female with PMHx of HTN, migraines, and TIA (last seen at [facility] in 11/2021), who presented to [facility] on 08/31/2022 with HA, nausea, vomiting, and word-finding difficulties; was found to have findings on CT and MRI head consistent with PRES. No tPA was given, as she presented outside of therapeutic window and LVO not performed, as no LVO was identified on CT angio head/neck. Pt was followed by neurology and cardiology during her admission. TTE and EEG were unremarkable; started on ASA. Course c/b hyponatremia, likely SIADH now resolved. Pt was evaluated by PM&R and therapy for functional deficits and gait/ ADL impairments and recommended acute rehabilitation. Pt was medically optimized for discharge to [facility] on 09/06/2022. Pt was seen and examined at the bedside upon arrival. Currently endorses 3-4/10 HA and anxiety. Reports that while ambulating, experiences blurry vision, dizziness, and lightheadedness. VS on admission unremarkable. PMHx: As noted above Current meds: Please see list in Pt’s chart. Social Hx: Pt is  and has two adult children. Pt graduated from nursing school, earned  two masters degrees (nursing and hospital administration), and holds  position in [employer]. She lacks hx of mental illness and substance abuse. Pt is [employer]. Sh enjoys riding on her bicycle.    Findings: Pt was seen for an initial assessment of her cognitive and emotional functioning. The Modified MMSE (3MS) was administered; Pt’s results (95/100) were in the Normal range. Her scores in mood measures suggested moderate levels of anxiety and depression (GAD7 = 13/21; PHQ9 = 18/27). Pt was alert, fully Ox3, as well as cooperative and friendly. Attn/Conc: Simple auditory attention - intact.  Concentration/Working memory for reversed counting and spelling – intact (7/7, but somewhat slow). Language: Pt’s speech was of normal volume, pitch and pace. Naming - intact. Sentence repetition - intact. Auditory Comprehension - intact. Reading - intact. Writing - intact. Memory: Encoding of 3 words was intact (3/3); short- and long-delayed verbal recall – both intact (3/3). LTM was mildly impaired for US presidents (3/4, improving to 4/4 with cueing). Visual memory – intact. Visuospatial: Visuomotor integration – moderately impaired for copy of a 2D figure, distortion was noted. Executive Functions: Motor Planning - intact. Organizational skills - intact. Abstract reasoning - mildly impaired (5/6). Initiation/Phonemic Fluency - mildly impaired (9/10).Verbal problem solving – closely intact. Emotional functioning: Affect - depressed, anxious but responsive to humor. Mood - euthymic ("good"); Pt reported experiencing sad mood, anxiety, helplessness/hopelessness, poor sleep, decreased appetite, low energy and fatigue. On mood measures she additionally reported depressed mood, poor sleep, fatigue/low energy, low self-esteem, poor concentration, psychomotor retardation, anxiety, worry, difficulty relaxing, restlessness, irritability and catastrophization during the past two weeks.  Thought processes were goal-directed.  No abnormal thought contents were observed; she reported having paranoid/delusional ideation while at the hospital, fearing she would be poisoned.  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 Pt also denied SI/HI/I/P. Insight - WFL. Judgment - closely intact.

## 2022-09-12 NOTE — PROGRESS NOTE ADULT - SUBJECTIVE AND OBJECTIVE BOX
Patient is a 61y old  Female who presents with a chief complaint of PRES (11 Sep 2022 11:23)      24 HOUR EVENTS:  No overnight events reported.     SUBJECTIVE:  Patient seen and examined at bedside.     ALLERGIES:  No Known Allergies    MEDICATIONS  (STANDING):  aspirin enteric coated 81 milliGRAM(s) Oral daily  atorvastatin 40 milliGRAM(s) Oral at bedtime  enoxaparin Injectable 40 milliGRAM(s) SubCutaneous every 24 hours  famotidine    Tablet 40 milliGRAM(s) Oral daily  lactobacillus acidophilus 1 Tablet(s) Oral daily  losartan 50 milliGRAM(s) Oral at bedtime  melatonin 9 milliGRAM(s) Oral at bedtime  nystatin Powder 1 Application(s) Topical two times a day  potassium chloride    Tablet ER 40 milliEquivalent(s) Oral every 4 hours  topiramate 50 milliGRAM(s) Oral at bedtime    MEDICATIONS  (PRN):  acetaminophen     Tablet .. 650 milliGRAM(s) Oral every 6 hours PRN Temp greater or equal to 38C (100.4F), Mild Pain (1 - 3)  polyethylene glycol 3350 17 Gram(s) Oral two times a day PRN Constipation  prochlorperazine   Tablet 5 milliGRAM(s) Oral every 6 hours PRN Nausea  traMADol 50 milliGRAM(s) Oral every 6 hours PRN Severe Pain (7 - 10)  traMADol 25 milliGRAM(s) Oral every 6 hours PRN Moderate Pain (4 - 6)    Vital Signs Last 24 Hrs  T(F): 98.1 (11 Sep 2022 22:00), Max: 98.1 (11 Sep 2022 22:00)  HR: 98 (11 Sep 2022 22:00) (98 - 98)  BP: 159/91 (11 Sep 2022 22:00) (159/91 - 159/91)  RR: 16 (11 Sep 2022 22:00) (16 - 16)  SpO2: 96% (11 Sep 2022 22:00) (96% - 96%)  I&O's Summary    PHYSICAL EXAM:  General: NAD, A/O x 3  ENT: Moist mucous membranes, no thrush  Neck: Supple, No JVD  Lungs: Clear to auscultation bilaterally, good air entry, non-labored breathing  Cardio: RRR, S1/S2, No murmur  Abdomen: Soft, Nontender, Nondistended; Bowel sounds present  Extremities: No calf tenderness, No pitting edema    LABS:                        14.4   9.39  )-----------( 461      ( 12 Sep 2022 07:17 )             42.0     09-12    136  |  100  |  12  ----------------------------<  111  3.3   |  28  |  0.92    Ca    9.1      12 Sep 2022 07:17                      09-01 Chol 224 mg/dL LDL -- HDL 63 mg/dL Trig 112 mg/dL        COVID-19 PCR: NotDetec (09-06-22 @ 18:30)  COVID-19 PCR: NotDetec (09-05-22 @ 05:57)  COVID-19 PCR: NotDetec (09-03-22 @ 06:40)  COVID-19 PCR: NotDetec (08-31-22 @ 09:11)    RADIOLOGY & ADDITIONAL TESTS:    Care Discussed with Consultants/Other Providers:    Patient is a 61y old  Female who presents with a chief complaint of PRES (11 Sep 2022 11:23)      24 HOUR EVENTS:  No overnight events reported.     SUBJECTIVE:  Patient seen and examined at bedside. She reports that headache is better with topamax. Still having word finding difficult. Denies new Neurologic issues.  Nausea is better. Reports concern over orthostasis.    ALLERGIES:  No Known Allergies    MEDICATIONS  (STANDING):  aspirin enteric coated 81 milliGRAM(s) Oral daily  atorvastatin 40 milliGRAM(s) Oral at bedtime  enoxaparin Injectable 40 milliGRAM(s) SubCutaneous every 24 hours  famotidine    Tablet 40 milliGRAM(s) Oral daily  lactobacillus acidophilus 1 Tablet(s) Oral daily  losartan 50 milliGRAM(s) Oral at bedtime  melatonin 9 milliGRAM(s) Oral at bedtime  nystatin Powder 1 Application(s) Topical two times a day  potassium chloride    Tablet ER 40 milliEquivalent(s) Oral every 4 hours  topiramate 50 milliGRAM(s) Oral at bedtime    MEDICATIONS  (PRN):  acetaminophen     Tablet .. 650 milliGRAM(s) Oral every 6 hours PRN Temp greater or equal to 38C (100.4F), Mild Pain (1 - 3)  polyethylene glycol 3350 17 Gram(s) Oral two times a day PRN Constipation  prochlorperazine   Tablet 5 milliGRAM(s) Oral every 6 hours PRN Nausea  traMADol 50 milliGRAM(s) Oral every 6 hours PRN Severe Pain (7 - 10)  traMADol 25 milliGRAM(s) Oral every 6 hours PRN Moderate Pain (4 - 6)    Vital Signs Last 24 Hrs  T(F): 98.1 (11 Sep 2022 22:00), Max: 98.1 (11 Sep 2022 22:00)  HR: 98 (11 Sep 2022 22:00) (98 - 98)  BP: 159/91 (11 Sep 2022 22:00) (159/91 - 159/91)  RR: 16 (11 Sep 2022 22:00) (16 - 16)  SpO2: 96% (11 Sep 2022 22:00) (96% - 96%)  I&O's Summary    PHYSICAL EXAM:  General: NAD, A/O x 3  ENT: Moist mucous membranes, no thrush  Neck: Supple, No JVD  Lungs: Clear to auscultation bilaterally, good air entry, non-labored breathing  Cardio: RRR, S1/S2, No murmur  Abdomen: Soft, Nontender, Nondistended; Bowel sounds present  Extremities: No calf tenderness, No pitting edema    LABS:                        14.4   9.39  )-----------( 461      ( 12 Sep 2022 07:17 )             42.0     09-12    136  |  100  |  12  ----------------------------<  111  3.3   |  28  |  0.92    Ca    9.1      12 Sep 2022 07:17                      09-01 Chol 224 mg/dL LDL -- HDL 63 mg/dL Trig 112 mg/dL        COVID-19 PCR: NotDetec (09-06-22 @ 18:30)  COVID-19 PCR: NotDetec (09-05-22 @ 05:57)  COVID-19 PCR: NotDetec (09-03-22 @ 06:40)  COVID-19 PCR: NotDetec (08-31-22 @ 09:11)    RADIOLOGY & ADDITIONAL TESTS:    Care Discussed with Consultants/Other Providers:    Patient is a 61y old  Female who presents with a chief complaint of PRES (11 Sep 2022 11:23)      24 HOUR EVENTS:  No overnight events reported.     SUBJECTIVE:  Patient seen and examined at bedside. She reports that headache is better with topamax. Still having word finding difficult. Denies new Neurologic issues.  Nausea is better. Reports concern over orthostasis.    ALLERGIES:  No Known Allergies    MEDICATIONS  (STANDING):  aspirin enteric coated 81 milliGRAM(s) Oral daily  atorvastatin 40 milliGRAM(s) Oral at bedtime  enoxaparin Injectable 40 milliGRAM(s) SubCutaneous every 24 hours  famotidine    Tablet 40 milliGRAM(s) Oral daily  lactobacillus acidophilus 1 Tablet(s) Oral daily  losartan 50 milliGRAM(s) Oral at bedtime  melatonin 9 milliGRAM(s) Oral at bedtime  nystatin Powder 1 Application(s) Topical two times a day  potassium chloride    Tablet ER 40 milliEquivalent(s) Oral every 4 hours  topiramate 50 milliGRAM(s) Oral at bedtime    MEDICATIONS  (PRN):  acetaminophen     Tablet .. 650 milliGRAM(s) Oral every 6 hours PRN Temp greater or equal to 38C (100.4F), Mild Pain (1 - 3)  polyethylene glycol 3350 17 Gram(s) Oral two times a day PRN Constipation  prochlorperazine   Tablet 5 milliGRAM(s) Oral every 6 hours PRN Nausea  traMADol 50 milliGRAM(s) Oral every 6 hours PRN Severe Pain (7 - 10)  traMADol 25 milliGRAM(s) Oral every 6 hours PRN Moderate Pain (4 - 6)    Vital Signs Last 24 Hrs  T(F): 98.1 (11 Sep 2022 22:00), Max: 98.1 (11 Sep 2022 22:00)  HR: 98 (11 Sep 2022 22:00) (98 - 98)  BP: 159/91 (11 Sep 2022 22:00) (159/91 - 159/91)  RR: 16 (11 Sep 2022 22:00) (16 - 16)  SpO2: 96% (11 Sep 2022 22:00) (96% - 96%)  I&O's Summary    PHYSICAL EXAM:  General: NAD, A/O x 3  ENT: Moist mucous membranes, no thrush  Neck: Supple, No JVD  Lungs: Clear to auscultation bilaterally, good air entry, non-labored breathing  Cardio: RRR, S1/S2, No murmur  Abdomen: Soft, Nontender, Nondistended; Bowel sounds present  Extremities: No calf tenderness, No pitting edema    LABS:                        14.4   9.39  )-----------( 461      ( 12 Sep 2022 07:17 )             42.0     09-12    136  |  100  |  12  ----------------------------<  111  3.3   |  28  |  0.92    Ca    9.1      12 Sep 2022 07:17                      09-01 Chol 224 mg/dL LDL -- HDL 63 mg/dL Trig 112 mg/dL        COVID-19 PCR: NotDetec (09-06-22 @ 18:30)  COVID-19 PCR: NotDetec (09-05-22 @ 05:57)  COVID-19 PCR: NotDetec (09-03-22 @ 06:40)  COVID-19 PCR: NotDetec (08-31-22 @ 09:11)    RADIOLOGY & ADDITIONAL TESTS:  < from: CT Head No Cont (09.09.22 @ 18:00) >  IMPRESSION:  No significant interval change from CT head 8/31/2022 with patchy   hypoattenuation left greater than right occipital lobe. No acute   intracranial hemorrhage, extra-axial collection or hydrocephalus.    < end of copied text >      Care Discussed with Consultants/Other Providers:

## 2022-09-13 LAB
ALBUMIN SERPL ELPH-MCNC: 3.2 G/DL — LOW (ref 3.3–5)
ALP SERPL-CCNC: 111 U/L — SIGNIFICANT CHANGE UP (ref 40–120)
ALT FLD-CCNC: 17 U/L — SIGNIFICANT CHANGE UP (ref 10–45)
ANION GAP SERPL CALC-SCNC: 6 MMOL/L — SIGNIFICANT CHANGE UP (ref 5–17)
AST SERPL-CCNC: 26 U/L — SIGNIFICANT CHANGE UP (ref 10–40)
BILIRUB SERPL-MCNC: 0.3 MG/DL — SIGNIFICANT CHANGE UP (ref 0.2–1.2)
BUN SERPL-MCNC: 19 MG/DL — SIGNIFICANT CHANGE UP (ref 7–23)
CALCIUM SERPL-MCNC: 9.5 MG/DL — SIGNIFICANT CHANGE UP (ref 8.4–10.5)
CHLORIDE SERPL-SCNC: 99 MMOL/L — SIGNIFICANT CHANGE UP (ref 96–108)
CO2 SERPL-SCNC: 31 MMOL/L — SIGNIFICANT CHANGE UP (ref 22–31)
CREAT SERPL-MCNC: 0.84 MG/DL — SIGNIFICANT CHANGE UP (ref 0.5–1.3)
EGFR: 80 ML/MIN/1.73M2 — SIGNIFICANT CHANGE UP
GLUCOSE SERPL-MCNC: 109 MG/DL — HIGH (ref 70–99)
HCT VFR BLD CALC: 44.6 % — SIGNIFICANT CHANGE UP (ref 34.5–45)
HGB BLD-MCNC: 15.1 G/DL — SIGNIFICANT CHANGE UP (ref 11.5–15.5)
MCHC RBC-ENTMCNC: 32.3 PG — SIGNIFICANT CHANGE UP (ref 27–34)
MCHC RBC-ENTMCNC: 33.9 GM/DL — SIGNIFICANT CHANGE UP (ref 32–36)
MCV RBC AUTO: 95.3 FL — SIGNIFICANT CHANGE UP (ref 80–100)
NRBC # BLD: 0 /100 WBCS — SIGNIFICANT CHANGE UP (ref 0–0)
PLATELET # BLD AUTO: 521 K/UL — HIGH (ref 150–400)
POTASSIUM SERPL-MCNC: 3.9 MMOL/L — SIGNIFICANT CHANGE UP (ref 3.5–5.3)
POTASSIUM SERPL-SCNC: 3.9 MMOL/L — SIGNIFICANT CHANGE UP (ref 3.5–5.3)
PROT SERPL-MCNC: 7.9 G/DL — SIGNIFICANT CHANGE UP (ref 6–8.3)
RBC # BLD: 4.68 M/UL — SIGNIFICANT CHANGE UP (ref 3.8–5.2)
RBC # FLD: 11.1 % — SIGNIFICANT CHANGE UP (ref 10.3–14.5)
SARS-COV-2 RNA SPEC QL NAA+PROBE: SIGNIFICANT CHANGE UP
SODIUM SERPL-SCNC: 136 MMOL/L — SIGNIFICANT CHANGE UP (ref 135–145)
TROPONIN I, HIGH SENSITIVITY RESULT: 9.1 NG/L — SIGNIFICANT CHANGE UP
WBC # BLD: 10.57 K/UL — HIGH (ref 3.8–10.5)
WBC # FLD AUTO: 10.57 K/UL — HIGH (ref 3.8–10.5)

## 2022-09-13 PROCEDURE — 99232 SBSQ HOSP IP/OBS MODERATE 35: CPT

## 2022-09-13 PROCEDURE — 99233 SBSQ HOSP IP/OBS HIGH 50: CPT

## 2022-09-13 PROCEDURE — 74018 RADEX ABDOMEN 1 VIEW: CPT | Mod: 26

## 2022-09-13 PROCEDURE — 93010 ELECTROCARDIOGRAM REPORT: CPT

## 2022-09-13 RX ORDER — SENNA PLUS 8.6 MG/1
2 TABLET ORAL AT BEDTIME
Refills: 0 | Status: DISCONTINUED | OUTPATIENT
Start: 2022-09-13 | End: 2022-09-24

## 2022-09-13 RX ORDER — SIMETHICONE 80 MG/1
80 TABLET, CHEWABLE ORAL ONCE
Refills: 0 | Status: COMPLETED | OUTPATIENT
Start: 2022-09-13 | End: 2022-09-13

## 2022-09-13 RX ORDER — POLYETHYLENE GLYCOL 3350 17 G/17G
17 POWDER, FOR SOLUTION ORAL
Refills: 0 | Status: DISCONTINUED | OUTPATIENT
Start: 2022-09-13 | End: 2022-09-24

## 2022-09-13 RX ORDER — SODIUM CHLORIDE 9 MG/ML
500 INJECTION INTRAMUSCULAR; INTRAVENOUS; SUBCUTANEOUS ONCE
Refills: 0 | Status: DISCONTINUED | OUTPATIENT
Start: 2022-09-13 | End: 2022-09-13

## 2022-09-13 RX ORDER — POLYETHYLENE GLYCOL 3350 17 G/17G
17 POWDER, FOR SOLUTION ORAL DAILY
Refills: 0 | Status: DISCONTINUED | OUTPATIENT
Start: 2022-09-13 | End: 2022-09-13

## 2022-09-13 RX ORDER — TRAMADOL HYDROCHLORIDE 50 MG/1
25 TABLET ORAL EVERY 6 HOURS
Refills: 0 | Status: DISCONTINUED | OUTPATIENT
Start: 2022-09-13 | End: 2022-09-20

## 2022-09-13 RX ORDER — HYDRALAZINE HCL 50 MG
25 TABLET ORAL ONCE
Refills: 0 | Status: COMPLETED | OUTPATIENT
Start: 2022-09-13 | End: 2022-09-13

## 2022-09-13 RX ADMIN — Medication 1 TABLET(S): at 12:03

## 2022-09-13 RX ADMIN — SIMETHICONE 80 MILLIGRAM(S): 80 TABLET, CHEWABLE ORAL at 09:39

## 2022-09-13 RX ADMIN — Medication 650 MILLIGRAM(S): at 16:47

## 2022-09-13 RX ADMIN — NYSTATIN CREAM 1 APPLICATION(S): 100000 CREAM TOPICAL at 05:58

## 2022-09-13 RX ADMIN — TRAMADOL HYDROCHLORIDE 25 MILLIGRAM(S): 50 TABLET ORAL at 10:45

## 2022-09-13 RX ADMIN — Medication 50 MILLIGRAM(S): at 21:41

## 2022-09-13 RX ADMIN — Medication 25 MILLIGRAM(S): at 16:52

## 2022-09-13 RX ADMIN — Medication 0.5 MILLIGRAM(S): at 21:36

## 2022-09-13 RX ADMIN — Medication 9 MILLIGRAM(S): at 21:40

## 2022-09-13 RX ADMIN — TRAMADOL HYDROCHLORIDE 25 MILLIGRAM(S): 50 TABLET ORAL at 22:36

## 2022-09-13 RX ADMIN — TRAMADOL HYDROCHLORIDE 50 MILLIGRAM(S): 50 TABLET ORAL at 06:54

## 2022-09-13 RX ADMIN — LOSARTAN POTASSIUM 50 MILLIGRAM(S): 100 TABLET, FILM COATED ORAL at 21:42

## 2022-09-13 RX ADMIN — ENOXAPARIN SODIUM 40 MILLIGRAM(S): 100 INJECTION SUBCUTANEOUS at 05:55

## 2022-09-13 RX ADMIN — Medication 81 MILLIGRAM(S): at 12:03

## 2022-09-13 RX ADMIN — ATORVASTATIN CALCIUM 40 MILLIGRAM(S): 80 TABLET, FILM COATED ORAL at 21:42

## 2022-09-13 RX ADMIN — NYSTATIN CREAM 1 APPLICATION(S): 100000 CREAM TOPICAL at 17:39

## 2022-09-13 RX ADMIN — Medication 650 MILLIGRAM(S): at 17:50

## 2022-09-13 RX ADMIN — FAMOTIDINE 40 MILLIGRAM(S): 10 INJECTION INTRAVENOUS at 12:03

## 2022-09-13 RX ADMIN — TRAMADOL HYDROCHLORIDE 50 MILLIGRAM(S): 50 TABLET ORAL at 05:55

## 2022-09-13 RX ADMIN — POLYETHYLENE GLYCOL 3350 17 GRAM(S): 17 POWDER, FOR SOLUTION ORAL at 12:51

## 2022-09-13 RX ADMIN — TRAMADOL HYDROCHLORIDE 25 MILLIGRAM(S): 50 TABLET ORAL at 21:36

## 2022-09-13 RX ADMIN — TRAMADOL HYDROCHLORIDE 25 MILLIGRAM(S): 50 TABLET ORAL at 09:43

## 2022-09-13 RX ADMIN — SENNA PLUS 2 TABLET(S): 8.6 TABLET ORAL at 21:42

## 2022-09-13 NOTE — PROGRESS NOTE ADULT - SUBJECTIVE AND OBJECTIVE BOX
Patient is a 61y old  Female who presents with a chief complaint of PRES (12 Sep 2022 13:30)      24 HOUR EVENTS:  No overnight events reported.     SUBJECTIVE:  Patient seen and examined at bedside.     ALLERGIES:  No Known Allergies    MEDICATIONS  (STANDING):  aspirin enteric coated 81 milliGRAM(s) Oral daily  atorvastatin 40 milliGRAM(s) Oral at bedtime  enoxaparin Injectable 40 milliGRAM(s) SubCutaneous every 24 hours  famotidine    Tablet 40 milliGRAM(s) Oral daily  lactobacillus acidophilus 1 Tablet(s) Oral daily  losartan 50 milliGRAM(s) Oral at bedtime  melatonin 9 milliGRAM(s) Oral at bedtime  nystatin Powder 1 Application(s) Topical two times a day  senna 2 Tablet(s) Oral at bedtime  topiramate 50 milliGRAM(s) Oral at bedtime    MEDICATIONS  (PRN):  acetaminophen     Tablet .. 650 milliGRAM(s) Oral every 6 hours PRN Temp greater or equal to 38C (100.4F), Mild Pain (1 - 3)  LORazepam     Tablet 0.5 milliGRAM(s) Oral every 8 hours PRN Nausea and/or Vomiting  polyethylene glycol 3350 17 Gram(s) Oral daily PRN Constipation  traMADol 50 milliGRAM(s) Oral every 6 hours PRN Severe Pain (7 - 10)  traMADol 25 milliGRAM(s) Oral every 6 hours PRN Moderate Pain (4 - 6)    Vital Signs Last 24 Hrs  T(F): 97.5 (13 Sep 2022 08:24), Max: 97.9 (12 Sep 2022 20:30)  HR: 94 (13 Sep 2022 08:24) (91 - 94)  BP: 138/94 (13 Sep 2022 09:13) (138/78 - 160/90)  RR: 16 (13 Sep 2022 08:24) (16 - 16)  SpO2: 93% (13 Sep 2022 08:24) (93% - 99%)  I&O's Summary    12 Sep 2022 07:01  -  13 Sep 2022 07:00  --------------------------------------------------------  IN: 480 mL / OUT: 0 mL / NET: 480 mL      PHYSICAL EXAM:  General: NAD, A/O x 3  ENT: Moist mucous membranes, no thrush  Neck: Supple, No JVD  Lungs: Clear to auscultation bilaterally, good air entry, non-labored breathing  Cardio: RRR, S1/S2, No murmur  Abdomen: Soft, Nontender, Nondistended; Bowel sounds present  Extremities: No calf tenderness, No pitting edema    LABS:                        14.4   9.39  )-----------( 461      ( 12 Sep 2022 07:17 )             42.0     09-12    136  |  100  |  12  ----------------------------<  111  3.3   |  28  |  0.92    Ca    9.1      12 Sep 2022 07:17                      09-01 Chol 224 mg/dL LDL -- HDL 63 mg/dL Trig 112 mg/dL                      COVID-19 PCR: NotDetec (09-06-22 @ 18:30)  COVID-19 PCR: NotDetec (09-05-22 @ 05:57)  COVID-19 PCR: NotDetec (09-03-22 @ 06:40)  COVID-19 PCR: NotDetec (08-31-22 @ 09:11)    RADIOLOGY & ADDITIONAL TESTS:    Care Discussed with Consultants/Other Providers:    Patient is a 61y old  Female who presents with a chief complaint of PRES (12 Sep 2022 13:30)      24 HOUR EVENTS:  No overnight events reported.     SUBJECTIVE:  Patient seen and examined at bedside.   she reported orthostatic hypotension during therapy.  Nausea is a lot better - when she had it, ativan helped.  Headache is better w/ better control of her BP.  Told rehab team she had some abdominal discomfort.    ALLERGIES:  No Known Allergies    MEDICATIONS  (STANDING):  aspirin enteric coated 81 milliGRAM(s) Oral daily  atorvastatin 40 milliGRAM(s) Oral at bedtime  enoxaparin Injectable 40 milliGRAM(s) SubCutaneous every 24 hours  famotidine    Tablet 40 milliGRAM(s) Oral daily  lactobacillus acidophilus 1 Tablet(s) Oral daily  losartan 50 milliGRAM(s) Oral at bedtime  melatonin 9 milliGRAM(s) Oral at bedtime  nystatin Powder 1 Application(s) Topical two times a day  senna 2 Tablet(s) Oral at bedtime  topiramate 50 milliGRAM(s) Oral at bedtime    MEDICATIONS  (PRN):  acetaminophen     Tablet .. 650 milliGRAM(s) Oral every 6 hours PRN Temp greater or equal to 38C (100.4F), Mild Pain (1 - 3)  LORazepam     Tablet 0.5 milliGRAM(s) Oral every 8 hours PRN Nausea and/or Vomiting  polyethylene glycol 3350 17 Gram(s) Oral daily PRN Constipation  traMADol 50 milliGRAM(s) Oral every 6 hours PRN Severe Pain (7 - 10)  traMADol 25 milliGRAM(s) Oral every 6 hours PRN Moderate Pain (4 - 6)    Vital Signs Last 24 Hrs  T(F): 97.5 (13 Sep 2022 08:24), Max: 97.9 (12 Sep 2022 20:30)  HR: 94 (13 Sep 2022 08:24) (91 - 94)  BP: 138/94 (13 Sep 2022 09:13) (138/78 - 160/90)  RR: 16 (13 Sep 2022 08:24) (16 - 16)  SpO2: 93% (13 Sep 2022 08:24) (93% - 99%)  I&O's Summary    12 Sep 2022 07:01  -  13 Sep 2022 07:00  --------------------------------------------------------  IN: 480 mL / OUT: 0 mL / NET: 480 mL      PHYSICAL EXAM:  General: NAD, A/O x 3  ENT: Moist mucous membranes, no thrush  Neck: Supple, No JVD  Lungs: Clear to auscultation bilaterally, good air entry, non-labored breathing  Cardio: RRR, S1/S2, No murmur  Abdomen: Soft, Nontender, Nondistended; Bowel sounds present  Extremities: No calf tenderness, No pitting edema    LABS:                        14.4   9.39  )-----------( 461      ( 12 Sep 2022 07:17 )             42.0     09-12    136  |  100  |  12  ----------------------------<  111  3.3   |  28  |  0.92    Ca    9.1      12 Sep 2022 07:17                      09-01 Chol 224 mg/dL LDL -- HDL 63 mg/dL Trig 112 mg/dL                      COVID-19 PCR: NotDetec (09-06-22 @ 18:30)  COVID-19 PCR: NotDetec (09-05-22 @ 05:57)  COVID-19 PCR: NotDetec (09-03-22 @ 06:40)  COVID-19 PCR: NotDetec (08-31-22 @ 09:11)    RADIOLOGY & ADDITIONAL TESTS:    Care Discussed with Consultants/Other Providers:

## 2022-09-13 NOTE — CHART NOTE - NSCHARTNOTEFT_GEN_A_CORE
NP contacted by RN and therapy team for elevated resting blood pressure of 167/114.  Per therapist, pt seemed to be panicked after seeing elevated pressure reading and began complaining of associated chest pressure.  Patient evaluated by NP and hospitalist Dr. Kimball.  Manual bp sitting 184/110. Manual bp standing 160/104. She c/o dizziness, headache and epigastric pain.   Exam negative for pyelonephritis, suprapubic tenderness, acute cholecystitis.  EKG ordered-> revealed NSR with QTc of 451 (improving)  Labs: CBC, CMP, Troponin ordered. Awaiting results.  Daughter Erica made aware of recent status. NP contacted by RN and therapy team for elevated resting blood pressure of 167/114.  Per therapist, pt seemed to be panicked after seeing elevated pressure reading and began complaining of associated chest pressure.  Patient evaluated by NP and hospitalist Dr. Kimball.  Manual bp sitting 184/110. Manual bp standing 160/104. She c/o dizziness, headache and epigastric pain.   Exam negative for pyelonephritis, suprapubic tenderness, acute cholecystitis.  EKG ordered-> revealed NSR with QTc of 451 (improving)  Labs: CBC, CMP, Troponin ordered. Awaiting results.  Nephrology consulted for recs in regards to bp and orthostatic management.   Daughter Erica made aware of recent status.    Attending Dr. Duval made aware.

## 2022-09-13 NOTE — PROVIDER CONTACT NOTE (OTHER) - ASSESSMENT
pt, KK=825/100 manual, HR=87, Temp=97.9F (oral), RR=16, O2=97%. pt. complaining of headache and abd pain.

## 2022-09-13 NOTE — PROGRESS NOTE ADULT - SUBJECTIVE AND OBJECTIVE BOX
Subjective:  Patient was seen and examined at the bedside this AM. No acute overnight events. Admits that she had poor sleep overnight due to stomach pain.  She has a slight headache at this time, but admits that the intensity of her headaches is improving and becoming more tolerable. She feels nauseous, with abdominal pain which started overnight. She states that her pain starts in her stomach and radiates to her right side/flank area. Simethicone was ordered and she felt better after the medication. She is still experiencing orthostatic blood pressure. This AM her bps measured: 138/78 lying-> 128/84 sitting -> 112/78 standing. She complained of dizziness when moving position from laying to standing only.     ROS:  Denies CP, SOB, dysuria.       Physical Exam:  Vital Signs Last 24 Hrs  T(C): 36.5 (12 Sep 2022 09:15), Max: 36.7 (11 Sep 2022 22:00)  T(F): 97.7 (12 Sep 2022 09:15), Max: 98.1 (11 Sep 2022 22:00)  HR: 82 (12 Sep 2022 09:15) (82 - 98)  BP: 116/71 (12 Sep 2022 09:15) (116/71 - 159/91)  BP(mean): --  RR: 14 (12 Sep 2022 09:15) (14 - 16)  SpO2: 99% (12 Sep 2022 09:15) (96% - 99%)    Parameters below as of 12 Sep 2022 09:15  Patient On (Oxygen Delivery Method): room air      Physical Exam:  Constitutional - NAD, Comfortable  HEENT - NCAT, EOMI  Chest - good chest expansion, good respiratory effort on RA  Cardio - warm and well perfused, no LE edema  Abdomen -  Soft, NTND. Slight TTP  Extremities - No peripheral edema, No calf tenderness   Neurologic Exam:                    Cognitive -             Orientation: A&O x4     Speech - Fluent, Comprehensible, No dysarthria, Mild anomic aphasia      Sensory - Intact to LT bilateral     Motor - moving all extremities spontaneously  Psychiatric - Tearful this AM        Recent Labs/Imagin.4   9.39  )-----------( 461      ( 12 Sep 2022 07:17 )             42.0     09-12    136  |  100  |  12  ----------------------------<  111<H>  3.3<L>   |  28  |  0.92    Ca    9.1      12 Sep 2022 07:17      MEDICATIONS  (STANDING):  aspirin enteric coated 81 milliGRAM(s) Oral daily  atorvastatin 40 milliGRAM(s) Oral at bedtime  enoxaparin Injectable 40 milliGRAM(s) SubCutaneous every 24 hours  famotidine    Tablet 40 milliGRAM(s) Oral daily  lactobacillus acidophilus 1 Tablet(s) Oral daily  losartan 50 milliGRAM(s) Oral at bedtime  melatonin 9 milliGRAM(s) Oral at bedtime  nystatin Powder 1 Application(s) Topical two times a day  polyethylene glycol 3350 17 Gram(s) Oral two times a day  senna 2 Tablet(s) Oral at bedtime  topiramate 50 milliGRAM(s) Oral at bedtime    MEDICATIONS  (PRN):  acetaminophen     Tablet .. 650 milliGRAM(s) Oral every 6 hours PRN Temp greater or equal to 38C (100.4F), Mild Pain (1 - 3)  LORazepam     Tablet 0.5 milliGRAM(s) Oral every 8 hours PRN Nausea and/or Vomiting  traMADol 50 milliGRAM(s) Oral every 6 hours PRN Severe Pain (7 - 10)  traMADol 25 milliGRAM(s) Oral every 6 hours PRN Moderate Pain (4 - 6) Subjective:  Patient was seen and examined at the bedside this AM. No acute overnight events. Admits that she had poor sleep overnight due to stomach pain.  She has a slight headache at this time, but admits that the intensity of her headaches is improving and becoming more tolerable. She feels nauseous, with abdominal pain which started overnight. She states that her pain starts in her stomach and radiates to her right side/flank area. Simethicone was ordered and she felt better after the medication. She is still experiencing orthostatic blood pressure. This AM her bps measured: 138/78 lying-> 128/84 sitting -> 112/78 standing. She complained of dizziness when moving position from laying to sitting only.     ROS:  Denies CP, SOB, dysuria.       Physical Exam:  Vital Signs Last 24 Hrs  T(C): 36.5 (12 Sep 2022 09:15), Max: 36.7 (11 Sep 2022 22:00)  T(F): 97.7 (12 Sep 2022 09:15), Max: 98.1 (11 Sep 2022 22:00)  HR: 82 (12 Sep 2022 09:15) (82 - 98)  BP: 116/71 (12 Sep 2022 09:15) (116/71 - 159/91)  BP(mean): --  RR: 14 (12 Sep 2022 09:15) (14 - 16)  SpO2: 99% (12 Sep 2022 09:15) (96% - 99%)    Parameters below as of 12 Sep 2022 09:15  Patient On (Oxygen Delivery Method): room air      Physical Exam:  Constitutional - NAD, Comfortable  HEENT - NCAT, EOMI  Chest - good chest expansion, good respiratory effort on RA  Cardio - warm and well perfused, no LE edema  Abdomen -  Soft, NTND. Slight TTP epigastric  Extremities - No peripheral edema, No calf tenderness   Neurologic Exam:                    Cognitive -             Orientation: A&O x4     Speech - Fluent, Comprehensible, No dysarthria, Mild anomic aphasia      Sensory - Intact to LT bilateral     Motor - moving all extremities spontaneously  Psychiatric - Stable        Recent Labs/Imagin.4   9.39  )-----------( 461      ( 12 Sep 2022 07:17 )             42.0     09-12    136  |  100  |  12  ----------------------------<  111<H>  3.3<L>   |  28  |  0.92    Ca    9.1      12 Sep 2022 07:17      MEDICATIONS  (STANDING):  aspirin enteric coated 81 milliGRAM(s) Oral daily  atorvastatin 40 milliGRAM(s) Oral at bedtime  enoxaparin Injectable 40 milliGRAM(s) SubCutaneous every 24 hours  famotidine    Tablet 40 milliGRAM(s) Oral daily  lactobacillus acidophilus 1 Tablet(s) Oral daily  losartan 50 milliGRAM(s) Oral at bedtime  melatonin 9 milliGRAM(s) Oral at bedtime  nystatin Powder 1 Application(s) Topical two times a day  polyethylene glycol 3350 17 Gram(s) Oral two times a day  senna 2 Tablet(s) Oral at bedtime  topiramate 50 milliGRAM(s) Oral at bedtime    MEDICATIONS  (PRN):  acetaminophen     Tablet .. 650 milliGRAM(s) Oral every 6 hours PRN Temp greater or equal to 38C (100.4F), Mild Pain (1 - 3)  LORazepam     Tablet 0.5 milliGRAM(s) Oral every 8 hours PRN Nausea and/or Vomiting  traMADol 50 milliGRAM(s) Oral every 6 hours PRN Severe Pain (7 - 10)  traMADol 25 milliGRAM(s) Oral every 6 hours PRN Moderate Pain (4 - 6)

## 2022-09-13 NOTE — PROVIDER CONTACT NOTE (OTHER) - SITUATION
pt. with elevated sitting manual BP of 182/100. pt. also complaining of headache 7/10 pain and abd discomfort.

## 2022-09-13 NOTE — PROVIDER CONTACT NOTE (OTHER) - ACTION/TREATMENT ORDERED:
scheduled losartan 50mg given along with PRN dose of tramadol and Ativan. MD made aware.  recheck BP.

## 2022-09-13 NOTE — PROGRESS NOTE ADULT - ASSESSMENT
EZRA MARIE is a 62yo Female with PMHx of HTN, migraines, and TIA (last seen at [facility] in 11/2021), who presented to [facility] on 08/31/2022 with HA, nausea, vomiting, and word-finding difficulties; was found to have findings on CT and MRI head consistent with PRES.  Admitted for multidisciplinary rehab program with Aphasia, gait and ADL impairment.  ------------------------------------------------------------------  #Comprehensive Multidisciplinary Rehab Program:  - Gait, ADL, Functional impairments  - PT/OT/ SLP 3 hours a day 5 days a week    #PRES, unclear etiology  - suggestive based on findings on MRI and CT head: hyperintense signal to left occipital lobe and right cerebellum. No enhancement observed with IV contrast.   - repeat MRI brain in 1 month (09/30/2022)  - c/w ASA 81mg daily  - Repeat CT Head stable    #Orthostatic Hypotension  #HTN  -fluctuating BP readings-- Nursing order for manual BPs only to improve accuracy-  --cont. Losartan 50mg qHS, per hospitalist 9/9--Holding parameter for Sitting SBP <120  - TEDs, and Abdominal binder ordered  - continue to monitor orthostatics    #Prolonged QTc  - avoid QTc prolonging medications--reglan, zofran, prochlorperazine    #HLD  - c/w Atorvastatin 40mg qhs    #Pain:  - Tylenol PRN, Tramadol PRN  - Avoid sedating meds that may affect cognitive recovery    #Headaches  --Topamax 50mg qHS  --Ativan 0.5mg q.8h PRN for nausea  --Repeat CT Head stable 9/9  -- Warm compress     #Sleep:  - Maintain quiet hours and low stim environment  - cont. Melatonin 9mg 9/9  - Topamax 25mg qHS.   --sleeping better    #GI/Bowel:  #abdominal discomfort  - Miralax changed from PRN to standing. Senna added 9/13.  - Encourage PO fluids  -- Ab XR ordered to evaluate stool burden vs. constipation  - Compazine 5 mg dc'ed; Ativan PRN for nausea  - Pepcid for reflux added 9/9    #/Bladder:  --continent  --voiding with low PVR--    #Diet :    - Diet: Regular with thins    #Skin/ Pressure Injury Prevention:  - assessment on admission --erythema under left breast--Nystatin ordered  - Turn Q2hrs in bed while awake, OOB to Chair, PT/OT/SLP     #DVT prophylaxis:  - Lovenox    #Precautions/ Restrictions  - Falls  - COVID PCR: 9/5    #Dispo: IDR 09/08/2022  - Nursing: continent of bowel and bladder  - SW: Lives in  with  and children. Has 5 SAI; split-level home with 6 steps to bedroom and 6 steps to kitchen  - OT: supervision/set-up with ADLs and CG functional transfers  - PT: ambulates 50' RW with min assist; CG transfers; 4 steps min assist  - SLP: Regular with thins diet; Has mild apraxia and anomic aphasia, Mild-Mod Cognitive deficits  - Team Goals: (1) Ambulate to bedroom Ivonne. (2) Express complex ideas. (3) Sequence higher-level tasks  - TDD: 9/17 to home    --------------------------------------------  Outpatient Follow up:  Omar Dominguez (DO)  Neurology; Vascular Neurology  [address] [facility], [address]  [place] [address]  Phone: [phone]  Fax: [phone]  Follow Up Time: 2 weeks    Gabriele Olivier (DO)  Cardiology; Internal Medicine  [address] [facility], [address]  [place] [address]  Phone: [phone]  Fax: [phone]  Follow Up Time: 1 month    Froy Craig (DO)  Medicine  [address] [place] [address], [address]  [place] [address]  Phone: [phone]  Fax: [phone]  Follow Up Time: Routine  --------------------------------------------   EZRA AMRIE is a 62yo Female with PMHx of HTN, migraines, and TIA (last seen at [facility] in 11/2021), who presented to [facility] on 08/31/2022 with HA, nausea, vomiting, and word-finding difficulties; was found to have findings on CT and MRI head consistent with PRES.  Admitted for multidisciplinary rehab program with Aphasia, gait and ADL impairment.  ------------------------------------------------------------------  #Comprehensive Multidisciplinary Rehab Program:  - Gait, ADL, Functional impairments  - PT/OT/ SLP 3 hours a day 5 days a week    #PRES, unclear etiology  - suggestive based on findings on MRI and CT head: hyperintense signal to left occipital lobe and right cerebellum. No enhancement observed with IV contrast.   - repeat MRI brain in 1 month (09/30/2022)  - c/w ASA 81mg daily  - Repeat CT Head stable    #Orthostatic Hypotension  #HTN  -fluctuating BP readings-- Nursing order for manual BPs only to improve accuracy-  --cont. Losartan 50mg qHS, per hospitalist 9/9--Holding parameter for Sitting SBP <120  - TEDs, and Abdominal binder ordered  --Nephrology, Dr. Suarez, consulted for recs in regards to bp and orthostatic management.  - continue to monitor orthostatics    #Prolonged QTc  - avoid QTc prolonging medications--reglan, zofran, prochlorperazine  - Most recent QTc- 451- improving    #HLD  - c/w Atorvastatin 40mg qhs    #Pain:  - Tylenol PRN, Tramadol PRN  - Avoid sedating meds that may affect cognitive recovery    #Headaches  --Topamax 50mg qHS  --Ativan 0.5mg q.8h PRN for nausea  --Repeat CT Head stable 9/9  -- Warm compress     #Sleep:  - Maintain quiet hours and low stim environment  - cont. Melatonin 9mg 9/9  - Topamax 25mg qHS.   --sleeping better    #GI/Bowel:  #abdominal discomfort  - Miralax changed from PRN to standing. Senna added 9/13.  - Encourage PO fluids  -- Ab XR ordered-> negative for any constipation or large stool burden.  - Compazine 5 mg dc'ed; Ativan PRN for nausea  - Pepcid for reflux added 9/9    #/Bladder:  --continent  --voiding with low PVR--    #Diet :    - Diet: Regular with thins    #Skin/ Pressure Injury Prevention:  - assessment on admission --erythema under left breast--Nystatin ordered  - Turn Q2hrs in bed while awake, OOB to Chair, PT/OT/SLP     #DVT prophylaxis:  - Lovenox    #Precautions/ Restrictions  - Falls  - COVID PCR: 9/5    #Dispo: IDR 09/08/2022  - Nursing: continent of bowel and bladder  - SW: Lives in  with  and children. Has 5 SAI; split-level home with 6 steps to bedroom and 6 steps to kitchen  - OT: supervision/set-up with ADLs and CG functional transfers  - PT: ambulates 50' RW with min assist; CG transfers; 4 steps min assist  - SLP: Regular with thins diet; Has mild apraxia and anomic aphasia, Mild-Mod Cognitive deficits  - Team Goals: (1) Ambulate to bedroom Ivonne. (2) Express complex ideas. (3) Sequence higher-level tasks  - TDD: 9/17 to home      MAXX Gottlieb NP spoke with pt's daughter Erica regarding most recent status @16:39. All questions and concerns answered.  --------------------------------------------  Outpatient Follow up:  Omar Dominguez (DO)  Neurology; Vascular Neurology  [address] [facility], [address]  [place] [address]  Phone: [phone]  Fax: [phone]  Follow Up Time: 2 weeks    Gabriele Olivier (DO)  Cardiology; Internal Medicine  [address] [facility], [address]  [place] [address]  Phone: [phone]  Fax: [phone]  Follow Up Time: 1 month    Froy Craig (DO)  Medicine  [address] [place] [address], [address]  [place] [address]  Phone: [phone]  Fax: [phone]  Follow Up Time: Routine  --------------------------------------------   EZRA MARIE is a 60yo Female with PMHx of HTN, migraines, and TIA (last seen at [facility] in 11/2021), who presented to [facility] on 08/31/2022 with HA, nausea, vomiting, and word-finding difficulties; was found to have findings on CT and MRI head consistent with PRES.  Admitted for multidisciplinary rehab program with Aphasia, gait and ADL impairment.  ------------------------------------------------------------------  #Comprehensive Multidisciplinary Rehab Program:  - Gait, ADL, Functional impairments  - PT/OT/ SLP 3 hours a day 5 days a week    #PRES, unclear etiology  - suggestive based on findings on MRI and CT head: hyperintense signal to left occipital lobe and right cerebellum. No enhancement observed with IV contrast.   - repeat MRI brain in 1 month (09/30/2022)  - c/w ASA 81mg daily  - Repeat CT Head stable    #Orthostatic Hypotension  #HTN  -fluctuating BP readings-- Nursing order for manual BPs only to improve accuracy-  --cont. Losartan 50mg qHS, per hospitalist 9/9--Holding parameter for Sitting SBP <120  - TEDs, and Abdominal binder ordered  --Nephrology, Dr. Suarez, consulted for recs in regards to bp and orthostatic management.  - continue to monitor orthostatics    #Prolonged QTc  - avoid QTc prolonging medications--reglan, zofran, prochlorperazine  - Most recent QTc- 451- improving    #HLD  - c/w Atorvastatin 40mg qhs    #Pain:  - Tylenol PRN, Tramadol PRN  - Avoid sedating meds that may affect cognitive recovery    #Headaches  --Topamax 50mg qHS  --Ativan 0.5mg q.8h PRN for nausea  --Repeat CT Head stable 9/9  -- Warm compress     #Sleep:  - Maintain quiet hours and low stim environment  - cont. Melatonin 9mg 9/9  - Topamax 25mg qHS.   --sleeping better    #GI/Bowel:  #abdominal discomfort  - Miralax changed from PRN to standing. Senna added 9/13.  - Encourage PO fluids  -- Ab XR ordered-> negative for any constipation or large stool burden.  - Compazine 5 mg dc'ed; Ativan PRN for nausea  - Pepcid for reflux added 9/9    #/Bladder:  --continent  --voiding with low PVR--    #Diet :    - Diet: Regular with thins    #Skin/ Pressure Injury Prevention:  - assessment on admission --erythema under left breast--Nystatin ordered  - Turn Q2hrs in bed while awake, OOB to Chair, PT/OT/SLP     #DVT prophylaxis:  - Lovenox    #Precautions/ Restrictions  - Falls  - COVID PCR: 9/5    #Dispo: IDR 09/08/2022  - Nursing: continent of bowel and bladder  - SW: Lives in  with  and children. Has 5 SAI; split-level home with 6 steps to bedroom and 6 steps to kitchen  - OT: supervision/set-up with ADLs and CG functional transfers  - PT: ambulates 50' RW with min assist; CG transfers; 4 steps min assist  - SLP: Regular with thins diet; Has mild apraxia and anomic aphasia, Mild-Mod Cognitive deficits  - Team Goals: (1) Ambulate to bedroom Ivonne. (2) Express complex ideas. (3) Sequence higher-level tasks  - TDD: 9/17 to home      MAXX Gottlieb NP spoke with pt's daughter Erica regarding most recent status @16:39. All questions and concerns answered.  Of note, daughter expressed that patient has been having abdominal pain since earlier this year. She did not seek any medical attention or workup for this complaint. On 8/30 she went to a [facility] facility and underwent imaging (daughter is unsure of specific study), and then presented to [facility] on 8/31 because she was not feeling well.   --------------------------------------------  Outpatient Follow up:  Omar Dominguez ()  Neurology; Vascular Neurology  [address] [facility], [address]  [place] [address]  Phone: [phone]  Fax: [phone]  Follow Up Time: 2 weeks    Gabriele Olivier)  Cardiology; Internal Medicine  [address] [place] [address], [address]  [place] [address]  Phone: [phone]  Fax: [phone]  Follow Up Time: 1 month    Froy Craig ([address], [facility] [address]  [place] [address]  Phone: [phone]  Fax: [phone]  Follow Up Time: Routine  --------------------------------------------   EZRA MARIE is a 62yo Female with PMHx of HTN, migraines, and TIA (last seen at [facility] in 11/2021), who presented to [facility] on 08/31/2022 with HA, nausea, vomiting, and word-finding difficulties; was found to have findings on CT and MRI head consistent with PRES.  Admitted for multidisciplinary rehab program with Aphasia, gait and ADL impairment.  ------------------------------------------------------------------  #Comprehensive Multidisciplinary Rehab Program:  - Gait, ADL, Functional impairments  - PT/OT/ SLP 3 hours a day 5 days a week    #PRES, unclear etiology  - suggestive based on findings on MRI and CT head: hyperintense signal to left occipital lobe and right cerebellum. No enhancement observed with IV contrast.   - repeat MRI brain in 1 month (09/30/2022)  - c/w ASA 81mg daily  - Repeat CT Head stable    #Orthostatic Hypotension  #HTN  -fluctuating BP readings-- Nursing order for manual BPs only to improve accuracy-  --cont. Losartan 50mg qHS, per hospitalist 9/9--Holding parameter for Sitting SBP <120  - TEDs, and Abdominal binder ordered  --Nephrology, Dr. Suarez, consulted for recs in regards to labile bp and orthostatic management.  - continue to monitor orthostatics    #Prolonged QTc  - avoid QTc prolonging medications--reglan, zofran, prochlorperazine  - Most recent QTc- 451- improving    #HLD  - c/w Atorvastatin 40mg qhs    #Pain:  - Tylenol PRN, Tramadol PRN  - Avoid sedating meds that may affect cognitive recovery    #Headaches  --Topamax 50mg qHS  --Ativan 0.5mg q.8h PRN for nausea  --Repeat CT Head stable 9/9  -- Warm compress     #Sleep:  - Maintain quiet hours and low stim environment  - cont. Melatonin 9mg 9/9  - Topamax 25mg qHS.   --sleeping better    #GI/Bowel:  #abdominal discomfort  - Miralax changed from PRN to standing. Senna added 9/13.  - Encourage PO fluids  -- Ab XR ordered-> negative for any constipation or large stool burden.  - Compazine 5 mg dc'ed; Ativan PRN for nausea  - Pepcid for reflux added 9/9    #/Bladder:  --continent  --voiding with low PVR--    #Diet :    - Diet: Regular with thins    #Skin/ Pressure Injury Prevention:  - assessment on admission --erythema under left breast--Nystatin ordered  - Turn Q2hrs in bed while awake, OOB to Chair, PT/OT/SLP     #DVT prophylaxis:  - Lovenox    #Precautions/ Restrictions  - Falls  - COVID PCR: 9/5    #Dispo: IDR 09/08/2022  - Nursing: continent of bowel and bladder  - SW: Lives in  with  and children. Has 5 SAI; split-level home with 6 steps to bedroom and 6 steps to kitchen  - OT: supervision/set-up with ADLs and CG functional transfers  - PT: ambulates 50' RW with min assist; CG transfers; 4 steps min assist  - SLP: Regular with thins diet; Has mild apraxia and anomic aphasia, Mild-Mod Cognitive deficits  - Team Goals: (1) Ambulate to bedroom Ivonne. (2) Express complex ideas. (3) Sequence higher-level tasks  - TDD: 9/17 to home      MAXX Gottlieb NP spoke with pt's daughter Erica regarding most recent status @16:39. All questions and concerns answered.  Of note, daughter expressed that patient has been having abdominal pain since earlier this year. She did not seek any medical attention or workup for this complaint. On 8/30 she went to a [facility] facility and underwent imaging (daughter is unsure of specific study), and then presented to [facility] on 8/31 because she was not feeling well.   --------------------------------------------  Outpatient Follow up:  Omar Dominguez ()  Neurology; Vascular Neurology  [address] [facility], [address]  [place] [address]  Phone: [phone]  Fax: [phone]  Follow Up Time: 2 weeks    Gabriele Olivier)  Cardiology; Internal Medicine  [address] [place] [address], [address]  [place] [address]  Phone: [phone]  Fax: [phone]  Follow Up Time: 1 month    Froy Craig ([address]  [place] [address]  Phone: [phone]  Fax: [phone]  Follow Up Time: Routine  --------------------------------------------

## 2022-09-13 NOTE — PROGRESS NOTE ADULT - NS ATTEND AMEND GEN_ALL_CORE FT
Pt. seen with NP.   Agree with documentation above as per NP with amendments made as appropriate. Patient medically stable. Making progress towards rehab goals.     PRES  Pt with labile fluctuating BP-- Pt. orthostatic. pt. with dizziness  BP elevated this Afternoon in therapy-- Pt. c/o abd. pain and chest pressure.   Cardiac w/u ordered. Pt. was seen by hospitalist and NP Pt. seen with NP.   Agree with documentation above as per NP with amendments made as appropriate. Patient medically stable. Making progress towards rehab goals.     PRES  Pt with labile fluctuating BP-- Pt. orthostatic. pt. with dizziness  BP elevated this Afternoon in therapy-- Pt. c/o abd. pain and chest pressure.   Cardiac w/u ordered. Pt. was seen by hospitalist and NP   Nephrology consulted for labile BP and Orthostatic hypotension. Pt. seen with NP.   Agree with documentation above as per NP with amendments made as appropriate. Patient medically stable. Making progress towards rehab goals.     PRES  Pt with labile fluctuating BP-- Pt. orthostatic. pt. with dizziness  BP elevated this Afternoon in therapy-- Pt. c/o abd. pain and chest pressure.   Cardiac w/u ordered. Pt. was seen by hospitalist and NP--   EKG no ischemic changes,  Trop neg.    Nephrology consulted for labile BP and Orthostatic hypotension.    abd. pain has been ongoing for some time.  Abd. xray unimpressive   Cont. PRN simethicone.  will d/w hospitalist tomorrow.

## 2022-09-13 NOTE — PROGRESS NOTE ADULT - ASSESSMENT
60 yo F w/ HTN, migraine, TIA (not on ASA and statin), presented w/ HA, word finding difficulties, and n/v, admitted as a code stroke, carotid dissection in left ICA on imaging, and CT/MRI head suggestive of PRES.  Admitted to [facility] on 9/6/2022.    # Acute metabolic encephalopathy complicated by aphasia, likely due to   # PRES  # Headache - improving.  - Repeat MRI head in 1 mo (9/30/2022)  - ASA and statin  - PT/OT/Speech therapy  - pain control & bowel regimen per PMR  - BP control as below    # HTN  #orthostasis  SBP goal 100-160.   - losartan 50mg qhs w/ holding parameters  - TEDS / abd binder  - encourage PO fluids  - DASH diet.  - will hold compazine in favor of ativan as it has been cited to cause orthostatic hypotension.   - discussed w/ PT/OT to monitor pt for sx of orthostasis during therapy. If symptomatic, may need to get additional input from consultants.     #Diarrhea  - post laxative  - add probiotic  - replete KCl as below.     # Abdominal hematoma from lovenox inj  - will monitor sites closely and h/h. Currently only subcutaneous.   - rotate lovenox inj sites.    # Depression  recently on benzos for anxiety following death of brother. Not on SSRI.    # Prolonged QTc  - avoid QTc prolonging meds, can also give oral ativan for breakthrough nausea.   - optimize electrolytes    # thrombocytosis  - trend platelets.    # Hx of migraines  - per d/c summary, avoid triptans.  - OP f/u with Neurology for migraine therapy.   - can consider consult to Dr. López for botox injections.  - pt started on topiramate.  - consider trial of gabapentin for severe headaches.    #VTE ppx:   -lovenox, no evidence of anemia.   60 yo F w/ HTN, migraine, TIA (not on ASA and statin), presented w/ HA, word finding difficulties, and n/v, admitted as a code stroke, carotid dissection in left ICA on imaging, and CT/MRI head suggestive of PRES.  Admitted to [facility] on 9/6/2022.    # Acute metabolic encephalopathy complicated by aphasia, likely due to   # PRES  # Headache - improving.  - Repeat MRI head in 1 mo (9/30/2022)  - ASA and statin  - PT/OT/Speech therapy  - pain control & bowel regimen per PMR  - BP control as below    # abdominal discomfort  Dr. Duval has ordered an AXR.  - will follow up after it is performed.     # HTN  #orthostasis  SBP goal 100-160.   - losartan 50mg qhs w/ holding parameters  - TEDS / abd binder  - encourage PO fluids  - DASH diet.  - will hold compazine in favor of ativan as it has been cited to cause orthostatic hypotension.   - discussed w/ PT/OT to monitor pt for sx of orthostasis during therapy. If symptomatic, may need to get additional input from consultants.     #Diarrhea  - post laxative  - add probiotic  - replete KCl as below.     # Abdominal hematoma from lovenox inj  - will monitor sites closely and h/h. Currently only subcutaneous.   - rotate lovenox inj sites.    # Depression  recently on benzos for anxiety following death of brother. Not on SSRI.    # Prolonged QTc  - avoid QTc prolonging meds, can also give oral ativan for breakthrough nausea.   - optimize electrolytes    # thrombocytosis  - trend platelets.    # Hx of migraines  - per d/c summary, avoid triptans.  - OP f/u with Neurology for migraine therapy.   - can consider consult to Dr. López for botox injections.  - pt started on topiramate.  - consider trial of gabapentin for severe headaches.    #VTE ppx:   -lovenox, no evidence of anemia.

## 2022-09-14 LAB
ANION GAP SERPL CALC-SCNC: 7 MMOL/L — SIGNIFICANT CHANGE UP (ref 5–17)
BUN SERPL-MCNC: 15 MG/DL — SIGNIFICANT CHANGE UP (ref 7–23)
CALCIUM SERPL-MCNC: 8.9 MG/DL — SIGNIFICANT CHANGE UP (ref 8.4–10.5)
CHLORIDE SERPL-SCNC: 100 MMOL/L — SIGNIFICANT CHANGE UP (ref 96–108)
CO2 SERPL-SCNC: 27 MMOL/L — SIGNIFICANT CHANGE UP (ref 22–31)
CREAT SERPL-MCNC: 0.86 MG/DL — SIGNIFICANT CHANGE UP (ref 0.5–1.3)
EGFR: 76 ML/MIN/1.73M2 — SIGNIFICANT CHANGE UP
GLUCOSE SERPL-MCNC: 115 MG/DL — HIGH (ref 70–99)
MAGNESIUM SERPL-MCNC: 2.1 MG/DL — SIGNIFICANT CHANGE UP (ref 1.6–2.6)
POTASSIUM SERPL-MCNC: 3.5 MMOL/L — SIGNIFICANT CHANGE UP (ref 3.5–5.3)
POTASSIUM SERPL-SCNC: 3.5 MMOL/L — SIGNIFICANT CHANGE UP (ref 3.5–5.3)
SODIUM SERPL-SCNC: 134 MMOL/L — LOW (ref 135–145)

## 2022-09-14 PROCEDURE — 99232 SBSQ HOSP IP/OBS MODERATE 35: CPT

## 2022-09-14 RX ORDER — AMLODIPINE BESYLATE 2.5 MG/1
2.5 TABLET ORAL DAILY
Refills: 0 | Status: DISCONTINUED | OUTPATIENT
Start: 2022-09-14 | End: 2022-09-14

## 2022-09-14 RX ORDER — AMLODIPINE BESYLATE 2.5 MG/1
5 TABLET ORAL DAILY
Refills: 0 | Status: DISCONTINUED | OUTPATIENT
Start: 2022-09-15 | End: 2022-09-15

## 2022-09-14 RX ADMIN — NYSTATIN CREAM 1 APPLICATION(S): 100000 CREAM TOPICAL at 17:33

## 2022-09-14 RX ADMIN — TRAMADOL HYDROCHLORIDE 25 MILLIGRAM(S): 50 TABLET ORAL at 09:26

## 2022-09-14 RX ADMIN — Medication 50 MILLIGRAM(S): at 22:31

## 2022-09-14 RX ADMIN — AMLODIPINE BESYLATE 2.5 MILLIGRAM(S): 2.5 TABLET ORAL at 12:19

## 2022-09-14 RX ADMIN — NYSTATIN CREAM 1 APPLICATION(S): 100000 CREAM TOPICAL at 06:00

## 2022-09-14 RX ADMIN — Medication 1 TABLET(S): at 11:21

## 2022-09-14 RX ADMIN — Medication 650 MILLIGRAM(S): at 12:16

## 2022-09-14 RX ADMIN — Medication 650 MILLIGRAM(S): at 11:15

## 2022-09-14 RX ADMIN — ENOXAPARIN SODIUM 40 MILLIGRAM(S): 100 INJECTION SUBCUTANEOUS at 05:57

## 2022-09-14 RX ADMIN — Medication 9 MILLIGRAM(S): at 22:30

## 2022-09-14 RX ADMIN — LOSARTAN POTASSIUM 50 MILLIGRAM(S): 100 TABLET, FILM COATED ORAL at 22:30

## 2022-09-14 RX ADMIN — TRAMADOL HYDROCHLORIDE 25 MILLIGRAM(S): 50 TABLET ORAL at 21:29

## 2022-09-14 RX ADMIN — Medication 650 MILLIGRAM(S): at 04:44

## 2022-09-14 RX ADMIN — Medication 650 MILLIGRAM(S): at 05:44

## 2022-09-14 RX ADMIN — TRAMADOL HYDROCHLORIDE 25 MILLIGRAM(S): 50 TABLET ORAL at 08:26

## 2022-09-14 RX ADMIN — TRAMADOL HYDROCHLORIDE 25 MILLIGRAM(S): 50 TABLET ORAL at 20:29

## 2022-09-14 RX ADMIN — Medication 81 MILLIGRAM(S): at 11:14

## 2022-09-14 RX ADMIN — FAMOTIDINE 40 MILLIGRAM(S): 10 INJECTION INTRAVENOUS at 11:14

## 2022-09-14 RX ADMIN — ATORVASTATIN CALCIUM 40 MILLIGRAM(S): 80 TABLET, FILM COATED ORAL at 22:31

## 2022-09-14 RX ADMIN — Medication 0.5 MILLIGRAM(S): at 22:31

## 2022-09-14 RX ADMIN — POLYETHYLENE GLYCOL 3350 17 GRAM(S): 17 POWDER, FOR SOLUTION ORAL at 05:57

## 2022-09-14 RX ADMIN — POLYETHYLENE GLYCOL 3350 17 GRAM(S): 17 POWDER, FOR SOLUTION ORAL at 17:33

## 2022-09-14 NOTE — PROGRESS NOTE ADULT - SUBJECTIVE AND OBJECTIVE BOX
Patient is a 61y old  Female who presents with a chief complaint of PRES (13 Sep 2022 12:37)      24 HOUR EVENTS:  No overnight events reported.     SUBJECTIVE:  Patient seen and examined at bedside.     ALLERGIES:  No Known Allergies    MEDICATIONS  (STANDING):  aspirin enteric coated 81 milliGRAM(s) Oral daily  atorvastatin 40 milliGRAM(s) Oral at bedtime  enoxaparin Injectable 40 milliGRAM(s) SubCutaneous every 24 hours  famotidine    Tablet 40 milliGRAM(s) Oral daily  lactobacillus acidophilus 1 Tablet(s) Oral daily  losartan 50 milliGRAM(s) Oral at bedtime  melatonin 9 milliGRAM(s) Oral at bedtime  nystatin Powder 1 Application(s) Topical two times a day  polyethylene glycol 3350 17 Gram(s) Oral two times a day  senna 2 Tablet(s) Oral at bedtime  topiramate 50 milliGRAM(s) Oral at bedtime    MEDICATIONS  (PRN):  acetaminophen     Tablet .. 650 milliGRAM(s) Oral every 6 hours PRN Temp greater or equal to 38C (100.4F), Mild Pain (1 - 3)  LORazepam     Tablet 0.5 milliGRAM(s) Oral every 8 hours PRN Nausea and/or Vomiting  traMADol 25 milliGRAM(s) Oral every 6 hours PRN Severe Pain (7 - 10)    Vital Signs Last 24 Hrs  T(F): 97.9 (13 Sep 2022 21:43), Max: 97.9 (13 Sep 2022 21:43)  HR: 91 (13 Sep 2022 22:53) (87 - 94)  BP: 156/98 (13 Sep 2022 22:53) (138/78 - 184/101)  RR: 16 (13 Sep 2022 21:43) (16 - 16)  SpO2: 97% (13 Sep 2022 21:43) (93% - 97%)  I&O's Summary    PHYSICAL EXAM:  General: NAD, A/O x 3  ENT: Moist mucous membranes, no thrush  Neck: Supple, No JVD  Lungs: Clear to auscultation bilaterally, good air entry, non-labored breathing  Cardio: RRR, S1/S2, No murmur  Abdomen: Soft, Nontender, Nondistended; Bowel sounds present  Extremities: No calf tenderness, No pitting edema    LABS:                        15.1   10.57 )-----------( 521      ( 13 Sep 2022 16:51 )             44.6     09-14    134  |  100  |  15  ----------------------------<  115  3.5   |  27  |  0.86    Ca    8.9      14 Sep 2022 05:40  Mg     2.1     09-14    TPro  7.9  /  Alb  3.2  /  TBili  0.3  /  DBili  x   /  AST  26  /  ALT  17  /  AlkPhos  111  09-13                CARDIAC MARKERS ( 13 Sep 2022 16:51 )  x     / 9.1 ng/L / x     / x     / x          09-01 Chol 224 mg/dL LDL -- HDL 63 mg/dL Trig 112 mg/dL                      COVID-19 PCR: NotDetec (09-13-22 @ 06:00)  COVID-19 PCR: NotDetec (09-06-22 @ 18:30)  COVID-19 PCR: NotDetec (09-05-22 @ 05:57)  COVID-19 PCR: NotDetec (09-03-22 @ 06:40)  COVID-19 PCR: NotDetec (08-31-22 @ 09:11)    RADIOLOGY & ADDITIONAL TESTS:    < from: Xray Abdomen 1 View PORTABLE -Routine (Xray Abdomen 1 View PORTABLE -Routine .) (09.13.22 @ 11:58) >  Paucity of small bowel gas.    The colon maintains normal caliber. No large stool burden is identified   within the colon or rectum.    Surgical clips in the left lower quadrant abdomen.    < end of copied text >      Care Discussed with Consultants/Other Providers:    Patient is a 61y old  Female who presents with a chief complaint of PRES (13 Sep 2022 12:37)      24 HOUR EVENTS:  No overnight events reported.     SUBJECTIVE:  Patient seen and examined at bedside.   Abdominal pressure in abdomen is improved today after having a BM  Headache better but persistent, 5/10 in severity.  Endorses dizziness when standing in PT and when rotating from side to side.     ALLERGIES:  No Known Allergies    MEDICATIONS  (STANDING):  aspirin enteric coated 81 milliGRAM(s) Oral daily  atorvastatin 40 milliGRAM(s) Oral at bedtime  enoxaparin Injectable 40 milliGRAM(s) SubCutaneous every 24 hours  famotidine    Tablet 40 milliGRAM(s) Oral daily  lactobacillus acidophilus 1 Tablet(s) Oral daily  losartan 50 milliGRAM(s) Oral at bedtime  melatonin 9 milliGRAM(s) Oral at bedtime  nystatin Powder 1 Application(s) Topical two times a day  polyethylene glycol 3350 17 Gram(s) Oral two times a day  senna 2 Tablet(s) Oral at bedtime  topiramate 50 milliGRAM(s) Oral at bedtime    MEDICATIONS  (PRN):  acetaminophen     Tablet .. 650 milliGRAM(s) Oral every 6 hours PRN Temp greater or equal to 38C (100.4F), Mild Pain (1 - 3)  LORazepam     Tablet 0.5 milliGRAM(s) Oral every 8 hours PRN Nausea and/or Vomiting  traMADol 25 milliGRAM(s) Oral every 6 hours PRN Severe Pain (7 - 10)    Vital Signs Last 24 Hrs  T(F): 97.9 (13 Sep 2022 21:43), Max: 97.9 (13 Sep 2022 21:43)  HR: 91 (13 Sep 2022 22:53) (87 - 94)  BP: 156/98 (13 Sep 2022 22:53) (138/78 - 184/101)  RR: 16 (13 Sep 2022 21:43) (16 - 16)  SpO2: 97% (13 Sep 2022 21:43) (93% - 97%)  I&O's Summary    PHYSICAL EXAM:  General: NAD, A/O x 3  ENT: Moist mucous membranes, no thrush  Neck: Supple, No JVD  Lungs: Clear to auscultation bilaterally, good air entry, non-labored breathing  Cardio: RRR, S1/S2, No murmur  Abdomen: Soft, Nontender, Nondistended; Bowel sounds present  Extremities: No calf tenderness, No pitting edema    LABS:                        15.1   10.57 )-----------( 521      ( 13 Sep 2022 16:51 )             44.6     09-14    134  |  100  |  15  ----------------------------<  115  3.5   |  27  |  0.86    Ca    8.9      14 Sep 2022 05:40  Mg     2.1     09-14    TPro  7.9  /  Alb  3.2  /  TBili  0.3  /  DBili  x   /  AST  26  /  ALT  17  /  AlkPhos  111  09-13                CARDIAC MARKERS ( 13 Sep 2022 16:51 )  x     / 9.1 ng/L / x     / x     / x          09-01 Chol 224 mg/dL LDL -- HDL 63 mg/dL Trig 112 mg/dL                      COVID-19 PCR: NotDetec (09-13-22 @ 06:00)  COVID-19 PCR: NotDetec (09-06-22 @ 18:30)  COVID-19 PCR: NotDetec (09-05-22 @ 05:57)  COVID-19 PCR: NotDetec (09-03-22 @ 06:40)  COVID-19 PCR: NotDetec (08-31-22 @ 09:11)    RADIOLOGY & ADDITIONAL TESTS:    < from: Xray Abdomen 1 View PORTABLE -Routine (Xray Abdomen 1 View PORTABLE -Routine .) (09.13.22 @ 11:58) >  Paucity of small bowel gas.    The colon maintains normal caliber. No large stool burden is identified   within the colon or rectum.    Surgical clips in the left lower quadrant abdomen.    < end of copied text >      Care Discussed with Consultants/Other Providers:

## 2022-09-14 NOTE — PROGRESS NOTE ADULT - ASSESSMENT
62 yo F w/ HTN, migraine, TIA (not on ASA and statin), presented w/ HA, word finding difficulties, and n/v, admitted as a code stroke, carotid dissection in left ICA on imaging, and CT/MRI head suggestive of PRES.  Admitted to [facility] on 9/6/2022.    # Acute metabolic encephalopathy complicated by aphasia, likely due to   # PRES  # Headache - improving.  - Repeat MRI head in 1 mo (9/30/2022)  - ASA and statin  - PT/OT/Speech therapy  - pain control & bowel regimen per PMR  - BP control as below    # abdominal discomfort  - appears to be somewhat of a chronic problem - CT abd/pelvis and abd us performed in the past. Saw GI back in 2017 for it.  related to constipation    # HTN  #orthostasis  SBP goal 100-160.   - losartan 50mg qhs w/ holding parameters  - TEDS / abd binder  - encourage PO fluids  - DASH diet.  - will hold compazine in favor of ativan as it has been cited to cause orthostatic hypotension.       #Diarrhea  - post laxative  - add probiotic  - replete KCl as below.     # Abdominal hematoma from lovenox inj  - will monitor sites closely and h/h. Currently only subcutaneous.   - rotate lovenox inj sites.    # Depression  recently on benzos for anxiety following death of brother. Not on SSRI.    # Prolonged QTc  - avoid QTc prolonging meds, can also give oral ativan for breakthrough nausea.   - optimize electrolytes    # thrombocytosis  - trend platelets.    # Hx of migraines  - per d/c summary, avoid triptans.  - OP f/u with Neurology for migraine therapy.   - can consider consult to Dr. López for botox injections.  - pt started on topiramate.  - consider trial of gabapentin for severe headaches.    #VTE ppx:   -lovenox, no evidence of anemia.   60 yo F w/ HTN, migraine, TIA (not on ASA and statin), presented w/ HA, word finding difficulties, and n/v, admitted as a code stroke, carotid dissection in left ICA on imaging, and CT/MRI head suggestive of PRES.  Admitted to [facility] on 9/6/2022.    # Acute metabolic encephalopathy complicated by aphasia, likely due to   # PRES  # Headache - improving.  - Repeat MRI head in 1 mo (9/30/2022)  - ASA and statin  - PT/OT/Speech therapy  - pain control & bowel regimen per PMR  - BP control as below    # abdominal discomfort likely related to constipation - resolved after having a BM  - appears to be somewhat of a chronic problem - CT abd/pelvis and abd us performed in the past. Saw GI back in 2017 for it.  - related to constipation    # HTN  #orthostasis  SBP goal 100-160.   - losartan 50mg qhs w/ holding parameters  - TEDS / abd binder  - encourage PO fluids  - DASH diet.  - will hold compazine in favor of ativan as it has been cited to cause orthostatic hypotension.     # Abdominal hematoma from lovenox inj  - will monitor sites closely and h/h. Currently only subcutaneous.   - rotate lovenox inj sites.    # Depression  recently on benzos for anxiety following death of brother. Not on SSRI.  - Discussed with Dr. mitchell - to find out if SSRI affects PRES.     # Prolonged QTc - improving  - avoid QTc prolonging meds, can also give oral ativan for breakthrough nausea.   - optimize electrolytes    # thrombocytosis likely reactive.   - trend platelets.  - already on aspirin    # Hx of migraines  - per d/c summary, avoid triptans.  - OP f/u with Neurology for migraine therapy.   - can consider consult to Dr. López for botox injections.  - pt started on topiramate.  - consider trial of gabapentin for severe headaches.    #VTE ppx:   -lovenox, no evidence of anemia.

## 2022-09-14 NOTE — PROGRESS NOTE ADULT - NS ATTEND AMEND GEN_ALL_CORE FT
Pt. seen with NP.  Agree with documentation above as per NP with amendments made as appropriate. Patient medically stable. Making progress towards rehab goals.     PRES  Labile BP-- BP elevated with exertion in therapy and pt. developed symptoms. when BP elevated -- headache and nausea.    Consulted nephrology for labile BP management.    Pt with epigastric pain-- has h/o GERD due to hiatal hernia-- she notes no H2 blocker or PPI has worked for her other than Zegrid.

## 2022-09-14 NOTE — CONSULT NOTE ADULT - SUBJECTIVE AND OBJECTIVE BOX
NEPHROLOGY CONSULTATION    CHIEF COMPLAINT: PRES    HPI:  Pt is 60 yo F with PMH of HTN, migraines, and TIA (last seen at [facility] in 2021), who presented to [facility] on 2022 with HA, nausea, vomiting, and word-finding difficulties; was found to have findings on CT and MRI head consistent with PRES. Patient was followed by neurology and cardiology during her admission. TTE and EEG were unremarkable; started on ASA. Course c/b hyponatremia, likely SIADH now resolved. Adm to [facility] on 2022. In rehab noted to have labile BP.    ROS:  as above    Allergies:  No Known Allergies    PAST MEDICAL & SURGICAL HISTORY:  Hypertension  Hallux valgus (acquired), right foot  s/p correction 2018  HV (hallux valgus), left  Acquired hammertoe of left foot  Migraine  H/O  section  X 2  H/O bilateral breast reduction surgery  H/O endoscopy, colonoscopy  S/P bunionectomy    SOCIAL HISTORY:  negative    FAMILY HISTORY:  Family history of diabetes mellitus (Mother)  Family history of heart disease (Father)  FH: HTN (hypertension) (Sibling)    MEDICATIONS  (STANDING):  amLODIPine   Tablet 2.5 milliGRAM(s) Oral daily  aspirin enteric coated 81 milliGRAM(s) Oral daily  atorvastatin 40 milliGRAM(s) Oral at bedtime  enoxaparin Injectable 40 milliGRAM(s) SubCutaneous every 24 hours  famotidine    Tablet 40 milliGRAM(s) Oral daily  lactobacillus acidophilus 1 Tablet(s) Oral daily  losartan 50 milliGRAM(s) Oral at bedtime  melatonin 9 milliGRAM(s) Oral at bedtime  nystatin Powder 1 Application(s) Topical two times a day  polyethylene glycol 3350 17 Gram(s) Oral two times a day  senna 2 Tablet(s) Oral at bedtime  topiramate 50 milliGRAM(s) Oral at bedtime    Vital Signs Last 24 Hrs  T(C): 36.6 (22 @ 07:57), Max: 36.6 (22 @ 21:43)  T(F): 97.9 (22 @ 07:57), Max: 97.9 (22 @ 21:43)  HR: 89 (22 @ 07:57) (87 - 92)  BP: 150/90 (22 @ 07:57) (141/79 - 184/101)  RR: 16 (22 @ 07:57) (16 - 16)  SpO2: 96% (22 @ 07:57) (96% - 97%)    LABS:                        15.1   10. )-----------( 521      ( 13 Sep 2022 16:51 )             44.6         134<L>  |  100  |  15  ----------------------------<  115<H>  3.5   |  27  |  0.86    Ca    8.9      14 Sep 2022 05:40  Mg     2.1         TPro  7.9  /  Alb  3.2<L>  /  TBili  0.3  /  DBili  x   /  AST  26  /  ALT  17  /  AlkPhos  111  09-13    LIVER FUNCTIONS - ( 13 Sep 2022 16:51 )  Alb: 3.2 g/dL / Pro: 7.9 g/dL / ALK PHOS: 111 U/L / ALT: 17 U/L / AST: 26 U/L / GGT: x           A/P:    full consult to follow    [phone] NEPHROLOGY CONSULTATION    CHIEF COMPLAINT: PRES    HPI:  Pt is 60 yo F with PMH of HTN, migraines, and TIA (last seen at [facility] in 2021), who presented to [facility] on 2022 with HA, nausea, vomiting, and word-finding difficulties; was found to have findings on CT and MRI head consistent with PRES. Patient was followed by neurology and cardiology during her admission. TTE and EEG were unremarkable; started on ASA. Course c/b hyponatremia, likely SIADH now resolved. Adm to [facility] on 2022. In rehab noted to have labile BP. Patient is awake and alert, feels occasional nausea, HA and dizziness however no clear relationship between symptoms and BP.    ROS:  as above    Allergies:  No Known Allergies    PAST MEDICAL & SURGICAL HISTORY:  Hypertension  Hallux valgus (acquired), right foot  s/p correction 2018  HV (hallux valgus), left  Acquired hammertoe of left foot  Migraine  H/O  section  X 2  H/O bilateral breast reduction surgery  H/O endoscopy, colonoscopy  S/P bunionectomy    SOCIAL HISTORY:  negative    FAMILY HISTORY:  Family history of diabetes mellitus (Mother)  Family history of heart disease (Father)  FH: HTN (hypertension) (Sibling)    MEDICATIONS  (STANDING):  amLODIPine   Tablet 2.5 milliGRAM(s) Oral daily  aspirin enteric coated 81 milliGRAM(s) Oral daily  atorvastatin 40 milliGRAM(s) Oral at bedtime  enoxaparin Injectable 40 milliGRAM(s) SubCutaneous every 24 hours  famotidine    Tablet 40 milliGRAM(s) Oral daily  lactobacillus acidophilus 1 Tablet(s) Oral daily  losartan 50 milliGRAM(s) Oral at bedtime  melatonin 9 milliGRAM(s) Oral at bedtime  nystatin Powder 1 Application(s) Topical two times a day  polyethylene glycol 3350 17 Gram(s) Oral two times a day  senna 2 Tablet(s) Oral at bedtime  topiramate 50 milliGRAM(s) Oral at bedtime    Vital Signs Last 24 Hrs  T(C): 36.8 (22 @ 22:25), Max: 36.8 (22 @ 22:25)  T(F): 98.3 (22 @ 22:25), Max: 98.3 (09-14-22 @ 22:25)  HR: 97 (22 @ 22:25) (89 - 97)  BP: 146/108 (22 @ 22:25) (140/90 - 156/98)  RR: 14 (22 @ 22:25) (14 - 16)  SpO2: 97% (22 @ 22:25) (96% - 98%)    s1s2  b/l air entry  soft, ND  no edema  AO    LABS:                        15.1   10.57 )-----------( 521      ( 13 Sep 2022 16:51 )             44.6         134<L>  |  100  |  15  ----------------------------<  115<H>  3.5   |  27  |  0.86    Ca    8.9      14 Sep 2022 05:40  Mg     2.1         TPro  7.9  /  Alb  3.2<L>  /  TBili  0.3  /  DBili  x   /  AST  26  /  ALT  17  /  AlkPhos  111  09-13    LIVER FUNCTIONS - ( 13 Sep 2022 16:51 )  Alb: 3.2 g/dL / Pro: 7.9 g/dL / ALK PHOS: 111 U/L / ALT: 17 U/L / AST: 26 U/L / GGT: x           A/P:    Suspected PRES  Labile BP  Pls take all BPs sitting or standing  Continue Losartan  Will add Norvasc and slowly titrate as needed  2ry [facility] w/up ordered  Will follow    [phone]

## 2022-09-14 NOTE — PROGRESS NOTE ADULT - ASSESSMENT
EZRA MARIE is a 60yo Female with PMHx of HTN, migraines, and TIA (last seen at [facility] in 11/2021), who presented to [facility] on 08/31/2022 with HA, nausea, vomiting, and word-finding difficulties; was found to have findings on CT and MRI head consistent with PRES.  Admitted for multidisciplinary rehab program with Aphasia, gait and ADL impairment.  ------------------------------------------------------------------  #Comprehensive Multidisciplinary Rehab Program:  - Gait, ADL, Functional impairments  - PT/OT/ SLP 3 hours a day 5 days a week    #PRES, unclear etiology  - suggestive based on findings on MRI and CT head: hyperintense signal to left occipital lobe and right cerebellum. No enhancement observed with IV contrast.   - repeat MRI brain in 1 month (09/30/2022)  - c/w ASA 81mg daily  - Repeat CT Head stable    #Orthostatic Hypotension  #HTN  -fluctuating BP readings-- Nursing order for manual BPs only to improve accuracy-  --cont. Losartan 50mg qHS, per hospitalist 9/9--Holding parameter for Sitting SBP <120  - TEDs, and Abdominal binder ordered  --Nephrology, Dr. Suarez, consulted for recs in regards to labile bp and orthostatic management.  --- Amlodipine 2.5mg added 9/14  - continue to monitor orthostatics    #Prolonged QTc  - Avoid QTc prolonging medications--reglan, zofran, prochlorperazine  - Most recent QTc- 451- improving    #HLD  - c/w Atorvastatin 40mg qhs    #Pain:  - Tylenol PRN, Tramadol PRN  - Avoid sedating meds that may affect cognitive recovery    #Headaches  --Topamax 50mg qHS  --Ativan 0.5mg q.8h PRN for nausea  --Repeat CT Head stable 9/9  -- Warm compress     #Sleep:  - Maintain quiet hours and low stim environment  - cont. Melatonin 9mg 9/9  - Topamax 25mg qHS.   --sleeping better    #GI/Bowel:  #abdominal discomfort  - Miralax changed from PRN to standing. Senna added 9/13.  - Encourage PO fluids  -- Ab XR ordered-> negative for any constipation or large stool burden.  - Compazine 5 mg dc'ed; Ativan PRN for nausea  - Pepcid for reflux added 9/9    #/Bladder:  --continent  --voiding with low PVR--    #Diet :    - Diet: Regular with thins    #Skin/ Pressure Injury Prevention:  - assessment on admission --erythema under left breast--Nystatin ordered  - Turn Q2hrs in bed while awake, OOB to Chair, PT/OT/SLP     #DVT prophylaxis:  - Lovenox    #Precautions/ Restrictions  - Falls  - COVID PCR: 9/5    #Dispo: IDR 09/08/2022  - Nursing: continent of bowel and bladder  - SW: Lives in  with  and children. Has 5 SAI; split-level home with 6 steps to bedroom and 6 steps to kitchen  - OT: supervision/set-up with ADLs and CG functional transfers  - PT: ambulates 50' RW with min assist; CG transfers; 4 steps min assist  - SLP: Regular with thins diet; Has mild apraxia and anomic aphasia, Mild-Mod Cognitive deficits  - Team Goals: (1) Ambulate to bedroom Ivonne. (2) Express complex ideas. (3) Sequence higher-level tasks  - TDD: 9/17 to home      MAXX Gottlieb NP spoke with pt's daughter Erica regarding most recent status @16:39. All questions and concerns answered.  Of note, daughter expressed that patient has been having abdominal pain since earlier this year. She did not seek any medical attention or workup for this complaint. On 8/30 she went to a [facility] facility and underwent imaging (daughter is unsure of specific study), and then presented to [facility] on 8/31 because she was not feeling well.   --------------------------------------------  Outpatient Follow up:  Omar Dominguez (DO)  Neurology; Vascular Neurology  [address] [facility] [place] [facility] [address] [place], [address]  [place] [address]  Phone: [phone]  Fax: [phone]  Follow Up Time: 2 weeks    Gabriele Olivier (DO)  Cardiology; Internal Medicine  [address] [facility] [address] [facility], [address]  [place] [address]  Phone: [phone]  Fax: [phone]  Follow Up Time: 1 month    Froy Craig (DO)  [facility]  [address], [address]  [place] [address]  Phone: [phone]  Fax: [phone]  Follow Up Time: Routine  --------------------------------------------   EZRA MARIE is a 60yo Female with PMHx of HTN, migraines, and TIA (last seen at [facility] in 11/2021), who presented to [facility] on 08/31/2022 with HA, nausea, vomiting, and word-finding difficulties; was found to have findings on CT and MRI head consistent with PRES.  Admitted for multidisciplinary rehab program with Aphasia, gait and ADL impairment.  ------------------------------------------------------------------  #Comprehensive Multidisciplinary Rehab Program:  - Gait, ADL, Functional impairments  - PT/OT/ SLP 3 hours a day 5 days a week    #PRES, unclear etiology  - suggestive based on findings on MRI and CT head: hyperintense signal to left occipital lobe and right cerebellum. No enhancement observed with IV contrast.   - repeat MRI brain in 1 month (09/30/2022)  - c/w ASA 81mg daily  - Repeat CT Head stable    #Orthostatic Hypotension  #HTN  -fluctuating BP readings-- Nursing order for manual BPs only to improve accuracy-  --cont. Losartan 50mg qHS, per hospitalist 9/9--Holding parameter for Sitting SBP <120  - TEDs, and Abdominal binder ordered  --Nephrology, Dr. Suarez, consulted for recs in regards to labile bp and orthostatic management.  --- Amlodipine 2.5mg added 9/14  - continue to monitor orthostatics    #Prolonged QTc  - Avoid QTc prolonging medications--reglan, zofran, prochlorperazine  - Most recent QTc- 451- improving    #HLD  - c/w Atorvastatin 40mg qhs    #Pain:  - Tylenol PRN, Tramadol PRN  - Avoid sedating meds that may affect cognitive recovery    #Headaches  --Topamax 50mg qHS  --Ativan 0.5mg q.8h PRN for nausea  --Repeat CT Head stable 9/9  -- Warm compress     #Sleep:  - Maintain quiet hours and low stim environment  - cont. Melatonin 9mg 9/9  - Topamax 25mg qHS.   --sleeping better    #GI/Bowel:  #abdominal discomfort  - Miralax changed from PRN to standing. Senna added 9/13.  - Encourage PO fluids  -- Ab XR ordered-> negative for any constipation or large stool burden.  - Compazine 5 mg dc'ed; Ativan PRN for nausea  - Pepcid for reflux added 9/9. DCd on 9/14  -- Will change antacid to home regimen Zegrid 9/14    #/Bladder:  --continent  --voiding with low PVR--    #Diet :    - Diet: Regular with thins    #Skin/ Pressure Injury Prevention:  - assessment on admission --erythema under left breast--Nystatin ordered  - Turn Q2hrs in bed while awake, OOB to Chair, PT/OT/SLP     #DVT prophylaxis:  - Lovenox    #Precautions/ Restrictions  - Falls  - COVID PCR: 9/5    #Dispo: IDR 09/08/2022  - Nursing: continent of bowel and bladder  - SW: Lives in  with  and children. Has 5 SAI; split-level home with 6 steps to bedroom and 6 steps to kitchen  - OT: supervision/set-up with ADLs and CG functional transfers  - PT: ambulates 50' RW with min assist; CG transfers; 4 steps min assist  - SLP: Regular with thins diet; Has mild apraxia and anomic aphasia, Mild-Mod Cognitive deficits  - Team Goals: (1) Ambulate to bedroom Ivonne. (2) Express complex ideas. (3) Sequence higher-level tasks  - TDD: 9/17 to home      MAXX Gottlieb NP spoke with pt's daughter Erica regarding most recent status @16:39. All questions and concerns answered.  Of note, daughter expressed that patient has been having abdominal pain since earlier this year. She did not seek any medical attention or workup for this complaint. On 8/30 she went to a [facility] facility and underwent imaging (daughter is unsure of specific study), and then presented to [facility] on 8/31 because she was not feeling well.   --------------------------------------------  Outpatient Follow up:  Omar Dominguez (DO)  Neurology; Vascular Neurology  [address] [facility] [address], [address]  [place] [address]  Phone: [phone]  Fax: [phone]  Follow Up Time: 2 weeks    Gabrilee Olivier (DO)  Cardiology; Internal Medicine  [address], [address]  [place] [address]  Phone: [phone]  Fax: [phone]  Follow Up Time: 1 month    Froy Craig (DO)  [address], [facility] [address]  [place] [address]  Phone: [phone]  Fax: [phone]  Follow Up Time: Routine  --------------------------------------------   EZRA MARIE is a 60yo Female with PMHx of HTN, migraines, and TIA (last seen at [facility] in 11/2021), who presented to [facility] on 08/31/2022 with HA, nausea, vomiting, and word-finding difficulties; was found to have findings on CT and MRI head consistent with PRES.  Admitted for multidisciplinary rehab program with Aphasia, gait and ADL impairment.  ------------------------------------------------------------------  #Comprehensive Multidisciplinary Rehab Program:  - Gait, ADL, Functional impairments  - PT/OT/ SLP 3 hours a day 5 days a week    #PRES, unclear etiology  - suggestive based on findings on MRI and CT head: hyperintense signal to left occipital lobe and right cerebellum. No enhancement observed with IV contrast.   - repeat MRI brain in 1 month (09/30/2022)  - c/w ASA 81mg daily  - Repeat CT Head stable    #Orthostatic Hypotension  #HTN  -fluctuating BP readings-- Nursing order for manual BPs only to improve accuracy-  --cont. Losartan 50mg qHS, per hospitalist 9/9--Holding parameter for Sitting SBP <120  - TEDs, and Abdominal binder ordered  --Nephrology, Dr. Suarez, consulted for recs in regards to labile bp and orthostatic management.  --- Amlodipine 2.5mg added 9/14  - continue to monitor orthostatics    #Prolonged QTc  - Avoid QTc prolonging medications--reglan, zofran, prochlorperazine  - Most recent QTc- 451- improving    #HLD  - c/w Atorvastatin 40mg qhs    #Pain:  - Tylenol PRN, Tramadol PRN  - Avoid sedating meds that may affect cognitive recovery    #Headaches  --Topamax 50mg qHS  --Ativan 0.5mg q.8h PRN for nausea  --Repeat CT Head stable 9/9  -- Warm compress     #Sleep:  - Maintain quiet hours and low stim environment  - cont. Melatonin 9mg 9/9  - Topamax 25mg qHS.   --sleeping better    #GI/Bowel:  #abdominal discomfort  - Miralax changed from PRN to standing. Senna added 9/13.  - Encourage PO fluids  -- Ab XR ordered-> negative for any constipation or large stool burden.  - Compazine 5 mg dc'ed; Ativan PRN for nausea  - Pepcid for reflux added 9/9. DCd on 9/14  -- Will change antacid to home regimen Zegrid 9/14    #/Bladder:  --continent  --voiding with low PVR--    #Diet :    - Diet: Regular with thins    #Skin/ Pressure Injury Prevention:  - assessment on admission --erythema under left breast--Nystatin ordered  - Turn Q2hrs in bed while awake, OOB to Chair, PT/OT/SLP     #DVT prophylaxis:  - Lovenox    #Precautions/ Restrictions  - Falls  - COVID PCR: 9/5    #Dispo: IDR 09/08/2022  - Nursing: continent of bowel and bladder  - SW: Lives in  with  and children. Has 5 SAI; split-level home with 6 steps to bedroom and 6 steps to kitchen  - OT: supervision/set-up with ADLs and CG functional transfers  - PT: ambulates 50' RW with min assist; CG transfers; 4 steps min assist  - SLP: Regular with thins diet; Has mild apraxia and anomic aphasia, Mild-Mod Cognitive deficits  - Team Goals: (1) Ambulate to bedroom Ivonne. (2) Express complex ideas. (3) Sequence higher-level tasks  - TDD: 9/17 to home      MAXX Gottlieb NP spoke with pt's daughter Erica regarding most recent status @16:39 on 9/13. All questions and concerns answered.  Of note, daughter expressed that patient has been having abdominal pain since earlier this year. She did not seek any medical attention or workup for this complaint. On 8/30 she went to a [facility] facility and underwent imaging (daughter is unsure of specific study), and then presented to [facility] on 8/31 because she was not feeling well.   --------------------------------------------  Outpatient Follow up:  Omar Dominguez (DO)  Neurology; Vascular Neurology  [address] [facility] [address]  [place] [address]  Phone: [phone]  Fax: [phone]  Follow Up Time: 2 weeks    Gabriele Olivier (DO)  Cardiology; Internal Medicine  [address] [facility], [address]  [place] [address]  Phone: [phone]  Fax: [phone]  Follow Up Time: 1 month    Froy Craig (DO)  [address]  [place] [address]  Phone: [phone]  Fax: [phone]  Follow Up Time: Routine  --------------------------------------------

## 2022-09-14 NOTE — CHART NOTE - NSCHARTNOTEFT_GEN_A_CORE
Nutrition Follow Up Note  Source: Medical Record [X] Patient [X] Family [ ]         Diet: Regular, DASH/TLC  Pt tolerating diet, usually eating >75% of meals per nursing flow sheets, although pt reports decreased appetite. Discussed food preferences. Pt c/o some GERD symptoms - discussed current diet. No other digestive issues reported. Encouraged fluid intake.     Enteral/Parenteral Nutrition: N/A    Current Weight: 167.5 lbs (9/13)  Weight stable    Pertinent Medications: MEDICATIONS  (STANDING):  amLODIPine   Tablet 2.5 milliGRAM(s) Oral daily  aspirin enteric coated 81 milliGRAM(s) Oral daily  atorvastatin 40 milliGRAM(s) Oral at bedtime  enoxaparin Injectable 40 milliGRAM(s) SubCutaneous every 24 hours  lactobacillus acidophilus 1 Tablet(s) Oral daily  losartan 50 milliGRAM(s) Oral at bedtime  melatonin 9 milliGRAM(s) Oral at bedtime  nystatin Powder 1 Application(s) Topical two times a day  Omeprazole/Sodium Bicarbonate 20/1100 mg 1 Capsule(s) 1 Capsule(s) Oral at bedtime  polyethylene glycol 3350 17 Gram(s) Oral two times a day  senna 2 Tablet(s) Oral at bedtime  topiramate 50 milliGRAM(s) Oral at bedtime    MEDICATIONS  (PRN):  acetaminophen     Tablet .. 650 milliGRAM(s) Oral every 6 hours PRN Temp greater or equal to 38C (100.4F), Mild Pain (1 - 3)  LORazepam     Tablet 0.5 milliGRAM(s) Oral every 8 hours PRN Nausea and/or Vomiting  traMADol 25 milliGRAM(s) Oral every 6 hours PRN Severe Pain (7 - 10)      Pertinent Labs:  09-14 Na134 mmol/L<L> Glu 115 mg/dL<H> K+ 3.5 mmol/L Cr  0.86 mg/dL BUN 15 mg/dL 09-13 Alb 3.2 g/dL<L> 09-01 Chol 224 mg/dL<H> LDL --    HDL 63 mg/dL Trig 112 mg/dL        Skin: Moisture associated dermatitis per nursing flow sheets.     Edema: No edema per nursing flow sheets     Last BM: on 9/13 Per nursing flowsheets     Estimated Needs:   [X] No Change since Previous Assessment  [ ] Recalculated:     Previous Nutrition Diagnosis:   Overweight/obesity    Nutrition Diagnosis is [X] Ongoing         New Nutrition Diagnosis: [X] Not Applicable  [ ] Inadequate Protein Energy Intake   [ ] Inadequate Oral Intake   [ ] Excessive Energy Intake   [ ] Increased Nutrient Needs   [ ] Obesity   [ ] Altered GI Function   [ ] Unintended Weight Loss   [ ] Food & Nutrition Related Knowledge Deficit  [ ] Limited Adherence to nutrition related recommendations   [ ] Malnutrition      Interventions:   1. Recommend continuing with current plan of care    Monitoring & Evaluation:   [X] Weights   [X] PO Intake   [X] Follow Up (Per Protocol)  [X] Tolerance to Diet Prescription   [X] Other: Labs     RD Remains Available.  Courtney Box RD

## 2022-09-14 NOTE — PROGRESS NOTE ADULT - SUBJECTIVE AND OBJECTIVE BOX
Subjective:  Patient was seen and examined at the bedside this AM. Admits to poor sleep due to headaches that wake her up in the middle of the night. During encounter she had nausea (3/10) and headache (4/10) but was still participating in therapy to the best of her ability. She is still experiencing orthostatic blood pressure. This AM her bps measured:  150/60 sitting -> 120/80 standing. She states that her nausea and headache worsen with positional changes. She has minimal epigastric pain this AM, she feels that it is because she is not on her home regimen (Zegrid)     ROS:  Denies CP, SOB, dysuria.       Vital Signs Last 24 Hrs  T(C): 36.6 (14 Sep 2022 07:57), Max: 36.6 (13 Sep 2022 21:43)  T(F): 97.9 (14 Sep 2022 07:57), Max: 97.9 (13 Sep 2022 21:43)  HR: 89 (14 Sep 2022 07:57) (87 - 92)  BP: 140/90 (14 Sep 2022 12:20) (140/90 - 184/101)  BP(mean): --  RR: 16 (14 Sep 2022 07:57) (16 - 16)  SpO2: 96% (14 Sep 2022 07:57) (96% - 97%)      Physical Exam:  Constitutional - NAD, Comfortable  HEENT - NCAT, EOMI  Chest - good chest expansion, good respiratory effort on RA  Cardio - warm and well perfused, no LE edema  Abdomen -  Soft, NTND. Slight TTP epigastric  Extremities - No peripheral edema, No calf tenderness   Neurologic Exam:                    Cognitive -             Orientation: A&O x4     Speech - Fluent, Comprehensible, No dysarthria, Mild anomic aphasia      Sensory - Intact to LT bilateral     Motor - moving all extremities spontaneously  Psychiatric - Stable        LAB                        15.1   10.57 )-----------( 521      ( 13 Sep 2022 16:51 )             44.6     09-14    134<L>  |  100  |  15  ----------------------------<  115<H>  3.5   |  27  |  0.86    Ca    8.9      14 Sep 2022 05:40  Mg     2.1     09-14    TPro  7.9  /  Alb  3.2<L>  /  TBili  0.3  /  DBili  x   /  AST  26  /  ALT  17  /  AlkPhos  111  09-13    LIVER FUNCTIONS - ( 13 Sep 2022 16:51 )  Alb: 3.2 g/dL / Pro: 7.9 g/dL / ALK PHOS: 111 U/L / ALT: 17 U/L / AST: 26 U/L / GGT: x             MEDICATIONS  (STANDING):  amLODIPine   Tablet 2.5 milliGRAM(s) Oral daily  aspirin enteric coated 81 milliGRAM(s) Oral daily  atorvastatin 40 milliGRAM(s) Oral at bedtime  enoxaparin Injectable 40 milliGRAM(s) SubCutaneous every 24 hours  famotidine    Tablet 40 milliGRAM(s) Oral daily  lactobacillus acidophilus 1 Tablet(s) Oral daily  losartan 50 milliGRAM(s) Oral at bedtime  melatonin 9 milliGRAM(s) Oral at bedtime  nystatin Powder 1 Application(s) Topical two times a day  polyethylene glycol 3350 17 Gram(s) Oral two times a day  senna 2 Tablet(s) Oral at bedtime  topiramate 50 milliGRAM(s) Oral at bedtime    MEDICATIONS  (PRN):  acetaminophen     Tablet .. 650 milliGRAM(s) Oral every 6 hours PRN Temp greater or equal to 38C (100.4F), Mild Pain (1 - 3)  LORazepam     Tablet 0.5 milliGRAM(s) Oral every 8 hours PRN Nausea and/or Vomiting  traMADol 25 milliGRAM(s) Oral every 6 hours PRN Severe Pain (7 - 10) Subjective:  Patient was seen and examined at the bedside this AM. Admits to poor sleep due to headaches that wake her up in the middle of the night. During encounter she had nausea (3/10) and headache (4/10) but was still participating in therapy to the best of her ability. She is still experiencing orthostatic blood pressure. This AM her bps measured:  150/60 sitting -> 120/80 standing. She states that her nausea and headache worsen with positional changes. She has minimal epigastric pain this AM, she feels that it is because she is not on her home regimen (Zegrid)   When ambulating in therapy, pt.'s BP increased to 146/102,  and pt. c/o headache and nausea symptoms.     After resting and then ambulating more-- BP increased to 177 SBP    ROS:  Denies CP, SOB, dysuria.       Vital Signs Last 24 Hrs  T(C): 36.6 (14 Sep 2022 07:57), Max: 36.6 (13 Sep 2022 21:43)  T(F): 97.9 (14 Sep 2022 07:57), Max: 97.9 (13 Sep 2022 21:43)  HR: 89 (14 Sep 2022 07:57) (87 - 92)  BP: 140/90 (14 Sep 2022 12:20) (140/90 - 184/101)  BP(mean): --  RR: 16 (14 Sep 2022 07:57) (16 - 16)  SpO2: 96% (14 Sep 2022 07:57) (96% - 97%)      Physical Exam:  Constitutional - NAD, Comfortable  HEENT - NCAT, EOMI  Chest - good chest expansion, good respiratory effort on RA  Cardio - warm and well perfused, no LE edema  Abdomen -  Soft, NTND. Slight TTP epigastric  Extremities - No peripheral edema, No calf tenderness   Neurologic Exam:                    Cognitive -             Orientation: A&O x4     Speech - Fluent, Comprehensible, No dysarthria, Mild anomic aphasia      Sensory - Intact to LT bilateral     Motor - moving all extremities spontaneously  Psychiatric - Stable        LAB                        15.1   10.57 )-----------( 521      ( 13 Sep 2022 16:51 )             44.6     09-14    134<L>  |  100  |  15  ----------------------------<  115<H>  3.5   |  27  |  0.86    Ca    8.9      14 Sep 2022 05:40  Mg     2.1     09-14    TPro  7.9  /  Alb  3.2<L>  /  TBili  0.3  /  DBili  x   /  AST  26  /  ALT  17  /  AlkPhos  111  09-13    LIVER FUNCTIONS - ( 13 Sep 2022 16:51 )  Alb: 3.2 g/dL / Pro: 7.9 g/dL / ALK PHOS: 111 U/L / ALT: 17 U/L / AST: 26 U/L / GGT: x             MEDICATIONS  (STANDING):  amLODIPine   Tablet 2.5 milliGRAM(s) Oral daily  aspirin enteric coated 81 milliGRAM(s) Oral daily  atorvastatin 40 milliGRAM(s) Oral at bedtime  enoxaparin Injectable 40 milliGRAM(s) SubCutaneous every 24 hours  famotidine    Tablet 40 milliGRAM(s) Oral daily  lactobacillus acidophilus 1 Tablet(s) Oral daily  losartan 50 milliGRAM(s) Oral at bedtime  melatonin 9 milliGRAM(s) Oral at bedtime  nystatin Powder 1 Application(s) Topical two times a day  polyethylene glycol 3350 17 Gram(s) Oral two times a day  senna 2 Tablet(s) Oral at bedtime  topiramate 50 milliGRAM(s) Oral at bedtime    MEDICATIONS  (PRN):  acetaminophen     Tablet .. 650 milliGRAM(s) Oral every 6 hours PRN Temp greater or equal to 38C (100.4F), Mild Pain (1 - 3)  LORazepam     Tablet 0.5 milliGRAM(s) Oral every 8 hours PRN Nausea and/or Vomiting  traMADol 25 milliGRAM(s) Oral every 6 hours PRN Severe Pain (7 - 10)

## 2022-09-15 LAB
ALDOST SERPL-MCNC: 20.9 NG/DL — SIGNIFICANT CHANGE UP
ANION GAP SERPL CALC-SCNC: 8 MMOL/L — SIGNIFICANT CHANGE UP (ref 5–17)
BUN SERPL-MCNC: 11 MG/DL — SIGNIFICANT CHANGE UP (ref 7–23)
CALCIUM SERPL-MCNC: 9.4 MG/DL — SIGNIFICANT CHANGE UP (ref 8.4–10.5)
CHLORIDE SERPL-SCNC: 103 MMOL/L — SIGNIFICANT CHANGE UP (ref 96–108)
CO2 SERPL-SCNC: 29 MMOL/L — SIGNIFICANT CHANGE UP (ref 22–31)
CREAT SERPL-MCNC: 0.86 MG/DL — SIGNIFICANT CHANGE UP (ref 0.5–1.3)
EGFR: 77 ML/MIN/1.73M2 — SIGNIFICANT CHANGE UP
GLUCOSE SERPL-MCNC: 113 MG/DL — HIGH (ref 70–99)
HCT VFR BLD CALC: 40.6 % — SIGNIFICANT CHANGE UP (ref 34.5–45)
HGB BLD-MCNC: 13.8 G/DL — SIGNIFICANT CHANGE UP (ref 11.5–15.5)
MCHC RBC-ENTMCNC: 31.8 PG — SIGNIFICANT CHANGE UP (ref 27–34)
MCHC RBC-ENTMCNC: 34 GM/DL — SIGNIFICANT CHANGE UP (ref 32–36)
MCV RBC AUTO: 93.5 FL — SIGNIFICANT CHANGE UP (ref 80–100)
NRBC # BLD: 0 /100 WBCS — SIGNIFICANT CHANGE UP (ref 0–0)
OSMOLALITY UR: 180 MOSM/KG — SIGNIFICANT CHANGE UP (ref 50–1200)
PLATELET # BLD AUTO: 476 K/UL — HIGH (ref 150–400)
POTASSIUM SERPL-MCNC: 3.5 MMOL/L — SIGNIFICANT CHANGE UP (ref 3.5–5.3)
POTASSIUM SERPL-SCNC: 3.5 MMOL/L — SIGNIFICANT CHANGE UP (ref 3.5–5.3)
RBC # BLD: 4.34 M/UL — SIGNIFICANT CHANGE UP (ref 3.8–5.2)
RBC # FLD: 11.1 % — SIGNIFICANT CHANGE UP (ref 10.3–14.5)
SODIUM SERPL-SCNC: 140 MMOL/L — SIGNIFICANT CHANGE UP (ref 135–145)
TSH SERPL-MCNC: 0.53 UIU/ML — SIGNIFICANT CHANGE UP (ref 0.36–3.74)
URATE SERPL-MCNC: 3.1 MG/DL — SIGNIFICANT CHANGE UP (ref 2.5–7)
WBC # BLD: 10.24 K/UL — SIGNIFICANT CHANGE UP (ref 3.8–10.5)
WBC # FLD AUTO: 10.24 K/UL — SIGNIFICANT CHANGE UP (ref 3.8–10.5)

## 2022-09-15 PROCEDURE — 99232 SBSQ HOSP IP/OBS MODERATE 35: CPT

## 2022-09-15 RX ORDER — AMLODIPINE BESYLATE 2.5 MG/1
7.5 TABLET ORAL DAILY
Refills: 0 | Status: DISCONTINUED | OUTPATIENT
Start: 2022-09-16 | End: 2022-09-16

## 2022-09-15 RX ORDER — AMLODIPINE BESYLATE 2.5 MG/1
5 TABLET ORAL DAILY
Refills: 0 | Status: DISCONTINUED | OUTPATIENT
Start: 2022-09-16 | End: 2022-09-15

## 2022-09-15 RX ORDER — AMLODIPINE BESYLATE 2.5 MG/1
5 TABLET ORAL ONCE
Refills: 0 | Status: COMPLETED | OUTPATIENT
Start: 2022-09-15 | End: 2022-09-15

## 2022-09-15 RX ORDER — POTASSIUM CHLORIDE 20 MEQ
40 PACKET (EA) ORAL ONCE
Refills: 0 | Status: COMPLETED | OUTPATIENT
Start: 2022-09-15 | End: 2022-09-15

## 2022-09-15 RX ADMIN — Medication 650 MILLIGRAM(S): at 06:21

## 2022-09-15 RX ADMIN — LOSARTAN POTASSIUM 50 MILLIGRAM(S): 100 TABLET, FILM COATED ORAL at 21:13

## 2022-09-15 RX ADMIN — ATORVASTATIN CALCIUM 40 MILLIGRAM(S): 80 TABLET, FILM COATED ORAL at 21:13

## 2022-09-15 RX ADMIN — Medication 81 MILLIGRAM(S): at 12:11

## 2022-09-15 RX ADMIN — NYSTATIN CREAM 1 APPLICATION(S): 100000 CREAM TOPICAL at 06:34

## 2022-09-15 RX ADMIN — Medication 9 MILLIGRAM(S): at 21:14

## 2022-09-15 RX ADMIN — TRAMADOL HYDROCHLORIDE 25 MILLIGRAM(S): 50 TABLET ORAL at 02:50

## 2022-09-15 RX ADMIN — NYSTATIN CREAM 1 APPLICATION(S): 100000 CREAM TOPICAL at 18:09

## 2022-09-15 RX ADMIN — AMLODIPINE BESYLATE 5 MILLIGRAM(S): 2.5 TABLET ORAL at 12:45

## 2022-09-15 RX ADMIN — Medication 50 MILLIGRAM(S): at 21:13

## 2022-09-15 RX ADMIN — Medication 1 TABLET(S): at 12:11

## 2022-09-15 RX ADMIN — Medication 40 MILLIEQUIVALENT(S): at 15:05

## 2022-09-15 RX ADMIN — TRAMADOL HYDROCHLORIDE 25 MILLIGRAM(S): 50 TABLET ORAL at 03:50

## 2022-09-15 RX ADMIN — ENOXAPARIN SODIUM 40 MILLIGRAM(S): 100 INJECTION SUBCUTANEOUS at 06:22

## 2022-09-15 RX ADMIN — Medication 0.5 MILLIGRAM(S): at 06:32

## 2022-09-15 RX ADMIN — Medication 650 MILLIGRAM(S): at 07:19

## 2022-09-15 NOTE — PROGRESS NOTE ADULT - NS ATTEND AMEND GEN_ALL_CORE FT
Pt. seen with resident & NP.  Agree with documentation above as per NP with amendments made as appropriate. Patient medically stable. Making progress towards rehab goals.     PRES  --Pt. elevated with exertion and pt. develops PRES symptoms--  Nephrology, Dr. Suarez, consulted for recs in regards to labile bp and orthostatic management.  ---increased Amlodipine to 5mg 9/15--  - Continue to monitor orthostatics  -- Will consider Neuro for further recs and symptom management if still issues with PRES symptoms tomorrow

## 2022-09-15 NOTE — PROGRESS NOTE ADULT - SUBJECTIVE AND OBJECTIVE BOX
Patient is a 61y old  Female who presents with a chief complaint of PRES (14 Sep 2022 12:16)      24 HOUR EVENTS:  No overnight events reported.     SUBJECTIVE:  Patient seen and examined at bedside.   She reports that abdominal pain is improved.  Headache last night w/ associated nausea. Admits that these sx get better throughout the day.    ALLERGIES:  No Known Allergies    MEDICATIONS  (STANDING):  amLODIPine   Tablet 5 milliGRAM(s) Oral daily  aspirin enteric coated 81 milliGRAM(s) Oral daily  atorvastatin 40 milliGRAM(s) Oral at bedtime  enoxaparin Injectable 40 milliGRAM(s) SubCutaneous every 24 hours  lactobacillus acidophilus 1 Tablet(s) Oral daily  losartan 50 milliGRAM(s) Oral at bedtime  melatonin 9 milliGRAM(s) Oral at bedtime  nystatin Powder 1 Application(s) Topical two times a day  Omeprazole/Sodium Bicarbonate 20/1100 mg 1 Capsule(s) 1 Capsule(s) Oral at bedtime  polyethylene glycol 3350 17 Gram(s) Oral two times a day  senna 2 Tablet(s) Oral at bedtime  topiramate 50 milliGRAM(s) Oral at bedtime    MEDICATIONS  (PRN):  acetaminophen     Tablet .. 650 milliGRAM(s) Oral every 6 hours PRN Temp greater or equal to 38C (100.4F), Mild Pain (1 - 3)  LORazepam     Tablet 0.5 milliGRAM(s) Oral every 8 hours PRN Nausea and/or Vomiting  traMADol 25 milliGRAM(s) Oral every 6 hours PRN Severe Pain (7 - 10)    Vital Signs Last 24 Hrs  T(F): 97.6 (15 Sep 2022 09:08), Max: 98.3 (14 Sep 2022 22:25)  HR: 69 (15 Sep 2022 10:44) (69 - 97)  BP: 149/91 (15 Sep 2022 10:44) (110/80 - 150/98)  RR: 16 (15 Sep 2022 10:44) (14 - 16)  SpO2: 99% (15 Sep 2022 10:44) (97% - 99%)  I&O's Summary    PHYSICAL EXAM:  General: NAD, A/O x 3  ENT: Moist mucous membranes, no thrush  Neck: Supple, No JVD  Lungs: Clear to auscultation bilaterally, good air entry, non-labored breathing  Cardio: RRR, S1/S2, No murmur  Abdomen: Soft, Nontender, Nondistended; Bowel sounds present  Extremities: No calf tenderness, No pitting edema    LABS:                        13.8   10.24 )-----------( 476      ( 15 Sep 2022 06:30 )             40.6     09-15    140  |  103  |  11  ----------------------------<  113  3.5   |  29  |  0.86    Ca    9.4      15 Sep 2022 06:30  Mg     2.1     09-14    TPro  7.9  /  Alb  3.2  /  TBili  0.3  /  DBili  x   /  AST  26  /  ALT  17  /  AlkPhos  111  09-13      CARDIAC MARKERS ( 13 Sep 2022 16:51 )  x     / 9.1 ng/L / x     / x     / x          09-01 Chol 224 mg/dL LDL -- HDL 63 mg/dL Trig 112 mg/dL  TSH 0.526   TSH with FT4 reflex --  Total T3 --                      COVID-19 PCR: NotDetec (09-13-22 @ 06:00)  COVID-19 PCR: NotDetec (09-06-22 @ 18:30)  COVID-19 PCR: NotDetec (09-05-22 @ 05:57)  COVID-19 PCR: NotDetec (09-03-22 @ 06:40)  COVID-19 PCR: NotDetec (08-31-22 @ 09:11)    RADIOLOGY & ADDITIONAL TESTS:    Care Discussed with Consultants/Other Providers:

## 2022-09-15 NOTE — PROGRESS NOTE ADULT - SUBJECTIVE AND OBJECTIVE BOX
Subjective:  Patient was seen and examined at the bedside this AM. States that she slept well until she was woken up with a headache overnight. Admits to a slight headache during encounter. She had nausea this morning but has since resolved. Feels like she had some exertional fatigue during OT. Her bp was checked at that time and measured:  141/91 lying -> 146/90 sitting-> 121/86 standing. Her abdominal pain has resolved.     ROS:  Denies CP, SOB, dysuria.       Vital Signs Last 24 Hrs  T(C): 36.4 (15 Sep 2022 09:08), Max: 36.8 (14 Sep 2022 22:25)  T(F): 97.6 (15 Sep 2022 09:08), Max: 98.3 (14 Sep 2022 22:25)  HR: 69 (15 Sep 2022 10:44) (69 - 97)  BP: 149/91 (15 Sep 2022 10:44) (110/80 - 150/98)  BP(mean): --  RR: 16 (15 Sep 2022 10:44) (14 - 16)  SpO2: 99% (15 Sep 2022 10:44) (97% - 99%)      Physical Exam:  Constitutional - NAD, Comfortable  HEENT - NCAT, EOMI  Chest - good chest expansion, good respiratory effort on RA  Cardio - warm and well perfused, no LE edema  Abdomen -  Soft, NTND.   Extremities - No peripheral edema, No calf tenderness   Neurologic Exam:                    Cognitive -             Orientation: A&O x4     Speech - Fluent, Comprehensible, No dysarthria, Mild anomic aphasia      Sensory - Intact to LT bilateral     Motor - moving all extremities spontaneously  Psychiatric - Stable      LAB                        13.8   10.24 )-----------( 476      ( 15 Sep 2022 06:30 )             40.6     09-15    140  |  103  |  11  ----------------------------<  113<H>  3.5   |  29  |  0.86    Ca    9.4      15 Sep 2022 06:30  Mg     2.1     09-14    TPro  7.9  /  Alb  3.2<L>  /  TBili  0.3  /  DBili  x   /  AST  26  /  ALT  17  /  AlkPhos  111  09-13    LIVER FUNCTIONS - ( 13 Sep 2022 16:51 )  Alb: 3.2 g/dL / Pro: 7.9 g/dL / ALK PHOS: 111 U/L / ALT: 17 U/L / AST: 26 U/L / GGT: x               MEDICATIONS  (STANDING):  amLODIPine   Tablet 5 milliGRAM(s) Oral daily  aspirin enteric coated 81 milliGRAM(s) Oral daily  atorvastatin 40 milliGRAM(s) Oral at bedtime  enoxaparin Injectable 40 milliGRAM(s) SubCutaneous every 24 hours  lactobacillus acidophilus 1 Tablet(s) Oral daily  losartan 50 milliGRAM(s) Oral at bedtime  melatonin 9 milliGRAM(s) Oral at bedtime  nystatin Powder 1 Application(s) Topical two times a day  Omeprazole/Sodium Bicarbonate 20/1100 mg 1 Capsule(s) 1 Capsule(s) Oral at bedtime  polyethylene glycol 3350 17 Gram(s) Oral two times a day  senna 2 Tablet(s) Oral at bedtime  topiramate 50 milliGRAM(s) Oral at bedtime    MEDICATIONS  (PRN):  acetaminophen     Tablet .. 650 milliGRAM(s) Oral every 6 hours PRN Temp greater or equal to 38C (100.4F), Mild Pain (1 - 3)  LORazepam     Tablet 0.5 milliGRAM(s) Oral every 8 hours PRN Nausea and/or Vomiting  traMADol 25 milliGRAM(s) Oral every 6 hours PRN Severe Pain (7 - 10) Subjective:  Patient was seen and examined at the bedside this AM. States that she slept well until she was woken up with a headache overnight. Admits to a slight headache during encounter. She had nausea this morning but has since resolved. Feels like she had some exertional fatigue during OT. Her bp was checked at that time and measured:  141/91 lying -> 146/90 sitting-> 121/86 standing. Her abdominal pain has resolved.     After walking in therapy, pt. developed headache and nausea symptoms-- BP was elevated 153/96.    Her amlodipine this AM was held for BP of 110.  Instructed nurse to give later in the AM when BP was high.      ROS:  Denies CP, SOB, dysuria.       Vital Signs Last 24 Hrs  T(C): 36.4 (15 Sep 2022 09:08), Max: 36.8 (14 Sep 2022 22:25)  T(F): 97.6 (15 Sep 2022 09:08), Max: 98.3 (14 Sep 2022 22:25)  HR: 69 (15 Sep 2022 10:44) (69 - 97)  BP: 149/91 (15 Sep 2022 10:44) (110/80 - 150/98)  BP(mean): --  RR: 16 (15 Sep 2022 10:44) (14 - 16)  SpO2: 99% (15 Sep 2022 10:44) (97% - 99%)      Physical Exam:  Constitutional - NAD, Comfortable  HEENT - NCAT, EOMI  Chest - good chest expansion, good respiratory effort on RA  Cardio - warm and well perfused, no LE edema  Abdomen -  Soft, NTND.   Extremities - No peripheral edema, No calf tenderness   Neurologic Exam:                    Cognitive -             Orientation: A&O x4     Speech - Fluent, Comprehensible, No dysarthria, Mild anomic aphasia      Sensory - Intact to LT bilateral     Motor - moving all extremities spontaneously  Psychiatric - Stable      LAB                        13.8   10.24 )-----------( 476      ( 15 Sep 2022 06:30 )             40.6     09-15    140  |  103  |  11  ----------------------------<  113<H>  3.5   |  29  |  0.86    Ca    9.4      15 Sep 2022 06:30  Mg     2.1     09-14    TPro  7.9  /  Alb  3.2<L>  /  TBili  0.3  /  DBili  x   /  AST  26  /  ALT  17  /  AlkPhos  111  09-13    LIVER FUNCTIONS - ( 13 Sep 2022 16:51 )  Alb: 3.2 g/dL / Pro: 7.9 g/dL / ALK PHOS: 111 U/L / ALT: 17 U/L / AST: 26 U/L / GGT: x               MEDICATIONS  (STANDING):  amLODIPine   Tablet 5 milliGRAM(s) Oral daily  aspirin enteric coated 81 milliGRAM(s) Oral daily  atorvastatin 40 milliGRAM(s) Oral at bedtime  enoxaparin Injectable 40 milliGRAM(s) SubCutaneous every 24 hours  lactobacillus acidophilus 1 Tablet(s) Oral daily  losartan 50 milliGRAM(s) Oral at bedtime  melatonin 9 milliGRAM(s) Oral at bedtime  nystatin Powder 1 Application(s) Topical two times a day  Omeprazole/Sodium Bicarbonate 20/1100 mg 1 Capsule(s) 1 Capsule(s) Oral at bedtime  polyethylene glycol 3350 17 Gram(s) Oral two times a day  senna 2 Tablet(s) Oral at bedtime  topiramate 50 milliGRAM(s) Oral at bedtime    MEDICATIONS  (PRN):  acetaminophen     Tablet .. 650 milliGRAM(s) Oral every 6 hours PRN Temp greater or equal to 38C (100.4F), Mild Pain (1 - 3)  LORazepam     Tablet 0.5 milliGRAM(s) Oral every 8 hours PRN Nausea and/or Vomiting  traMADol 25 milliGRAM(s) Oral every 6 hours PRN Severe Pain (7 - 10)

## 2022-09-15 NOTE — PROGRESS NOTE ADULT - SUBJECTIVE AND OBJECTIVE BOX
Feels better overall    Vital Signs Last 24 Hrs  T(C): 36.7 (09-15-22 @ 19:42), Max: 36.8 (09-14-22 @ 22:25)  T(F): 98 (09-15-22 @ 19:42), Max: 98.3 (09-14-22 @ 22:25)  HR: 95 (09-15-22 @ 19:42) (69 - 97)  BP: 161/94 (09-15-22 @ 19:42) (110/80 - 161/94)  RR: 16 (09-15-22 @ 19:42) (14 - 16)  SpO2: 97% (09-15-22 @ 19:42) (97% - 99%)    s1s2  b/l air entry  soft, ND  no edema  AO                         13.8   10.24 )-----------( 476      ( 15 Sep 2022 06:30 )             40.6     15 Sep 2022 06:30    140    |  103    |  11     ----------------------------<  113    3.5     |  29     |  0.86     Ca    9.4        15 Sep 2022 06:30  Mg     2.1       14 Sep 2022 05:40    acetaminophen     Tablet .. 650 milliGRAM(s) Oral every 6 hours PRN  amLODIPine   Tablet 5 milliGRAM(s) Oral daily  aspirin enteric coated 81 milliGRAM(s) Oral daily  atorvastatin 40 milliGRAM(s) Oral at bedtime  enoxaparin Injectable 40 milliGRAM(s) SubCutaneous every 24 hours  lactobacillus acidophilus 1 Tablet(s) Oral daily  LORazepam     Tablet 0.5 milliGRAM(s) Oral every 8 hours PRN  losartan 50 milliGRAM(s) Oral at bedtime  melatonin 9 milliGRAM(s) Oral at bedtime  nystatin Powder 1 Application(s) Topical two times a day  Omeprazole/Sodium Bicarbonate 20/1100 mg 1 Capsule(s) 1 Capsule(s) Oral at bedtime  polyethylene glycol 3350 17 Gram(s) Oral two times a day  senna 2 Tablet(s) Oral at bedtime  topiramate 50 milliGRAM(s) Oral at bedtime  traMADol 25 milliGRAM(s) Oral every 6 hours PRN    A/P:    Suspected PRES  Labile BP  Pls continue to take all BPs sitting or standing  Continue Losartan and Norvasc  2ry HTN w/up as ordered  Will titrate Norvasc as needed    [phone]

## 2022-09-15 NOTE — PROGRESS NOTE ADULT - ASSESSMENT
EZRA MARIE is a 62yo Female with PMHx of HTN, migraines, and TIA (last seen at [facility] in 11/2021), who presented to [facility] on 08/31/2022 with HA, nausea, vomiting, and word-finding difficulties; was found to have findings on CT and MRI head consistent with PRES.  Admitted for multidisciplinary rehab program with Aphasia, gait and ADL impairment.  ------------------------------------------------------------------  #Comprehensive Multidisciplinary Rehab Program:  - Gait, ADL, Functional impairments  - PT/OT/ SLP 3 hours a day 5 days a week    #PRES, unclear etiology  - suggestive based on findings on MRI and CT head: hyperintense signal to left occipital lobe and right cerebellum. No enhancement observed with IV contrast.   - repeat MRI brain in 1 month (09/30/2022)  - c/w ASA 81mg daily  - Repeat CT Head stable    #Orthostatic Hypotension  #HTN  -fluctuating BP readings-- Nursing order for manual BPs only to improve accuracy-  --cont. Losartan 50mg qHS, per hospitalist 9/9--Holding parameter for Sitting SBP <120  - TEDs, and Abdominal binder ordered  --Nephrology, Dr. Suarez, consulted for recs in regards to labile bp and orthostatic management.  --- Amlodipine 2.5mg added 9/14  - continue to monitor orthostatics  -- Will consider Neuro for further recs and symptom management.    #Prolonged QTc  - Avoid QTc prolonging medications--reglan, zofran, prochlorperazine  - Most recent QTc- 451- improving    #HLD  - c/w Atorvastatin 40mg qhs    #Pain:  - Tylenol PRN, Tramadol PRN  - Avoid sedating meds that may affect cognitive recovery    #Headaches  --Topamax 50mg qHS  --Ativan 0.5mg q.8h PRN for nausea  --Repeat CT Head stable 9/9  -- Warm compress     #Sleep:  - Maintain quiet hours and low stim environment  - cont. Melatonin 9mg 9/9  - Topamax 25mg qHS.   --sleeping better    #GI/Bowel:  #abdominal discomfort  - Miralax changed from PRN to standing. Senna added 9/13.  - Encourage PO fluids  -- Ab XR ordered-> negative for any constipation or large stool burden.  - Compazine 5 mg dc'ed; Ativan PRN for nausea  - Pepcid for reflux added 9/9. DCd on 9/14  -- Will change antacid to home regimen Zegrid 9/14    #/Bladder:  --continent  --voiding with low PVR--    #Diet :    - Diet: Regular with thins    #Skin/ Pressure Injury Prevention:  - assessment on admission --erythema under left breast--Nystatin ordered  - Turn Q2hrs in bed while awake, OOB to Chair, PT/OT/SLP     #DVT prophylaxis:  - Lovenox    #Precautions/ Restrictions  - Falls  - COVID PCR: 9/5    #Dispo: IDR 09/08/2022  - Nursing: continent of bowel and bladder  - SW: Lives in  with  and children. Has 5 SAI; split-level home with 6 steps to bedroom and 6 steps to kitchen  - OT: supervision/set-up with ADLs and CG functional transfers  - PT: ambulates 50' RW with min assist; CG transfers; 4 steps min assist  - SLP: Regular with thins diet; Has mild apraxia and anomic aphasia, Mild-Mod Cognitive deficits  - Team Goals: (1) Ambulate to bedroom Ivonne. (2) Express complex ideas. (3) Sequence higher-level tasks  - TDD: 9/17 to home      MAXX Gottlieb NP spoke with pt's daughter Erica regarding most recent status @16:39 on 9/13. All questions and concerns answered.  Of note, daughter expressed that patient has been having abdominal pain since earlier this year. She did not seek any medical attention or workup for this complaint. On 8/30 she went to a [facility] facility and underwent imaging (daughter is unsure of specific study), and then presented to [facility] on 8/31 because she was not feeling well.   --------------------------------------------  Outpatient Follow up:  Omar Dominguez (DO)  Neurology; Vascular Neurology  [address] [facility] [address], [address]  [place] [address]  Phone: [phone]  Fax: [phone]  Follow Up Time: 2 weeks    Gabriele Olivier (DO)  Cardiology; Internal Medicine  [address] [place] [address], [address]  [place] [address]  Phone: [phone]  Fax: [phone]  Follow Up Time: 1 month    Froy Craig (DO)  [address], [facility] [address]  [place] [address]  Phone: [phone]  Fax: [phone]  Follow Up Time: Routine  --------------------------------------------   EZRA MARIE is a 60yo Female with PMHx of HTN, migraines, and TIA (last seen at [facility] in 11/2021), who presented to [facility] on 08/31/2022 with HA, nausea, vomiting, and word-finding difficulties; was found to have findings on CT and MRI head consistent with PRES.  Admitted for multidisciplinary rehab program with Aphasia, gait and ADL impairment.  ------------------------------------------------------------------  #Comprehensive Multidisciplinary Rehab Program:  - Gait, ADL, Functional impairments  - PT/OT/ SLP 3 hours a day 5 days a week  -- PT: 60 min/ OT: 30 min/ SLP: 90 min starting 9/15    #PRES, unclear etiology  - suggestive based on findings on MRI and CT head: hyperintense signal to left occipital lobe and right cerebellum. No enhancement observed with IV contrast.   - repeat MRI brain in 1 month (09/30/2022)  - c/w ASA 81mg daily  - Repeat CT Head stable    #Orthostatic Hypotension  #HTN  -fluctuating BP readings-- Nursing order for manual BPs only to improve accuracy-  --cont. Losartan 50mg qHS, per hospitalist 9/9--Holding parameter for Sitting SBP <120  - TEDs, and Abdominal binder ordered  --Nephrology, Dr. Suarez, consulted for recs in regards to labile bp and orthostatic management.  --- Amlodipine 2.5mg added 9/14  - continue to monitor orthostatics  -- Will consider Neuro for further recs and symptom management.    #Prolonged QTc  - Avoid QTc prolonging medications--reglan, zofran, prochlorperazine  - Most recent QTc- 451- improving    #HLD  - c/w Atorvastatin 40mg qhs    #Pain:  - Tylenol PRN, Tramadol PRN  - Avoid sedating meds that may affect cognitive recovery    #Headaches  --Topamax 50mg qHS  --Ativan 0.5mg q.8h PRN for nausea  --Repeat CT Head stable 9/9  -- Warm compress     #Sleep:  - Maintain quiet hours and low stim environment  - cont. Melatonin 9mg 9/9  - Topamax 25mg qHS.   --sleeping better    #GI/Bowel:  #abdominal discomfort  - Miralax changed from PRN to standing. Senna added 9/13.  - Encourage PO fluids  -- Ab XR ordered-> negative for any constipation or large stool burden.  - Compazine 5 mg dc'ed; Ativan PRN for nausea  - Pepcid for reflux added 9/9. DCd on 9/14  -- Will change antacid to home regimen Zegrid 9/14    #/Bladder:  --continent  --voiding with low PVR--    #Diet :    - Diet: Regular with thins    #Skin/ Pressure Injury Prevention:  - assessment on admission --erythema under left breast--Nystatin ordered  - Turn Q2hrs in bed while awake, OOB to Chair, PT/OT/SLP     #DVT prophylaxis:  - Lovenox    #Precautions/ Restrictions  - Falls  - COVID PCR: 9/5    #Dispo: IDR 09/08/2022  - Nursing: continent of bowel and bladder  - SW: Lives in  with  and children. Has 5 SAI; split-level home with 6 steps to bedroom and 6 steps to kitchen  - OT: supervision/set-up with ADLs and CG functional transfers  - PT: ambulates 50' RW with min assist; CG transfers; 4 steps min assist  - SLP: Regular with thins diet; Has mild apraxia and anomic aphasia, Mild-Mod Cognitive deficits  - Team Goals: (1) Ambulate to bedroom Ivonne. (2) Express complex ideas. (3) Sequence higher-level tasks  - TDD: 9/17 to home      MAXX Gottlieb NP spoke with pt's daughter Erica regarding most recent status @16:39 on 9/13. All questions and concerns answered.  Of note, daughter expressed that patient has been having abdominal pain since earlier this year. She did not seek any medical attention or workup for this complaint. On 8/30 she went to a [facility] facility and underwent imaging (daughter is unsure of specific study), and then presented to [facility] on 8/31 because she was not feeling well.   --------------------------------------------  Outpatient Follow up:  Omar Dominguez (DO)  Neurology; Vascular Neurology  [address] [facility], [address]  [place] [address]  Phone: [phone]  Fax: [phone]  Follow Up Time: 2 weeks    Gabriele Olivier (DO)  Cardiology; Internal Medicine  [address] [facility], [address]  [place] [address]  Phone: [phone]  Fax: [phone]  Follow Up Time: 1 month    Froy Craig (DO)  Medicine  [address] [place] [address], [facility] [address]  [place] [address]  Phone: [phone]  Fax: [phone]  Follow Up Time: Routine  --------------------------------------------   EZRA MARIE is a 60yo Female with PMHx of HTN, migraines, and TIA (last seen at [facility] in 11/2021), who presented to [facility] on 08/31/2022 with HA, nausea, vomiting, and word-finding difficulties; was found to have findings on CT and MRI head consistent with PRES.  Admitted for multidisciplinary rehab program with Aphasia, gait and ADL impairment.  ------------------------------------------------------------------  #Comprehensive Multidisciplinary Rehab Program:  - Gait, ADL, Functional impairments  - PT/OT/ SLP 3 hours a day 5 days a week  -- PT: 60 min/ OT: 30 min/ SLP: 90 min starting 9/15    #PRES, unclear etiology  - suggestive based on findings on MRI and CT head: hyperintense signal to left occipital lobe and right cerebellum. No enhancement observed with IV contrast.   - repeat MRI brain in 1 month (09/30/2022)  - c/w ASA 81mg daily  - Repeat CT Head stable    #Orthostatic Hypotension  #HTN  -fluctuating BP readings-- Nursing order for manual BPs only to improve accuracy-  --cont. Losartan 50mg qHS, per hospitalist 9/9--Holding parameter for Sitting SBP <120  - TEDs, and Abdominal binder ordered  --Nephrology, Dr. Suarez, consulted for recs in regards to labile bp and orthostatic management.  --- Amlodipine 2.5mg added 9/14  - continue to monitor orthostatics  -- Will consider Neuro for further recs and symptom management.    #Prolonged QTc  - Avoid QTc prolonging medications--reglan, zofran, prochlorperazine  - Most recent QTc- 451- improving    #HLD  - c/w Atorvastatin 40mg qhs    #Pain:  - Tylenol PRN, Tramadol PRN  - Avoid sedating meds that may affect cognitive recovery    #Headaches  --Topamax 50mg qHS  --Ativan 0.5mg q.8h PRN for nausea  --Repeat CT Head stable 9/9  -- Warm compress     #Sleep:  - Maintain quiet hours and low stim environment  - cont. Melatonin 9mg 9/9  - Topamax 25mg qHS.   --sleeping better    #GI/Bowel:  #abdominal discomfort  - Miralax changed from PRN to standing. Senna added 9/13.  - Encourage PO fluids  -- Ab XR ordered-> negative for any constipation or large stool burden.  - Compazine 5 mg dc'ed; Ativan PRN for nausea  - Pepcid for reflux added 9/9. DCd on 9/14  -- Will change antacid to home regimen Zegrid 9/14    #/Bladder:  --continent  --voiding with low PVR--    #Diet :    - Diet: Regular with thins    #Skin/ Pressure Injury Prevention:  - assessment on admission --erythema under left breast--Nystatin ordered  - Turn Q2hrs in bed while awake, OOB to Chair, PT/OT/SLP     #DVT prophylaxis:  - Lovenox    #Precautions/ Restrictions  - Falls  - COVID PCR: 9/5    #Dispo: IDR 09/15/2022  - Nursing: continent of bowel and bladder  - Barriers: Orthostatics, labile blood pressure, visual perceptual deficits.  - SW: Lives in  with  and children. Has 5 SAI; split-level home with 6 steps to bedroom and 6 steps to kitchen  - OT: Independent with eating, grooming, and UBD. Supervision for LBD, bathing, toilet transfer, toileting, and tub/shower transfer.   - PT: Ambulated 100 ft with SC, with CG/supervision. Supervision for transfers. Completed 8 stairs with supervision.   - SLP: Regular with thins diet; Has mild anomia. Mild cognitive deficits with high level tasks.   - Team Goals: (1) Ambulate to bedroom Ivonne. (2) Express complex ideas. (3) Sequence higher-level tasks  - TDD: 9/22 to home    --------------------------------------------  Outpatient Follow up:  Omar Dominguez (DO)  Neurology; Vascular Neurology  [address] [facility], [address]  [place] [address]  Phone: [phone]  Fax: [phone]  Follow Up Time: 2 weeks    Charline, Gabriele (DO)  Cardiology; Internal Medicine  [address] [facility], [address]  [place] [address]  Phone: [phone]  Fax: [phone]  Follow Up Time: 1 month    Froy Craig (DO)  [facility]  [address] [facility], [facility] [address]  [place] [address]  Phone: [phone]  Fax: [phone]  Follow Up Time: Routine  --------------------------------------------   EZRA MARIE is a 62yo Female with PMHx of HTN, migraines, and TIA (last seen at [facility] in 11/2021), who presented to [facility] on 08/31/2022 with HA, nausea, vomiting, and word-finding difficulties; was found to have findings on CT and MRI head consistent with PRES.  Admitted for multidisciplinary rehab program with Aphasia, gait and ADL impairment.  ------------------------------------------------------------------  #Comprehensive Multidisciplinary Rehab Program:  - Gait, ADL, Functional impairments  - PT/OT/ SLP 3 hours a day 5 days a week  -- PT: 60 min/ OT: 30 min/ SLP: 90 min starting 9/15    #PRES, unclear etiology  - suggestive based on findings on MRI and CT head: hyperintense signal to left occipital lobe and right cerebellum. No enhancement observed with IV contrast.   - repeat MRI brain in 1 month (09/30/2022)  - c/w ASA 81mg daily  - Repeat CT Head stable    #Orthostatic Hypotension  #HTN  -fluctuating BP readings-- Nursing order for manual BPs only to improve accuracy-  --cont. Losartan 50mg qHS, per hospitalist 9/9--Holding parameter for Sitting SBP <120  - TEDs, and Abdominal binder ordered  --Nephrology, Dr. Suarez, consulted for recs in regards to labile bp and orthostatic management.  --- Amlodipine 2.5mg added 9/14  - Continue to monitor orthostatics  -- Will consider Neuro for further recs and symptom management.    #Prolonged QTc  - Avoid QTc prolonging medications--reglan, zofran, prochlorperazine  - Most recent QTc- 451- improving    #HLD  - c/w Atorvastatin 40mg qhs    #Pain:  - Tylenol PRN, Tramadol PRN  - Avoid sedating meds that may affect cognitive recovery    #Headaches  --Topamax 50mg qHS  --Ativan 0.5mg q.8h PRN for nausea  --Repeat CT Head stable 9/9  -- Warm compress     #Sleep:  - Maintain quiet hours and low stim environment  - cont. Melatonin 9mg 9/9  - Topamax 25mg qHS.   --sleeping better    #GI/Bowel:  #abdominal discomfort  - Miralax changed from PRN to standing. Senna added 9/13.  - Encourage PO fluids  -- Ab XR ordered-> negative for any constipation or large stool burden.  - Compazine 5 mg dc'ed; Ativan PRN for nausea  - Pepcid for reflux added 9/9. DCd on 9/14  -- Will change antacid to home regimen Zegrid 9/14    #/Bladder:  --continent  --voiding with low PVR--    #Diet :    - Diet: Regular with thins    #Skin/ Pressure Injury Prevention:  - assessment on admission --erythema under left breast--Nystatin ordered  - Turn Q2hrs in bed while awake, OOB to Chair, PT/OT/SLP     #DVT prophylaxis:  - Lovenox    #Precautions/ Restrictions  - Falls  - COVID PCR: 9/5    #Dispo: IDR 09/15/2022  - Nursing: continent of bowel and bladder  - Barriers: Orthostatics, labile blood pressure, visual perceptual deficits.  - SW: Lives in  with  and children. Has 5 SAI; split-level home with 6 steps to bedroom and 6 steps to kitchen  - OT: Independent with eating, grooming, and UBD. Supervision for LBD, bathing, toilet transfer, toileting, and tub/shower transfer.   - PT: Ambulated 100 ft with SC, with CG/supervision. Supervision for transfers. Completed 8 stairs with supervision.   - SLP: Regular with thins diet; Has mild anomia. Mild cognitive deficits with high level tasks.   - Team Goals: (1) Ambulate to bedroom Ivonne. (2) Express complex ideas. (3) Sequence higher-level tasks  - TDD: 9/22 to home    --------------------------------------------  Outpatient Follow up:  Omar Dominguez (DO)  Neurology; Vascular Neurology  [address] [facility], [address]  [place] [address]  Phone: [phone]  Fax: [phone]  Follow Up Time: 2 weeks    Charline, Gabriele (DO)  Cardiology; Internal Medicine  [address] [facility], [address]  [place] [address]  Phone: [phone]  Fax: [phone]  Follow Up Time: 1 month    Froy Craig (DO)  [facility]  [address] [facility], [facility] [address]  [place] [address]  Phone: [phone]  Fax: [phone]  Follow Up Time: Routine  --------------------------------------------   EZRA MARIE is a 60yo Female with PMHx of HTN, migraines, and TIA (last seen at [facility] in 11/2021), who presented to [facility] on 08/31/2022 with HA, nausea, vomiting, and word-finding difficulties; was found to have findings on CT and MRI head consistent with PRES.  Admitted for multidisciplinary rehab program with Aphasia, gait and ADL impairment.  ------------------------------------------------------------------  #Comprehensive Multidisciplinary Rehab Program:  - Gait, ADL, Functional impairments  - PT/OT/ SLP 3 hours a day 5 days a week  -- PT: 60 min/ OT: 30 min/ SLP: 90 min starting 9/15    #PRES, unclear etiology  - suggestive based on findings on MRI and CT head: hyperintense signal to left occipital lobe and right cerebellum. No enhancement observed with IV contrast.   - repeat MRI brain in 1 month (09/30/2022)  - c/w ASA 81mg daily  - Repeat CT Head stable    #Orthostatic Hypotension  #HTN  -fluctuating BP readings-- Nursing order for manual BPs only to improve accuracy-  --cont. Losartan 50mg qHS, per hospitalist 9/9--Holding parameter for Sitting SBP <120  - TEDs, and Abdominal binder ordered  --Nephrology, Dr. Suarez, consulted for recs in regards to labile bp and orthostatic management.  ---increased Amlodipine to 5mg 9/15--  - Continue to monitor orthostatics  -- Will consider Neuro for further recs and symptom management if still issues with PRES symptoms tomorrow    #Prolonged QTc  - Avoid QTc prolonging medications--reglan, zofran, prochlorperazine  - Most recent QTc- 451- improving    #HLD  - c/w Atorvastatin 40mg qhs    #Pain:  - Tylenol PRN, Tramadol PRN  - Avoid sedating meds that may affect cognitive recovery    #Headaches  --Topamax 50mg qHS  --Ativan 0.5mg q.8h PRN for nausea  --Repeat CT Head stable 9/9  -- Warm compress     #Sleep:  - Maintain quiet hours and low stim environment  - cont. Melatonin 9mg 9/9  - Topamax 25mg qHS.   --sleeping better    #GI/Bowel:  #abdominal discomfort  - Miralax changed from PRN to standing. Senna added 9/13.  - Encourage PO fluids  -- Ab XR ordered-> negative for any constipation or large stool burden.  - Compazine 5 mg dc'ed; Ativan PRN for nausea  - Pepcid for reflux added 9/9. DCd on 9/14  -- Will change antacid to home regimen Zegrid 9/14    #/Bladder:  --continent  --voiding with low PVR--    #Diet :    - Diet: Regular with thins    #Skin/ Pressure Injury Prevention:  - assessment on admission --erythema under left breast--Nystatin ordered  - Turn Q2hrs in bed while awake, OOB to Chair, PT/OT/SLP     #DVT prophylaxis:  - Lovenox    #Precautions/ Restrictions  - Falls  - COVID PCR: 9/5    #Dispo: IDR 09/15/2022  - Nursing: continent of bowel and bladder  - Barriers: Orthostatics, labile blood pressure, visual perceptual deficits.  - SW: Lives in  with  and children. Has 5 SAI; split-level home with 6 steps to bedroom and 6 steps to kitchen  - OT: Independent with eating, grooming, and UBD. Supervision for LBD, bathing, toilet transfer, toileting, and tub/shower transfer.   - PT: Ambulated 150 ft with SC, with CG/supervision. Supervision for transfers. Completed 8 stairs with supervision.   - SLP: Regular with thins diet; Has mild anomia. Mild cognitive deficits with high level tasks.   - Team Goals: (1) Ambulate to bedroom Ivonne. (2) Express complex ideas. (3) Sequence higher-level tasks  - TDD: 9/21 to home    --------------------------------------------  Outpatient Follow up:  Omar Dominguez (DO)  Neurology; Vascular Neurology  [address] [facility], [address]  [place] [address]  Phone: [phone]  Fax: [phone]  Follow Up Time: 2 weeks    Gabriele Olivier (DO)  Cardiology; Internal Medicine  [address] [facility], [address]  [place] [address]  Phone: [phone]  Fax: [phone]  Follow Up Time: 1 month    Froy Craig (DO)  Medicine  [address] [facility], [address]  [place] [address]  Phone: [phone]  Fax: [phone]  Follow Up Time: Routine  --------------------------------------------

## 2022-09-15 NOTE — PROGRESS NOTE ADULT - ASSESSMENT
62 yo F w/ HTN, migraine, TIA (not on ASA and statin), presented w/ HA, word finding difficulties, and n/v, admitted as a code stroke, carotid dissection in left ICA on imaging, and CT/MRI head suggestive of PRES.  Admitted to [facility] on 9/6/2022.    # Acute metabolic encephalopathy complicated by aphasia, likely due to   # PRES  # Headache - improving.  - Repeat MRI head in 1 mo (9/30/2022)  - ASA and statin  - PT/OT/Speech therapy  - pain control & bowel regimen per PMR  - BP control as below    # abdominal discomfort 2/2 constipation and GERD - improving.   - bowel regimen  - omeprazole    # HTN  #orthostasis  SBP goal 100-160.   - losartan 50mg qhs w/ holding parameters. Started on amlodipine by Nephrology.  - TEDS / abd binder  - encourage PO fluids  - DASH diet.    # Abdominal hematoma from lovenox inj  - will monitor sites closely and h/h. Currently only subcutaneous.   - rotate lovenox inj sites.    # Depression  recently on benzos for anxiety following death of brother. Not on SSRI.  - Discussed with Dr. mitchell - to find out if SSRI affects PRES.     # Prolonged QTc - improving  - avoid QTc prolonging meds, can also give oral ativan for breakthrough nausea.   - optimize electrolytes    # thrombocytosis likely reactive.   - trend platelets.  - already on aspirin    # Hx of migraines  - per d/c summary, avoid triptans.  - OP f/u with Neurology for migraine therapy.   - can consider consult to Dr. López for botox injections.  - pt started on topiramate.  - consider trial of gabapentin for severe headaches.      #VTE ppx:   -lovenox, no evidence of anemia.

## 2022-09-16 LAB
CHLORIDE UR-SCNC: 22 MMOL/L — SIGNIFICANT CHANGE UP
SODIUM UR-SCNC: 26 MMOL/L — SIGNIFICANT CHANGE UP

## 2022-09-16 PROCEDURE — 99233 SBSQ HOSP IP/OBS HIGH 50: CPT

## 2022-09-16 PROCEDURE — 99232 SBSQ HOSP IP/OBS MODERATE 35: CPT

## 2022-09-16 RX ADMIN — TRAMADOL HYDROCHLORIDE 25 MILLIGRAM(S): 50 TABLET ORAL at 21:00

## 2022-09-16 RX ADMIN — TRAMADOL HYDROCHLORIDE 25 MILLIGRAM(S): 50 TABLET ORAL at 20:00

## 2022-09-16 RX ADMIN — Medication 81 MILLIGRAM(S): at 11:36

## 2022-09-16 RX ADMIN — NYSTATIN CREAM 1 APPLICATION(S): 100000 CREAM TOPICAL at 17:24

## 2022-09-16 RX ADMIN — Medication 650 MILLIGRAM(S): at 14:31

## 2022-09-16 RX ADMIN — Medication 650 MILLIGRAM(S): at 15:33

## 2022-09-16 RX ADMIN — Medication 1 TABLET(S): at 11:36

## 2022-09-16 RX ADMIN — LOSARTAN POTASSIUM 50 MILLIGRAM(S): 100 TABLET, FILM COATED ORAL at 21:40

## 2022-09-16 RX ADMIN — Medication 50 MILLIGRAM(S): at 21:39

## 2022-09-16 RX ADMIN — ENOXAPARIN SODIUM 40 MILLIGRAM(S): 100 INJECTION SUBCUTANEOUS at 06:03

## 2022-09-16 RX ADMIN — ATORVASTATIN CALCIUM 40 MILLIGRAM(S): 80 TABLET, FILM COATED ORAL at 21:40

## 2022-09-16 RX ADMIN — NYSTATIN CREAM 1 APPLICATION(S): 100000 CREAM TOPICAL at 06:03

## 2022-09-16 RX ADMIN — Medication 9 MILLIGRAM(S): at 21:44

## 2022-09-16 RX ADMIN — Medication 0.5 MILLIGRAM(S): at 21:45

## 2022-09-16 NOTE — PROGRESS NOTE ADULT - ASSESSMENT
60 yo F w/ HTN, migraine, TIA (not on ASA and statin), presented w/ HA, word finding difficulties, and n/v, admitted as a code stroke, carotid dissection in left ICA on imaging, and CT/MRI head suggestive of PRES.  Admitted to [facility] on 9/6/2022.    # Acute metabolic encephalopathy complicated by aphasia, likely due to   # PRES  # Headache - improving.  - Repeat MRI head in 1 mo (9/30/2022)  - ASA and statin  - PT/OT/Speech therapy  - pain control & bowel regimen per PMR  - BP control as below    # abdominal discomfort 2/2 constipation and GERD - improving.   - bowel regimen  - omeprazole    # HTN  #orthostasis  SBP goal 100-160.   - losartan 50mg qhs w/ holding parameters. Started on amlodipine by Nephrology.  - TEDS / abd binder  - encourage PO fluids  - DASH diet.    # Abdominal hematoma from lovenox inj  - will monitor sites closely and h/h. Currently only subcutaneous.   - rotate lovenox inj sites.    # Depression  recently on benzos for anxiety following death of brother. Not on SSRI.  - Neuropsych f/u    # Prolonged QTc - improving  - avoid QTc prolonging meds, can also give oral ativan for breakthrough nausea.   - optimize electrolytes    # thrombocytosis likely reactive - stable  - trend platelets.  - already on aspirin    # Hx of migraines  - per d/c summary, avoid triptans.  - OP f/u with Neurology for migraine therapy.   - can consider consult to Dr. López for botox injections.  - pt started on topiramate.  - consider trial of gabapentin for severe headaches.      #VTE ppx:   -lovenox, no evidence of anemia.

## 2022-09-16 NOTE — PROGRESS NOTE ADULT - SUBJECTIVE AND OBJECTIVE BOX
Events noted, low BP this am, Norvasc never given    Vital Signs Last 24 Hrs  T(C): 36.7 (09-16-22 @ 07:54), Max: 36.7 (09-15-22 @ 19:42)  T(F): 98.1 (09-16-22 @ 07:54), Max: 98.1 (09-16-22 @ 07:54)  HR: 78 (09-16-22 @ 07:54) (78 - 98)  BP: 90/70 (09-16-22 @ 11:39) (90/70 - 161/94)  RR: 16 (09-16-22 @ 10:58) (16 - 16)  SpO2: 98% (09-16-22 @ 10:58) (95% - 98%)    s1s2  b/l air entry  soft, ND  no edema  AO                                  13.8   10.24 )-----------( 476      ( 15 Sep 2022 06:30 )             40.6     15 Sep 2022 06:30    140    |  103    |  11     ----------------------------<  113    3.5     |  29     |  0.86     Ca    9.4        15 Sep 2022 06:30    acetaminophen     Tablet .. 650 milliGRAM(s) Oral every 6 hours PRN  aspirin enteric coated 81 milliGRAM(s) Oral daily  atorvastatin 40 milliGRAM(s) Oral at bedtime  enoxaparin Injectable 40 milliGRAM(s) SubCutaneous every 24 hours  lactobacillus acidophilus 1 Tablet(s) Oral daily  LORazepam     Tablet 0.5 milliGRAM(s) Oral every 8 hours PRN  losartan 50 milliGRAM(s) Oral at bedtime  melatonin 9 milliGRAM(s) Oral at bedtime  nystatin Powder 1 Application(s) Topical two times a day  Omeprazole/Sodium Bicarbonate 20/1100 mg 1 Capsule(s) 1 Capsule(s) Oral at bedtime  polyethylene glycol 3350 17 Gram(s) Oral two times a day  senna 2 Tablet(s) Oral at bedtime  topiramate 50 milliGRAM(s) Oral at bedtime  traMADol 25 milliGRAM(s) Oral every 6 hours PRN    A/P:    Suspected PRES  Labile BP  Pls continue to take all BPs sitting or standing  Continue Losartan at night w/holding parameters  D/w rehab team and family at bedside  Will follow    [phone]

## 2022-09-16 NOTE — PROGRESS NOTE ADULT - SUBJECTIVE AND OBJECTIVE BOX
Subjective:  Patient was seen in bed this AM. States that she feels better. She had slight dizziness earlier this morning and a slight headache that did not require any pain medication. Still orthostatic this AM with therapies.  (130/75 - beginning of therapy with sitting and sitting 1 min later 102/80)    ROS:  Denies CP, SOB, dysuria.       Vital Signs Last 24 Hrs  T(C): 36.7 (16 Sep 2022 07:54), Max: 36.7 (15 Sep 2022 19:42)  T(F): 98.1 (16 Sep 2022 07:54), Max: 98.1 (16 Sep 2022 07:54)  HR: 78 (16 Sep 2022 07:54) (78 - 98)  BP: 90/70 (16 Sep 2022 11:39) (90/70 - 161/94)  BP(mean): --  RR: 16 (16 Sep 2022 10:58) (16 - 16)  SpO2: 98% (16 Sep 2022 10:58) (95% - 98%)      Physical Exam:  Constitutional - NAD, Comfortable  HEENT - NCAT, EOMI  Chest - good chest expansion, good respiratory effort on RA  Cardio - warm and well perfused, no LE edema  Abdomen -  Soft, NTND.   Extremities - No peripheral edema, No calf tenderness   Neurologic Exam:                    Cognitive -             Orientation: A&O x4     Speech - Fluent, Comprehensible, No dysarthria, Mild anomic aphasia      Sensory - Intact to LT bilateral     Motor - moving all extremities spontaneously  Psychiatric - Stable      LAB                        13.8   10.24 )-----------( 476      ( 15 Sep 2022 06:30 )             40.6     09-15    140  |  103  |  11  ----------------------------<  113<H>  3.5   |  29  |  0.86    Ca    9.4      15 Sep 2022 06:30  Mg     2.1     09-14    TPro  7.9  /  Alb  3.2<L>  /  TBili  0.3  /  DBili  x   /  AST  26  /  ALT  17  /  AlkPhos  111  09-13    LIVER FUNCTIONS - ( 13 Sep 2022 16:51 )  Alb: 3.2 g/dL / Pro: 7.9 g/dL / ALK PHOS: 111 U/L / ALT: 17 U/L / AST: 26 U/L / GGT: x               MEDICATIONS  (STANDING):  amLODIPine   Tablet 5 milliGRAM(s) Oral daily  aspirin enteric coated 81 milliGRAM(s) Oral daily  atorvastatin 40 milliGRAM(s) Oral at bedtime  enoxaparin Injectable 40 milliGRAM(s) SubCutaneous every 24 hours  lactobacillus acidophilus 1 Tablet(s) Oral daily  losartan 50 milliGRAM(s) Oral at bedtime  melatonin 9 milliGRAM(s) Oral at bedtime  nystatin Powder 1 Application(s) Topical two times a day  Omeprazole/Sodium Bicarbonate 20/1100 mg 1 Capsule(s) 1 Capsule(s) Oral at bedtime  polyethylene glycol 3350 17 Gram(s) Oral two times a day  senna 2 Tablet(s) Oral at bedtime  topiramate 50 milliGRAM(s) Oral at bedtime    MEDICATIONS  (PRN):  acetaminophen     Tablet .. 650 milliGRAM(s) Oral every 6 hours PRN Temp greater or equal to 38C (100.4F), Mild Pain (1 - 3)  LORazepam     Tablet 0.5 milliGRAM(s) Oral every 8 hours PRN Nausea and/or Vomiting  traMADol 25 milliGRAM(s) Oral every 6 hours PRN Severe Pain (7 - 10) Subjective:  Patient was seen in bed this AM. States that she feels better. She had slight dizziness earlier this morning and a slight headache that did not require any pain medication. Still orthostatic this AM with therapies.  (130/75 - beginning of therapy with sitting and sitting 1 min later 102/80)  tolerated walking for 20 min in OT and BP was in 130s/70s.  when stood to walk a second time, SBP dropped to 90 and patient became dizzy     ROS:  Denies CP, SOB, dysuria.       Vital Signs Last 24 Hrs  T(C): 36.7 (16 Sep 2022 07:54), Max: 36.7 (15 Sep 2022 19:42)  T(F): 98.1 (16 Sep 2022 07:54), Max: 98.1 (16 Sep 2022 07:54)  HR: 78 (16 Sep 2022 07:54) (78 - 98)  BP: 90/70 (16 Sep 2022 11:39) (90/70 - 161/94)  BP(mean): --  RR: 16 (16 Sep 2022 10:58) (16 - 16)  SpO2: 98% (16 Sep 2022 10:58) (95% - 98%)      Physical Exam:  Constitutional - NAD, Comfortable  HEENT - NCAT, EOMI  Chest - good chest expansion, good respiratory effort on RA  Cardio - warm and well perfused, no LE edema  Abdomen -  Soft, NTND.   Extremities - No peripheral edema, No calf tenderness   Neurologic Exam:                    Cognitive -             Orientation: A&O x4     Speech - Fluent, Comprehensible, No dysarthria, Mild anomic aphasia      Sensory - Intact to LT bilateral     Motor - moving all extremities spontaneously  Psychiatric - Stable      LAB                        13.8   10.24 )-----------( 476      ( 15 Sep 2022 06:30 )             40.6     09-15    140  |  103  |  11  ----------------------------<  113<H>  3.5   |  29  |  0.86    Ca    9.4      15 Sep 2022 06:30  Mg     2.1     09-14    TPro  7.9  /  Alb  3.2<L>  /  TBili  0.3  /  DBili  x   /  AST  26  /  ALT  17  /  AlkPhos  111  09-13    LIVER FUNCTIONS - ( 13 Sep 2022 16:51 )  Alb: 3.2 g/dL / Pro: 7.9 g/dL / ALK PHOS: 111 U/L / ALT: 17 U/L / AST: 26 U/L / GGT: x               MEDICATIONS  (STANDING):  amLODIPine   Tablet 5 milliGRAM(s) Oral daily  aspirin enteric coated 81 milliGRAM(s) Oral daily  atorvastatin 40 milliGRAM(s) Oral at bedtime  enoxaparin Injectable 40 milliGRAM(s) SubCutaneous every 24 hours  lactobacillus acidophilus 1 Tablet(s) Oral daily  losartan 50 milliGRAM(s) Oral at bedtime  melatonin 9 milliGRAM(s) Oral at bedtime  nystatin Powder 1 Application(s) Topical two times a day  Omeprazole/Sodium Bicarbonate 20/1100 mg 1 Capsule(s) 1 Capsule(s) Oral at bedtime  polyethylene glycol 3350 17 Gram(s) Oral two times a day  senna 2 Tablet(s) Oral at bedtime  topiramate 50 milliGRAM(s) Oral at bedtime    MEDICATIONS  (PRN):  acetaminophen     Tablet .. 650 milliGRAM(s) Oral every 6 hours PRN Temp greater or equal to 38C (100.4F), Mild Pain (1 - 3)  LORazepam     Tablet 0.5 milliGRAM(s) Oral every 8 hours PRN Nausea and/or Vomiting  traMADol 25 milliGRAM(s) Oral every 6 hours PRN Severe Pain (7 - 10)

## 2022-09-16 NOTE — PROGRESS NOTE ADULT - NS ATTEND AMEND GEN_ALL_CORE FT
Pt. seen with resident & NP.  Agree with documentation above as per NP with amendments made as appropriate. Patient medically stable. Making progress towards rehab goals.     PRES  -- no elevated BP today or in evening. Minimal headache last night,  no nausea.  No increased BP with activity in therapy today, but pt. became orthostatic after walking for a while.   -Spoke with Dr. Suarez-- discussed that pt's orthostatic bp today probably reflects the amlodipine 5mg she received yesterday-- Stop Amlodipine 9/16 (pt. did not receive this AM but did 9/15)--   Nephrology will cont. to follow up patient through the weekend.

## 2022-09-16 NOTE — PROGRESS NOTE ADULT - SUBJECTIVE AND OBJECTIVE BOX
Patient is a 61y old  Female who presents with a chief complaint of PRES (15 Sep 2022 19:43)      24 HOUR EVENTS:  No overnight events reported.     SUBJECTIVE:  Patient seen and examined at bedside. She had a headache at 4 PM, but it's overall better. Abdominal pain is better. Dizziness is better.    ALLERGIES:  No Known Allergies    MEDICATIONS  (STANDING):  amLODIPine   Tablet 7.5 milliGRAM(s) Oral daily  aspirin enteric coated 81 milliGRAM(s) Oral daily  atorvastatin 40 milliGRAM(s) Oral at bedtime  enoxaparin Injectable 40 milliGRAM(s) SubCutaneous every 24 hours  lactobacillus acidophilus 1 Tablet(s) Oral daily  losartan 50 milliGRAM(s) Oral at bedtime  melatonin 9 milliGRAM(s) Oral at bedtime  nystatin Powder 1 Application(s) Topical two times a day  Omeprazole/Sodium Bicarbonate 20/1100 mg 1 Capsule(s) 1 Capsule(s) Oral at bedtime  polyethylene glycol 3350 17 Gram(s) Oral two times a day  senna 2 Tablet(s) Oral at bedtime  topiramate 50 milliGRAM(s) Oral at bedtime    MEDICATIONS  (PRN):  acetaminophen     Tablet .. 650 milliGRAM(s) Oral every 6 hours PRN Temp greater or equal to 38C (100.4F), Mild Pain (1 - 3)  LORazepam     Tablet 0.5 milliGRAM(s) Oral every 8 hours PRN Nausea and/or Vomiting  traMADol 25 milliGRAM(s) Oral every 6 hours PRN Severe Pain (7 - 10)    Vital Signs Last 24 Hrs  T(F): 98.1 (16 Sep 2022 07:54), Max: 98.1 (16 Sep 2022 07:54)  HR: 78 (16 Sep 2022 07:54) (78 - 98)  BP: 123/74 (16 Sep 2022 10:58) (123/74 - 161/94)  RR: 16 (16 Sep 2022 10:58) (16 - 16)  SpO2: 98% (16 Sep 2022 10:58) (95% - 98%)  I&O's Summary    PHYSICAL EXAM:  General: NAD, A/O x 3  ENT: Moist mucous membranes, no thrush  Neck: Supple, No JVD  Lungs: Clear to auscultation bilaterally, good air entry, non-labored breathing  Cardio: RRR, S1/S2, No murmur  Abdomen: Soft, Nontender, Nondistended; Bowel sounds present  Extremities: No calf tenderness, No pitting edema    LABS:                        13.8   10.24 )-----------( 476      ( 15 Sep 2022 06:30 )             40.6     09-15    140  |  103  |  11  ----------------------------<  113  3.5   |  29  |  0.86    Ca    9.4      15 Sep 2022 06:30  Mg     2.1     09-14    TPro  7.9  /  Alb  3.2  /  TBili  0.3  /  DBili  x   /  AST  26  /  ALT  17  /  AlkPhos  111  09-13                CARDIAC MARKERS ( 13 Sep 2022 16:51 )  x     / 9.1 ng/L / x     / x     / x          09-01 Chol 224 mg/dL LDL -- HDL 63 mg/dL Trig 112 mg/dL  TSH 0.526   TSH with FT4 reflex --  Total T3 --                      COVID-19 PCR: NotDetec (09-13-22 @ 06:00)  COVID-19 PCR: NotDetec (09-06-22 @ 18:30)  COVID-19 PCR: NotDetec (09-05-22 @ 05:57)  COVID-19 PCR: NotDetec (09-03-22 @ 06:40)  COVID-19 PCR: NotDetec (08-31-22 @ 09:11)    RADIOLOGY & ADDITIONAL TESTS:    Care Discussed with Consultants/Other Providers:

## 2022-09-16 NOTE — PROGRESS NOTE ADULT - ASSESSMENT
EZRA MARIE is a 62yo Female with PMHx of HTN, migraines, and TIA (last seen at [facility] in 11/2021), who presented to [facility] on 08/31/2022 with HA, nausea, vomiting, and word-finding difficulties; was found to have findings on CT and MRI head consistent with PRES.  Admitted for multidisciplinary rehab program with Aphasia, gait and ADL impairment.  ------------------------------------------------------------------  #Comprehensive Multidisciplinary Rehab Program:  - Gait, ADL, Functional impairments  - PT/OT/ SLP 3 hours a day 5 days a week  -- PT: 60 min/ OT: 30 min/ SLP: 90 min starting 9/15    #PRES, unclear etiology  - suggestive based on findings on MRI and CT head: hyperintense signal to left occipital lobe and right cerebellum. No enhancement observed with IV contrast.   - repeat MRI brain in 1 month (09/30/2022)  - c/w ASA 81mg daily  - Repeat CT Head stable    #Orthostatic Hypotension  #HTN  -fluctuating BP readings-- Nursing order for manual BPs only to improve accuracy-  --cont. Losartan 50mg qHS, per hospitalist 9/9--Holding parameter for Sitting SBP <120  - TEDs, and Abdominal binder ordered  --Nephrology, Dr. Suarez, consulted for recs in regards to labile bp and orthostatic management.  ---Increased Amlodipine to 7.5 9/15-- per Nephrology recs  - Continue to monitor orthostatics    #Prolonged QTc  - Avoid QTc prolonging medications--reglan, zofran, prochlorperazine  - Most recent QTc- 451- improving    #HLD  - c/w Atorvastatin 40mg qhs    #Pain:  - Tylenol PRN, Tramadol PRN  - Avoid sedating meds that may affect cognitive recovery    #Headaches  --Topamax 50mg qHS  --Ativan 0.5mg q.8h PRN for nausea  --Repeat CT Head stable 9/9  -- Warm compress     #Sleep:  - Maintain quiet hours and low stim environment  - cont. Melatonin 9mg 9/9  - Topamax 25mg qHS.   --sleeping better    #GI/Bowel:  #abdominal discomfort  - Miralax changed from PRN to standing. Senna added 9/13.  - Encourage PO fluids  -- Ab XR ordered-> negative for any constipation or large stool burden.  - Compazine 5 mg dc'ed; Ativan PRN for nausea  - Pepcid for reflux added 9/9. DCd on 9/14  -- Will change antacid to home regimen Zegrid 9/14    #/Bladder:  --continent  --voiding with low PVR--    #Diet :    - Diet: Regular with thins    #Skin/ Pressure Injury Prevention:  - assessment on admission --erythema under left breast--Nystatin ordered  - Turn Q2hrs in bed while awake, OOB to Chair, PT/OT/SLP     #DVT prophylaxis:  - Lovenox    #Precautions/ Restrictions  - Falls  - COVID PCR: 9/5    #Dispo: IDR 09/15/2022  - Nursing: continent of bowel and bladder  - Barriers: Orthostatics, labile blood pressure, visual perceptual deficits.  - SW: Lives in  with  and children. Has 5 SAI; split-level home with 6 steps to bedroom and 6 steps to kitchen  - OT: Independent with eating, grooming, and UBD. Supervision for LBD, bathing, toilet transfer, toileting, and tub/shower transfer.   - PT: Ambulated 150 ft with SC, with CG/supervision. Supervision for transfers. Completed 8 stairs with supervision.   - SLP: Regular with thins diet; Has mild anomia. Mild cognitive deficits with high level tasks.   - Team Goals: (1) Ambulate to bedroom Ivonne. (2) Express complex ideas. (3) Sequence higher-level tasks  - TDD: 9/21 to home    --------------------------------------------  Outpatient Follow up:  Omar Dominguez (DO)  Neurology; Vascular Neurology  [address], [address]  [place] [address]  Phone: [phone]  Fax: [phone]  Follow Up Time: 2 weeks    Gabriele Olivier ()  Cardiology; Internal Medicine  [address] [facility] [facility], [address]  [place] [address]  Phone: [phone]  Fax: [phone]  Follow Up Time: 1 month    Froy Craig ([address]  [place] [address]  Phone: [phone]  Fax: [phone]  Follow Up Time: Routine  --------------------------------------------   EZRA MARIE is a 62yo Female with PMHx of HTN, migraines, and TIA (last seen at [facility] in 11/2021), who presented to [facility] on 08/31/2022 with HA, nausea, vomiting, and word-finding difficulties; was found to have findings on CT and MRI head consistent with PRES.  Admitted for multidisciplinary rehab program with Aphasia, gait and ADL impairment.  ------------------------------------------------------------------  #Comprehensive Multidisciplinary Rehab Program:  - Gait, ADL, Functional impairments  - PT/OT/ SLP 3 hours a day 5 days a week  -- PT: 60 min/ OT: 30 min/ SLP: 90 min starting 9/15    #PRES, unclear etiology  - suggestive based on findings on MRI and CT head: hyperintense signal to left occipital lobe and right cerebellum. No enhancement observed with IV contrast.   - repeat MRI brain in 1 month (09/30/2022)  - c/w ASA 81mg daily  - Repeat CT Head stable    #Orthostatic Hypotension  #HTN  -fluctuating BP readings-- Nursing order for manual BPs only to improve accuracy-  --cont. Losartan 50mg qHS, per Nephrology 9/16--Holding parameter for Sitting SBP <120  - TEDs, and Abdominal binder ordered  --Nephrology, Dr. Suarez, consulted for recs in regards to labile bp and orthostatic management.  ---Spoke with Dr. Suarez-- discussed that pt's orthostatic bp today probably reflects the amlodipine 5mg she received yesterday-- Stop Amlodipine 9/16 (pt. did not receive this AM but did 9/15)--   - Continue to monitor orthostatics    #Prolonged QTc  - Avoid QTc prolonging medications--reglan, zofran, prochlorperazine  - Most recent QTc- 451- improving    #HLD  - c/w Atorvastatin 40mg qhs    #Pain:  - Tylenol PRN, Tramadol PRN  - Avoid sedating meds that may affect cognitive recovery    #Headaches  --Topamax 50mg qHS  --Ativan 0.5mg q.8h PRN for nausea  --Repeat CT Head stable 9/9  -- Warm compress     #Sleep:  - Maintain quiet hours and low stim environment  - cont. Melatonin 9mg 9/9  - Topamax 25mg qHS.   --sleeping better    #GI/Bowel:  #abdominal discomfort  - Miralax changed from PRN to standing. Senna added 9/13.  - Encourage PO fluids  -- Ab XR ordered-> negative for any constipation or large stool burden.  - Compazine 5 mg dc'ed; Ativan PRN for nausea  - Pepcid for reflux added 9/9. DCd on 9/14  -- Will change antacid to home regimen Zegrid 9/14    #/Bladder:  --continent  --voiding with low PVR--    #Diet :    - Diet: Regular with thins    #Skin/ Pressure Injury Prevention:  - assessment on admission --erythema under left breast--Nystatin ordered  - Turn Q2hrs in bed while awake, OOB to Chair, PT/OT/SLP     #DVT prophylaxis:  - Lovenox    #Precautions/ Restrictions  - Falls  - COVID PCR: 9/5    #Dispo: IDR 09/15/2022  - Nursing: continent of bowel and bladder  - Barriers: Orthostatics, labile blood pressure, visual perceptual deficits.  - SW: Lives in  with  and children. Has 5 SAI; split-level home with 6 steps to bedroom and 6 steps to kitchen  - OT: Independent with eating, grooming, and UBD. Supervision for LBD, bathing, toilet transfer, toileting, and tub/shower transfer.   - PT: Ambulated 150 ft with SC, with CG/supervision. Supervision for transfers. Completed 8 stairs with supervision.   - SLP: Regular with thins diet; Has mild anomia. Mild cognitive deficits with high level tasks.   - Team Goals: (1) Ambulate to bedroom Ivonne. (2) Express complex ideas. (3) Sequence higher-level tasks  - TDD: 9/21 to home    --------------------------------------------  Outpatient Follow up:  Omar Dominguez (DO)  Neurology; Vascular Neurology  [address] [facility], [address]  [place] [address]  Phone: [phone]  Fax: [phone]  Follow Up Time: 2 weeks    Gabriele Olivier (DO)  Cardiology; Internal Medicine  [address] [facility], [address]  [place] [address]  Phone: [phone]  Fax: [phone]  Follow Up Time: 1 month    Froy Craig (DO)  [address]  [place] [address]  Phone: [phone]  Fax: [phone]  Follow Up Time: Routine  --------------------------------------------

## 2022-09-17 LAB — CORTIS AM PEAK SERPL-MCNC: 16.3 UG/DL — SIGNIFICANT CHANGE UP (ref 6–18.4)

## 2022-09-17 PROCEDURE — 99232 SBSQ HOSP IP/OBS MODERATE 35: CPT

## 2022-09-17 RX ORDER — CYCLOBENZAPRINE HYDROCHLORIDE 10 MG/1
10 TABLET, FILM COATED ORAL THREE TIMES A DAY
Refills: 0 | Status: DISCONTINUED | OUTPATIENT
Start: 2022-09-17 | End: 2022-09-24

## 2022-09-17 RX ADMIN — Medication 9 MILLIGRAM(S): at 21:44

## 2022-09-17 RX ADMIN — Medication 81 MILLIGRAM(S): at 12:16

## 2022-09-17 RX ADMIN — Medication 1 TABLET(S): at 12:16

## 2022-09-17 RX ADMIN — Medication 50 MILLIGRAM(S): at 21:44

## 2022-09-17 RX ADMIN — ATORVASTATIN CALCIUM 40 MILLIGRAM(S): 80 TABLET, FILM COATED ORAL at 21:44

## 2022-09-17 RX ADMIN — ENOXAPARIN SODIUM 40 MILLIGRAM(S): 100 INJECTION SUBCUTANEOUS at 05:49

## 2022-09-17 RX ADMIN — LOSARTAN POTASSIUM 50 MILLIGRAM(S): 100 TABLET, FILM COATED ORAL at 21:44

## 2022-09-17 RX ADMIN — NYSTATIN CREAM 1 APPLICATION(S): 100000 CREAM TOPICAL at 05:50

## 2022-09-17 RX ADMIN — TRAMADOL HYDROCHLORIDE 25 MILLIGRAM(S): 50 TABLET ORAL at 20:45

## 2022-09-17 RX ADMIN — TRAMADOL HYDROCHLORIDE 25 MILLIGRAM(S): 50 TABLET ORAL at 06:53

## 2022-09-17 RX ADMIN — TRAMADOL HYDROCHLORIDE 25 MILLIGRAM(S): 50 TABLET ORAL at 05:53

## 2022-09-17 RX ADMIN — Medication 650 MILLIGRAM(S): at 05:04

## 2022-09-17 RX ADMIN — Medication 650 MILLIGRAM(S): at 17:35

## 2022-09-17 RX ADMIN — Medication 650 MILLIGRAM(S): at 04:04

## 2022-09-17 RX ADMIN — Medication 650 MILLIGRAM(S): at 15:40

## 2022-09-17 RX ADMIN — CYCLOBENZAPRINE HYDROCHLORIDE 10 MILLIGRAM(S): 10 TABLET, FILM COATED ORAL at 21:45

## 2022-09-17 RX ADMIN — CYCLOBENZAPRINE HYDROCHLORIDE 10 MILLIGRAM(S): 10 TABLET, FILM COATED ORAL at 09:23

## 2022-09-17 NOTE — PROGRESS NOTE ADULT - ASSESSMENT
EZRA MARIE is a 62yo Female with PMHx of HTN, migraines, and TIA (last seen at [facility] in 11/2021), who presented to [facility] on 08/31/2022 with HA, nausea, vomiting, and word-finding difficulties; was found to have findings on CT and MRI head consistent with PRES.     # PRES, unclear etiology  - suggestive based on findings on MRI and CT head: hyperintense signal to left occipital lobe and right cerebellum  - repeat MRI brain in 1 month (09/30/2022)  - c/w ASA 81mg daily  - continue comprehensive rehab program PT/OT/ SLP 3 hours a day 5 days a week    #Orthostatic Hypotension/ HTN  - fluctuating BP readings-- Nursing order for manual BPs only to improve accuracy  - cont. Losartan 50mg qHS,. Holding parameter for Sitting SBP <120  - amlodipine dc  - renal following  - Continue to monitor orthostatics  - (114/68 - 165/98) 9/17    # Prolonged QTc  - Avoid QTc prolonging medications (reglan, zofran, prochlorperazine)  - Most recent QTc- 451- improving    # HLD  - c/w Atorvastatin 40mg qhs    # Pain:  - Tylenol PRN, Tramadol PRN  - add flexeril 10 q8 PRN, warm compress 9/17.discussed with patient    #Headaches  - Topamax 50mg qHS  - Ativan 0.5mg q.8h PRN for nausea  - flexeril    #Sleep:  - cont. Melatonin 9mg 9/9    #GI/Bowel:  - Miralax , senna  - Encourage PO fluids  - Compazine 5 mg dc'ed; Ativan PRN for nausea  -  Zegrid 9/14    #Diet :    - Diet: Regular with thins      #DVT prophylaxis:  - Lovenox        --------------------------------------------  Outpatient Follow up:  Omar Dominguez (DO)  Neurology; Vascular Neurology  [address] [facility] [address], [address]  [place] [address]  Phone: [phone]  Fax: [phone]  Follow Up Time: 2 weeks    Gabriele Olivier ()  Cardiology; Internal Medicine  [address] [place] [address], [address]  [place] [address]  Phone: [phone]  Fax: [phone]  Follow Up Time: 1 month    Froy Craig ()  [address]  [place] [address]  Phone: [phone]  Fax: [phone]  Follow Up Time: Routine  --------------------------------------------

## 2022-09-17 NOTE — PROGRESS NOTE ADULT - SUBJECTIVE AND OBJECTIVE BOX
Patient is a 61y old  Female who presents with a chief complaint of PRES (17 Sep 2022 09:02)      HPI:  EZRA MARIE is a 60yo Female with PMHx of HTN, migraines, and TIA (last seen at [facility] in 2021), who presented to [facility] on 2022 with HA, nausea, vomiting, and word-finding difficulties; was found to have findings on CT and MRI head consistent with PRES. No tPA was given, as she presented outside of therapeutic window and LVO not performed, as no LVO was identified on CT angio head/neck. Patient was followed by neurology and cardiology during her admission. TTE and EEG were unremarkable; started on ASA. Course c/b hyponatremia, likely SIADH now resolved.    Patient was evaluated by PM&R and therapy for functional deficits and gait/ ADL impairments and recommended acute rehabilitation. Patient was medically optimized for discharge to [facility] on 2022. Patient was seen and examined at the bedside upon arrival. Currently endorses 3-4/10 HA and anxiety. Reports that while ambulating, experiences blurry vision, dizziness, and lightheadedness. VS on admission unremarkable. (06 Sep 2022 12:50)      PAST MEDICAL & SURGICAL HISTORY:  Hypertension      Hallux valgus (acquired), right foot  s/p correction 2018      HV (hallux valgus), left      Acquired hammertoe of left foot      Migraine      H/O  section  X 2      H/O bilateral breast reduction surgery      H/O endoscopy  &amp; colonoscopy      S/P bunionectomy  Right and hammertoe correction 2018          MEDICATIONS  (STANDING):  aspirin enteric coated 81 milliGRAM(s) Oral daily  atorvastatin 40 milliGRAM(s) Oral at bedtime  enoxaparin Injectable 40 milliGRAM(s) SubCutaneous every 24 hours  lactobacillus acidophilus 1 Tablet(s) Oral daily  losartan 50 milliGRAM(s) Oral at bedtime  melatonin 9 milliGRAM(s) Oral at bedtime  nystatin Powder 1 Application(s) Topical two times a day  Omeprazole/Sodium Bicarbonate  mg 1 Capsule(s) 1 Capsule(s) Oral at bedtime  polyethylene glycol 3350 17 Gram(s) Oral two times a day  senna 2 Tablet(s) Oral at bedtime  topiramate 50 milliGRAM(s) Oral at bedtime    MEDICATIONS  (PRN):  acetaminophen     Tablet .. 650 milliGRAM(s) Oral every 6 hours PRN Temp greater or equal to 38C (100.4F), Mild Pain (1 - 3)  cyclobenzaprine 10 milliGRAM(s) Oral three times a day PRN Muscle Spasm  LORazepam     Tablet 0.5 milliGRAM(s) Oral every 8 hours PRN Nausea and/or Vomiting  traMADol 25 milliGRAM(s) Oral every 6 hours PRN Severe Pain (7 - 10)      Allergies    No Known Allergies    Intolerances          VITALS  61y  Vital Signs Last 24 Hrs  T(C): 36.4 (17 Sep 2022 08:58), Max: 36.5 (16 Sep 2022 21:37)  T(F): 97.6 (17 Sep 2022 08:58), Max: 97.7 (16 Sep 2022 21:37)  HR: 67 (17 Sep 2022 08:58) (67 - 82)  BP: 150/96 (17 Sep 2022 08:58) (114/68 - 165/98)  BP(mean): --  RR: 16 (17 Sep 2022 08:58) (16 - 16)  SpO2: 96% (17 Sep 2022 08:58) (96% - 96%)    Parameters below as of 17 Sep 2022 08:50  Patient On (Oxygen Delivery Method): room air      Daily     Daily         RECENT LABS:                      CAPILLARY BLOOD GLUCOSE

## 2022-09-17 NOTE — PROGRESS NOTE ADULT - ASSESSMENT
62 yo F w/ HTN, migraine, TIA (not on ASA and statin), presented w/ HA, word finding difficulties, and n/v, admitted as a code stroke, carotid dissection in left ICA on imaging, and CT/MRI head suggestive of PRES.  Admitted to [facility] on 9/6/2022.        Neurology   Acute metabolic encephalopathy complicated by aphasia, likely due to PRES  - Repeat MRI head in 1 mo (9/30/2022)  - ASA and statin  - PT/OT/Speech therapy  - pain control & bowel regimen per PMR  - BP control as below  - suspect elevation in BP is 2/2 headache, given documenated BP of 90 over 24hrs would defer further titration of antihypertensive regimen until adequate pain relief has been attempted  Migraines  - per d/c summary, avoid triptans.  - OP f/u with Neurology for migraine therapy.   - can consider consult to Dr. López for botox injections.  - pt started on topiramate.  - consider gabapentin given headaches this AM, if to be started needs daily ECG x 3 days given ability to prolong QTc.    Psych  Depression  - recently on benzos for anxiety following death of brother. Not on SSRI.  - Neuropsych f/u  - would monitor QTc closely if SSRI to be initiated    Gastroenterology  abdominal discomfort 2/2 constipation and GERD  - improving.   - bowel regimen  - omeprazole  Abdominal hematoma from lovenox inj  - will monitor sites closely and h/h. Currently only subcutaneous.   - rotate lovenox inj sites.    Cardiovascular  Hypertension with episodes of orthostasis  - SBP goal 100-160.   - losartan 50mg qhs w/ holding parameters. Started on amlodipine by Nephrology, c/w amlodipine 5mg daily  - TEDS / abd binder  - encourage PO fluids  - DASH diet.  Prolonged QTc   - improved, QTc on 9/13 normal at 456  - avoid QTc prolonging meds, can also give oral ativan for breakthrough nausea.   - optimize electrolytes    Hematology  Thrombocytosis likely reactive   - stable  - trend platelets.  - already on aspirin              VTE ppx: lovenox.  Diet: per primary  Pain control: per primary

## 2022-09-17 NOTE — PROGRESS NOTE ADULT - COMMENTS
Patient complained of persistent headache overnight despite taking tylenol and then tramadol, which usually improves symptoms. Reports that headaches are both frontal as well as in posterior neck and shoudlers. Did not sleep lying flat. No visual change or motor change.

## 2022-09-17 NOTE — PROGRESS NOTE ADULT - CONSTITUTIONAL COMMENTS
alert, no facial droop. reduced attention, due to discomfort. yes/no reliable, no dysarthria and convesrsational speech WFL

## 2022-09-17 NOTE — PROGRESS NOTE ADULT - SUBJECTIVE AND OBJECTIVE BOX
Patient seen and examined at bedside. Patient notes headaches that woke her from sleep around 4am. States that tramadol and tylenol gave her some relief but woke this morning with pain again. Describes it as diffuse and throbbing.     ALLERGIES:  No Known Allergies    MEDICATIONS  (STANDING):  aspirin enteric coated 81 milliGRAM(s) Oral daily  atorvastatin 40 milliGRAM(s) Oral at bedtime  enoxaparin Injectable 40 milliGRAM(s) SubCutaneous every 24 hours  lactobacillus acidophilus 1 Tablet(s) Oral daily  losartan 50 milliGRAM(s) Oral at bedtime  melatonin 9 milliGRAM(s) Oral at bedtime  nystatin Powder 1 Application(s) Topical two times a day  Omeprazole/Sodium Bicarbonate 20/1100 mg 1 Capsule(s) 1 Capsule(s) Oral at bedtime  polyethylene glycol 3350 17 Gram(s) Oral two times a day  senna 2 Tablet(s) Oral at bedtime  topiramate 50 milliGRAM(s) Oral at bedtime    MEDICATIONS  (PRN):  acetaminophen     Tablet .. 650 milliGRAM(s) Oral every 6 hours PRN Temp greater or equal to 38C (100.4F), Mild Pain (1 - 3)  cyclobenzaprine 10 milliGRAM(s) Oral three times a day PRN Muscle Spasm  LORazepam     Tablet 0.5 milliGRAM(s) Oral every 8 hours PRN Nausea and/or Vomiting  traMADol 25 milliGRAM(s) Oral every 6 hours PRN Severe Pain (7 - 10)    Vital Signs Last 24 Hrs  T(F): 97.6 (17 Sep 2022 08:58), Max: 97.7 (16 Sep 2022 21:37)  HR: 67 (17 Sep 2022 08:58) (67 - 82)  BP: 150/96 (17 Sep 2022 08:58) (90/70 - 165/98)  RR: 16 (17 Sep 2022 08:58) (16 - 16)  SpO2: 96% (17 Sep 2022 08:58) (96% - 98%)  I&O's Summary      PHYSICAL EXAM:  Gen: nad, resting in bed  Neuro: aaox3, no focal deficits  Heent: eomi b/l, no jvd, no oral exudates  Pulm: cta b/l, no w/r/r  CV: +s1s2, no m/r/g  Ab: soft, nt/nd, normoactive bs x 4, swelling noted but not firm  Extrem: no edema, pulses intact and equal      LABS:                        13.8   10.24 )-----------( 476      ( 15 Sep 2022 06:30 )             40.6       09-15    140  |  103  |  11  ----------------------------<  113  3.5   |  29  |  0.86    Ca    9.4      15 Sep 2022 06:30                  09-01 Chol 224 mg/dL LDL -- HDL 63 mg/dL Trig 112 mg/dL  TSH 0.526   TSH with FT4 reflex --  Total T3 --                      COVID-19 PCR: NotDetec (09-13-22 @ 06:00)  COVID-19 PCR: NotDetec (09-06-22 @ 18:30)  COVID-19 PCR: NotDetec (09-05-22 @ 05:57)  COVID-19 PCR: NotDetec (09-03-22 @ 06:40)  COVID-19 PCR: NotDetec (08-31-22 @ 09:11)      RADIOLOGY & ADDITIONAL TESTS:    Care Discussed with Consultants/Other Providers:

## 2022-09-17 NOTE — PROGRESS NOTE ADULT - MOTOR
tightness bilateral cervical PS rigth >> left  multiple trigger points upper and mid trap, right LS  +TTP some improvement with accupressure

## 2022-09-18 LAB — RENIN PLAS-CCNC: 21.71 NG/ML/HR — HIGH (ref 0.17–5.38)

## 2022-09-18 PROCEDURE — 99232 SBSQ HOSP IP/OBS MODERATE 35: CPT

## 2022-09-18 RX ORDER — LOSARTAN POTASSIUM 100 MG/1
100 TABLET, FILM COATED ORAL DAILY
Refills: 0 | Status: DISCONTINUED | OUTPATIENT
Start: 2022-09-18 | End: 2022-09-19

## 2022-09-18 RX ADMIN — Medication 650 MILLIGRAM(S): at 09:01

## 2022-09-18 RX ADMIN — ENOXAPARIN SODIUM 40 MILLIGRAM(S): 100 INJECTION SUBCUTANEOUS at 05:21

## 2022-09-18 RX ADMIN — CYCLOBENZAPRINE HYDROCHLORIDE 10 MILLIGRAM(S): 10 TABLET, FILM COATED ORAL at 08:30

## 2022-09-18 RX ADMIN — NYSTATIN CREAM 1 APPLICATION(S): 100000 CREAM TOPICAL at 17:40

## 2022-09-18 RX ADMIN — POLYETHYLENE GLYCOL 3350 17 GRAM(S): 17 POWDER, FOR SOLUTION ORAL at 17:36

## 2022-09-18 RX ADMIN — Medication 50 MILLIGRAM(S): at 21:19

## 2022-09-18 RX ADMIN — NYSTATIN CREAM 1 APPLICATION(S): 100000 CREAM TOPICAL at 05:12

## 2022-09-18 RX ADMIN — TRAMADOL HYDROCHLORIDE 25 MILLIGRAM(S): 50 TABLET ORAL at 18:11

## 2022-09-18 RX ADMIN — TRAMADOL HYDROCHLORIDE 25 MILLIGRAM(S): 50 TABLET ORAL at 10:54

## 2022-09-18 RX ADMIN — Medication 0.5 MILLIGRAM(S): at 10:57

## 2022-09-18 RX ADMIN — Medication 650 MILLIGRAM(S): at 08:31

## 2022-09-18 RX ADMIN — Medication 81 MILLIGRAM(S): at 11:23

## 2022-09-18 RX ADMIN — Medication 1 TABLET(S): at 11:25

## 2022-09-18 RX ADMIN — Medication 9 MILLIGRAM(S): at 21:19

## 2022-09-18 RX ADMIN — TRAMADOL HYDROCHLORIDE 25 MILLIGRAM(S): 50 TABLET ORAL at 04:20

## 2022-09-18 RX ADMIN — TRAMADOL HYDROCHLORIDE 25 MILLIGRAM(S): 50 TABLET ORAL at 11:24

## 2022-09-18 RX ADMIN — ATORVASTATIN CALCIUM 40 MILLIGRAM(S): 80 TABLET, FILM COATED ORAL at 21:19

## 2022-09-18 RX ADMIN — TRAMADOL HYDROCHLORIDE 25 MILLIGRAM(S): 50 TABLET ORAL at 17:41

## 2022-09-18 RX ADMIN — CYCLOBENZAPRINE HYDROCHLORIDE 10 MILLIGRAM(S): 10 TABLET, FILM COATED ORAL at 21:19

## 2022-09-18 RX ADMIN — TRAMADOL HYDROCHLORIDE 25 MILLIGRAM(S): 50 TABLET ORAL at 05:21

## 2022-09-18 NOTE — PROGRESS NOTE ADULT - ASSESSMENT
EZRA MARIE is a 60yo Female with PMHx of HTN, migraines, and TIA (last seen at [facility] in 11/2021), who presented to [facility] on 08/31/2022 with HA, nausea, vomiting, and word-finding difficulties; was found to have findings on CT and MRI head consistent with PRES.     # PRES, unclear etiology  - suggestive based on findings on MRI and CT head: hyperintense signal to left occipital lobe and right cerebellum  - repeat MRI brain in 1 month (09/30/2022)  - c/w ASA 81mg daily  - continue comprehensive rehab program PT/OT/ SLP 3 hours a day 5 days a week    #Orthostatic Hypotension/ HTN  -  manual BPs only to improve accuracy  - Losartan 50mg qHS,. Holding parameter for Sitting SBP <120  - renal following  - (128/83 - 158/90) 9/18 acceptable range given orthostasis    # Prolonged QTc  - Avoid QTc prolonging medications (reglan, zofran, prochlorperazine)  - Most recent QTc- 451- improving    # HLD  - c/w Atorvastatin 40mg qhs    # Pain:  - Tylenol PRN, Tramadol PRN  - flexeril 10 q8 PRN, warm compress /. consider standing flexeril x 2-3 days     # Headaches  - Topamax 50mg qHS  - Ativan 0.5mg q.8h PRN for nausea  - flexeril    #Sleep:  - cont. Melatonin 9mg 9/9    #GI/Bowel:  - Miralax , senna  - Encourage PO fluids  - Compazine 5 mg dc'ed; Ativan PRN for nausea  -  Zegrid 9/14    #Diet :    - Diet: Regular with thins    #DVT prophylaxis:  - Lovenox        --------------------------------------------  Outpatient Follow up:  Omar Dominguez (DO)  Neurology; Vascular Neurology  [address], [address]  [place] [address]  Phone: [phone]  Fax: [phone]  Follow Up Time: 2 weeks    Gabriele Olivier (DO)  Cardiology; Internal Medicine  [address], [address]  [place] [address]  Phone: [phone]  Fax: [phone]  Follow Up Time: 1 month    Froy Craig ([address] [address]  Phone: [phone]  Fax: [phone]  Follow Up Time: Routine  --------------------------------------------

## 2022-09-18 NOTE — PROGRESS NOTE ADULT - ASSESSMENT
62 yo F w/ HTN, migraine, TIA (not on ASA and statin), presented w/ HA, word finding difficulties, and n/v, admitted as a code stroke, carotid dissection in left ICA on imaging, and CT/MRI head suggestive of PRES.  Admitted to [facility] on 9/6/2022.        Neurology   Acute metabolic encephalopathy complicated by aphasia, likely due to PRES  - Repeat MRI head in 1 mo (9/30/2022)  - ASA and statin  - PT/OT/Speech therapy  - pain control & bowel regimen per PMR  - BP control as below  - suspect elevation in BP is 2/2 headache, given documenated BP of 90 over 24hrs would defer further titration of antihypertensive regimen until adequate pain relief has been attempted  Migraines  - per d/c summary, avoid triptans.  - OP f/u with Neurology for migraine therapy.   - can consider consult to Dr. López for botox injections.  - pt started on topiramate.  - consider gabapentin given headaches this AM, if to be started needs daily ECG x 3 days given ability to prolong QTc.    Psych  Depression  - recently on benzos for anxiety following death of brother. Not on SSRI.  - Neuropsych f/u  - would monitor QTc closely if SSRI to be initiated    Gastroenterology  abdominal discomfort 2/2 constipation and GERD  - improving.   - bowel regimen  - omeprazole  Abdominal hematoma from lovenox inj  - will monitor sites closely and h/h. Currently only subcutaneous.   - rotate lovenox inj sites.    Cardiovascular  Hypertension with episodes of orthostasis  - SBP goal 100-160.   - losartan currently at 50mg qhs w/ holding parameters, increase to 100mg daily. Started on amlodipine by Nephrology, c/w amlodipine 5mg daily  - TEDS / abd binder  - encourage PO fluids  - DASH diet.  Prolonged QTc   - improved, QTc on 9/13 normal at 456  - avoid QTc prolonging meds, can also give oral ativan for breakthrough nausea.   - optimize electrolytes    Hematology  Thrombocytosis likely reactive   - stable  - trend platelets.  - already on aspirin              VTE ppx: lovenox.  Diet: per primary  Pain control: per primary

## 2022-09-18 NOTE — PROGRESS NOTE ADULT - SUBJECTIVE AND OBJECTIVE BOX
Patient seen and examined at bedside. Patient notes improvement in headache after med change yesterday. However still present intermittently. Review of 24hr BP shows above goal. Discussed with patient in regards to increasing antihypertensive therapy and she expresses understanding and agreement. 10 point ROS otherwise negative.     ALLERGIES:  No Known Allergies    MEDICATIONS  (STANDING):  aspirin enteric coated 81 milliGRAM(s) Oral daily  atorvastatin 40 milliGRAM(s) Oral at bedtime  enoxaparin Injectable 40 milliGRAM(s) SubCutaneous every 24 hours  lactobacillus acidophilus 1 Tablet(s) Oral daily  losartan 50 milliGRAM(s) Oral at bedtime  melatonin 9 milliGRAM(s) Oral at bedtime  nystatin Powder 1 Application(s) Topical two times a day  Omeprazole/Sodium Bicarbonate 20/1100 mg 1 Capsule(s) 1 Capsule(s) Oral at bedtime  polyethylene glycol 3350 17 Gram(s) Oral two times a day  senna 2 Tablet(s) Oral at bedtime  topiramate 50 milliGRAM(s) Oral at bedtime    MEDICATIONS  (PRN):  acetaminophen     Tablet .. 650 milliGRAM(s) Oral every 6 hours PRN Temp greater or equal to 38C (100.4F), Mild Pain (1 - 3)  cyclobenzaprine 10 milliGRAM(s) Oral three times a day PRN Muscle Spasm  LORazepam     Tablet 0.5 milliGRAM(s) Oral every 8 hours PRN Nausea and/or Vomiting  traMADol 25 milliGRAM(s) Oral every 6 hours PRN Severe Pain (7 - 10)    Vital Signs Last 24 Hrs  T(F): 98.1 (17 Sep 2022 21:37), Max: 98.1 (17 Sep 2022 21:37)  HR: 74 (18 Sep 2022 10:54) (74 - 80)  BP: 158/90 (18 Sep 2022 10:54) (128/83 - 158/90)  RR: 16 (17 Sep 2022 21:37) (16 - 16)  SpO2: 98% (17 Sep 2022 21:37) (98% - 98%)  I&O's Summary      PHYSICAL EXAM:  Gen: nad, resting in bed  Neuro: aaox3, no focal deficits  Heent: eomi b/l, no jvd, no oral exudates  Pulm: cta b/l, no w/r/r  CV: +s1s2, no m/r/g  Ab: soft, nt/nd, normoactive bs x 4  Extrem: no edema, pulses intact and equal      LABS:                        09-01 Chol 224 mg/dL LDL -- HDL 63 mg/dL Trig 112 mg/dL                      COVID-19 PCR: NotDetec (09-13-22 @ 06:00)  COVID-19 PCR: NotDetec (09-06-22 @ 18:30)  COVID-19 PCR: NotDetec (09-05-22 @ 05:57)  COVID-19 PCR: NotDetec (09-03-22 @ 06:40)  COVID-19 PCR: NotDetec (08-31-22 @ 09:11)      RADIOLOGY & ADDITIONAL TESTS:    Care Discussed with Consultants/Other Providers:

## 2022-09-18 NOTE — PROGRESS NOTE ADULT - SUBJECTIVE AND OBJECTIVE BOX
Patient is a 61y old  Female who presents with a chief complaint of PRES (18 Sep 2022 11:28)      HPI:  EZRA MARIE is a 60yo Female with PMHx of HTN, migraines, and TIA (last seen at [facility] in 2021), who presented to [facility] on 2022 with HA, nausea, vomiting, and word-finding difficulties; was found to have findings on CT and MRI head consistent with PRES. No tPA was given, as she presented outside of therapeutic window and LVO not performed, as no LVO was identified on CT angio head/neck. Patient was followed by neurology and cardiology during her admission. TTE and EEG were unremarkable; started on ASA. Course c/b hyponatremia, likely SIADH now resolved.    Patient was evaluated by PM&R and therapy for functional deficits and gait/ ADL impairments and recommended acute rehabilitation. Patient was medically optimized for discharge to [facility] on 2022. Patient was seen and examined at the bedside upon arrival. Currently endorses 3-4/10 HA and anxiety. Reports that while ambulating, experiences blurry vision, dizziness, and lightheadedness. VS on admission unremarkable. (06 Sep 2022 12:50)      PAST MEDICAL & SURGICAL HISTORY:  Hypertension      Hallux valgus (acquired), right foot  s/p correction 2018      HV (hallux valgus), left      Acquired hammertoe of left foot      Migraine      H/O  section  X 2      H/O bilateral breast reduction surgery      H/O endoscopy  &amp; colonoscopy      S/P bunionectomy  Right and hammertoe correction 2018          MEDICATIONS  (STANDING):  aspirin enteric coated 81 milliGRAM(s) Oral daily  atorvastatin 40 milliGRAM(s) Oral at bedtime  enoxaparin Injectable 40 milliGRAM(s) SubCutaneous every 24 hours  lactobacillus acidophilus 1 Tablet(s) Oral daily  losartan 100 milliGRAM(s) Oral daily  melatonin 9 milliGRAM(s) Oral at bedtime  nystatin Powder 1 Application(s) Topical two times a day  Omeprazole/Sodium Bicarbonate  mg 1 Capsule(s) 1 Capsule(s) Oral at bedtime  polyethylene glycol 3350 17 Gram(s) Oral two times a day  senna 2 Tablet(s) Oral at bedtime  topiramate 50 milliGRAM(s) Oral at bedtime    MEDICATIONS  (PRN):  acetaminophen     Tablet .. 650 milliGRAM(s) Oral every 6 hours PRN Temp greater or equal to 38C (100.4F), Mild Pain (1 - 3)  cyclobenzaprine 10 milliGRAM(s) Oral three times a day PRN Muscle Spasm  LORazepam     Tablet 0.5 milliGRAM(s) Oral every 8 hours PRN Nausea and/or Vomiting  traMADol 25 milliGRAM(s) Oral every 6 hours PRN Severe Pain (7 - 10)      Allergies    No Known Allergies    Intolerances          VITALS  61y  Vital Signs Last 24 Hrs  T(C): 36.7 (17 Sep 2022 21:37), Max: 36.7 (17 Sep 2022 21:37)  T(F): 98.1 (17 Sep 2022 21:37), Max: 98.1 (17 Sep 2022 21:37)  HR: 74 (18 Sep 2022 10:54) (74 - 80)  BP: 158/90 (18 Sep 2022 10:54) (128/83 - 158/90)  BP(mean): --  RR: 16 (17 Sep 2022 21:37) (16 - 16)  SpO2: 98% (17 Sep 2022 21:37) (98% - 98%)    Parameters below as of 17 Sep 2022 21:37  Patient On (Oxygen Delivery Method): room air      Daily     Daily Weight in k.5 (17 Sep 2022 22:57)        RECENT LABS:                      CAPILLARY BLOOD GLUCOSE      Review of Systems:   · Additional ROS	Patient still has some posterior tension headache radiating to head although appears less severe than yesterday. She states that she is not always given flexeril when asks for pain meds. Difference between meds and types of H/A reviewed. If continues to have difficulty in receiving medication, will begin trial standing flexeril x 2-3 days.    Physical Exam:   · Constitutional Comments	alert, fatigued and mildly anxious O x 3  · Respiratory	clear to auscultation bilaterally; no wheezes; no rales; no rhonchi  · Cardiovascular	regular rate and rhythm; S1 S2 present; no gallops; no rub; no murmur  · Neurological	cranial nerves II-XII intact; sensation intact  · Motor	tightness bilateral cervical PS rigth >> left  multiple trigger points upper and mid trap, right LS  +TTP some improvement with accupressure

## 2022-09-19 LAB
ANION GAP SERPL CALC-SCNC: 6 MMOL/L — SIGNIFICANT CHANGE UP (ref 5–17)
BUN SERPL-MCNC: 14 MG/DL — SIGNIFICANT CHANGE UP (ref 7–23)
CALCIUM SERPL-MCNC: 9.4 MG/DL — SIGNIFICANT CHANGE UP (ref 8.4–10.5)
CHLORIDE SERPL-SCNC: 101 MMOL/L — SIGNIFICANT CHANGE UP (ref 96–108)
CO2 SERPL-SCNC: 31 MMOL/L — SIGNIFICANT CHANGE UP (ref 22–31)
CREAT SERPL-MCNC: 1.02 MG/DL — SIGNIFICANT CHANGE UP (ref 0.5–1.3)
EGFR: 63 ML/MIN/1.73M2 — SIGNIFICANT CHANGE UP
GLUCOSE SERPL-MCNC: 101 MG/DL — HIGH (ref 70–99)
HCT VFR BLD CALC: 42.5 % — SIGNIFICANT CHANGE UP (ref 34.5–45)
HGB BLD-MCNC: 14 G/DL — SIGNIFICANT CHANGE UP (ref 11.5–15.5)
MCHC RBC-ENTMCNC: 31.4 PG — SIGNIFICANT CHANGE UP (ref 27–34)
MCHC RBC-ENTMCNC: 32.9 GM/DL — SIGNIFICANT CHANGE UP (ref 32–36)
MCV RBC AUTO: 95.3 FL — SIGNIFICANT CHANGE UP (ref 80–100)
NRBC # BLD: 0 /100 WBCS — SIGNIFICANT CHANGE UP (ref 0–0)
PLATELET # BLD AUTO: 518 K/UL — HIGH (ref 150–400)
POTASSIUM SERPL-MCNC: 4.1 MMOL/L — SIGNIFICANT CHANGE UP (ref 3.5–5.3)
POTASSIUM SERPL-SCNC: 4.1 MMOL/L — SIGNIFICANT CHANGE UP (ref 3.5–5.3)
RBC # BLD: 4.46 M/UL — SIGNIFICANT CHANGE UP (ref 3.8–5.2)
RBC # FLD: 11.2 % — SIGNIFICANT CHANGE UP (ref 10.3–14.5)
SARS-COV-2 RNA SPEC QL NAA+PROBE: SIGNIFICANT CHANGE UP
SODIUM SERPL-SCNC: 138 MMOL/L — SIGNIFICANT CHANGE UP (ref 135–145)
WBC # BLD: 7.2 K/UL — SIGNIFICANT CHANGE UP (ref 3.8–10.5)
WBC # FLD AUTO: 7.2 K/UL — SIGNIFICANT CHANGE UP (ref 3.8–10.5)

## 2022-09-19 PROCEDURE — 99233 SBSQ HOSP IP/OBS HIGH 50: CPT

## 2022-09-19 PROCEDURE — 99232 SBSQ HOSP IP/OBS MODERATE 35: CPT

## 2022-09-19 RX ORDER — SODIUM CHLORIDE 9 MG/ML
500 INJECTION INTRAMUSCULAR; INTRAVENOUS; SUBCUTANEOUS
Refills: 0 | Status: DISCONTINUED | OUTPATIENT
Start: 2022-09-19 | End: 2022-09-19

## 2022-09-19 RX ORDER — TRAMADOL HYDROCHLORIDE 50 MG/1
50 TABLET ORAL ONCE
Refills: 0 | Status: DISCONTINUED | OUTPATIENT
Start: 2022-09-19 | End: 2022-09-19

## 2022-09-19 RX ORDER — LOSARTAN POTASSIUM 100 MG/1
100 TABLET, FILM COATED ORAL DAILY
Refills: 0 | Status: DISCONTINUED | OUTPATIENT
Start: 2022-09-20 | End: 2022-09-20

## 2022-09-19 RX ADMIN — Medication 50 MILLIGRAM(S): at 21:04

## 2022-09-19 RX ADMIN — Medication 0.5 MILLIGRAM(S): at 07:59

## 2022-09-19 RX ADMIN — Medication 1 TABLET(S): at 12:29

## 2022-09-19 RX ADMIN — NYSTATIN CREAM 1 APPLICATION(S): 100000 CREAM TOPICAL at 05:59

## 2022-09-19 RX ADMIN — LOSARTAN POTASSIUM 100 MILLIGRAM(S): 100 TABLET, FILM COATED ORAL at 05:59

## 2022-09-19 RX ADMIN — Medication 650 MILLIGRAM(S): at 03:10

## 2022-09-19 RX ADMIN — CYCLOBENZAPRINE HYDROCHLORIDE 10 MILLIGRAM(S): 10 TABLET, FILM COATED ORAL at 20:22

## 2022-09-19 RX ADMIN — ENOXAPARIN SODIUM 40 MILLIGRAM(S): 100 INJECTION SUBCUTANEOUS at 05:58

## 2022-09-19 RX ADMIN — Medication 81 MILLIGRAM(S): at 10:52

## 2022-09-19 RX ADMIN — TRAMADOL HYDROCHLORIDE 25 MILLIGRAM(S): 50 TABLET ORAL at 08:01

## 2022-09-19 RX ADMIN — Medication 650 MILLIGRAM(S): at 11:20

## 2022-09-19 RX ADMIN — Medication 650 MILLIGRAM(S): at 10:50

## 2022-09-19 RX ADMIN — ATORVASTATIN CALCIUM 40 MILLIGRAM(S): 80 TABLET, FILM COATED ORAL at 21:05

## 2022-09-19 RX ADMIN — Medication 650 MILLIGRAM(S): at 02:11

## 2022-09-19 RX ADMIN — CYCLOBENZAPRINE HYDROCHLORIDE 10 MILLIGRAM(S): 10 TABLET, FILM COATED ORAL at 10:51

## 2022-09-19 RX ADMIN — Medication 9 MILLIGRAM(S): at 21:04

## 2022-09-19 RX ADMIN — TRAMADOL HYDROCHLORIDE 50 MILLIGRAM(S): 50 TABLET ORAL at 12:58

## 2022-09-19 RX ADMIN — TRAMADOL HYDROCHLORIDE 50 MILLIGRAM(S): 50 TABLET ORAL at 12:28

## 2022-09-19 RX ADMIN — TRAMADOL HYDROCHLORIDE 25 MILLIGRAM(S): 50 TABLET ORAL at 07:31

## 2022-09-19 NOTE — PROGRESS NOTE ADULT - SUBJECTIVE AND OBJECTIVE BOX
Patient is a 61y old  Female who presents with a chief complaint of PRES (18 Sep 2022 12:16)      Patient seen and examined at bedside.  No overnight events  C/O headache and dizziness during therapy    ALLERGIES:  No Known Allergies    MEDICATIONS  (STANDING):  aspirin enteric coated 81 milliGRAM(s) Oral daily  atorvastatin 40 milliGRAM(s) Oral at bedtime  enoxaparin Injectable 40 milliGRAM(s) SubCutaneous every 24 hours  lactobacillus acidophilus 1 Tablet(s) Oral daily  losartan 100 milliGRAM(s) Oral daily  melatonin 9 milliGRAM(s) Oral at bedtime  nystatin Powder 1 Application(s) Topical two times a day  Omeprazole/Sodium Bicarbonate 20/1100 mg 1 Capsule(s) 1 Capsule(s) Oral at bedtime  polyethylene glycol 3350 17 Gram(s) Oral two times a day  senna 2 Tablet(s) Oral at bedtime  topiramate 50 milliGRAM(s) Oral at bedtime    MEDICATIONS  (PRN):  acetaminophen     Tablet .. 650 milliGRAM(s) Oral every 6 hours PRN Temp greater or equal to 38C (100.4F), Mild Pain (1 - 3)  cyclobenzaprine 10 milliGRAM(s) Oral three times a day PRN Muscle Spasm  LORazepam     Tablet 0.5 milliGRAM(s) Oral every 8 hours PRN Nausea and/or Vomiting  traMADol 25 milliGRAM(s) Oral every 6 hours PRN Severe Pain (7 - 10)    Vital Signs Last 24 Hrs  T(F): 97.6 (19 Sep 2022 08:01), Max: 98 (18 Sep 2022 20:06)  HR: 73 (19 Sep 2022 08:01) (73 - 80)  BP: 114/72 (19 Sep 2022 08:01) (114/72 - 158/90)  RR: 16 (19 Sep 2022 08:01) (16 - 16)  SpO2: 97% (19 Sep 2022 08:01) (97% - 97%)  I&O's Summary    PHYSICAL EXAM:  GENERAL: NAD  HENT:  Atraumatic, Normocephalic; No tonsillar erythema, exudates, or enlargement; Moist mucous membranes;   EYES: EOMI, PERRLA, conjunctiva and sclera clear, no lid-lag  NECK: Supple, No JVD, Normal thyroid  CHEST/LUNG: Clear to percussion bilaterally; No rales, rhonchi, wheezing, or rubs; normal respiratory effort, no intercostal retractions  HEART: Regular rate and rhythm; No murmurs, rubs, or gallops; No pitting edema  ABDOMEN: Soft, Nontender, Nondistended; Bowel sounds present; No HSM  MUSCULOSKELETAL/EXTREMITIES:  2+ Peripheral Pulses, No clubbing or digital cyanosis  PSYCH: Appropriate affect, Alert & Awake; Good judgement    LABS:                        14.0   7.20  )-----------( 518      ( 19 Sep 2022 06:20 )             42.5       09-19    138  |  101  |  14  ----------------------------<  101  4.1   |  31  |  1.02    Ca    9.4      19 Sep 2022 06:20    09-01 Chol 224 mg/dL LDL -- HDL 63 mg/dL Trig 112 mg/dL    COVID-19 PCR: NotDetec (09-13-22 @ 06:00)  COVID-19 PCR: NotDetec (09-06-22 @ 18:30)  COVID-19 PCR: NotDetec (09-05-22 @ 05:57)  COVID-19 PCR: NotDetec (09-03-22 @ 06:40)  COVID-19 PCR: NotDetec (08-31-22 @ 09:11)      Care Discussed with Rehab Attending and Other Providers

## 2022-09-19 NOTE — PROGRESS NOTE ADULT - SUBJECTIVE AND OBJECTIVE BOX
Subjective:  Patient was seen in bed this AM. She states that she had a headache overnight that woke her from her sleep. She also experienced a headache during therapy this morning that was associated with a sudden onset of nausea but no vomiting. Prior her symptoms of headache and nausea were associated with her elevated blood pressures but this no longer seems to be the case.     ROS:  Denies CP, SOB, dysuria, abdominal pain.       Vital Signs Last 24 Hrs  T(C): 36.4 (19 Sep 2022 08:01), Max: 36.7 (18 Sep 2022 20:06)  T(F): 97.6 (19 Sep 2022 08:01), Max: 98 (18 Sep 2022 20:06)  HR: 73 (19 Sep 2022 08:01) (73 - 80)  BP: 114/72 (19 Sep 2022 08:01) (114/72 - 158/90)  BP(mean): --  RR: 16 (19 Sep 2022 08:01) (16 - 16)  SpO2: 97% (19 Sep 2022 08:01) (97% - 97%)      Physical Exam:  Constitutional - NAD, Comfortable  HEENT - NCAT, EOMI  Chest - good chest expansion, good respiratory effort on RA  Cardio - warm and well perfused, no LE edema  Abdomen -  Soft, NTND.   Extremities - No peripheral edema, No calf tenderness   Neurologic Exam:                    Cognitive -             Orientation: A&O x4     Speech - Fluent, Comprehensible, No dysarthria, Mild anomic aphasia      Sensory - Intact to LT bilateral     Motor - moving all extremities spontaneously  Psychiatric - Stable      LAB                        14.0   7.20  )-----------( 518      ( 19 Sep 2022 06:20 )             42.5     09-19    138  |  101  |  14  ----------------------------<  101<H>  4.1   |  31  |  1.02    Ca    9.4      19 Sep 2022 06:20      MEDICATIONS  (STANDING):  aspirin enteric coated 81 milliGRAM(s) Oral daily  atorvastatin 40 milliGRAM(s) Oral at bedtime  enoxaparin Injectable 40 milliGRAM(s) SubCutaneous every 24 hours  lactobacillus acidophilus 1 Tablet(s) Oral daily  losartan 100 milliGRAM(s) Oral daily  melatonin 9 milliGRAM(s) Oral at bedtime  nystatin Powder 1 Application(s) Topical two times a day  Omeprazole/Sodium Bicarbonate 20/1100 mg 1 Capsule(s) 1 Capsule(s) Oral at bedtime  polyethylene glycol 3350 17 Gram(s) Oral two times a day  senna 2 Tablet(s) Oral at bedtime  topiramate 50 milliGRAM(s) Oral at bedtime    MEDICATIONS  (PRN):  acetaminophen     Tablet .. 650 milliGRAM(s) Oral every 6 hours PRN Temp greater or equal to 38C (100.4F), Mild Pain (1 - 3)  cyclobenzaprine 10 milliGRAM(s) Oral three times a day PRN Muscle Spasm  LORazepam     Tablet 0.5 milliGRAM(s) Oral every 8 hours PRN Nausea and/or Vomiting  traMADol 25 milliGRAM(s) Oral every 6 hours PRN Severe Pain (7 - 10) Subjective:  Patient was seen in bed this AM. She states that she had a headache overnight that woke her from her sleep. Her /89 last night.  She also experienced a headache during therapy this morning that was associated with a sudden onset of nausea but no vomiting.  Her Blood Pressure was 130/86 this AM in bed-- She received losartan this AM.  Her sitting BP was 114/72 at 8am.  She had headache and nausea at this time but no dizziness. Received Tramadol and ativan at 7:30am today, but still with pain when seen around 9:30am.  Pt. was sitting in bed and SBP was 134.   Prior her symptoms of headache and nausea were associated with her elevated blood pressures but this not the case this AM.     ROS:  Denies CP, SOB, dysuria, abdominal pain.       Vital Signs Last 24 Hrs  T(C): 36.4 (19 Sep 2022 08:01), Max: 36.7 (18 Sep 2022 20:06)  T(F): 97.6 (19 Sep 2022 08:01), Max: 98 (18 Sep 2022 20:06)  HR: 73 (19 Sep 2022 08:01) (73 - 80)  BP: 114/72 (19 Sep 2022 08:01) (114/72 - 158/90)  BP(mean): --  RR: 16 (19 Sep 2022 08:01) (16 - 16)  SpO2: 97% (19 Sep 2022 08:01) (97% - 97%)      Physical Exam:  Constitutional - NAD, Comfortable  HEENT - NCAT, EOMI  Chest - good chest expansion, good respiratory effort on RA  Cardio - warm and well perfused, no LE edema  Abdomen -  Soft, NTND.   Extremities - No peripheral edema, No calf tenderness   Neurologic Exam:                    Cognitive -             Orientation: A&O x4     Speech - Fluent, Comprehensible, No dysarthria, Mild anomic aphasia      Sensory - Intact to LT bilateral     Motor - moving all extremities spontaneously  Psychiatric - Stable      LAB                        14.0   7.20  )-----------( 518      ( 19 Sep 2022 06:20 )             42.5     09-19    138  |  101  |  14  ----------------------------<  101<H>  4.1   |  31  |  1.02    Ca    9.4      19 Sep 2022 06:20      MEDICATIONS  (STANDING):  aspirin enteric coated 81 milliGRAM(s) Oral daily  atorvastatin 40 milliGRAM(s) Oral at bedtime  enoxaparin Injectable 40 milliGRAM(s) SubCutaneous every 24 hours  lactobacillus acidophilus 1 Tablet(s) Oral daily  losartan 100 milliGRAM(s) Oral daily  melatonin 9 milliGRAM(s) Oral at bedtime  nystatin Powder 1 Application(s) Topical two times a day  Omeprazole/Sodium Bicarbonate 20/1100 mg 1 Capsule(s) 1 Capsule(s) Oral at bedtime  polyethylene glycol 3350 17 Gram(s) Oral two times a day  senna 2 Tablet(s) Oral at bedtime  topiramate 50 milliGRAM(s) Oral at bedtime    MEDICATIONS  (PRN):  acetaminophen     Tablet .. 650 milliGRAM(s) Oral every 6 hours PRN Temp greater or equal to 38C (100.4F), Mild Pain (1 - 3)  cyclobenzaprine 10 milliGRAM(s) Oral three times a day PRN Muscle Spasm  LORazepam     Tablet 0.5 milliGRAM(s) Oral every 8 hours PRN Nausea and/or Vomiting  traMADol 25 milliGRAM(s) Oral every 6 hours PRN Severe Pain (7 - 10)

## 2022-09-19 NOTE — PROGRESS NOTE ADULT - SUBJECTIVE AND OBJECTIVE BOX
Events noted, c/o HA, hx migraines, Losartan changed to 100mg qam as of this morning    Vital Signs Last 24 Hrs  T(C): 36.4 (09-19-22 @ 08:01), Max: 36.7 (09-18-22 @ 20:06)  T(F): 97.6 (09-19-22 @ 08:01), Max: 98 (09-18-22 @ 20:06)  HR: 73 (09-19-22 @ 10:52) (73 - 80)  BP: 144/85 (09-19-22 @ 10:52) (114/72 - 150/89)  RR: 16 (09-19-22 @ 08:01) (16 - 16)  SpO2: 97% (09-19-22 @ 08:01) (97% - 97%)    s1s2  b/l air entry  soft, ND  no edema  AO                                         14.0   7.20  )-----------( 518      ( 19 Sep 2022 06:20 )             42.5     19 Sep 2022 06:20    138    |  101    |  14     ----------------------------<  101    4.1     |  31     |  1.02     Ca    9.4        19 Sep 2022 06:20    acetaminophen     Tablet .. 650 milliGRAM(s) Oral every 6 hours PRN  aspirin enteric coated 81 milliGRAM(s) Oral daily  atorvastatin 40 milliGRAM(s) Oral at bedtime  cyclobenzaprine 10 milliGRAM(s) Oral three times a day PRN  enoxaparin Injectable 40 milliGRAM(s) SubCutaneous every 24 hours  lactobacillus acidophilus 1 Tablet(s) Oral daily  LORazepam     Tablet 0.5 milliGRAM(s) Oral every 8 hours PRN  losartan 100 milliGRAM(s) Oral daily  melatonin 9 milliGRAM(s) Oral at bedtime  nystatin Powder 1 Application(s) Topical two times a day  Omeprazole/Sodium Bicarbonate 20/1100 mg 1 Capsule(s) 1 Capsule(s) Oral at bedtime  polyethylene glycol 3350 17 Gram(s) Oral two times a day  senna 2 Tablet(s) Oral at bedtime  topiramate 50 milliGRAM(s) Oral at bedtime  traMADol 25 milliGRAM(s) Oral every 6 hours PRN    A/P:    Suspected PRES  Labile BP  Pls continue to take all BPs sitting or standing  Continue Losartan 100mg qam as ordered fo now  Neuro eval  D/w rehab team   Will follow    [phone]

## 2022-09-19 NOTE — PROGRESS NOTE ADULT - ASSESSMENT
EZRA MARIE is a 62yo Female with PMHx of HTN, migraines, and TIA (last seen at [facility] in 11/2021), who presented to [facility] on 08/31/2022 with HA, nausea, vomiting, and word-finding difficulties; was found to have findings on CT and MRI head consistent with PRES.  Admitted for multidisciplinary rehab program with Aphasia, gait and ADL impairment.  ------------------------------------------------------------------  #Comprehensive Multidisciplinary Rehab Program:  - Gait, ADL, Functional impairments  - PT/OT/ SLP 3 hours a day 5 days a week  -- PT: 60 min/ OT: 30 min/ SLP: 90 min starting 9/15    #PRES, unclear etiology  - suggestive based on findings on MRI and CT head: hyperintense signal to left occipital lobe and right cerebellum. No enhancement observed with IV contrast.   - repeat MRI brain in 1 month (09/30/2022)  - c/w ASA 81mg daily  - Repeat CT Head stable  - Will consider Neuro recs for symptom management    #Orthostatic Hypotension  #HTN  -fluctuating BP readings-- Nursing order for manual BPs only to improve accuracy-  --cont. Losartan 100mg qHS--Holding parameter for Sitting SBP <110  - TEDs, and Abdominal binder ordered  --Nephrology, Dr. Suarez, consulted for recs in regards to labile bp and orthostatic management.  -Amlodipine DCd. Losartan increased to 100mg @ HS  - Continue to monitor orthostatics    #Prolonged QTc  - Avoid QTc prolonging medications--reglan, zofran, prochlorperazine  - Most recent QTc- 456- improving    #HLD  - c/w Atorvastatin 40mg qhs    #Pain:  - Tylenol PRN, Tramadol PRN  - Avoid sedating meds that may affect cognitive recovery    #Headaches  --Topamax 50mg qHS  --Ativan 0.5mg q.8h PRN for nausea  --Repeat CT Head stable 9/9  -- Warm compress     #Sleep:  - Maintain quiet hours and low stim environment  - cont. Melatonin 9mg 9/9  - Topamax 50mg qHS.   --sleeping better    #GI/Bowel:  #abdominal discomfort  - Miralax changed from PRN to standing. Senna added 9/13.  - Encourage PO fluids  -- Ab XR ordered-> negative for any constipation or large stool burden.  - Compazine 5 mg dc'ed; Ativan PRN for nausea  - Pepcid for reflux added 9/9. DCd on 9/14  -- Will change antacid to home regimen Zegrid 9/14    #/Bladder:  --continent  --voiding with low PVR--    #Diet :    - Diet: Regular with thins    #Skin/ Pressure Injury Prevention:  - assessment on admission --erythema under left breast--Nystatin ordered  - Turn Q2hrs in bed while awake, OOB to Chair, PT/OT/SLP     #DVT prophylaxis:  - Lovenox    #Precautions/ Restrictions  - Falls  - COVID PCR: 9/5    #Dispo: IDR 09/15/2022  - Nursing: continent of bowel and bladder  - Barriers: Orthostatics, labile blood pressure, visual perceptual deficits.  - SW: Lives in  with  and children. Has 5 SAI; split-level home with 6 steps to bedroom and 6 steps to kitchen  - OT: Independent with eating, grooming, and UBD. Supervision for LBD, bathing, toilet transfer, toileting, and tub/shower transfer.   - PT: Ambulated 150 ft with SC, with CG/supervision. Supervision for transfers. Completed 8 stairs with supervision.   - SLP: Regular with thins diet; Has mild anomia. Mild cognitive deficits with high level tasks.   - Team Goals: (1) Ambulate to bedroom Ivonne. (2) Express complex ideas. (3) Sequence higher-level tasks  - TDD: 9/21 to home    --------------------------------------------  Outpatient Follow up:  Omar Dominguez (DO)  Neurology; Vascular Neurology  [address] [facility], [address]  [place] [address]  Phone: [phone]  Fax: [phone]  Follow Up Time: 2 weeks    Gabriele Olivier)  Cardiology; Internal Medicine  [address] [place] [address], [address]  [place] [address]  Phone: [phone]  Fax: [phone]  Follow Up Time: 1 month    Froy Craig ([address], [facility] [address]  [place] [address]  Phone: [phone]  Fax: [phone]  Follow Up Time: Routine  --------------------------------------------   EZRA MARIE is a 60yo Female with PMHx of HTN, migraines, and TIA (last seen at [facility] in 11/2021), who presented to [facility] on 08/31/2022 with HA, nausea, vomiting, and word-finding difficulties; was found to have findings on CT and MRI head consistent with PRES.  Admitted for multidisciplinary rehab program with Aphasia, gait and ADL impairment.  ------------------------------------------------------------------  #Comprehensive Multidisciplinary Rehab Program:  - Gait, ADL, Functional impairments  - PT/OT/ SLP 3 hours a day 5 days a week  -- PT: 60 min/ OT: 30 min/ SLP: 90 min starting 9/15    #PRES, unclear etiology  --PRES symptoms do not seem definitively associated with pt's BP as the case this AM--d/w Nephrology  --Neurology consulted for assessment and recommendations  - suggestive based on findings on MRI and CT head: hyperintense signal to left occipital lobe and right cerebellum. No enhancement observed with IV contrast.   - repeat MRI brain in 1 month (09/30/2022)  - c/w ASA 81mg daily  - Repeat CT Head stable      #Orthostatic Hypotension  #HTN  -fluctuating BP readings-- Nursing order for manual BPs only to improve accuracy-  -- Losartan changed by hospitalist on weekend to 100mg daily--Holding parameter for SBP <110  --d/w Nephrology-- Dr. Suarez-- Rec. to cont. Losartan in AM-- monitor BP  - TEDs, and Abdominal binder ordered  --Nephrology, Dr. Suarez, consulted for recs in regards to labile bp and orthostatic management.  - Continue to monitor orthostatics    #Prolonged QTc  - Avoid QTc prolonging medications--reglan, zofran, prochlorperazine  - Most recent QTc- 456- improving    #HLD  - c/w Atorvastatin 40mg qhs    #Pain:  - Tylenol PRN, Tramadol PRN  - Avoid sedating meds that may affect cognitive recovery    #Headaches  --Topamax 50mg qHS  --Ativan 0.5mg q.8h PRN for nausea  --Repeat CT Head stable 9/9  -- Warm compress     #Sleep:  - Maintain quiet hours and low stim environment  - cont. Melatonin 9mg 9/9  - Topamax 50mg qHS.       #GI/Bowel:  #abdominal discomfort  - Miralax changed from PRN to standing. Senna added 9/13.  - Encourage PO fluids  --cont Ativan PRN for nausea  - Improved with home regimen Zegrid 9/14    #/Bladder:  --continent  --voiding with low PVR--    #Diet :    - Diet: Regular with thins    #Skin/ Pressure Injury Prevention:  - assessment on admission --erythema under left breast--Nystatin ordered  - Turn Q2hrs in bed while awake, OOB to Chair, PT/OT/SLP     #DVT prophylaxis:  - Lovenox    #Precautions/ Restrictions  - Falls  - COVID PCR: 9/5    #Dispo: IDR 09/15/2022  - Nursing: continent of bowel and bladder  - Barriers: Orthostatics, labile blood pressure, visual perceptual deficits.  - SW: Lives in  with  and children. Has 5 SAI; split-level home with 6 steps to bedroom and 6 steps to kitchen  - OT: Independent with eating, grooming, and UBD. Supervision for LBD, bathing, toilet transfer, toileting, and tub/shower transfer.   - PT: Ambulated 150 ft with SC, with CG/supervision. Supervision for transfers. Completed 8 stairs with supervision.   - SLP: Regular with thins diet; Has mild anomia. Mild cognitive deficits with high level tasks.   - Team Goals: (1) Ambulate to bedroom Ivonne. (2) Express complex ideas. (3) Sequence higher-level tasks  - TDD: 9/21 to home    --------------------------------------------  Outpatient Follow up:  Omar Dominguez (DO)  Neurology; Vascular Neurology  [address] [facility], [address]  [place] [address]  Phone: [phone]  Fax: [phone]  Follow Up Time: 2 weeks    Gabriele OlivierDO)  Cardiology; Internal Medicine  [address] [facility], [address]  [place] [address]  Phone: [phone]  Fax: [phone]  Follow Up Time: 1 month    Froy Craig ([address] [address]  Phone: [phone]  Fax: [phone]  Follow Up Time: Routine  --------------------------------------------

## 2022-09-19 NOTE — PROGRESS NOTE ADULT - NS ATTEND AMEND GEN_ALL_CORE FT
Pt. seen with NP.  Agree with documentation above as per NP with amendments made as appropriate. Patient medically stable. Making progress towards rehab goals.       #PRES, unclear etiology  --PRES symptoms do not seem definitively associated with pt's BP as the case this AM--d/w Nephrology  --Neurology consulted for assessment and recommendations        HTN/Orthostatic Hypotension  -fluctuating BP readings-- Nursing order for manual BPs only to improve accuracy-  BP generally improved--  -- Losartan changed by hospitalist on weekend to 100mg daily--Holding parameter for SBP <110  --d/w Nephrology-- Dr. Suarez-- Rec. to cont. Losartan in AM-- monitor BP

## 2022-09-19 NOTE — PROGRESS NOTE ADULT - ASSESSMENT
60 yo F w/ HTN, migraine, TIA (not on ASA and statin), presented w/ HA, word finding difficulties, and n/v, admitted as a code stroke, carotid dissection in left ICA on imaging, and CT/MRI head suggestive of PRES.  Admitted to [facility] on 9/6/2022.    # Acute metabolic encephalopathy complicated by aphasia, likely due to PRES  #History TIA  # Headache - improving.  - Repeat MRI head in 1 mo (9/30/2022)  - ASA and statin  - PT/OT/Speech therapy  - pain control & bowel regimen per PMR    # abdominal discomfort 2/2 constipation and GERD - improving.   - bowel regimen  - omeprazole    # HTN  #orthostasis  SBP goal 100-160.   - losartan 50mg qhs w/ holding parameters. Started on amlodipine by Nephrology.  - TEDS / abd binder  - encourage PO fluids  - DASH diet.    # Abdominal hematoma from lovenox inj  - will monitor sites closely and h/h. Currently only subcutaneous.   - rotate lovenox inj sites.    # Depression  recently on benzos for anxiety following death of brother. Not on SSRI.  - Neuropsych f/u    # Prolonged QTc - improving  - avoid QTc prolonging meds, can also give oral ativan for breakthrough nausea.   - optimize electrolytes    # thrombocytosis likely reactive - stable  - trend platelets.  - already on aspirin    # Hx of migraines  - per d/c summary, avoid triptans.  - OP f/u with Neurology for migraine therapy.   - Continue topiramate.  - consider trial of gabapentin for severe headaches.    #VTE ppx:   -lovenox, no evidence of anemia.

## 2022-09-20 ENCOUNTER — TRANSCRIPTION ENCOUNTER (OUTPATIENT)
Age: 62
End: 2022-09-20

## 2022-09-20 LAB
METANEPHRINE, PL: 21.2 PG/ML — SIGNIFICANT CHANGE UP (ref 0–88)
NORMETANEPHRINE, PL: 135.9 PG/ML — SIGNIFICANT CHANGE UP (ref 0–285.2)

## 2022-09-20 PROCEDURE — 99222 1ST HOSP IP/OBS MODERATE 55: CPT

## 2022-09-20 PROCEDURE — 90832 PSYTX W PT 30 MINUTES: CPT

## 2022-09-20 PROCEDURE — 99232 SBSQ HOSP IP/OBS MODERATE 35: CPT

## 2022-09-20 PROCEDURE — 99233 SBSQ HOSP IP/OBS HIGH 50: CPT

## 2022-09-20 RX ORDER — LOSARTAN POTASSIUM 100 MG/1
25 TABLET, FILM COATED ORAL AT BEDTIME
Refills: 0 | Status: DISCONTINUED | OUTPATIENT
Start: 2022-09-21 | End: 2022-09-24

## 2022-09-20 RX ORDER — TOPIRAMATE 25 MG
50 TABLET ORAL
Refills: 0 | Status: DISCONTINUED | OUTPATIENT
Start: 2022-09-20 | End: 2022-09-24

## 2022-09-20 RX ORDER — TOPIRAMATE 25 MG
50 TABLET ORAL ONCE
Refills: 0 | Status: COMPLETED | OUTPATIENT
Start: 2022-09-20 | End: 2022-09-20

## 2022-09-20 RX ADMIN — Medication 650 MILLIGRAM(S): at 10:44

## 2022-09-20 RX ADMIN — Medication 50 MILLIGRAM(S): at 20:39

## 2022-09-20 RX ADMIN — Medication 1 TABLET(S): at 11:20

## 2022-09-20 RX ADMIN — TRAMADOL HYDROCHLORIDE 25 MILLIGRAM(S): 50 TABLET ORAL at 12:26

## 2022-09-20 RX ADMIN — Medication 0.5 MILLIGRAM(S): at 02:21

## 2022-09-20 RX ADMIN — CYCLOBENZAPRINE HYDROCHLORIDE 10 MILLIGRAM(S): 10 TABLET, FILM COATED ORAL at 20:39

## 2022-09-20 RX ADMIN — TRAMADOL HYDROCHLORIDE 25 MILLIGRAM(S): 50 TABLET ORAL at 13:30

## 2022-09-20 RX ADMIN — Medication 50 MILLIGRAM(S): at 11:20

## 2022-09-20 RX ADMIN — ATORVASTATIN CALCIUM 40 MILLIGRAM(S): 80 TABLET, FILM COATED ORAL at 21:40

## 2022-09-20 RX ADMIN — ENOXAPARIN SODIUM 40 MILLIGRAM(S): 100 INJECTION SUBCUTANEOUS at 05:53

## 2022-09-20 RX ADMIN — TRAMADOL HYDROCHLORIDE 25 MILLIGRAM(S): 50 TABLET ORAL at 07:04

## 2022-09-20 RX ADMIN — Medication 81 MILLIGRAM(S): at 11:20

## 2022-09-20 RX ADMIN — NYSTATIN CREAM 1 APPLICATION(S): 100000 CREAM TOPICAL at 17:02

## 2022-09-20 RX ADMIN — Medication 0.5 MILLIGRAM(S): at 10:31

## 2022-09-20 RX ADMIN — NYSTATIN CREAM 1 APPLICATION(S): 100000 CREAM TOPICAL at 05:53

## 2022-09-20 RX ADMIN — Medication 650 MILLIGRAM(S): at 09:41

## 2022-09-20 RX ADMIN — TRAMADOL HYDROCHLORIDE 25 MILLIGRAM(S): 50 TABLET ORAL at 06:04

## 2022-09-20 RX ADMIN — Medication 9 MILLIGRAM(S): at 21:39

## 2022-09-20 RX ADMIN — LOSARTAN POTASSIUM 100 MILLIGRAM(S): 100 TABLET, FILM COATED ORAL at 05:53

## 2022-09-20 NOTE — PROGRESS NOTE ADULT - SUBJECTIVE AND OBJECTIVE BOX
Patient is a 61y old  Female who presents with a chief complaint of PRES (20 Sep 2022 09:20)      Patient seen and examined at bedside.  No overnight events  Was orthostatic and nauseous during therapy this AM.      ALLERGIES:  No Known Allergies    MEDICATIONS  (STANDING):  aspirin enteric coated 81 milliGRAM(s) Oral daily  atorvastatin 40 milliGRAM(s) Oral at bedtime  enoxaparin Injectable 40 milliGRAM(s) SubCutaneous every 24 hours  lactobacillus acidophilus 1 Tablet(s) Oral daily  melatonin 9 milliGRAM(s) Oral at bedtime  nystatin Powder 1 Application(s) Topical two times a day  Omeprazole/Sodium Bicarbonate 20/1100 mg 1 Capsule(s) 1 Capsule(s) Oral at bedtime  polyethylene glycol 3350 17 Gram(s) Oral two times a day  senna 2 Tablet(s) Oral at bedtime  topiramate 50 milliGRAM(s) Oral at bedtime    MEDICATIONS  (PRN):  acetaminophen     Tablet .. 650 milliGRAM(s) Oral every 6 hours PRN Temp greater or equal to 38C (100.4F), Mild Pain (1 - 3)  cyclobenzaprine 10 milliGRAM(s) Oral three times a day PRN Muscle Spasm  LORazepam     Tablet 0.5 milliGRAM(s) Oral every 8 hours PRN Nausea and/or Vomiting  traMADol 25 milliGRAM(s) Oral every 6 hours PRN Severe Pain (7 - 10)    Vital Signs Last 24 Hrs  T(F): 98 (20 Sep 2022 08:35), Max: 98 (20 Sep 2022 08:35)  HR: 91 (20 Sep 2022 09:44) (89 - 96)  BP: 100/60 (20 Sep 2022 09:44) (100/60 - 134/86)  RR: 15 (20 Sep 2022 09:44) (15 - 16)  SpO2: 94% (20 Sep 2022 09:44) (94% - 97%)  I&O's Summary    19 Sep 2022 07:01  -  20 Sep 2022 07:00  --------------------------------------------------------  IN: 480 mL / OUT: 0 mL / NET: 480 mL      PHYSICAL EXAM:  GENERAL: NAD  HENT:  Atraumatic, Normocephalic; No tonsillar erythema, exudates, or enlargement; Moist mucous membranes;   EYES: EOMI, PERRLA, conjunctiva and sclera clear, no lid-lag  NECK: Supple, No JVD, Normal thyroid  CHEST/LUNG: Clear to percussion bilaterally; No rales, rhonchi, wheezing, or rubs; normal respiratory effort, no intercostal retractions  HEART: Regular rate and rhythm; No murmurs, rubs, or gallops; No pitting edema  ABDOMEN: Soft, Nontender, Nondistended; Bowel sounds present; No HSM  MUSCULOSKELETAL/EXTREMITIES:  2+ Peripheral Pulses, No clubbing or digital cyanosis  PSYCH: Appropriate affect, Alert & Awake; Good judgement    LABS:                        14.0   7.20  )-----------( 518      ( 19 Sep 2022 06:20 )             42.5       09-19    138  |  101  |  14  ----------------------------<  101  4.1   |  31  |  1.02    Ca    9.4      19 Sep 2022 06:20     09-01 Chol 224 mg/dL LDL -- HDL 63 mg/dL Trig 112 mg/dL    COVID-19 PCR: NotDetec (09-19-22 @ 06:03)  COVID-19 PCR: NotDetec (09-13-22 @ 06:00)  COVID-19 PCR: NotDetec (09-06-22 @ 18:30)  COVID-19 PCR: NotDetec (09-05-22 @ 05:57)  COVID-19 PCR: NotDetec (09-03-22 @ 06:40)      Care Discussed with Rehab Attending and Other Providers

## 2022-09-20 NOTE — PROGRESS NOTE ADULT - ASSESSMENT
60 yo F w/ HTN, migraine, TIA (not on ASA and statin), presented w/ HA, word finding difficulties, and n/v, admitted as a code stroke, carotid dissection in left ICA on imaging, and CT/MRI head suggestive of PRES.  Admitted to [facility] on 9/6/2022.    # Acute metabolic encephalopathy complicated by aphasia, likely due to PRES  #History TIA  # Headache - improving.  - Repeat MRI head in 1 mo (9/30/2022)  - ASA and statin  - PT/OT/Speech therapy  - pain control & bowel regimen per PMR    # abdominal discomfort 2/2 constipation and GERD - improving.   - bowel regimen  - omeprazole    # HTN  #orthostasis  SBP goal 100-160.   - patient with episode of orthostatic Hypotension on 9/20AM.   - Holding losartan (was recently increased to 100mg from 50mg)  - TEDS / abd binder  - encourage PO fluids  - Will consider giving Ca channel blocker versus low dose losartan qhs    # Abdominal hematoma from lovenox inj  - will monitor sites closely and h/h. Currently only subcutaneous.   - rotate lovenox inj sites.    # Depression  recently on benzos for anxiety following death of brother. Not on SSRI.  - Neuropsych f/u    # Prolonged QTc - improving  - avoid QTc prolonging meds, can also give oral ativan for breakthrough nausea.   - optimize electrolytes    # thrombocytosis likely reactive - stable  - trend platelets.  - already on aspirin    # Hx of migraines  - per d/c summary, avoid triptans.  - OP f/u with Neurology for migraine therapy.   - Continue topiramate.  - consider trial of gabapentin for severe headaches.    #VTE ppx:   -lovenox, no evidence of anemia.

## 2022-09-20 NOTE — DISCHARGE NOTE PROVIDER - NSDCMRMEDTOKEN_GEN_ALL_CORE_FT
acetaminophen 500 mg oral tablet: 2 tab(s) orally every 8 hours, As needed, Temp greater or equal to 38C (100.4F), Mild Pain (1 - 3), Moderate Pain (4 - 6)  aspirin 81 mg oral delayed release tablet: 1 tab(s) orally once a day  atorvastatin 40 mg oral tablet: 1 tab(s) orally once a day (at bedtime)  enoxaparin: 40 unit(s) subcutaneous once a day for DVT ppx   losartan 50 mg oral tablet: 1 tab(s) orally 2 times a day  Occupational Therapy: Occupational Therapy BIW x 6 weeks  Dx: Posterior Reversible Encephalopathy Syndrome (PRES)  Eval. and Treat, ADLs, coordination, IADLs, Problem Solving skills  Physical Therapy: Physical Therapy BIW x 6 weeks  Dx: Posterior Reversible Encephalopathy Syndrome  Eval and Treat, Balance, Community Ambulation, Stairs, Endurance.  HEP  polyethylene glycol 3350 oral powder for reconstitution: 17 gram(s) orally once a day  senna leaf extract oral tablet: 2 tab(s) orally once a day (at bedtime)  Speech therapy: Speech therapy x 6-8 Weeks  Dx: Posterior Reversible Encephalopathy Syndrome (PRES)  Eval and Treat, Cognitive Linguistic skills, HEP.    acetaminophen 325 mg oral tablet: 2 tab(s) orally every 6 hours, As needed, Temp greater or equal to 38C (100.4F), Mild Pain (1 - 3)  aspirin 81 mg oral delayed release tablet: 1 tab(s) orally once a day  atorvastatin 40 mg oral tablet: 1 tab(s) orally once a day (at bedtime)  cyclobenzaprine 10 mg oral tablet: 1 tab(s) orally 3 times a day, As needed, Muscle Spasm MDD:30  freetext medication     -: 1 cap(s) orally once a day (at bedtime)  lactobacillus acidophilus oral capsule: 1 cap(s) orally once a day  LORazepam 0.5 mg oral tablet: 1 tab(s) orally every 8 hours, As needed, Nausea and/or Vomiting MDD:1.5mg  losartan 25 mg oral tablet: 1 tab(s) orally once a day (at bedtime)  melatonin 3 mg oral tablet: 3 tab(s) orally once a day (at bedtime)  Occupational Therapy: Occupational Therapy BIW x 6 weeks  Dx: Posterior Reversible Encephalopathy Syndrome (PRES)  Eval. and Treat, ADLs, coordination, IADLs, Problem Solving skills  Physical Therapy: Physical Therapy BIW x 6 weeks  Dx: Posterior Reversible Encephalopathy Syndrome  Eval and Treat, Balance, Community Ambulation, Stairs, Endurance.  HEP  polyethylene glycol 3350 oral powder for reconstitution: 17 gram(s) orally once a day  senna leaf extract oral tablet: 2 tab(s) orally once a day (at bedtime)  Speech therapy: Speech therapy x 6-8 Weeks  Dx: Posterior Reversible Encephalopathy Syndrome (PRES)  Eval and Treat, Cognitive Linguistic skills, HEP.   topiramate 50 mg oral tablet: 1 tab(s) orally 2 times a day MDD:100  traMADol 50 mg oral tablet: 0.5 tab(s) orally every 12 hours, As needed, Moderate Pain (4 - 6) MDD:1.0

## 2022-09-20 NOTE — PROGRESS NOTE ADULT - SUBJECTIVE AND OBJECTIVE BOX
Events noted, low BP today after Losartan 100mg    Vital Signs Last 24 Hrs  T(C): 36.7 (09-20-22 @ 08:35), Max: 36.7 (09-20-22 @ 08:35)  T(F): 98 (09-20-22 @ 08:35), Max: 98 (09-20-22 @ 08:35)  HR: 92 (09-20-22 @ 16:48) (77 - 96)  BP: 129/85 (09-20-22 @ 16:48) (100/60 - 138/89)  RR: 15 (09-20-22 @ 16:48) (15 - 16)  SpO2: 96% (09-20-22 @ 16:48) (94% - 98%)    s1s2  b/l air entry  soft, ND  no edema  AO                                                14.0   7.20  )-----------( 518      ( 19 Sep 2022 06:20 )             42.5     19 Sep 2022 06:20    138    |  101    |  14     ----------------------------<  101    4.1     |  31     |  1.02     Ca    9.4        19 Sep 2022 06:20    acetaminophen     Tablet .. 650 milliGRAM(s) Oral every 6 hours PRN  aspirin enteric coated 81 milliGRAM(s) Oral daily  atorvastatin 40 milliGRAM(s) Oral at bedtime  cyclobenzaprine 10 milliGRAM(s) Oral three times a day PRN  enoxaparin Injectable 40 milliGRAM(s) SubCutaneous every 24 hours  lactobacillus acidophilus 1 Tablet(s) Oral daily  LORazepam     Tablet 0.5 milliGRAM(s) Oral every 8 hours PRN  melatonin 9 milliGRAM(s) Oral at bedtime  nystatin Powder 1 Application(s) Topical two times a day  Omeprazole/Sodium Bicarbonate 20/1100 mg 1 Capsule(s) 1 Capsule(s) Oral at bedtime  polyethylene glycol 3350 17 Gram(s) Oral two times a day  senna 2 Tablet(s) Oral at bedtime  topiramate 50 milliGRAM(s) Oral two times a day  traMADol 25 milliGRAM(s) Oral every 6 hours PRN    A/P:    Suspected PRES  Labile BP  Pls continue to take all BPs sitting or standing  Losartan 25mg qhs to start tomorrow  Neuro eval appr  PFMs are negative  D/w medicine  Will follow    [phone]

## 2022-09-20 NOTE — DISCHARGE NOTE PROVIDER - PROVIDER TOKENS
PROVIDER:[TOKEN:[[id number]],FOLLOWUP:[2 weeks]],PROVIDER:[TOKEN:[[id number]],FOLLOWUP:[1 month]],PROVIDER:[TOKEN:[[id number]],FOLLOWUP:[Routine]],PROVIDER:[TOKEN:[[id number]]]

## 2022-09-20 NOTE — DISCHARGE NOTE PROVIDER - NSDCACTIVITY_GEN_ALL_CORE
Do not drive or operate machinery Bathing allowed/Sex allowed/Do not drive or operate machinery/Showering allowed/Stairs allowed/Walking - Indoors allowed/No heavy lifting/straining/Walking - Outdoors allowed

## 2022-09-20 NOTE — DISCHARGE NOTE PROVIDER - CARE PROVIDER_API CALL
mOar Dominguez (DO)  Neurology; Vascular Neurology  [address] [facility], [address]  [place] [address]  Phone: [phone]  Fax: [phone]  Follow Up Time: 2 weeks    Gabriele Olivier (DO)  Cardiology; Internal Medicine  [address] [facility], [address]  [place] [address]  Phone: [phone]  Fax: [phone]  Follow Up Time: 1 month    Froy Craig (DO)  Medicine  [address] [facility], [address]  [place] [address]  Phone: [phone]  Fax: [phone]  Follow Up Time: Routine    Tesha Duval (DO)  Brain Injury Medicine; PhysicalRehab Medicine  [address] [place] [address]  Phone: [phone]  Fax: [phone]  Follow Up Time:

## 2022-09-20 NOTE — DISCHARGE NOTE PROVIDER - NSDCCPCAREPLAN_GEN_ALL_CORE_FT
PRINCIPAL DISCHARGE DIAGNOSIS  Diagnosis: Posterior reversible encephalopathy syndrome  Assessment and Plan of Treatment: You were admitted to [facility] for acute inpatient rehab after being treated fr a condition called Posterior Reversible Encephalopathy Syndrome, joshuakvee PRES. After being discharged from the hospital, please have close follow up with your PCP, neurologist, and cardiology. Please follow up with Dr. Duval in ~4weeks after discharge for further rehab needs.      SECONDARY DISCHARGE DIAGNOSES  Diagnosis: Autonomic dysfunction  Assessment and Plan of Treatment: During your hospital course at [facility], you had frequent episodes of headaches, nausea, sweating, blurry vision, and large fluctuations in blood pressure. You were evaluated by a neurologist at [facility], who feels your symptoms should gradually continue to improve over time. You will be sent home with a medication, called Topamax for your headaches and Losartan for your blood pressure. Please continue to take these medications, as precribed and have close follow up with your neurologist and cardiologist for further management.    Diagnosis: Hypertension  Assessment and Plan of Treatment: You will be sent home with a medication, called Losartan for your blood pressure.    Diagnosis: Migraine headache  Assessment and Plan of Treatment: You will be sent home with a medication, called Topamax for headache management.    Diagnosis: Prolonged QT interval  Assessment and Plan of Treatment: During a test called an EKG or ECG, you were found to have an abnormally prolonged QTc, which is one of the parameters that describe the conduction of your heart. As a result, you should not take any medications that may further prolong your QTc. Please follow up with your cardiologist for further management and please consult your cardiologist or PCP before starting any new medications.

## 2022-09-20 NOTE — PROGRESS NOTE ADULT - SUBJECTIVE AND OBJECTIVE BOX
Pt was seen for 30 min for supportive tx. Pt c/o irritability, frustration. Pt reported doing well since last seen. She is participating in her rehab txs as much as she can and doing progress in ambulation, performance of her ADLs and cognition. She reports feeling frustrated with significant fluctuation in her BP which has been difficult to control so far, and is slowing down her progress because it impacts on her ability to participate in some of her rehab txs, especially PT. Pt is aware that usual medications for BP have not being effective in controlling itso other tests are under way to further clarify the cause and ways to treat it. She noted that her father had significant BP issues. Pt also expressed concern about work related issues that she has had a hard time to streamline and solve, but eventually realizing that she has to let it go because she is not getting the help she needs from coworkers and IT to address them. Pt also expressed concern about potential for addiction of a medication she is currently taking that helps her sleep, and also decreases nausea and headaches. Pt is aware of developing an addiction to it, as she had such difficulties with a medication she was taking for migraines years ago. Discussed the fact that she is taking a low dose and that target symptoms appear to be more physical in nature, as well as the short-term nature of the tx, in order to help Pt put things into perspective, and to note when her symptoms improve to start being weaned off the medication. Pt reported improved sleep and appetite, and energy levels fluctuating in response to episodes of uncontrolled BP. Support and encouragement were provided.

## 2022-09-20 NOTE — PROGRESS NOTE ADULT - ASSESSMENT
Pt alert, attentive, intact language, thought processes goal-directed, no abnormal thought contents noted, mildly depressed affect, euthymic mood, denied AH/VH, denied SI/HI/I/P, calm behavior, improved coping. Plan: Continue supportive tx.

## 2022-09-20 NOTE — PROGRESS NOTE ADULT - ASSESSMENT
EZRA MARIE is a 60yo Female with PMHx of HTN, migraines, and TIA (last seen at [facility] in 11/2021), who presented to [facility] on 08/31/2022 with HA, nausea, vomiting, and word-finding difficulties; was found to have findings on CT and MRI head consistent with PRES.  Admitted for multidisciplinary rehab program with Aphasia, gait and ADL impairment.  ------------------------------------------------------------------  #Comprehensive Multidisciplinary Rehab Program:  - Gait, ADL, Functional impairments  - PT/OT/ SLP 3 hours a day 5 days a week  -- PT: 60 min/ OT: 30 min/ SLP: 90 min starting 9/15    #PRES, unclear etiology  --PRES symptoms do not seem definitively associated with pt's BP as the case this AM--d/w Nephrology  - suggestive based on findings on MRI and CT head: hyperintense signal to left occipital lobe and right cerebellum. No enhancement observed with IV contrast.   - repeat MRI brain in 1 month (09/30/2022)  - c/w ASA 81mg daily  - Repeat CT Head stable  --Neurology consulted for assessment and recommendations  - per neuro, will obtain endocrine c/s to r/o pheo and increase topamax to 50mg BID for HAs.     #Orthostatic Hypotension  #HTN  -fluctuating BP readings-- Nursing order for manual BPs only to improve accuracy-  --will decrease Losartan from 100mg to 50mg daily  - continue to monitor orthostatics  - TEDs, and Abdominal binder ordered  --Nephrology, Dr. Suarez, consulted for recs in regards to labile bp and orthostatic management.  - Continue to monitor orthostatics    #Prolonged QTc  - Avoid QTc prolonging medications--reglan, zofran, prochlorperazine  - Most recent QTc- 456- improving    #HLD  - c/w Atorvastatin 40mg qhs    #Pain:  - Tylenol PRN, Tramadol PRN  - Avoid sedating meds that may affect cognitive recovery    #Headaches  --Topamax 50mg qHS  --Ativan 0.5mg q.8h PRN for nausea  --Repeat CT Head stable 9/9  -- Warm compress     #Sleep:  - Maintain quiet hours and low stim environment  - cont. Melatonin 9mg 9/9  - Topamax 50mg qHS.     #GI/Bowel:  #abdominal discomfort  - Miralax changed from PRN to standing. Senna added 9/13.  - Encourage PO fluids  --cont Ativan PRN for nausea  - Improved with home regimen Zegrid 9/14    #/Bladder:  --continent  --voiding with low PVR--    #Diet :    - Diet: Regular with thins    #Skin/ Pressure Injury Prevention:  - assessment on admission --erythema under left breast--Nystatin ordered  - Turn Q2hrs in bed while awake, OOB to Chair, PT/OT/SLP     #DVT prophylaxis:  - Lovenox    #Precautions/ Restrictions  - Falls  - COVID PCR: 9/5    #Dispo: IDR 09/15/2022  - Nursing: continent of bowel and bladder  - Barriers: Orthostatics, labile blood pressure, visual perceptual deficits.  - SW: Lives in  with  and children. Has 5 SAI; split-level home with 6 steps to bedroom and 6 steps to kitchen  - OT: Independent with eating, grooming, and UBD. Supervision for LBD, bathing, toilet transfer, toileting, and tub/shower transfer.   - PT: Ambulated 150 ft with SC, with CG/supervision. Supervision for transfers. Completed 8 stairs with supervision.   - SLP: Regular with thins diet; Has mild anomia. Mild cognitive deficits with high level tasks.   - Team Goals: (1) Ambulate to bedroom Ivonne. (2) Express complex ideas. (3) Sequence higher-level tasks  - TDD: 9/21 to home    --------------------------------------------  Outpatient Follow up:  Omar Dominguez (DO)  Neurology; Vascular Neurology  [address] [facility], [address]  [place] [address]  Phone: [phone]  Fax: [phone]  Follow Up Time: 2 weeks    Gabriele Olivier)  Cardiology; Internal Medicine  [address] [facility], [address]  [place] [address]  Phone: [phone]  Fax: [phone]  Follow Up Time: 1 month    Froy Craig ([address] [address]  Phone: [phone]  Fax: [phone]  Follow Up Time: Routine  --------------------------------------------   EZRA MARIE is a 62yo Female with PMHx of HTN, migraines, and TIA (last seen at [facility] in 11/2021), who presented to [facility] on 08/31/2022 with HA, nausea, vomiting, and word-finding difficulties; was found to have findings on CT and MRI head consistent with PRES.  Admitted for multidisciplinary rehab program with Aphasia, gait and ADL impairment.  ------------------------------------------------------------------  #Comprehensive Multidisciplinary Rehab Program:  - Gait, ADL, Functional impairments  - PT/OT/ SLP 3 hours a day 5 days a week  -- PT: 60 min/ OT: 30 min/ SLP: 90 min starting 9/15    #PRES, unclear etiology  --PRES symptoms do not seem definitively associated with pt's BP as the case this AM--d/w Nephrology  - suggestive based on findings on MRI and CT head: hyperintense signal to left occipital lobe and right cerebellum. No enhancement observed with IV contrast.   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 Rec. consider  endocrine c/s to r/o pheo --Pt. had w/u by Nephrology.     #Orthostatic Hypotension  #HTN  -fluctuating BP readings-- Nursing order for manual BPs only to improve accuracy-  --Nephro decreased Losartan from 100mg to 25mg daily  - continue to monitor orthostatics  - TEDs, and Abdominal binder ordered  --Nephrology, Dr. Suarez, consulted for recs in regards to labile bp and orthostatic management.  - Continue to monitor orthostatics    #Prolonged QTc  - Avoid QTc prolonging medications--reglan, zofran, prochlorperazine  - Most recent QTc- 456- improving    #HLD  - c/w Atorvastatin 40mg qhs    #Pain:  - Tylenol PRN, Tramadol PRN  - Avoid sedating meds that may affect cognitive recovery    #Headaches  --Topamax 50mg qHS  --Ativan 0.5mg q.8h PRN for nausea  --Repeat CT Head stable 9/9  -- Warm compress     #Sleep:  - Maintain quiet hours and low stim environment  - cont. Melatonin 9mg 9/9  - Topamax 50mg qHS.     #GI/Bowel:  #abdominal discomfort  - Miralax changed from PRN to standing. Senna added 9/13.  - Encourage PO fluids  --cont Ativan PRN for nausea  - Improved with home regimen Zegrid 9/14    #/Bladder:  --continent  --voiding with low PVR--    #Diet :    - Diet: Regular with thins    #Skin/ Pressure Injury Prevention:  - assessment on admission --erythema under left breast--Nystatin ordered  - Turn Q2hrs in bed while awake, OOB to Chair, PT/OT/SLP     #DVT prophylaxis:  - Lovenox    #Precautions/ Restrictions  - Falls  - COVID PCR: 9/5    #Dispo: IDR 09/15/2022  - Nursing: continent of bowel and bladder  - Barriers: Orthostatics, labile blood pressure, visual perceptual deficits.  - SW: Lives in  with  and children. Has 5 SAI; split-level home with 6 steps to bedroom and 6 steps to kitchen  - OT: Independent with eating, grooming, and UBD. Supervision for LBD, bathing, toilet transfer, toileting, and tub/shower transfer.   - PT: Ambulated 150 ft with SC, with CG/supervision. Supervision for transfers. Completed 8 stairs with supervision.   - SLP: Regular with thins diet; Has mild anomia. Mild cognitive deficits with high level tasks.   - Team Goals: (1) Ambulate to bedroom Ivonne. (2) Express complex ideas. (3) Sequence higher-level tasks  - TDD: 9/21 to home    --------------------------------------------  Outpatient Follow up:  Omar Dominguez (DO)  Neurology; Vascular Neurology  [address] [facility], [address]  [place] [address]  Phone: [phone]  Fax: [phone]  Follow Up Time: 2 weeks    Gabriele Olivier (DO)  Cardiology; Internal Medicine  [address] [place] [address], [facility] [address]  [place] [address]  Phone: [phone]  Fax: [phone]  Follow Up Time: 1 month    Froy Craig (DO)  [address]  [place] [address]  Phone: [phone]  Fax: [phone]  Follow Up Time: Routine  --------------------------------------------

## 2022-09-20 NOTE — DISCHARGE NOTE PROVIDER - CARE PROVIDERS DIRECT ADDRESSES
,DirectAddress_Unknown,DirectAddress_Unknown,DirectAddress_Unknown,[email or web address]

## 2022-09-20 NOTE — DISCHARGE NOTE PROVIDER - HOSPITAL COURSE
EZRA MARIE is a 62yo Female with PMHx of HTN, migraines, and TIA (last seen at [facility] in 11/2021), who presented to [facility] on 08/31/2022 with HA, nausea, vomiting, and word-finding difficulties; was found to have findings on CT and MRI head consistent with PRES. No tPA was given, as she presented outside of therapeutic window and LVO not performed, as no LVO was identified on CT angio head/neck. Patient was followed by neurology and cardiology during her admission. TTE and EEG were unremarkable; started on ASA. Course c/b hyponatremia, likely SIADH now resolved.    Patient was evaluated by PM&R and therapy for functional deficits and gait/ ADL impairments and recommended acute rehabilitation. Patient was medically optimized for discharge to [facility] on 09/06/2022. Patient was seen and examined at the bedside upon arrival. Currently endorses 3-4/10 HA and anxiety. Reports that while ambulating, experiences blurry vision, dizziness, and lightheadedness. VS on admission unremarkable.    Patient was stable upon rehab admission to  [facility]patient [facility]. Admitted with gait instabilty, ADL, and functional impairments.     Rehab Course significant for _________. All other medical co-morbidities were stable. Patient tolerated course of inpatient PT/OT/SLP rehab with significant functional improvements and met rehab goals prior to discharge. Patient was medically cleared on ___  for discharged to home.     Patient's functional status as of last IDT rounds prior to discharge is, as follows:        Patient will follow up with the following: EZRA MARIE is a 60yo Female with PMHx of HTN, migraines, and TIA (last seen at [facility] in 11/2021), who presented to [facility] on 08/31/2022 with HA, nausea, vomiting, and word-finding difficulties; was found to have findings on CT and MRI head consistent with PRES. No tPA was given, as she presented outside of therapeutic window and LVO not performed, as no LVO was identified on CT angio head/neck. Patient was followed by neurology and cardiology during her admission. TTE and EEG were unremarkable; started on ASA. Course c/b hyponatremia, likely SIADH now resolved.    Patient was evaluated by PM&R and therapy for functional deficits and gait/ ADL impairments and recommended acute rehabilitation. Patient was medically optimized for discharge to [facility] on 09/06/2022. Patient was seen and examined at the bedside upon arrival. Currently endorses 3-4/10 HA and anxiety. Reports that while ambulating, experiences blurry vision, dizziness, and lightheadedness. VS on admission unremarkable.    Patient was stable upon rehab admission to  Inpatient Rehabilitation Facility. Admitted with gait instabilty, ADL, and functional impairments.     Rehab Course significant for labile blood pressure; patient frequently orthostatic with changes in positioning and at other times, was hypertensive. During these episodes, would experience HAs described as feeling different than her typical migraine HAs, with associated nausea; would also report episodes of blurry vision with ambulation or experiencing diaphoresis with flushed sensation. She was started on Topamax for HAs, Ativan PRN for nausea 2/2 prolonged QTc, and adjustments were made to her blood pressure medications, as managed by nephrology. Neurology was consulted to evaluate atypical HAs with labile BP. As per neurology, Topamax was increased to 50mg BID and workup was started to rule out other possible pathologies that may cause symptoms (i.e pheochromocytoma, hyperthyroidism, etc.). 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     All other medical co-morbidities were stable. Patient tolerated course of inpatient PT/OT/SLP rehab with significant functional improvements and met rehab goals prior to discharge. Patient was medically cleared on ___  for discharged to home.     Patient's functional status as of last IDT rounds prior to discharge is, as follows:        Patient will follow up with the following:   PCP  Neurology  Cardiology  PM&R JAMES MARIE is a 60yo Female with PMHx of HTN, migraines, and TIA (last seen at [facility] in 11/2021), who presented to [facility] on 08/31/2022 with HA, nausea, vomiting, and word-finding difficulties; was found to have findings on CT and MRI head consistent with PRES. No tPA was given, as she presented outside of therapeutic window and LVO not performed, as no LVO was identified on CT angio head/neck. Patient was followed by neurology and cardiology during her admission. TTE and EEG were unremarkable; started on ASA. Course c/b hyponatremia, likely SIADH now resolved.    Patient was evaluated by PM&R and therapy for functional deficits and gait/ ADL impairments and recommended acute rehabilitation. Patient was medically optimized for discharge to [facility] on 09/06/2022. Patient was seen and examined at the bedside upon arrival. Currently endorses 3-4/10 HA and anxiety. Reports that while ambulating, experiences blurry vision, dizziness, and lightheadedness. VS on admission unremarkable.    Patient was stable upon rehab admission to  Inpatient Rehabilitation Facility. Admitted with gait instabilty, ADL, and functional impairments.     Rehab Course significant for labile blood pressure; patient frequently orthostatic with changes in positioning and at other times, was hypertensive. During these episodes, would experience HAs described as feeling different than her typical migraine HAs, with associated nausea; would also report episodes of blurry vision with ambulation or experiencing diaphoresis with flushed sensation. She was started on Topamax for HAs, Ativan PRN for nausea 2/2 prolonged QTc, and adjustments were made to her blood pressure medications, as managed by nephrology. Neurology was consulted to evaluate atypical HAs with labile BP. As per neurology, Topamax was increased to 50mg BID and workup was started to rule out other possible pathologies that may cause symptoms (i.e pheochromocytoma, hyperthyroidism, etc.). 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     All other medical co-morbidities were stable. Patient tolerated course of inpatient PT/OT/SLP rehab with significant functional improvements and met rehab goals prior to discharge. Patient was medically cleared on 9/24/22 for discharged to home.     Patient's functional status as of last IDT rounds prior to discharge is, as follows:        Patient will follow up with the following:   PCP  Neurology  Cardiology  PM&R

## 2022-09-20 NOTE — PROGRESS NOTE ADULT - SUBJECTIVE AND OBJECTIVE BOX
Subjective:  Patient was seen and examined at the bedside this AM. No acute overnight events.     States she has a HA this morning and was orthostatic with drop in BP to 90/68 while sitting in chair after therapy session; however, overall feels better than she did the previous day. Evaluated by neurology this AM; plan for additional blood work. Advised patient plan to hold dc tomorrow for additional workup. In addition, plan to adjust BP medications in light of orthostatic hypotension.     Patient does note that when she experiences HA with nausea that she also experiences blurry vision with exertion (i.e. ambulating). Also reports intermittent episodes of sweating; notes that she felt warm/flushed and was sweating last night despite having the AC turned on in her room.       ROS:  (+) Lightheaded, HA, and nausea. (+) Sweating overnight. Did not report vomiting, CP, palpitations, or abdominal pain. Last BM was on       Physical Exam:  Vital Signs Last 24 Hrs  T(C): 36.7 (20 Sep 2022 08:35), Max: 36.7 (20 Sep 2022 08:35)  T(F): 98 (20 Sep 2022 08:35), Max: 98 (20 Sep 2022 08:35)  HR: 91 (20 Sep 2022 09:44) (89 - 96)  BP: 100/60 (20 Sep 2022 09:44) (100/60 - 134/86)  BP(mean): --  RR: 15 (20 Sep 2022 09:44) (15 - 16)  SpO2: 94% (20 Sep 2022 09:44) (94% - 97%)    Parameters below as of 20 Sep 2022 09:44  Patient On (Oxygen Delivery Method): room air    22 @ 07:01  -  22 @ 07:00  --------------------------------------------------------  IN: 480 mL / OUT: 0 mL / NET: 480 mL      Constitutional - NAD, Comfortable  Chest - Good chest expansion. Breathing comfortably on RA  Cardiovascular - Warm and well perfused; no LE edema  Abdomen - Soft, NTND  Extremities - No calf tenderness   Neurologic Exam -                    Cognitive - Awake and alert; answering questions appropriately; following commands     Communication - Fluent, No dysarthria     Motor -                     LEFT    UE - ShAB 5/5, EF 5/5, EE 5/5, WE 5/5,  5/5                    RIGHT UE - ShAB 5/5, EF 5/5, EE 5/5, WE 5/5,  5/5                    LEFT    LE - HF 5/5, KE 5/5, DF 5/5, PF 5/5                    RIGHT LE - HF 5/5, KE 5/5, DF 5/5, PF 5/5  Psychiatric - Mood stable, Affect WNL      Recent Labs/Imagin.0   7.20  )-----------( 518      ( 19 Sep 2022 06:20 )             42.5     09-    138  |  101  |  14  ----------------------------<  101<H>  4.1   |  31  |  1.02    Ca    9.4      19 Sep 2022 06:20      Medications:  acetaminophen     Tablet .. 650 milliGRAM(s) Oral every 6 hours PRN  aspirin enteric coated 81 milliGRAM(s) Oral daily  atorvastatin 40 milliGRAM(s) Oral at bedtime  cyclobenzaprine 10 milliGRAM(s) Oral three times a day PRN  enoxaparin Injectable 40 milliGRAM(s) SubCutaneous every 24 hours  lactobacillus acidophilus 1 Tablet(s) Oral daily  LORazepam     Tablet 0.5 milliGRAM(s) Oral every 8 hours PRN  melatonin 9 milliGRAM(s) Oral at bedtime  nystatin Powder 1 Application(s) Topical two times a day  Omeprazole/Sodium Bicarbonate  mg 1 Capsule(s) 1 Capsule(s) Oral at bedtime  polyethylene glycol 3350 17 Gram(s) Oral two times a day  senna 2 Tablet(s) Oral at bedtime  topiramate 50 milliGRAM(s) Oral at bedtime  traMADol 25 milliGRAM(s) Oral every 6 hours PRN   Subjective:  Patient was seen and examined at the bedside this AM. No acute overnight events.     States she has a HA this morning and was orthostatic with drop in BP to 90/68 while sitting in chair after therapy session; however, overall feels better than she did the previous day. mild headache and nausea today.  Evaluated by neurology this AM; plan for additional blood work. Advised patient plan to hold dc tomorrow for additional workup. In addition, plan to adjust BP medications in light of orthostatic hypotension.   tolerated therapy    Patient does note that when she experiences HA with nausea that she also experiences blurry vision with exertion (i.e. ambulating). Also reports intermittent episodes of sweating; notes that she felt warm/flushed and was sweating last night despite having the AC turned on in her room.     Pt's orthostatics VS +  -- --> decreased to 80      ROS:  (+) Lightheaded, HA, and nausea. (+) Sweating overnight. Did not report vomiting, CP, palpitations, or abdominal pain. Last BM was on       Physical Exam:  Vital Signs Last 24 Hrs  T(C): 36.7 (20 Sep 2022 08:35), Max: 36.7 (20 Sep 2022 08:35)  T(F): 98 (20 Sep 2022 08:35), Max: 98 (20 Sep 2022 08:35)  HR: 91 (20 Sep 2022 09:44) (89 - 96)  BP: 100/60 (20 Sep 2022 09:44) (100/60 - 134/86)  BP(mean): --  RR: 15 (20 Sep 2022 09:44) (15 - 16)  SpO2: 94% (20 Sep 2022 09:44) (94% - 97%)    Parameters below as of 20 Sep 2022 09:44  Patient On (Oxygen Delivery Method): room air    22 @ 07:01  -  22 @ 07:00  --------------------------------------------------------  IN: 480 mL / OUT: 0 mL / NET: 480 mL      Constitutional - NAD, Comfortable  Chest - Good chest expansion. Breathing comfortably on RA  Cardiovascular - Warm and well perfused; no LE edema  Abdomen - Soft, NTND  Extremities - No calf tenderness   Neurologic Exam -                    Cognitive - Awake and alert; answering questions appropriately; following commands     Communication - Fluent, No dysarthria     Motor -                     LEFT    UE - ShAB 5/5, EF 5/5, EE 5/5, WE 5/5,  5/5                    RIGHT UE - ShAB 5/5, EF 5/5, EE 5/5, WE 5/5,  5/5                    LEFT    LE - HF 5/5, KE 5/5, DF 5/5, PF 5/5                    RIGHT LE - HF 5/5, KE 5/5, DF 5/5, PF 5/5  Psychiatric - Mood stable, Affect WNL      Recent Labs/Imagin.0   7.20  )-----------( 518      ( 19 Sep 2022 06:20 )             42.5     09-    138  |  101  |  14  ----------------------------<  101<H>  4.1   |  31  |  1.02    Ca    9.4      19 Sep 2022 06:20      Medications:  acetaminophen     Tablet .. 650 milliGRAM(s) Oral every 6 hours PRN  aspirin enteric coated 81 milliGRAM(s) Oral daily  atorvastatin 40 milliGRAM(s) Oral at bedtime  cyclobenzaprine 10 milliGRAM(s) Oral three times a day PRN  enoxaparin Injectable 40 milliGRAM(s) SubCutaneous every 24 hours  lactobacillus acidophilus 1 Tablet(s) Oral daily  LORazepam     Tablet 0.5 milliGRAM(s) Oral every 8 hours PRN  melatonin 9 milliGRAM(s) Oral at bedtime  nystatin Powder 1 Application(s) Topical two times a day  Omeprazole/Sodium Bicarbonate  mg 1 Capsule(s) 1 Capsule(s) Oral at bedtime  polyethylene glycol 3350 17 Gram(s) Oral two times a day  senna 2 Tablet(s) Oral at bedtime  topiramate 50 milliGRAM(s) Oral at bedtime  traMADol 25 milliGRAM(s) Oral every 6 hours PRN

## 2022-09-20 NOTE — CONSULT NOTE ADULT - SUBJECTIVE AND OBJECTIVE BOX
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  Patient findings on MRI of the head showed hyperintensity left more than the right pleural region that was consistent with PRES. repeat MRI of the brain shows resolution of the hyperintensity.  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  As per patient she never experienced any ptosis or abnormal sweating on the left side.  Patient continues to have headache that is not typical for her usual migraine along with blood pressure fluctuations.  Patient denies any medication or supplements that may have prompted the symptoms.      Vital Signs Last 24 Hrs  T(C): 36.7 (20 Sep 2022 08:35), Max: 36.7 (20 Sep 2022 08:35)  T(F): 98 (20 Sep 2022 08:35), Max: 98 (20 Sep 2022 08:35)  HR: 90 (20 Sep 2022 08:35) (73 - 96)  BP: 110/70 (20 Sep 2022 08:35) (110/70 - 144/85)  BP(mean): --  RR: 16 (20 Sep 2022 08:35) (15 - 16)  SpO2: 97% (20 Sep 2022 08:35) (95% - 97%)    Parameters below as of 20 Sep 2022 08:35  Patient On (Oxygen Delivery Method): room air    Alert and oriented x3, speech is fluent language is intact no word finding difficulties  PERRL, EOMI, V1 to V3 is intact, face is symmetric, tongue uvula palate midline  No pronator drift, lift both legs against gravity  Sensation intact to light touch  Coordination intact finger-nose-finger    MEDICATIONS  (STANDING):  aspirin enteric coated 81 milliGRAM(s) Oral daily  atorvastatin 40 milliGRAM(s) Oral at bedtime  enoxaparin Injectable 40 milliGRAM(s) SubCutaneous every 24 hours  lactobacillus acidophilus 1 Tablet(s) Oral daily  losartan 100 milliGRAM(s) Oral daily  melatonin 9 milliGRAM(s) Oral at bedtime  nystatin Powder 1 Application(s) Topical two times a day  Omeprazole/Sodium Bicarbonate 20/1100 mg 1 Capsule(s) 1 Capsule(s) Oral at bedtime  polyethylene glycol 3350 17 Gram(s) Oral two times a day  senna 2 Tablet(s) Oral at bedtime  topiramate 50 milliGRAM(s) Oral at bedtime    MEDICATIONS  (PRN):  acetaminophen     Tablet .. 650 milliGRAM(s) Oral every 6 hours PRN Temp greater or equal to 38C (100.4F), Mild Pain (1 - 3)  cyclobenzaprine 10 milliGRAM(s) Oral three times a day PRN Muscle Spasm  LORazepam     Tablet 0.5 milliGRAM(s) Oral every 8 hours PRN Nausea and/or Vomiting  traMADol 25 milliGRAM(s) Oral every 6 hours PRN Severe Pain (7 - 10)    61-year-old woman with recent presentation of PRES by history and MRI findings is consulted for her continued blood pressure fluctuations along with headache and migraine.  Her headaches may be related to blood pressure fluctuations versus migraine exacerbation.  Will recommend increasing Topamax to 50 mg twice daily.  Currently it is 50 mg at night.  Will recommend continuing aspirin and atorvastatin.  Will consider endocrine evaluation for possible pheochromocytoma as a cause of her intermittent blood pressure fluctuations.  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

## 2022-09-21 PROCEDURE — 99232 SBSQ HOSP IP/OBS MODERATE 35: CPT

## 2022-09-21 PROCEDURE — 99233 SBSQ HOSP IP/OBS HIGH 50: CPT

## 2022-09-21 RX ORDER — TRAMADOL HYDROCHLORIDE 50 MG/1
25 TABLET ORAL EVERY 12 HOURS
Refills: 0 | Status: DISCONTINUED | OUTPATIENT
Start: 2022-09-21 | End: 2022-09-24

## 2022-09-21 RX ADMIN — ENOXAPARIN SODIUM 40 MILLIGRAM(S): 100 INJECTION SUBCUTANEOUS at 06:24

## 2022-09-21 RX ADMIN — LOSARTAN POTASSIUM 25 MILLIGRAM(S): 100 TABLET, FILM COATED ORAL at 21:19

## 2022-09-21 RX ADMIN — CYCLOBENZAPRINE HYDROCHLORIDE 10 MILLIGRAM(S): 10 TABLET, FILM COATED ORAL at 16:12

## 2022-09-21 RX ADMIN — NYSTATIN CREAM 1 APPLICATION(S): 100000 CREAM TOPICAL at 17:20

## 2022-09-21 RX ADMIN — CYCLOBENZAPRINE HYDROCHLORIDE 10 MILLIGRAM(S): 10 TABLET, FILM COATED ORAL at 22:28

## 2022-09-21 RX ADMIN — TRAMADOL HYDROCHLORIDE 25 MILLIGRAM(S): 50 TABLET ORAL at 09:34

## 2022-09-21 RX ADMIN — Medication 1 TABLET(S): at 11:23

## 2022-09-21 RX ADMIN — ATORVASTATIN CALCIUM 40 MILLIGRAM(S): 80 TABLET, FILM COATED ORAL at 21:19

## 2022-09-21 RX ADMIN — Medication 50 MILLIGRAM(S): at 06:25

## 2022-09-21 RX ADMIN — TRAMADOL HYDROCHLORIDE 25 MILLIGRAM(S): 50 TABLET ORAL at 08:34

## 2022-09-21 RX ADMIN — Medication 9 MILLIGRAM(S): at 21:18

## 2022-09-21 RX ADMIN — Medication 81 MILLIGRAM(S): at 11:23

## 2022-09-21 RX ADMIN — Medication 50 MILLIGRAM(S): at 17:19

## 2022-09-21 RX ADMIN — NYSTATIN CREAM 1 APPLICATION(S): 100000 CREAM TOPICAL at 06:24

## 2022-09-21 NOTE — PROGRESS NOTE ADULT - SUBJECTIVE AND OBJECTIVE BOX
Subjective:  Patient was seen and examined at the bedside this AM. She admits that she was able to sleep last night but does have a slight headache rating 2-3/10 during our encounter. She was able to eat breakfast this morning. Per RN, the patient feels that her therapies are too early for her, therapies now starting at 10AM. Orthostatic bps negative for this /75 lying -> 123/80 sitting-> 112/79 standing.       ROS:  Denies nausea, vomiting, CP, palpitations, or abdominal pain. Last BM was on       Vital Signs Last 24 Hrs  T(C): 36.7 (20 Sep 2022 20:37), Max: 36.7 (20 Sep 2022 20:37)  T(F): 98 (20 Sep 2022 20:37), Max: 98 (20 Sep 2022 20:37)  HR: 80 (21 Sep 2022 06:36) (80 - 92)  BP: 112/75 (21 Sep 2022 06:36) (112/75 - 129/85)  BP(mean): --  RR: 16 (20 Sep 2022 20:37) (15 - 16)  SpO2: 98% (20 Sep 2022 20:37) (96% - 98%)      Constitutional - NAD, Comfortable  Chest - Good chest expansion. Breathing comfortably on RA  Cardiovascular - Warm and well perfused; no LE edema  Abdomen - Soft, NTND  Extremities - No calf tenderness   Neurologic Exam -                    Cognitive - Awake and alert; answering questions appropriately; following commands     Communication - Fluent, No dysarthria     Motor -                     LEFT    UE - ShAB 5/5, EF 5/5, EE 5/5, WE 5/5,  5/5                    RIGHT UE - ShAB 5/5, EF 5/5, EE 5/5, WE 5/5,  5/5                    LEFT    LE - HF 5/5, KE 5/5, DF 5/5, PF 5/5                    RIGHT LE - HF 5/5, KE 5/5, DF 5/5, PF 5/5  Psychiatric - Mood stable, Affect WNL      Recent Labs/Imagin.0   7.20  )-----------( 518      ( 19 Sep 2022 06:20 )             42.5     -    138  |  101  |  14  ----------------------------<  101<H>  4.1   |  31  |  1.02    Ca    9.4      19 Sep 2022 06:20      Medications:  acetaminophen     Tablet .. 650 milliGRAM(s) Oral every 6 hours PRN  aspirin enteric coated 81 milliGRAM(s) Oral daily  atorvastatin 40 milliGRAM(s) Oral at bedtime  cyclobenzaprine 10 milliGRAM(s) Oral three times a day PRN  enoxaparin Injectable 40 milliGRAM(s) SubCutaneous every 24 hours  lactobacillus acidophilus 1 Tablet(s) Oral daily  LORazepam     Tablet 0.5 milliGRAM(s) Oral every 8 hours PRN  melatonin 9 milliGRAM(s) Oral at bedtime  nystatin Powder 1 Application(s) Topical two times a day  Omeprazole/Sodium Bicarbonate  mg 1 Capsule(s) 1 Capsule(s) Oral at bedtime  polyethylene glycol 3350 17 Gram(s) Oral two times a day  senna 2 Tablet(s) Oral at bedtime  topiramate 50 milliGRAM(s) Oral at bedtime  traMADol 25 milliGRAM(s) Oral every 6 hours PRN   Subjective:  Patient was seen and examined at the bedside this AM. She admits that she was able to sleep last night but does have a slight headache rating 2-3/10 during our encounter. She was able to eat breakfast this morning. Per RN, the patient feels that her therapies are too early for her, therapies now starting at 10AM. Orthostatic bps negative for this /75 lying -> 123/80 sitting-> 112/79 standing.       ROS:  Headache, nausea, Orthostatic BP-  Denies nausea, vomiting, CP, palpitations, or abdominal pain.       Vital Signs Last 24 Hrs  T(C): 36.7 (20 Sep 2022 20:37), Max: 36.7 (20 Sep 2022 20:37)  T(F): 98 (20 Sep 2022 20:37), Max: 98 (20 Sep 2022 20:37)  HR: 80 (21 Sep 2022 06:36) (80 - 92)  BP: 112/75 (21 Sep 2022 06:36) (112/75 - 129/85)  BP(mean): --  RR: 16 (20 Sep 2022 20:37) (15 - 16)  SpO2: 98% (20 Sep 2022 20:37) (96% - 98%)      Constitutional - NAD, Comfortable  Chest - Good chest expansion. Breathing comfortably on RA  Cardiovascular - Warm and well perfused; no LE edema  Abdomen - Soft, NTND  Extremities - No calf tenderness   Neurologic Exam -                    Cognitive - Awake and alert; answering questions appropriately; following commands     Communication - Fluent, No dysarthria     Motor -                     LEFT    UE - ShAB 5/5, EF 5/5, EE 5/5, WE 5/5,  5/5                    RIGHT UE - ShAB 5/5, EF 5/5, EE 5/5, WE 5/5,  5/5                    LEFT    LE - HF 5/5, KE 5/5, DF 5/5, PF 5/5                    RIGHT LE - HF 5/5, KE 5/5, DF 5/5, PF 5/5  Psychiatric - Mood stable, Affect WNL      Recent Labs/Imagin.0   7.20  )-----------( 518      ( 19 Sep 2022 06:20 )             42.5     09-19    138  |  101  |  14  ----------------------------<  101<H>  4.1   |  31  |  1.02    Ca    9.4      19 Sep 2022 06:20      Medications:  acetaminophen     Tablet .. 650 milliGRAM(s) Oral every 6 hours PRN  aspirin enteric coated 81 milliGRAM(s) Oral daily  atorvastatin 40 milliGRAM(s) Oral at bedtime  cyclobenzaprine 10 milliGRAM(s) Oral three times a day PRN  enoxaparin Injectable 40 milliGRAM(s) SubCutaneous every 24 hours  lactobacillus acidophilus 1 Tablet(s) Oral daily  LORazepam     Tablet 0.5 milliGRAM(s) Oral every 8 hours PRN  melatonin 9 milliGRAM(s) Oral at bedtime  nystatin Powder 1 Application(s) Topical two times a day  Omeprazole/Sodium Bicarbonate  mg 1 Capsule(s) 1 Capsule(s) Oral at bedtime  polyethylene glycol 3350 17 Gram(s) Oral two times a day  senna 2 Tablet(s) Oral at bedtime  topiramate 50 milliGRAM(s) Oral at bedtime  traMADol 25 milliGRAM(s) Oral every 6 hours PRN

## 2022-09-21 NOTE — PROGRESS NOTE ADULT - SUBJECTIVE AND OBJECTIVE BOX
s: no acute events    Vital Signs Last 24 Hrs  T(C): 36.7 (20 Sep 2022 20:37), Max: 36.7 (20 Sep 2022 20:37)  T(F): 98 (20 Sep 2022 20:37), Max: 98 (20 Sep 2022 20:37)  HR: 80 (21 Sep 2022 06:36) (77 - 92)  BP: 112/75 (21 Sep 2022 06:36) (100/60 - 138/89)  BP(mean): --  RR: 16 (20 Sep 2022 20:37) (15 - 16)  SpO2: 98% (20 Sep 2022 20:37) (94% - 98%)    Parameters below as of 20 Sep 2022 20:37  Patient On (Oxygen Delivery Method): room air    Alert and oriented x3, speech is fluent language is intact no word finding difficulties  PERRL, EOMI, V1 to V3 is intact, face is symmetric, tongue uvula palate midline  No pronator drift, lift both legs against gravity  Sensation intact to light touch  Coordination intact finger-nose-finger    MEDICATIONS  (STANDING):  aspirin enteric coated 81 milliGRAM(s) Oral daily  atorvastatin 40 milliGRAM(s) Oral at bedtime  enoxaparin Injectable 40 milliGRAM(s) SubCutaneous every 24 hours  lactobacillus acidophilus 1 Tablet(s) Oral daily  losartan 25 milliGRAM(s) Oral at bedtime  melatonin 9 milliGRAM(s) Oral at bedtime  nystatin Powder 1 Application(s) Topical two times a day  Omeprazole/Sodium Bicarbonate 20/1100 mg 1 Capsule(s) 1 Capsule(s) Oral at bedtime  polyethylene glycol 3350 17 Gram(s) Oral two times a day  senna 2 Tablet(s) Oral at bedtime  topiramate 50 milliGRAM(s) Oral two times a day    MEDICATIONS  (PRN):  acetaminophen     Tablet .. 650 milliGRAM(s) Oral every 6 hours PRN Temp greater or equal to 38C (100.4F), Mild Pain (1 - 3)  cyclobenzaprine 10 milliGRAM(s) Oral three times a day PRN Muscle Spasm  LORazepam     Tablet 0.5 milliGRAM(s) Oral every 8 hours PRN Nausea and/or Vomiting  traMADol 25 milliGRAM(s) Oral every 12 hours PRN Moderate Pain (4 - 6)      61-year-old woman with recent presentation of PRES by history and MRI findings is consulted for her continued blood pressure fluctuations along with headache and migraine.  Her headaches may be related to blood pressure fluctuations versus migraine exacerbation.  Continue Topamax to 50 mg twice daily.  Will recommend continuing aspirin and atorvastatin.  Will consider endocrine evaluation for possible pheochromocytoma as a cause of her intermittent blood pressure fluctuations.  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 please call back with any further questions.

## 2022-09-21 NOTE — PROGRESS NOTE ADULT - SUBJECTIVE AND OBJECTIVE BOX
Patient is a 61y old  Female who presents with a chief complaint of PRES (20 Sep 2022 17:00)      Patient seen and examined at bedside.  No overnight events  No complaints this morning    ALLERGIES:  No Known Allergies    MEDICATIONS  (STANDING):  aspirin enteric coated 81 milliGRAM(s) Oral daily  atorvastatin 40 milliGRAM(s) Oral at bedtime  enoxaparin Injectable 40 milliGRAM(s) SubCutaneous every 24 hours  lactobacillus acidophilus 1 Tablet(s) Oral daily  losartan 25 milliGRAM(s) Oral at bedtime  melatonin 9 milliGRAM(s) Oral at bedtime  nystatin Powder 1 Application(s) Topical two times a day  Omeprazole/Sodium Bicarbonate 20/1100 mg 1 Capsule(s) 1 Capsule(s) Oral at bedtime  polyethylene glycol 3350 17 Gram(s) Oral two times a day  senna 2 Tablet(s) Oral at bedtime  topiramate 50 milliGRAM(s) Oral two times a day    MEDICATIONS  (PRN):  acetaminophen     Tablet .. 650 milliGRAM(s) Oral every 6 hours PRN Temp greater or equal to 38C (100.4F), Mild Pain (1 - 3)  cyclobenzaprine 10 milliGRAM(s) Oral three times a day PRN Muscle Spasm  LORazepam     Tablet 0.5 milliGRAM(s) Oral every 8 hours PRN Nausea and/or Vomiting  traMADol 25 milliGRAM(s) Oral every 12 hours PRN Moderate Pain (4 - 6)    Vital Signs Last 24 Hrs  T(F): 98 (20 Sep 2022 20:37), Max: 98 (20 Sep 2022 20:37)  HR: 80 (21 Sep 2022 06:36) (77 - 92)  BP: 112/75 (21 Sep 2022 06:36) (100/60 - 138/89)  RR: 16 (20 Sep 2022 20:37) (15 - 16)  SpO2: 98% (20 Sep 2022 20:37) (94% - 98%)  I&O's Summary    20 Sep 2022 07:01  -  21 Sep 2022 07:00  --------------------------------------------------------  IN: 480 mL / OUT: 0 mL / NET: 480 mL      PHYSICAL EXAM:  GENERAL: NAD  HENT:  Atraumatic, Normocephalic; No tonsillar erythema, exudates, or enlargement; Moist mucous membranes;   EYES: EOMI, PERRLA, conjunctiva and sclera clear, no lid-lag  NECK: Supple, No JVD, Normal thyroid  CHEST/LUNG: Clear to percussion bilaterally; No rales, rhonchi, wheezing, or rubs; normal respiratory effort, no intercostal retractions  HEART: Regular rate and rhythm; No murmurs, rubs, or gallops; No pitting edema  ABDOMEN: Soft, Nontender, Nondistended; Bowel sounds present; No HSM  MUSCULOSKELETAL/EXTREMITIES:  2+ Peripheral Pulses, No clubbing or digital cyanosis  PSYCH: Appropriate affect, Alert & Awake; Good judgement    LABS:                        14.0   7.20  )-----------( 518      ( 19 Sep 2022 06:20 )             42.5       09-19    138  |  101  |  14  ----------------------------<  101  4.1   |  31  |  1.02    Ca    9.4      19 Sep 2022 06:20       09-01 Chol 224 mg/dL LDL -- HDL 63 mg/dL Trig 112 mg/dL    COVID-19 PCR: NotDetec (09-19-22 @ 06:03)  COVID-19 PCR: NotDetec (09-13-22 @ 06:00)  COVID-19 PCR: NotDetec (09-06-22 @ 18:30)  COVID-19 PCR: NotDetec (09-05-22 @ 05:57)  COVID-19 PCR: NotDetec (09-03-22 @ 06:40)      Care Discussed with Rehab Attending and Other Providers

## 2022-09-21 NOTE — PROGRESS NOTE ADULT - NS ATTEND AMEND GEN_ALL_CORE FT
Pt. seen with NP.  Agree with documentation above as per NP with amendments made as appropriate. Patient medically stable. Making progress towards rehab goals.     PRES  Headache and nausea improved today with increase in Topamax-- minimal.   Tolerated therapy well  Orthostatics neg and BP with good control today -- no losartan this AM,  changed to 25mg qhs starting tonight.    Cont. to monitor over the next day-- if medically stable, plan for discharge 9/23 or 9/24.

## 2022-09-21 NOTE — CHART NOTE - NSCHARTNOTEFT_GEN_A_CORE
Nutrition Follow Up Note  Hospital Course   (Per Electronic Medical Record)    Source:  Patient [X]  Medical Record [X]      Diet: DASH/TLC    Patient w/ good appetite/intake at this time tolerating % of meal tray. Stated she would like ketchup at breakfast, noted: Na138 mmol/L and BP WDL, noted on cardex. No issues w/ chewing and swallowing. Denies N/V/C/D, last BM 9/21.     Enteral/Parenteral Nutrition: Not Applicable    Current Weight: 168.6 lb on 9/20    Pertinent Medications: MEDICATIONS  (STANDING):  aspirin enteric coated 81 milliGRAM(s) Oral daily  atorvastatin 40 milliGRAM(s) Oral at bedtime  enoxaparin Injectable 40 milliGRAM(s) SubCutaneous every 24 hours  lactobacillus acidophilus 1 Tablet(s) Oral daily  losartan 25 milliGRAM(s) Oral at bedtime  melatonin 9 milliGRAM(s) Oral at bedtime  nystatin Powder 1 Application(s) Topical two times a day  Omeprazole/Sodium Bicarbonate 20/1100 mg 1 Capsule(s) 1 Capsule(s) Oral at bedtime  polyethylene glycol 3350 17 Gram(s) Oral two times a day  senna 2 Tablet(s) Oral at bedtime  topiramate 50 milliGRAM(s) Oral two times a day    MEDICATIONS  (PRN):  acetaminophen     Tablet .. 650 milliGRAM(s) Oral every 6 hours PRN Temp greater or equal to 38C (100.4F), Mild Pain (1 - 3)  cyclobenzaprine 10 milliGRAM(s) Oral three times a day PRN Muscle Spasm  LORazepam     Tablet 0.5 milliGRAM(s) Oral every 8 hours PRN Nausea and/or Vomiting  traMADol 25 milliGRAM(s) Oral every 12 hours PRN Moderate Pain (4 - 6)      Pertinent Labs:  09-19 Na138 mmol/L Glu 101 mg/dL<H> K+ 4.1 mmol/L Cr  1.02 mg/dL BUN 14 mg/dL 09-01 Chol 224 mg/dL<H> LDL --    HDL 63 mg/dL Trig 112 mg/dL      Skin: No Pressure Injury    Edema: No edema noted     Last Bowel Movement: on 9/21    Estimated Needs:   [X] No Change Since Previous Assessment    Previous Nutrition Diagnosis:     Overweight/Obesity  related to likely excessive energy intake PTA  as evidenced by BMI >30    Nutrition Diagnosis is [X] Ongoing -     New Nutrition Diagnosis: [X] Not Applicable    Interventions:   1) Continue DASH/TLC diet.   2) Monitor PO intake, GI tolerance, skin integrity, labs, weight, and bowel movement regularity.   3) Honor food preferences as feasible.   5) Provide ongoing diet education as needed  6) RD remains available upon request and will follow-up per protocol     Monitoring & Evaluation:   [X] Weights   [X] PO Intake   [X] Skin Integrity   [X] Follow Up (Per Protocol)  [X] Tolerance to Diet Prescription   [X] Other: Labs & POCT    Registered Dietitian/Nutritionist Remains Available.  Erica Carey RD, MS, CDN    Phone# [phone]

## 2022-09-21 NOTE — PROGRESS NOTE ADULT - ASSESSMENT
EZRA MARIE is a 60yo Female with PMHx of HTN, migraines, and TIA (last seen at [facility] in 11/2021), who presented to [facility] on 08/31/2022 with HA, nausea, vomiting, and word-finding difficulties; was found to have findings on CT and MRI head consistent with PRES.  Admitted for multidisciplinary rehab program with Aphasia, gait and ADL impairment.  ------------------------------------------------------------------  #Comprehensive Multidisciplinary Rehab Program:  - Gait, ADL, Functional impairments  - PT/OT/ SLP 3 hours a day 5 days a week  -- PT: 60 min/ OT: 30 min/ SLP: 90 min starting 9/15    #PRES, unclear etiology  --PRES symptoms do not seem definitively associated with pt's BP --d/w Nephrology  - suggestive based on findings on MRI and CT head: hyperintense signal to left occipital lobe and right cerebellum. No enhancement observed with IV contrast.   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 Rec. consider  endocrine c/s to r/o pheochromocytoma --Pt. had w/u by Nephrology.     #Orthostatic Hypotension  #HTN  -fluctuating BP readings-- Nursing order for manual BPs only to improve accuracy-  --Nephro decreased Losartan from 100mg to 25mg daily (9/21)  - continue to monitor orthostatics  - TEDs, and Abdominal binder ordered  --Nephrology, Dr. Suarez, consulted for recs in regards to labile bp and orthostatic management.  - Continue to monitor orthostatics    #Prolonged QTc  - Avoid QTc prolonging medications--reglan, zofran, prochlorperazine  - Most recent QTc- 456- improving    #HLD  - c/w Atorvastatin 40mg qhs    #Pain:  - Tylenol PRN, Tramadol PRN  - Avoid sedating meds that may affect cognitive recovery    #Headaches  --Topamax 50mg qHS  --Ativan 0.5mg q.8h PRN for nausea  --Repeat CT Head stable 9/9  -- Warm compress     #Sleep:  - Maintain quiet hours and low stim environment  - cont. Melatonin 9mg 9/9  - Topamax 50mg qHS.     #GI/Bowel:  #abdominal discomfort  - Miralax changed from PRN to standing. Senna added 9/13.  - Encourage PO fluids  --cont Ativan PRN for nausea  - Improved with home regimen Zegrid 9/14    #/Bladder:  --continent  --voiding with low PVR--    #Diet :    - Diet: Regular with thins    #Skin/ Pressure Injury Prevention:  - assessment on admission --erythema under left breast--Nystatin ordered  - Turn Q2hrs in bed while awake, OOB to Chair, PT/OT/SLP     #DVT prophylaxis:  - Lovenox    #Precautions/ Restrictions  - Falls  - COVID PCR: 9/5    #Dispo: IDR 09/15/2022  - Nursing: continent of bowel and bladder  - Barriers: Orthostatics, labile blood pressure, visual perceptual deficits.  - SW: Lives in  with  and children. Has 5 SAI; split-level home with 6 steps to bedroom and 6 steps to kitchen  - OT: Independent with eating, grooming, and UBD. Supervision for LBD, bathing, toilet transfer, toileting, and tub/shower transfer.   - PT: Ambulated 150 ft with SC, with CG/supervision. Supervision for transfers. Completed 8 stairs with supervision.   - SLP: Regular with thins diet; Has mild anomia. Mild cognitive deficits with high level tasks.   - Team Goals: (1) Ambulate to bedroom Ivonne. (2) Express complex ideas. (3) Sequence higher-level tasks  - TDD: 9/21 to home    --------------------------------------------  Outpatient Follow up:  Omar Dominguez (DO)  Neurology; Vascular Neurology  [address] [facility]  [place] [address]  Phone: [phone]  Fax: [phone]  Follow Up Time: 2 weeks    Gabriele Olivier (DO)  Cardiology; Internal Medicine  [address] [place] [address], [facility] [address]  [place] [address]  Phone: [phone]  Fax: [phone]  Follow Up Time: 1 month    Froy Craig (DO)  [address]  [place] [address]  Phone: [phone]  Fax: [phone]  Follow Up Time: Routine  --------------------------------------------   EZRA MARIE is a 62yo Female with PMHx of HTN, migraines, and TIA (last seen at [facility] in 11/2021), who presented to [facility] on 08/31/2022 with HA, nausea, vomiting, and word-finding difficulties; was found to have findings on CT and MRI head consistent with PRES.  Admitted for multidisciplinary rehab program with Aphasia, gait and ADL impairment.  ------------------------------------------------------------------  #Comprehensive Multidisciplinary Rehab Program:  - Gait, ADL, Functional impairments  - PT/OT/ SLP 3 hours a day 5 days a week  -- PT: 60 min/ OT: 30 min/ SLP: 90 min starting 9/15    #PRES, unclear etiology  --PRES symptoms do not seem definitively associated with pt's BP --d/w Nephrology  - suggestive based on findings on MRI and CT head: hyperintense signal to left occipital lobe and right cerebellum. No enhancement observed with IV contrast.   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 pheochromocytoma w/u by Nephrology.   --symptoms improved today    #Orthostatic Hypotension  #HTN  -fluctuating BP readings-- Nursing order for manual BPs only to improve accuracy-  --Nephro decreased Losartan from 100mg to 25mg daily (9/21)  - continue to monitor orthostatics-- BP improved today  - TEDs, and Abdominal binder ordered  --Nephrology, Dr. Suarez, consulted for recs in regards to labile bp and orthostatic management.  - Continue to monitor orthostatics    #Prolonged QTc  - Avoid QTc prolonging medications--reglan, zofran, prochlorperazine  - Most recent QTc- 456- improving    #HLD  - c/w Atorvastatin 40mg qhs    #Pain:  - Tylenol PRN, Tramadol PRN  - Avoid sedating meds that may affect cognitive recovery    #Headaches  --Topamax 50mg qHS  --Ativan 0.5mg q.8h PRN for nausea  --Repeat CT Head stable 9/9  -- Warm compress     #Sleep:  - Maintain quiet hours and low stim environment  - cont. Melatonin 9mg 9/9  - Topamax 50mg qHS.     #GI/Bowel:  #abdominal discomfort  - Miralax changed from PRN to standing. Senna added 9/13.  - Encourage PO fluids  --cont Ativan PRN for nausea  - Improved with home regimen Zegrid 9/14    #/Bladder:  --continent  --voiding with low PVR--    #Diet :    - Diet: Regular with thins    #Skin/ Pressure Injury Prevention:  - assessment on admission --erythema under left breast--Nystatin ordered  - Turn Q2hrs in bed while awake, OOB to Chair, PT/OT/SLP     #DVT prophylaxis:  - Lovenox    #Precautions/ Restrictions  - Falls  - COVID PCR: 9/5    #Dispo: IDR 09/15/2022  - Nursing: continent of bowel and bladder  - Barriers: Orthostatics, labile blood pressure, visual perceptual deficits.  - SW: Lives in  with  and children. Has 5 SAI; split-level home with 6 steps to bedroom and 6 steps to kitchen  - OT: Independent with eating, grooming, and UBD. Supervision for LBD, bathing, toilet transfer, toileting, and tub/shower transfer.   - PT: Ambulated 150 ft with SC, with CG/supervision. Supervision for transfers. Completed 8 stairs with supervision.   - SLP: Regular with thins diet; Has mild anomia. Mild cognitive deficits with high level tasks.   - Team Goals: (1) Ambulate to bedroom Ivonne. (2) Express complex ideas. (3) Sequence higher-level tasks  - TDD: This week to home    --------------------------------------------  Outpatient Follow up:  Omar Dominguez (DO)  Neurology; Vascular Neurology  [address] [facility], [address]  [place] [address]  Phone: [phone]  Fax: [phone]  Follow Up Time: 2 weeks    Gabriele Olivier (DO)  Cardiology; Internal Medicine  [address] [facility], [address]  [place] [address]  Phone: [phone]  Fax: [phone]  Follow Up Time: 1 month    Froy Craig (DO)  [address]  [place] [address]  Phone: [phone]  Fax: [phone]  Follow Up Time: Routine  --------------------------------------------

## 2022-09-21 NOTE — PROGRESS NOTE ADULT - SUBJECTIVE AND OBJECTIVE BOX
Feels better today    Vital Signs Last 24 Hrs  T(C): 36.7 (09-21-22 @ 20:13), Max: 36.8 (09-21-22 @ 16:12)  T(F): 98.1 (09-21-22 @ 20:13), Max: 98.2 (09-21-22 @ 16:12)  HR: 95 (09-21-22 @ 20:13) (80 - 95)  BP: 140/86 (09-21-22 @ 20:13) (112/75 - 140/86)  RR: 15 (09-21-22 @ 20:13) (15 - 16)  SpO2: 96% (09-21-22 @ 20:13) (95% - 96%)    s1s2  b/l air entry  soft, ND  no edema  AO                                     acetaminophen     Tablet .. 650 milliGRAM(s) Oral every 6 hours PRN  aspirin enteric coated 81 milliGRAM(s) Oral daily  atorvastatin 40 milliGRAM(s) Oral at bedtime  cyclobenzaprine 10 milliGRAM(s) Oral three times a day PRN  enoxaparin Injectable 40 milliGRAM(s) SubCutaneous every 24 hours  lactobacillus acidophilus 1 Tablet(s) Oral daily  LORazepam     Tablet 0.5 milliGRAM(s) Oral every 8 hours PRN  losartan 25 milliGRAM(s) Oral at bedtime  melatonin 9 milliGRAM(s) Oral at bedtime  nystatin Powder 1 Application(s) Topical two times a day  Omeprazole/Sodium Bicarbonate 20/1100 mg 1 Capsule(s) 1 Capsule(s) Oral at bedtime  polyethylene glycol 3350 17 Gram(s) Oral two times a day  senna 2 Tablet(s) Oral at bedtime  topiramate 50 milliGRAM(s) Oral two times a day  traMADol 25 milliGRAM(s) Oral every 12 hours PRN    A/P:    Suspected PRES  Labile BP, better today  Pls continue to take all BPs sitting or standing  Losartan 25mg qhs to start tonight  Neuro eval appr  PFMs are negative  Will follow    [phone]

## 2022-09-21 NOTE — PROGRESS NOTE ADULT - ASSESSMENT
62 yo F w/ HTN, migraine, TIA (not on ASA and statin), presented w/ HA, word finding difficulties, and n/v, admitted as a code stroke, carotid dissection in left ICA on imaging, and CT/MRI head suggestive of PRES.  Admitted to [facility] on 9/6/2022.    # Acute metabolic encephalopathy complicated by aphasia, likely due to PRES  #History TIA  # Headache - improving.  - Repeat MRI head in 1 mo (9/30/2022)  - ASA and statin  - PT/OT/Speech therapy  - pain control & bowel regimen per PMR    # abdominal discomfort 2/2 constipation and GERD - improving.   - bowel regimen  - omeprazole    # HTN with episodes of orthostasis  SBP goal 100-160.   - patient with episode of orthostatic Hypotension on 9/20AM.   - Holding losartan (was recently increased to 100mg from 50mg)  - TEDS / abd binder  - encourage PO fluids  - Will contiue with losartan 50 qhs. Discussed with nephrology    # Abdominal hematoma from lovenox inj  - will monitor sites closely and h/h. Currently only subcutaneous.   - rotate lovenox inj sites.    # Depression  recently on benzos for anxiety following death of brother. Not on SSRI.  - Neuropsych f/u    # Prolonged QTc - improving  - avoid QTc prolonging meds, can also give oral ativan for breakthrough nausea.   - optimize electrolytes    # thrombocytosis likely reactive - stable  - trend platelets.  - already on aspirin    # Hx of migraines  - per d/c summary, avoid triptans.  - OP f/u with Neurology for migraine therapy.   - Continue topiramate.  - consider trial of gabapentin for severe headaches.    #VTE ppx:   -lovenox

## 2022-09-22 LAB
ANION GAP SERPL CALC-SCNC: 9 MMOL/L — SIGNIFICANT CHANGE UP (ref 5–17)
APPEARANCE UR: CLEAR — SIGNIFICANT CHANGE UP
BILIRUB UR-MCNC: NEGATIVE — SIGNIFICANT CHANGE UP
BUN SERPL-MCNC: 16 MG/DL — SIGNIFICANT CHANGE UP (ref 7–23)
CALCIUM SERPL-MCNC: 9 MG/DL — SIGNIFICANT CHANGE UP (ref 8.4–10.5)
CHLORIDE SERPL-SCNC: 103 MMOL/L — SIGNIFICANT CHANGE UP (ref 96–108)
CO2 SERPL-SCNC: 27 MMOL/L — SIGNIFICANT CHANGE UP (ref 22–31)
COLLECT DURATION TIME UR: 24 HR — SIGNIFICANT CHANGE UP
COLOR SPEC: YELLOW — SIGNIFICANT CHANGE UP
CREAT SERPL-MCNC: 0.88 MG/DL — SIGNIFICANT CHANGE UP (ref 0.5–1.3)
DIFF PNL FLD: NEGATIVE — SIGNIFICANT CHANGE UP
EGFR: 75 ML/MIN/1.73M2 — SIGNIFICANT CHANGE UP
GLUCOSE SERPL-MCNC: 109 MG/DL — HIGH (ref 70–99)
GLUCOSE UR QL: NEGATIVE — SIGNIFICANT CHANGE UP
HCT VFR BLD CALC: 38.4 % — SIGNIFICANT CHANGE UP (ref 34.5–45)
HGB BLD-MCNC: 13 G/DL — SIGNIFICANT CHANGE UP (ref 11.5–15.5)
KETONES UR-MCNC: NEGATIVE — SIGNIFICANT CHANGE UP
LEUKOCYTE ESTERASE UR-ACNC: NEGATIVE — SIGNIFICANT CHANGE UP
MCHC RBC-ENTMCNC: 31.6 PG — SIGNIFICANT CHANGE UP (ref 27–34)
MCHC RBC-ENTMCNC: 33.9 GM/DL — SIGNIFICANT CHANGE UP (ref 32–36)
MCV RBC AUTO: 93.4 FL — SIGNIFICANT CHANGE UP (ref 80–100)
NITRITE UR-MCNC: NEGATIVE — SIGNIFICANT CHANGE UP
NRBC # BLD: 0 /100 WBCS — SIGNIFICANT CHANGE UP (ref 0–0)
PH UR: 6.5 — SIGNIFICANT CHANGE UP (ref 5–8)
PLATELET # BLD AUTO: 457 K/UL — HIGH (ref 150–400)
POTASSIUM SERPL-MCNC: 3.4 MMOL/L — LOW (ref 3.5–5.3)
POTASSIUM SERPL-SCNC: 3.4 MMOL/L — LOW (ref 3.5–5.3)
PROT 24H UR-MRATE: 168 MG/24 H — HIGH (ref 50–100)
PROT UR-MCNC: NEGATIVE — SIGNIFICANT CHANGE UP
RBC # BLD: 4.11 M/UL — SIGNIFICANT CHANGE UP (ref 3.8–5.2)
RBC # FLD: 11.5 % — SIGNIFICANT CHANGE UP (ref 10.3–14.5)
SODIUM SERPL-SCNC: 139 MMOL/L — SIGNIFICANT CHANGE UP (ref 135–145)
SP GR SPEC: 1.01 — SIGNIFICANT CHANGE UP (ref 1.01–1.02)
TOTAL VOLUME - 24 HOUR: 2800 ML — SIGNIFICANT CHANGE UP
URINE CREATININE CALCULATION: 0.7 G/24 H — LOW (ref 0.8–1.8)
UROBILINOGEN FLD QL: NEGATIVE — SIGNIFICANT CHANGE UP
WBC # BLD: 7.16 K/UL — SIGNIFICANT CHANGE UP (ref 3.8–10.5)
WBC # FLD AUTO: 7.16 K/UL — SIGNIFICANT CHANGE UP (ref 3.8–10.5)

## 2022-09-22 PROCEDURE — 99232 SBSQ HOSP IP/OBS MODERATE 35: CPT

## 2022-09-22 RX ORDER — POTASSIUM CHLORIDE 20 MEQ
20 PACKET (EA) ORAL
Refills: 0 | Status: COMPLETED | OUTPATIENT
Start: 2022-09-22 | End: 2022-09-22

## 2022-09-22 RX ADMIN — NYSTATIN CREAM 1 APPLICATION(S): 100000 CREAM TOPICAL at 06:23

## 2022-09-22 RX ADMIN — Medication 50 MILLIGRAM(S): at 06:23

## 2022-09-22 RX ADMIN — Medication 20 MILLIEQUIVALENT(S): at 10:02

## 2022-09-22 RX ADMIN — Medication 81 MILLIGRAM(S): at 11:49

## 2022-09-22 RX ADMIN — Medication 650 MILLIGRAM(S): at 04:33

## 2022-09-22 RX ADMIN — Medication 9 MILLIGRAM(S): at 21:50

## 2022-09-22 RX ADMIN — NYSTATIN CREAM 1 APPLICATION(S): 100000 CREAM TOPICAL at 17:45

## 2022-09-22 RX ADMIN — Medication 1 TABLET(S): at 11:49

## 2022-09-22 RX ADMIN — CYCLOBENZAPRINE HYDROCHLORIDE 10 MILLIGRAM(S): 10 TABLET, FILM COATED ORAL at 11:56

## 2022-09-22 RX ADMIN — Medication 650 MILLIGRAM(S): at 10:01

## 2022-09-22 RX ADMIN — ATORVASTATIN CALCIUM 40 MILLIGRAM(S): 80 TABLET, FILM COATED ORAL at 21:50

## 2022-09-22 RX ADMIN — Medication 650 MILLIGRAM(S): at 11:01

## 2022-09-22 RX ADMIN — CYCLOBENZAPRINE HYDROCHLORIDE 10 MILLIGRAM(S): 10 TABLET, FILM COATED ORAL at 21:50

## 2022-09-22 RX ADMIN — TRAMADOL HYDROCHLORIDE 25 MILLIGRAM(S): 50 TABLET ORAL at 02:42

## 2022-09-22 RX ADMIN — Medication 650 MILLIGRAM(S): at 05:33

## 2022-09-22 RX ADMIN — ENOXAPARIN SODIUM 40 MILLIGRAM(S): 100 INJECTION SUBCUTANEOUS at 06:23

## 2022-09-22 RX ADMIN — Medication 50 MILLIGRAM(S): at 17:44

## 2022-09-22 RX ADMIN — Medication 20 MILLIEQUIVALENT(S): at 11:49

## 2022-09-22 RX ADMIN — TRAMADOL HYDROCHLORIDE 25 MILLIGRAM(S): 50 TABLET ORAL at 01:42

## 2022-09-22 RX ADMIN — LOSARTAN POTASSIUM 25 MILLIGRAM(S): 100 TABLET, FILM COATED ORAL at 21:50

## 2022-09-22 RX ADMIN — Medication 20 MILLIEQUIVALENT(S): at 14:41

## 2022-09-22 NOTE — PROGRESS NOTE ADULT - ASSESSMENT
EZRA MARIE is a 60yo Female with PMHx of HTN, migraines, and TIA (last seen at [facility] in 11/2021), who presented to [facility] on 08/31/2022 with HA, nausea, vomiting, and word-finding difficulties; was found to have findings on CT and MRI head consistent with PRES.  Admitted for multidisciplinary rehab program with Aphasia, gait and ADL impairment.  ------------------------------------------------------------------  #Comprehensive Multidisciplinary Rehab Program:  - Gait, ADL, Functional impairments  - PT/OT/ SLP 3 hours a day 5 days a week  -- PT: 60 min/ OT: 30 min/ SLP: 90 min starting 9/15    #PRES, unclear etiology  --PRES symptoms do not seem definitively associated with pt's BP --d/w Nephrology  - suggestive based on findings on MRI and CT head: hyperintense signal to left occipital lobe and right cerebellum. No enhancement observed with IV contrast.   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 pheochromocytoma w/u by Nephrology.   --headache overnight and this morning, not orthostatic today, BPs wnl today    #Orthostatic Hypotension  #HTN  -fluctuating BP readings-- Nursing order for manual BPs only to improve accuracy-  --Nephro decreased Losartan from 100mg to 25mg daily (9/21)  - continue to monitor orthostatics-- BP improved today  - TEDs, and Abdominal binder ordered  --Nephrology, Dr. Suarez, consulted for recs in regards to labile bp and orthostatic management.  - Continue to monitor orthostatics    #Prolonged QTc  - Avoid QTc prolonging medications--reglan, zofran, prochlorperazine  - Most recent QTc- 456- improving    #HLD  - c/w Atorvastatin 40mg qhs    #Pain:  - Tylenol PRN, Tramadol PRN  - Avoid sedating meds that may affect cognitive recovery    #Headaches  --Topamax 50mg qHS  --Ativan 0.5mg q.8h PRN for nausea  --Repeat CT Head stable 9/9  -- Warm compress     #Sleep:  - Maintain quiet hours and low stim environment  - cont. Melatonin 9mg 9/9  - Topamax 50mg qHS.     #GI/Bowel:  #abdominal discomfort  - Miralax changed from PRN to standing. Senna added 9/13.  - Encourage PO fluids  --cont Ativan PRN for nausea  - Improved with home regimen Zegrid 9/14    #/Bladder:  --continent  --voiding with low PVR--    #Diet :    - Diet: Regular with thins    #Skin/ Pressure Injury Prevention:  - assessment on admission --erythema under left breast--Nystatin ordered  - Turn Q2hrs in bed while awake, OOB to Chair, PT/OT/SLP     #DVT prophylaxis:  - Lovenox    #Precautions/ Restrictions  - Falls  - COVID PCR: 9/5    #Dispo: IDR 09/22/2022  - Nursing: continent of bowel and bladder  - Barriers: Orthostatics, labile blood pressure, visual perceptual deficits with vestibular tasks possibly exacerbating nausea  - SW: Lives in  with  and children. Has 5 SAI; split-level home with 6 steps to bedroom and 6 steps to kitchen  - OT: Independent with all ALDs  - PT: Ambulating with distant supervision. Independent with transfers  - SLP: Regular with thins diet; Has mild anomia. Mild cognitive deficits with high level tasks. Mild working memory deficits.   - Team Goals: (1) Ambulate to bedroom Ivonne. (2) Express complex ideas. (3) Sequence higher-level tasks  - TDD: 9/24 pending medical clearance    --------------------------------------------  Outpatient Follow up:  Omar Dominguez (DO)  Neurology; Vascular Neurology  [address] [facility] [address], [address]  [place] [address]  Phone: [phone]  Fax: [phone]  Follow Up Time: 2 weeks    Gabriele Olivier (DO)  Cardiology; Internal Medicine  [address] [facility], [address]  [place] [address]  Phone: [phone]  Fax: [phone]  Follow Up Time: 1 month    Froy Craig (DO)  Medicine  [address] [facility] [address]  [place] [address]  Phone: [phone]  Fax: [phone]  Follow Up Time: Routine  --------------------------------------------

## 2022-09-22 NOTE — PROGRESS NOTE ADULT - NS ATTEND AMEND GEN_ALL_CORE FT
I have personally seen and examined the patient with the NP. Medical records reviewed. I have made amendments to the documentation where necessary and adjusted the history, physical examination, and plan as documented by the NP.    PRES - BP controlled is continually adjusted with recently lowered Losartan dose - Nephrology input appreciated  HA - generally improved on Topamax , may need higher dose at bedtime - will monitor    Bowel regimen  Discharge plan discussed with team

## 2022-09-22 NOTE — PROGRESS NOTE ADULT - ATTENDING COMMENTS
Pt. seen with resident.  Agree with documentation above as per resident with amendments made as appropriate. Patient medically stable. Making progress towards rehab goals.     PRES  Headache pain and nausea improved from last week.  Pt. sleeping better  --cont. Topamax.    --stopped compazine for nausea as can cause QTc prolongation  --ATivan 0.5mg PRN nausea    #Orthostatic Hypotension  #HTN  -fluctuating BP readings-- Nursing order for manual BPs only to improve accuracy-  --cont. Losartan 50mg qHS, per hospitalist 9/9--Holding parameter for Sitting SBP <120  - continue to monitor orthostatics    #Prolonged QTc  - avoid QTc prolonging medications--reglan, zofran or prochlorperazine  - repeat ECG today
Pt. seen with resident.  Agree with documentation above as per resident with amendments made as appropriate. Patient medically stable. Making progress towards rehab goals.     PRES  Orthostatic hypotension-- decrease losartan to 25mg -- with holding parameters as above  Poor sleep-- schedule melatonin 6mg qhs  Improved sleep will help improve headache pain  Cont. Tramadol PRN for headache  Ativan PRN for nausea--- Nausea occurs when headache pain is bad.
Pt. seen with resident.  Agree with documentation above as per resident with amendments made as appropriate. Patient medically stable. Making progress towards rehab goals.     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 Rec. consider  endocrine c/s to r/o pheo --Pt. had w/u by Nephrology.     #Orthostatic Hypotension today after Losartan 100mg  #HTN  -fluctuating BP readings-- Nursing order for manual BPs only to improve accuracy-  --Nephro decreased Losartan from 100mg to 25mg daily    Monitor --
PRES - BP controlled is continually adjusted with recently lowered Losartan dose - Nephrology input appreciated  HA - generally improved on Topamax , may need higher dose at bedtime - will monitor    Bowel regimen  Discharge plan discussed with team.

## 2022-09-22 NOTE — PROGRESS NOTE ADULT - SUBJECTIVE AND OBJECTIVE BOX
Feels better today    Vital Signs Last 24 Hrs  T(C): 36.4 (09-22-22 @ 08:46), Max: 36.7 (09-21-22 @ 20:13)  T(F): 97.6 (09-22-22 @ 08:46), Max: 98.1 (09-21-22 @ 20:13)  HR: 87 (09-22-22 @ 08:46) (87 - 98)  BP: 126/83 (09-22-22 @ 08:46) (126/83 - 144/90)  RR: 16 (09-22-22 @ 08:46) (15 - 16)  SpO2: 99% (09-22-22 @ 08:46) (96% - 99%)    s1s2  b/l air entry  soft, ND  no edema  AO                                                        13.0   7.16  )-----------( 457      ( 22 Sep 2022 06:30 )             38.4     22 Sep 2022 06:30    139    |  103    |  16     ----------------------------<  109    3.4     |  27     |  0.88     Ca    9.0        22 Sep 2022 06:30    acetaminophen     Tablet .. 650 milliGRAM(s) Oral every 6 hours PRN  aspirin enteric coated 81 milliGRAM(s) Oral daily  atorvastatin 40 milliGRAM(s) Oral at bedtime  cyclobenzaprine 10 milliGRAM(s) Oral three times a day PRN  enoxaparin Injectable 40 milliGRAM(s) SubCutaneous every 24 hours  lactobacillus acidophilus 1 Tablet(s) Oral daily  LORazepam     Tablet 0.5 milliGRAM(s) Oral every 8 hours PRN  losartan 25 milliGRAM(s) Oral at bedtime  melatonin 9 milliGRAM(s) Oral at bedtime  nystatin Powder 1 Application(s) Topical two times a day  Omeprazole/Sodium Bicarbonate 20/1100 mg 1 Capsule(s) 1 Capsule(s) Oral at bedtime  polyethylene glycol 3350 17 Gram(s) Oral two times a day  senna 2 Tablet(s) Oral at bedtime  topiramate 50 milliGRAM(s) Oral two times a day  traMADol 25 milliGRAM(s) Oral every 12 hours PRN    A/P:    Suspected PRES  Labile BP, better today  Pls continue to take all BPs sitting or standing  Losartan 25mg qhs  Neuro eval appr  PFMs are negative  Suppl K  BMP, Mg in am    [phone]

## 2022-09-22 NOTE — PROGRESS NOTE ADULT - SUBJECTIVE AND OBJECTIVE BOX
Patient is a 61y old  Female who presents with a chief complaint of PRES.      SUBJECTIVE: Pt seen and examined at bedside this morning. Endorses headache overnight that woke her up from sleeping, unresolved by pain medication. Pain radiates to left side of face and involves L ear. Severe pain overnight, currently 5/10. Denies nausea, vomiting. +BM yesterday. Urinating without pain. Not orthostatic this morning.       ROS:  +headaches  No fever, chills  No chest pain  No abdominal pain    VITALS  61y  Vital Signs Last 24 Hrs  T(C): 36.4 (22 Sep 2022 08:46), Max: 36.8 (21 Sep 2022 16:12)  T(F): 97.6 (22 Sep 2022 08:46), Max: 98.2 (21 Sep 2022 16:12)  HR: 87 (22 Sep 2022 08:46) (87 - 98)  BP: 126/83 (22 Sep 2022 08:46) (126/83 - 144/90)  BP(mean): --  RR: 16 (22 Sep 2022 08:46) (15 - 16)  SpO2: 99% (22 Sep 2022 08:46) (95% - 99%)    Parameters below as of 22 Sep 2022 08:46  Patient On (Oxygen Delivery Method): room air      Daily     Daily Weight in k.5 (21 Sep 2022 22:21)    PHYSICAL EXAM  Constitutional - NAD, Comfortable  Chest - Good chest expansion. Breathing comfortably on RA  Cardiovascular - Warm and well perfused; no LE edema  Abdomen - Soft, NTND  Extremities - No calf tenderness   Neurologic Exam -                    Cognitive - Awake and alert; answering questions appropriately; following commands     Communication - Fluent, No dysarthria  Psychiatric - Mood stable, Affect WNL      MEDICATIONS  (STANDING):  aspirin enteric coated 81 milliGRAM(s) Oral daily  atorvastatin 40 milliGRAM(s) Oral at bedtime  enoxaparin Injectable 40 milliGRAM(s) SubCutaneous every 24 hours  lactobacillus acidophilus 1 Tablet(s) Oral daily  losartan 25 milliGRAM(s) Oral at bedtime  melatonin 9 milliGRAM(s) Oral at bedtime  nystatin Powder 1 Application(s) Topical two times a day  Omeprazole/Sodium Bicarbonate  mg 1 Capsule(s) 1 Capsule(s) Oral at bedtime  polyethylene glycol 3350 17 Gram(s) Oral two times a day  potassium chloride    Tablet ER 20 milliEquivalent(s) Oral every 2 hours  senna 2 Tablet(s) Oral at bedtime  topiramate 50 milliGRAM(s) Oral two times a day    MEDICATIONS  (PRN):  acetaminophen     Tablet .. 650 milliGRAM(s) Oral every 6 hours PRN Temp greater or equal to 38C (100.4F), Mild Pain (1 - 3)  cyclobenzaprine 10 milliGRAM(s) Oral three times a day PRN Muscle Spasm  LORazepam     Tablet 0.5 milliGRAM(s) Oral every 8 hours PRN Nausea and/or Vomiting  traMADol 25 milliGRAM(s) Oral every 12 hours PRN Moderate Pain (4 - 6)      RECENT LABS:                          13.0   7.16  )-----------( 457      ( 22 Sep 2022 06:30 )             38.4         139  |  103  |  16  ----------------------------<  109<H>  3.4<L>   |  27  |  0.88    Ca    9.0      22 Sep 2022 06:30                CAPILLARY BLOOD GLUCOSE

## 2022-09-23 ENCOUNTER — TRANSCRIPTION ENCOUNTER (OUTPATIENT)
Age: 62
End: 2022-09-23

## 2022-09-23 LAB
ANION GAP SERPL CALC-SCNC: 7 MMOL/L — SIGNIFICANT CHANGE UP (ref 5–17)
BUN SERPL-MCNC: 14 MG/DL — SIGNIFICANT CHANGE UP (ref 7–23)
CALCIUM SERPL-MCNC: 9.1 MG/DL — SIGNIFICANT CHANGE UP (ref 8.4–10.5)
CHLORIDE SERPL-SCNC: 105 MMOL/L — SIGNIFICANT CHANGE UP (ref 96–108)
CO2 SERPL-SCNC: 28 MMOL/L — SIGNIFICANT CHANGE UP (ref 22–31)
CREAT SERPL-MCNC: 0.82 MG/DL — SIGNIFICANT CHANGE UP (ref 0.5–1.3)
EGFR: 81 ML/MIN/1.73M2 — SIGNIFICANT CHANGE UP
GLUCOSE SERPL-MCNC: 101 MG/DL — HIGH (ref 70–99)
MAGNESIUM SERPL-MCNC: 2.2 MG/DL — SIGNIFICANT CHANGE UP (ref 1.6–2.6)
POTASSIUM SERPL-MCNC: 4.2 MMOL/L — SIGNIFICANT CHANGE UP (ref 3.5–5.3)
POTASSIUM SERPL-SCNC: 4.2 MMOL/L — SIGNIFICANT CHANGE UP (ref 3.5–5.3)
SODIUM SERPL-SCNC: 140 MMOL/L — SIGNIFICANT CHANGE UP (ref 135–145)

## 2022-09-23 PROCEDURE — 99232 SBSQ HOSP IP/OBS MODERATE 35: CPT

## 2022-09-23 RX ORDER — ATORVASTATIN CALCIUM 80 MG/1
1 TABLET, FILM COATED ORAL
Qty: 0 | Refills: 0 | DISCHARGE
Start: 2022-09-23

## 2022-09-23 RX ORDER — CYCLOBENZAPRINE HYDROCHLORIDE 10 MG/1
1 TABLET, FILM COATED ORAL
Qty: 30 | Refills: 0
Start: 2022-09-23 | End: 2022-10-02

## 2022-09-23 RX ORDER — TRAMADOL HYDROCHLORIDE 50 MG/1
0.5 TABLET ORAL
Qty: 10 | Refills: 0
Start: 2022-09-23 | End: 2022-10-02

## 2022-09-23 RX ORDER — LACTOBACILLUS ACIDOPHILUS 100MM CELL
1 CAPSULE ORAL
Qty: 0 | Refills: 0 | DISCHARGE
Start: 2022-09-23

## 2022-09-23 RX ORDER — TOPIRAMATE 25 MG
1 TABLET ORAL
Qty: 60 | Refills: 0
Start: 2022-09-23 | End: 2022-10-22

## 2022-09-23 RX ORDER — ACETAMINOPHEN 500 MG
2 TABLET ORAL
Qty: 0 | Refills: 0 | DISCHARGE
Start: 2022-09-23

## 2022-09-23 RX ORDER — LANOLIN ALCOHOL/MO/W.PET/CERES
3 CREAM (GRAM) TOPICAL
Qty: 0 | Refills: 0 | DISCHARGE
Start: 2022-09-23

## 2022-09-23 RX ORDER — LOSARTAN POTASSIUM 100 MG/1
1 TABLET, FILM COATED ORAL
Qty: 30 | Refills: 0
Start: 2022-09-23 | End: 2022-10-22

## 2022-09-23 RX ADMIN — Medication 81 MILLIGRAM(S): at 12:07

## 2022-09-23 RX ADMIN — NYSTATIN CREAM 1 APPLICATION(S): 100000 CREAM TOPICAL at 17:59

## 2022-09-23 RX ADMIN — Medication 9 MILLIGRAM(S): at 21:12

## 2022-09-23 RX ADMIN — Medication 50 MILLIGRAM(S): at 06:25

## 2022-09-23 RX ADMIN — ATORVASTATIN CALCIUM 40 MILLIGRAM(S): 80 TABLET, FILM COATED ORAL at 21:12

## 2022-09-23 RX ADMIN — Medication 650 MILLIGRAM(S): at 06:27

## 2022-09-23 RX ADMIN — Medication 1 TABLET(S): at 12:07

## 2022-09-23 RX ADMIN — TRAMADOL HYDROCHLORIDE 25 MILLIGRAM(S): 50 TABLET ORAL at 09:32

## 2022-09-23 RX ADMIN — CYCLOBENZAPRINE HYDROCHLORIDE 10 MILLIGRAM(S): 10 TABLET, FILM COATED ORAL at 21:12

## 2022-09-23 RX ADMIN — LOSARTAN POTASSIUM 25 MILLIGRAM(S): 100 TABLET, FILM COATED ORAL at 21:12

## 2022-09-23 RX ADMIN — Medication 650 MILLIGRAM(S): at 07:27

## 2022-09-23 RX ADMIN — TRAMADOL HYDROCHLORIDE 25 MILLIGRAM(S): 50 TABLET ORAL at 08:32

## 2022-09-23 RX ADMIN — ENOXAPARIN SODIUM 40 MILLIGRAM(S): 100 INJECTION SUBCUTANEOUS at 06:24

## 2022-09-23 RX ADMIN — NYSTATIN CREAM 1 APPLICATION(S): 100000 CREAM TOPICAL at 06:25

## 2022-09-23 RX ADMIN — Medication 50 MILLIGRAM(S): at 17:58

## 2022-09-23 NOTE — PROGRESS NOTE ADULT - NUTRITIONAL ASSESSMENT
Diet, Regular:   DASH/TLC {Sodium & Cholesterol Restricted} (DASH) (09-06-22 @ 13:42) [Active]

## 2022-09-23 NOTE — DISCHARGE NOTE NURSING/CASE MANAGEMENT/SOCIAL WORK - NSDCFUADDAPPT_GEN_ALL_CORE_FT
\[facility]\""  Address: [address] [place] [address] – [address] Floor [place] [address]  [phone]  Appointments:  Mon 9/26 – 2:20pm – Physical therapy evaluation  Mon 9/26 – 3:10 pm – Occupational therapy evaluation  Wed 9/28 – 10:10 am – Speech therapy evaluation    Please follow up with PCP in 2 weeks.

## 2022-09-23 NOTE — DISCHARGE NOTE NURSING/CASE MANAGEMENT/SOCIAL WORK - PATIENT PORTAL LINK FT
You can access the FollowMyHealth Patient Portal offered by [facility] by registering at the following website: [email or web address]. By joining [facility]’s FollowMyHealth portal, you will also be able to view your health information using other applications (apps) compatible with our system.

## 2022-09-23 NOTE — PROGRESS NOTE ADULT - ASSESSMENT
EZRA MARIE is a 62yo Female with PMHx of HTN, migraines, and TIA (last seen at [facility] in 11/2021), who presented to [facility] on 08/31/2022 with HA, nausea, vomiting, and word-finding difficulties; was found to have findings on CT and MRI head consistent with PRES.  Admitted for multidisciplinary rehab program with Aphasia, gait and ADL impairment.  ------------------------------------------------------------------  #Comprehensive Multidisciplinary Rehab Program:  - Gait, ADL, Functional impairments  - PT/OT/ SLP 3 hours a day 5 days a week  -- PT: 60 min/ OT: 60 min/ SLP: 60 min 9/22    #PRES, unclear etiology  --PRES symptoms do not seem definitively associated with pt's BP --d/w Nephrology  - suggestive based on findings on MRI and CT head: hyperintense signal to left occipital lobe and right cerebellum. No enhancement observed with IV contrast.   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 pheochromocytoma w/u by Nephrology.   --Consider increasing topamax @ HS to 75, and continue 50mg in AM    #Orthostatic Hypotension  #HTN  -fluctuating BP readings-- Nursing order for manual BPs only to improve accuracy-  --Nephro decreased Losartan from 100mg to 25mg daily (9/21)  - continue to monitor orthostatics  - TEDs, and Abdominal binder ordered  --Nephrology, Dr. Suarez, consulted for recs in regards to labile bp and orthostatic management.  - Continue to monitor orthostatics    #Prolonged QTc  - Avoid QTc prolonging medications--reglan, zofran, prochlorperazine  - Most recent QTc- 456- improving    #HLD  - c/w Atorvastatin 40mg qhs    #Pain:  - Tylenol PRN, Tramadol PRN  - Avoid sedating meds that may affect cognitive recovery    #Headaches  --Topamax 50mg qHS  --Ativan 0.5mg q.8h PRN for nausea  --Repeat CT Head stable 9/9  -- Warm compress     #Sleep:  - Maintain quiet hours and low stim environment  - cont. Melatonin 9mg 9/9  - Topamax 50mg qHS.     #GI/Bowel:  #abdominal discomfort  - Miralax changed from PRN to standing. Senna added 9/13.  - Encourage PO fluids  --cont Ativan PRN for nausea  - Improved with home regimen Zegrid 9/14    #/Bladder:  --continent  --voiding with low PVR--    #Diet :    - Diet: Regular with thins    #Skin/ Pressure Injury Prevention:  - assessment on admission --erythema under left breast--Nystatin ordered  - Turn Q2hrs in bed while awake, OOB to Chair, PT/OT/SLP     #DVT prophylaxis:  - Lovenox    #Precautions/ Restrictions  - Falls  - COVID PCR: 9/5    #Dispo: IDR 09/22/2022  - Nursing: continent of bowel and bladder  - Barriers: Orthostatics, labile blood pressure, visual perceptual deficits with vestibular tasks possibly exacerbating nausea  - SW: Lives in  with  and children. Has 5 SAI; split-level home with 6 steps to bedroom and 6 steps to kitchen  - OT: Independent with all ALDs  - PT: Ambulating with distant supervision. Independent with transfers  - SLP: Regular with thins diet; Has mild anomia. Mild cognitive deficits with high level tasks. Mild working memory deficits.   - Team Goals: (1) Ambulate to bedroom Ivonne. (2) Express complex ideas. (3) Sequence higher-level tasks  - TDD: 9/24 pending medical clearance    --------------------------------------------  Outpatient Follow up:  Omar Dominguez (DO)  Neurology; Vascular Neurology  [address] [facility], [address]  [place] [address]  Phone: [phone]  Fax: [phone]  Follow Up Time: 2 weeks    Gabriele Olivier (DO)  Cardiology; Internal Medicine  [address], [address]  [place] [address]  Phone: [phone]  Fax: [phone]  Follow Up Time: 1 month    Froy Craig (DO)  Medicine  [address] [facility], [address]  [place] [address]  Phone: [phone]  Fax: [phone]  Follow Up Time: Routine  --------------------------------------------

## 2022-09-23 NOTE — DISCHARGE NOTE NURSING/CASE MANAGEMENT/SOCIAL WORK - NSFLUVACAGEDISCH_IMM_ALL_CORE
Would like a referral for an opthamologist.  Prefers [facility]. ( She has to take a cab there)   Adult

## 2022-09-23 NOTE — DISCHARGE NOTE NURSING/CASE MANAGEMENT/SOCIAL WORK - NSDCPEFALRISK_GEN_ALL_CORE
For information on Fall & Injury Prevention, visit: [email or web address] OR  [email or web address] OR  https://www.cdc.gov/steadi/patient.html

## 2022-09-23 NOTE — PROGRESS NOTE ADULT - SUBJECTIVE AND OBJECTIVE BOX
Patient is a 61y old  Female who presents with a chief complaint of PRES.      SUBJECTIVE: Pt seen and examined at bedside this morning.*       ROS:    No fever, chills  No chest pain  No abdominal pain    Vital Signs Last 24 Hrs  T(C): 36.7 (22 Sep 2022 21:45), Max: 36.7 (22 Sep 2022 21:45)  T(F): 98 (22 Sep 2022 21:45), Max: 98 (22 Sep 2022 21:45)  HR: 89 (22 Sep 2022 23:00) (89 - 99)  BP: 103/80 (22 Sep 2022 23:00) (103/80 - 162/100)  BP(mean): --  RR: 16 (22 Sep 2022 21:45) (16 - 16)  SpO2: 98% (22 Sep 2022 21:45) (98% - 98%)      PHYSICAL EXAM  Constitutional - NAD, Comfortable  Chest - Good chest expansion. Breathing comfortably on RA  Cardiovascular - Warm and well perfused; no LE edema  Abdomen - Soft, NTND  Extremities - No calf tenderness   Neurologic Exam -                    Cognitive - Awake and alert; answering questions appropriately; following commands     Communication - Fluent, No dysarthria  Psychiatric - Mood stable, Affect WNL        RECENT LABS:                          13.0   7.16  )-----------( 457      ( 22 Sep 2022 06:30 )             38.4     09-22    139  |  103  |  16  ----------------------------<  109<H>  3.4<L>   |  27  |  0.88    Ca    9.0      22 Sep 2022 06:30        MEDICATIONS  (STANDING):  aspirin enteric coated 81 milliGRAM(s) Oral daily  atorvastatin 40 milliGRAM(s) Oral at bedtime  enoxaparin Injectable 40 milliGRAM(s) SubCutaneous every 24 hours  lactobacillus acidophilus 1 Tablet(s) Oral daily  losartan 25 milliGRAM(s) Oral at bedtime  melatonin 9 milliGRAM(s) Oral at bedtime  nystatin Powder 1 Application(s) Topical two times a day  Omeprazole/Sodium Bicarbonate 20/1100 mg 1 Capsule(s) 1 Capsule(s) Oral at bedtime  polyethylene glycol 3350 17 Gram(s) Oral two times a day  potassium chloride    Tablet ER 20 milliEquivalent(s) Oral every 2 hours  senna 2 Tablet(s) Oral at bedtime  topiramate 50 milliGRAM(s) Oral two times a day    MEDICATIONS  (PRN):  acetaminophen     Tablet .. 650 milliGRAM(s) Oral every 6 hours PRN Temp greater or equal to 38C (100.4F), Mild Pain (1 - 3)  cyclobenzaprine 10 milliGRAM(s) Oral three times a day PRN Muscle Spasm  LORazepam     Tablet 0.5 milliGRAM(s) Oral every 8 hours PRN Nausea and/or Vomiting  traMADol 25 milliGRAM(s) Oral every 12 hours PRN Moderate Pain (4 - 6)                   Patient is a 61y old  Female who presents with a chief complaint of PRES.      SUBJECTIVE: Pt seen and examined at bedside this morning. States that she was able to sleep throughout the night without a headache. She did have a slight headache earlier this AM, but has since resolved. She no longer has belly pain or nausea and is able to eat meals. She participated well in therapy this morning without any drop in blood pressure or symptoms.        ROS:  No fever, chills  No chest pain  No abdominal pain      Vital Signs Last 24 Hrs  T(C): 36.7 (22 Sep 2022 21:45), Max: 36.7 (22 Sep 2022 21:45)  T(F): 98 (22 Sep 2022 21:45), Max: 98 (22 Sep 2022 21:45)  HR: 89 (22 Sep 2022 23:00) (89 - 99)  BP: 103/80 (22 Sep 2022 23:00) (103/80 - 162/100)  BP(mean): --  RR: 16 (22 Sep 2022 21:45) (16 - 16)  SpO2: 98% (22 Sep 2022 21:45) (98% - 98%)      PHYSICAL EXAM  Constitutional - NAD, Comfortable  Chest - Good chest expansion. Breathing comfortably on RA  Cardiovascular - Warm and well perfused; no LE edema  Abdomen - Soft, NTND  Extremities - No calf tenderness   Neurologic Exam -                    Cognitive - Awake and alert; answering questions appropriately; following commands     Communication - Fluent, No dysarthria  Psychiatric - Mood stable, Affect WNL        RECENT LABS:                          13.0   7.16  )-----------( 457      ( 22 Sep 2022 06:30 )             38.4     09-22    139  |  103  |  16  ----------------------------<  109<H>  3.4<L>   |  27  |  0.88    Ca    9.0      22 Sep 2022 06:30        MEDICATIONS  (STANDING):  aspirin enteric coated 81 milliGRAM(s) Oral daily  atorvastatin 40 milliGRAM(s) Oral at bedtime  enoxaparin Injectable 40 milliGRAM(s) SubCutaneous every 24 hours  lactobacillus acidophilus 1 Tablet(s) Oral daily  losartan 25 milliGRAM(s) Oral at bedtime  melatonin 9 milliGRAM(s) Oral at bedtime  nystatin Powder 1 Application(s) Topical two times a day  Omeprazole/Sodium Bicarbonate 20/1100 mg 1 Capsule(s) 1 Capsule(s) Oral at bedtime  polyethylene glycol 3350 17 Gram(s) Oral two times a day  potassium chloride    Tablet ER 20 milliEquivalent(s) Oral every 2 hours  senna 2 Tablet(s) Oral at bedtime  topiramate 50 milliGRAM(s) Oral two times a day    MEDICATIONS  (PRN):  acetaminophen     Tablet .. 650 milliGRAM(s) Oral every 6 hours PRN Temp greater or equal to 38C (100.4F), Mild Pain (1 - 3)  cyclobenzaprine 10 milliGRAM(s) Oral three times a day PRN Muscle Spasm  LORazepam     Tablet 0.5 milliGRAM(s) Oral every 8 hours PRN Nausea and/or Vomiting  traMADol 25 milliGRAM(s) Oral every 12 hours PRN Moderate Pain (4 - 6)

## 2022-09-23 NOTE — PROGRESS NOTE ADULT - NS ATTEND AMEND GEN_ALL_CORE FT
I have personally seen and examined the patient with the NP. Medical records reviewed. I have made amendments to the documentation where necessary and adjusted the history, physical examination, and plan as documented by the NP.    PRES - BP controlled is continually adjusted with recently lowered Losartan dose - Nephrology input appreciated  HA -improved on current dose of Topamax , may need higher dose at bedtime - will monitor    Bowel regimen, DVT ppx  Discharge plan discussed with team.

## 2022-09-23 NOTE — PROGRESS NOTE ADULT - ASSESSMENT
62 yo F w/ HTN, migraine, TIA (not on ASA and statin), presented w/ HA, word finding difficulties, and n/v, admitted as a code stroke, carotid dissection in left ICA on imaging, and CT/MRI head suggestive of PRES.  Admitted to [facility] on 9/6/2022.    # Acute metabolic encephalopathy complicated by aphasia, likely due to PRES  #History TIA  # Headache - improving.  - Repeat MRI head in 1 mo (9/30/2022)  - ASA and statin  - PT/OT/Speech therapy  - pain control & bowel regimen per PMR    # abdominal discomfort 2/2 constipation and GERD - improving.   - bowel regimen  - omeprazole    # HTN with episodes of orthostasis  SBP goal 100-160.   - patient with episode of orthostatic Hypotension on 9/20AM.   - Holding losartan (was recently increased to 100mg from 50mg)  - TEDS / abd binder  - encourage PO fluids  - Will contiue with losartan 50 qhs. Discussed with nephrology    # Abdominal hematoma from lovenox inj  - will monitor sites closely and h/h. Currently only subcutaneous.   - rotate lovenox inj sites.    # Depression  recently on benzos for anxiety following death of brother. Not on SSRI.  - Neuropsych f/u    # Prolonged QTc - improving  - avoid QTc prolonging meds, can also give oral ativan for breakthrough nausea.   - optimize electrolytes    # thrombocytosis likely reactive - stable  - trend platelets.  - already on aspirin    # Hx of migraines  - per d/c summary, avoid triptans.  - OP f/u with Neurology for migraine therapy.   - Continue topiramate.  - consider trial of gabapentin for severe headaches.    #VTE ppx:   -lovenox   60 yo F w/ HTN, migraine, TIA (not on ASA and statin), presented w/ HA, word finding difficulties, and n/v, admitted as a code stroke, carotid dissection in left ICA on imaging, and CT/MRI head suggestive of PRES.  Admitted to [facility] on 9/6/2022.    # Acute metabolic encephalopathy complicated by aphasia, likely due to PRES  #History TIA  # Headache - improving.  - Repeat MRI head in 1 mo (9/30/2022)  - ASA and statin  - PT/OT/Speech therapy  - pain control & bowel regimen per PMR  - pheochromacytoma work up in progress by Nephro    # abdominal discomfort 2/2 constipation and GERD - improving.   - bowel regimen  - omeprazole    # HTN with episodes of orthostasis  SBP goal 100-160.   - patient with episode of orthostatic Hypotension on 9/20AM.   - TEDS / abd binder  - encourage PO fluids  - pt currently on losartan 25 mg qhs  -Neuro reviewed pt's CTA and perhaps there is a "flap" of left ICA at C2 causing autonomic dysfunction --> Neuro f/u     # Depression  recently on benzos for anxiety following death of brother. Not on SSRI.  - Neuropsych f/u    # Prolonged QTc - improving  - avoid QTc prolonging meds, can also give oral ativan for breakthrough nausea.   - optimize electrolytes    # thrombocytosis likely reactive - stable  - trend platelets.  - already on aspirin    # Hx of migraines  - per d/c summary, avoid triptans.  - OP f/u with Neurology for migraine therapy.   - Continue topiramate - Neurology helping to adjust dosing.    #VTE ppx:   -lovenox

## 2022-09-23 NOTE — PROGRESS NOTE ADULT - SUBJECTIVE AND OBJECTIVE BOX
Patient is a 61y old  Female who presents with a chief complaint of PRES (22 Sep 2022 17:32)      24 HOUR EVENTS:  No overnight events reported.     SUBJECTIVE:  Patient seen and examined at bedside.       ALLERGIES:  No Known Allergies    MEDICATIONS  (STANDING):  aspirin enteric coated 81 milliGRAM(s) Oral daily  atorvastatin 40 milliGRAM(s) Oral at bedtime  enoxaparin Injectable 40 milliGRAM(s) SubCutaneous every 24 hours  lactobacillus acidophilus 1 Tablet(s) Oral daily  losartan 25 milliGRAM(s) Oral at bedtime  melatonin 9 milliGRAM(s) Oral at bedtime  nystatin Powder 1 Application(s) Topical two times a day  Omeprazole/Sodium Bicarbonate  mg 1 Capsule(s) 1 Capsule(s) Oral at bedtime  polyethylene glycol 3350 17 Gram(s) Oral two times a day  senna 2 Tablet(s) Oral at bedtime  topiramate 50 milliGRAM(s) Oral two times a day    MEDICATIONS  (PRN):  acetaminophen     Tablet .. 650 milliGRAM(s) Oral every 6 hours PRN Temp greater or equal to 38C (100.4F), Mild Pain (1 - 3)  cyclobenzaprine 10 milliGRAM(s) Oral three times a day PRN Muscle Spasm  LORazepam     Tablet 0.5 milliGRAM(s) Oral every 8 hours PRN Nausea and/or Vomiting  traMADol 25 milliGRAM(s) Oral every 12 hours PRN Moderate Pain (4 - 6)    Vital Signs Last 24 Hrs  T(F): 98 (22 Sep 2022 21:45), Max: 98 (22 Sep 2022 21:45)  HR: 89 (22 Sep 2022 23:00) (87 - 99)  BP: 103/80 (22 Sep 2022 23:00) (103/80 - 162/100)  RR: 16 (22 Sep 2022 21:45) (16 - 16)  SpO2: 98% (22 Sep 2022 21:45) (98% - 99%)  I&O's Summary    PHYSICAL EXAM:  General: NAD, A/O x 3  ENT: Moist mucous membranes, no thrush  Neck: Supple, No JVD  Lungs: Clear to auscultation bilaterally, good air entry, non-labored breathing  Cardio: RRR, S1/S2, No murmur  Abdomen: Soft, Nontender, Nondistended; Bowel sounds present  Extremities: No calf tenderness, No pitting edema    LABS:                        13.0   7.16  )-----------( 457      ( 22 Sep 2022 06:30 )             38.4         139  |  103  |  16  ----------------------------<  109  3.4   |  27  |  0.88    Ca    9.0      22 Sep 2022 06:30    09- Chol 224 mg/dL LDL -- HDL 63 mg/dL Trig 112 mg/dL    Urinalysis Basic - ( 22 Sep 2022 17:45 )    Color: Yellow / Appearance: Clear / S.010 / pH: x  Gluc: x / Ketone: Negative  / Bili: Negative / Urobili: Negative   Blood: x / Protein: Negative / Nitrite: Negative   Leuk Esterase: Negative / RBC: x / WBC x   Sq Epi: x / Non Sq Epi: x / Bacteria: x        COVID-19 PCR: NotDetec (22 @ 06:03)  COVID-19 PCR: NotDetec (22 @ 06:00)  COVID-19 PCR: NotDetec (22 @ 18:30)  COVID-19 PCR: NotDetec (22 @ 05:57)  COVID-19 PCR: NotDetec (22 @ 06:40)    RADIOLOGY & ADDITIONAL TESTS:    Care Discussed with Consultants/Other Providers:

## 2022-09-23 NOTE — PROGRESS NOTE ADULT - NS ATTEND OPT1 GEN_ALL_CORE
I attest my time as attending is greater than 50% of the total combined time spent on qualifying patient care activities by the PA/NP and attending.
I independently performed the documented:
I attest my time as attending is greater than 50% of the total combined time spent on qualifying patient care activities by the PA/NP and attending.
I independently performed the documented:
I independently performed the documented:
I attest my time as attending is greater than 50% of the total combined time spent on qualifying patient care activities by the PA/NP and attending.

## 2022-09-23 NOTE — PROGRESS NOTE ADULT - SUBJECTIVE AND OBJECTIVE BOX
Feels better overall    Vital Signs Last 24 Hrs  T(C): 36.7 (22 @ 21:45), Max: 36.7 (22 @ 21:45)  T(F): 98 (22 @ 21:45), Max: 98 (22 @ 21:45)  HR: 89 (22 @ 23:00) (89 - 99)  BP: 103/80 (22 @ 23:00) (103/80 - 162/100)  RR: 16 (22 @ 21:45) (16 - 16)  SpO2: 98% (22 @ 21:45) (98% - 98%)    s1s2  b/l air entry  soft, ND  no edema  AO                                                                 13.0   7.16  )-----------( 457      ( 22 Sep 2022 06:30 )             38.4     23 Sep 2022 07:23    140    |  105    |  14     ----------------------------<  101    4.2     |  28     |  0.82     Ca    9.1        23 Sep 2022 07:23  Mg     2.2       23 Sep 2022 07:23    Urinalysis Basic - ( 22 Sep 2022 17:45 )    Color: Yellow / Appearance: Clear / S.010 / pH: x  Gluc: x / Ketone: Negative  / Bili: Negative / Urobili: Negative   Blood: x / Protein: Negative / Nitrite: Negative   Leuk Esterase: Negative / RBC: x / WBC x   Sq Epi: x / Non Sq Epi: x / Bacteria: x    acetaminophen     Tablet .. 650 milliGRAM(s) Oral every 6 hours PRN  aspirin enteric coated 81 milliGRAM(s) Oral daily  atorvastatin 40 milliGRAM(s) Oral at bedtime  cyclobenzaprine 10 milliGRAM(s) Oral three times a day PRN  enoxaparin Injectable 40 milliGRAM(s) SubCutaneous every 24 hours  lactobacillus acidophilus 1 Tablet(s) Oral daily  LORazepam     Tablet 0.5 milliGRAM(s) Oral every 8 hours PRN  losartan 25 milliGRAM(s) Oral at bedtime  melatonin 9 milliGRAM(s) Oral at bedtime  nystatin Powder 1 Application(s) Topical two times a day  Omeprazole/Sodium Bicarbonate  mg 1 Capsule(s) 1 Capsule(s) Oral at bedtime  polyethylene glycol 3350 17 Gram(s) Oral two times a day  senna 2 Tablet(s) Oral at bedtime  topiramate 50 milliGRAM(s) Oral two times a day  traMADol 25 milliGRAM(s) Oral every 12 hours PRN    A/P:    Suspected PRES  Labile BP, better overall  Would d/c on Losartan 25mg qhs  Low Na, liberal K diet  D/w pt  Neuro, renal f/u as op  Office number given to the pt    [phone]

## 2022-09-24 VITALS
SYSTOLIC BLOOD PRESSURE: 130 MMHG | TEMPERATURE: 98 F | RESPIRATION RATE: 15 BRPM | HEART RATE: 76 BPM | OXYGEN SATURATION: 97 % | DIASTOLIC BLOOD PRESSURE: 82 MMHG

## 2022-09-24 PROCEDURE — 99238 HOSP IP/OBS DSCHRG MGMT 30/<: CPT

## 2022-09-24 PROCEDURE — 99232 SBSQ HOSP IP/OBS MODERATE 35: CPT

## 2022-09-24 RX ORDER — ATORVASTATIN CALCIUM 80 MG/1
1 TABLET, FILM COATED ORAL
Qty: 30 | Refills: 0
Start: 2022-09-24 | End: 2022-10-23

## 2022-09-24 RX ORDER — TOPIRAMATE 25 MG
1 TABLET ORAL
Qty: 0 | Refills: 0 | DISCHARGE
Start: 2022-09-24

## 2022-09-24 RX ADMIN — NYSTATIN CREAM 1 APPLICATION(S): 100000 CREAM TOPICAL at 05:57

## 2022-09-24 RX ADMIN — Medication 650 MILLIGRAM(S): at 06:03

## 2022-09-24 RX ADMIN — CYCLOBENZAPRINE HYDROCHLORIDE 10 MILLIGRAM(S): 10 TABLET, FILM COATED ORAL at 08:27

## 2022-09-24 RX ADMIN — Medication 81 MILLIGRAM(S): at 11:45

## 2022-09-24 RX ADMIN — Medication 1 TABLET(S): at 11:45

## 2022-09-24 RX ADMIN — TRAMADOL HYDROCHLORIDE 25 MILLIGRAM(S): 50 TABLET ORAL at 04:25

## 2022-09-24 RX ADMIN — ENOXAPARIN SODIUM 40 MILLIGRAM(S): 100 INJECTION SUBCUTANEOUS at 05:56

## 2022-09-24 RX ADMIN — Medication 50 MILLIGRAM(S): at 05:56

## 2022-09-24 RX ADMIN — TRAMADOL HYDROCHLORIDE 25 MILLIGRAM(S): 50 TABLET ORAL at 03:26

## 2022-09-24 RX ADMIN — Medication 650 MILLIGRAM(S): at 06:56

## 2022-09-24 NOTE — PROGRESS NOTE ADULT - REASON FOR ADMISSION
PRES

## 2022-09-24 NOTE — PROGRESS NOTE ADULT - ASSESSMENT
60 yo F w/ HTN, migraine, TIA (not on ASA and statin), presented w/ HA, word finding difficulties, and n/v, admitted as a code stroke, carotid dissection in left ICA on imaging, and CT/MRI head suggestive of PRES.  Admitted to [facility] on 9/6/2022.    # Acute metabolic encephalopathy complicated by aphasia, likely due to PRES  #History TIA  # Headache - improving.  - Repeat MRI head in 1 mo (9/30/2022)  - ASA and statin  - PT/OT/Speech therapy  - pain control & bowel regimen per PMR  - pheochromacytoma work up in progress by Nephro    # abdominal discomfort 2/2 constipation and GERD - improving.   - bowel regimen  - omeprazole    # HTN with episodes of orthostasis  SBP goal 100-160.   - patient with episode of orthostatic Hypotension on 9/20AM.   - TEDS / abd binder  - encourage PO fluids  - pt currently on losartan 25 mg qhs  -Neuro reviewed pt's CTA and perhaps there is a "flap" of left ICA at C2 causing autonomic dysfunction --> Neuro f/u     # Depression  recently on benzos for anxiety following death of brother. Not on SSRI.  - Neuropsych f/u    # Prolonged QTc - improving  - avoid QTc prolonging meds, can also give oral ativan for breakthrough nausea.   - optimize electrolytes    # thrombocytosis likely reactive - stable  - trend platelets.  - already on aspirin    # Hx of migraines  - per d/c summary, avoid triptans.  - OP f/u with Neurology for migraine therapy.   - Continue topiramate - Neurology helping to adjust dosing.    #VTE ppx:   -lovenox   62 y/o F w/ HTN, migraine, TIA (not on ASA and statin), presented w/ HA, word finding difficulties, and n/v, admitted as a code stroke, carotid dissection in left ICA on imaging, and CT/MRI head suggestive of PRES. Admitted to [facility] on 9/6/2022.    #Acute metabolic encephalopathy complicated by aphasia, likely due to PRES  #History TIA  #Headache - improving  -Repeat MRI head in 1 mo (9/30/2022)  -ASA and statin  -Pheochromacytoma work up in progress by Nephro  -Continue comprehensive rehab program -PT/OT/SLP per rehab team  -Pain management, bowel regimen per rehab     #Abdominal discomfort 2/2 constipation and GERD - improving   -Bowel regimen per rehab  -PPI    #HTN with episodes of orthostasis, SBP goal 100-160  -Orthostatic Hypotension on 9/20AM.   -TEDS / abd binder  -Encourage PO fluids  -Losartan 25 mg qhs  -Neuro reviewed pt's CTA and perhaps there is a "flap" of left ICA at C2 causing autonomic dysfunction --> Neuro f/u     #Depression  -Recently on benzos for anxiety following death of brother. Not on SSRI.  -Neuropsych f/u    #Prolonged QTc - improving  -Avoid QTc prolonging meds, can also give oral ativan for breakthrough nausea.   -Monitor electrolytes     #Hx of migraines  -Per d/c summary, avoid triptans  -OP f/u with Neurology for migraine therapy.   -Continue topiramate - Neurology helping to adjust dosing    #DVT ppx - lovenox

## 2022-09-24 NOTE — PROGRESS NOTE ADULT - SUBJECTIVE AND OBJECTIVE BOX
Patient is a 61y old  Female who presents with a chief complaint of PRES (23 Sep 2022 13:59)      Patient seen and examined at bedside.    ALLERGIES:  No Known Allergies    MEDICATIONS  (STANDING):  aspirin enteric coated 81 milliGRAM(s) Oral daily  atorvastatin 40 milliGRAM(s) Oral at bedtime  enoxaparin Injectable 40 milliGRAM(s) SubCutaneous every 24 hours  lactobacillus acidophilus 1 Tablet(s) Oral daily  losartan 25 milliGRAM(s) Oral at bedtime  melatonin 9 milliGRAM(s) Oral at bedtime  nystatin Powder 1 Application(s) Topical two times a day  Omeprazole/Sodium Bicarbonate  mg 1 Capsule(s) 1 Capsule(s) Oral at bedtime  polyethylene glycol 3350 17 Gram(s) Oral two times a day  senna 2 Tablet(s) Oral at bedtime  topiramate 50 milliGRAM(s) Oral two times a day    MEDICATIONS  (PRN):  acetaminophen     Tablet .. 650 milliGRAM(s) Oral every 6 hours PRN Temp greater or equal to 38C (100.4F), Mild Pain (1 - 3)  cyclobenzaprine 10 milliGRAM(s) Oral three times a day PRN Muscle Spasm  traMADol 25 milliGRAM(s) Oral every 12 hours PRN Moderate Pain (4 - 6)    Vital Signs Last 24 Hrs  T(F): 98.4 (23 Sep 2022 20:44), Max: 98.5 (23 Sep 2022 08:35)  HR: 90 (23 Sep 2022 20:44) (70 - 90)  BP: 145/91 (23 Sep 2022 20:44) (124/69 - 157/94)  RR: 16 (23 Sep 2022 20:44) (15 - 16)  SpO2: 97% (23 Sep 2022 20:44) (97% - 100%)  I&O's Summary        PHYSICAL EXAM:  General: NAD, A/O x 3  ENT: MMM, no scleral icterus  Neck: Supple, No JVD  Lungs: Clear to auscultation bilaterally, no wheezes, rales, rhonchi  Cardio: RRR, S1/S2, No murmurs  Abdomen: Soft, Nontender, Nondistended; Bowel sounds present  Extremities: No calf tenderness, No pitting edema    LABS:                        13.0   7.16  )-----------( 457      ( 22 Sep 2022 06:30 )             38.4           140  |  105  |  14  ----------------------------<  101  4.2   |  28  |  0.82    Ca    9.1      23 Sep 2022 07:23  Mg     2.2                       09- Chol 224 mg/dL LDL -- HDL 63 mg/dL Trig 112 mg/dL                  Urinalysis Basic - ( 22 Sep 2022 17:45 )    Color: Yellow / Appearance: Clear / S.010 / pH: x  Gluc: x / Ketone: Negative  / Bili: Negative / Urobili: Negative   Blood: x / Protein: Negative / Nitrite: Negative   Leuk Esterase: Negative / RBC: x / WBC x   Sq Epi: x / Non Sq Epi: x / Bacteria: x        COVID-19 PCR: NotDetec (22 @ 06:03)  COVID-19 PCR: NotDetec (22 @ 06:00)  COVID-19 PCR: NotDetec (22 @ 18:30)  COVID-19 PCR: NotDetec (22 @ 05:57)  COVID-19 PCR: NotDetec (22 @ 06:40)      RADIOLOGY & ADDITIONAL TESTS:    Care Discussed with Consultants/Other Providers:    Patient is a 61y old  Female who presents with a chief complaint of PRES (23 Sep 2022 13:59)      Patient seen and examined at bedside. +headache this AM relived with flexeril. otherwise denies headache, fever, chills, cp, sob, n/v, abd pain.      ALLERGIES:  No Known Allergies    MEDICATIONS  (STANDING):  aspirin enteric coated 81 milliGRAM(s) Oral daily  atorvastatin 40 milliGRAM(s) Oral at bedtime  enoxaparin Injectable 40 milliGRAM(s) SubCutaneous every 24 hours  lactobacillus acidophilus 1 Tablet(s) Oral daily  losartan 25 milliGRAM(s) Oral at bedtime  melatonin 9 milliGRAM(s) Oral at bedtime  nystatin Powder 1 Application(s) Topical two times a day  Omeprazole/Sodium Bicarbonate  mg 1 Capsule(s) 1 Capsule(s) Oral at bedtime  polyethylene glycol 3350 17 Gram(s) Oral two times a day  senna 2 Tablet(s) Oral at bedtime  topiramate 50 milliGRAM(s) Oral two times a day    MEDICATIONS  (PRN):  acetaminophen     Tablet .. 650 milliGRAM(s) Oral every 6 hours PRN Temp greater or equal to 38C (100.4F), Mild Pain (1 - 3)  cyclobenzaprine 10 milliGRAM(s) Oral three times a day PRN Muscle Spasm  traMADol 25 milliGRAM(s) Oral every 12 hours PRN Moderate Pain (4 - 6)    Vital Signs Last 24 Hrs  T(F): 98.4 (23 Sep 2022 20:44), Max: 98.5 (23 Sep 2022 08:35)  HR: 90 (23 Sep 2022 20:44) (70 - 90)  BP: 145/91 (23 Sep 2022 20:44) (124/69 - 157/94)  RR: 16 (23 Sep 2022 20:44) (15 - 16)  SpO2: 97% (23 Sep 2022 20:44) (97% - 100%)  I&O's Summary        PHYSICAL EXAM:  General: NAD, A/O x 3  ENT: MMM, no scleral icterus  Neck: Supple, No JVD  Lungs: Clear to auscultation bilaterally, no wheezes, rales, rhonchi  Cardio: RRR, S1/S2, +systolic murmur  Abdomen: Soft, Nontender, Nondistended; Bowel sounds present  Extremities: No calf tenderness, No pitting edema    LABS:                        13.0   7.16  )-----------( 457      ( 22 Sep 2022 06:30 )             38.4           140  |  105  |  14  ----------------------------<  101  4.2   |  28  |  0.82    Ca    9.1      23 Sep 2022 07:23  Mg     2.2      Chol 224 mg/dL LDL -- HDL 63 mg/dL Trig 112 mg/dL                  Urinalysis Basic - ( 22 Sep 2022 17:45 )    Color: Yellow / Appearance: Clear / S.010 / pH: x  Gluc: x / Ketone: Negative  / Bili: Negative / Urobili: Negative   Blood: x / Protein: Negative / Nitrite: Negative   Leuk Esterase: Negative / RBC: x / WBC x   Sq Epi: x / Non Sq Epi: x / Bacteria: x        COVID-19 PCR: NotDetec (22 @ 06:03)  COVID-19 PCR: NotDetec (22 @ 06:00)  COVID-19 PCR: NotDetec (22 @ 18:30)  COVID-19 PCR: NotDetec (22 @ 05:57)  COVID-19 PCR: NotDetec (22 @ 06:40)      RADIOLOGY & ADDITIONAL TESTS: reviewed    Care Discussed with Consultants/Other Providers: yes, rehab

## 2022-09-24 NOTE — PROGRESS NOTE ADULT - PROVIDER SPECIALTY LIST ADULT
Hospitalist
Nephrology
Nephrology
Neurology
Physiatry
Rehab Medicine
Hospitalist
Nephrology
Nephrology
Neuropsychology
Rehab Medicine
Hospitalist
Nephrology
Rehab Medicine
Rehab Medicine
Hospitalist
Hospitalist
Rehab Medicine
Physiatry

## 2022-09-24 NOTE — PROGRESS NOTE ADULT - SUBJECTIVE AND OBJECTIVE BOX
Pt. seen and examined at bedside.  No overnight events.      REVIEW OF SYSTEMS  Constitutional - No fever,  No fatigue  Neurological - ++ headaches, ++ loss of strength  Musculoskeletal - No joint pain, No joint swelling, No muscle pain    VITALS  T(C): 36.6 (22 @ 08:15), Max: 36.9 (22 @ 20:44)  HR: 76 (22 @ 08:15) (70 - 90)  BP: 130/82 (22 @ 08:15) (130/82 - 157/94)  RR: 15 (22 @ 08:15) (15 - 16)  SpO2: 97% (22 @ 08:15) (97% - 100%)  Wt(kg): --       MEDICATIONS   acetaminophen     Tablet .. 650 milliGRAM(s) every 6 hours PRN  aspirin enteric coated 81 milliGRAM(s) daily  atorvastatin 40 milliGRAM(s) at bedtime  cyclobenzaprine 10 milliGRAM(s) three times a day PRN  enoxaparin Injectable 40 milliGRAM(s) every 24 hours  lactobacillus acidophilus 1 Tablet(s) daily  losartan 25 milliGRAM(s) at bedtime  melatonin 9 milliGRAM(s) at bedtime  nystatin Powder 1 Application(s) two times a day  Omeprazole/Sodium Bicarbonate  mg 1 Capsule(s) 1 Capsule(s) at bedtime  polyethylene glycol 3350 17 Gram(s) two times a day  senna 2 Tablet(s) at bedtime  topiramate 50 milliGRAM(s) two times a day  traMADol 25 milliGRAM(s) every 12 hours PRN      RECENT LABS/IMAGING        140  |  105  |  14  ----------------------------<  101<H>  4.2   |  28  |  0.82    Ca    9.1      23 Sep 2022 07:23  Mg     2.2             Urinalysis Basic - ( 22 Sep 2022 17:45 )    Color: Yellow / Appearance: Clear / S.010 / pH: x  Gluc: x / Ketone: Negative  / Bili: Negative / Urobili: Negative   Blood: x / Protein: Negative / Nitrite: Negative   Leuk Esterase: Negative / RBC: x / WBC x   Sq Epi: x / Non Sq Epi: x / Bacteria: x                ---------  PHYSICAL EXAM  Constitutional - NAD, Comfortable  Pulm - Breathing comfortably, No wheezing  Abd - Soft, NTND  Extremities - No edema, No calf tenderness  Neurologic Exam -                    Cognitive - Awake, Alert     Communication - Fluent     Motor - SLIGHT RIGHT ARM WEAKNESS     Sensory - Intact to LT  Psychiatric - Mood WNL, Affect WNL    ASSESSMENT/PLAN  61y Female with functional deficits 2' POSTERIOR REVERSIBLE ENCEPHELOPATHY SYNDROME (PRES).  Continue current medical management  Pain - Tylenol PRN  DVT PPX - enoxaparin Injectable 40 milliGRAM(s) every 24 hours  Active issues - NONE; LABS NOTED  D/C HOME TODAY

## 2022-09-24 NOTE — PROGRESS NOTE ADULT - TIME BILLING
MEDICAL COMPLEXITY
Medical Complexity  Discussion with consultants regarding patient management
Medical complexity.  discussion with Neurology and nephrology regarding plan of care
** Spoke with patient and pt's  _daughter, Erica-- who is a nurse___, to provide update on pt's status,  medical update, medications, progress in therapy, rehab plan of care, and discharge planning for 9/21 and expectations.  Goal for patient to be Mod I upon discharge.  All questions answered.

## 2022-09-25 PROCEDURE — 83935 ASSAY OF URINE OSMOLALITY: CPT

## 2022-09-25 PROCEDURE — 83735 ASSAY OF MAGNESIUM: CPT

## 2022-09-25 PROCEDURE — 92523 SPEECH SOUND LANG COMPREHEN: CPT

## 2022-09-25 PROCEDURE — 84550 ASSAY OF BLOOD/URIC ACID: CPT

## 2022-09-25 PROCEDURE — 97530 THERAPEUTIC ACTIVITIES: CPT

## 2022-09-25 PROCEDURE — 97167 OT EVAL HIGH COMPLEX 60 MIN: CPT

## 2022-09-25 PROCEDURE — 84443 ASSAY THYROID STIM HORMONE: CPT

## 2022-09-25 PROCEDURE — 84244 ASSAY OF RENIN: CPT

## 2022-09-25 PROCEDURE — 97116 GAIT TRAINING THERAPY: CPT

## 2022-09-25 PROCEDURE — 85025 COMPLETE CBC W/AUTO DIFF WBC: CPT

## 2022-09-25 PROCEDURE — 97129 THER IVNTJ 1ST 15 MIN: CPT

## 2022-09-25 PROCEDURE — 80048 BASIC METABOLIC PNL TOTAL CA: CPT

## 2022-09-25 PROCEDURE — 84300 ASSAY OF URINE SODIUM: CPT

## 2022-09-25 PROCEDURE — 97130 THER IVNTJ EA ADDL 15 MIN: CPT

## 2022-09-25 PROCEDURE — 83835 ASSAY OF METANEPHRINES: CPT

## 2022-09-25 PROCEDURE — 97112 NEUROMUSCULAR REEDUCATION: CPT

## 2022-09-25 PROCEDURE — 97110 THERAPEUTIC EXERCISES: CPT

## 2022-09-25 PROCEDURE — 97163 PT EVAL HIGH COMPLEX 45 MIN: CPT

## 2022-09-25 PROCEDURE — 85027 COMPLETE CBC AUTOMATED: CPT

## 2022-09-25 PROCEDURE — 82436 ASSAY OF URINE CHLORIDE: CPT

## 2022-09-25 PROCEDURE — 92507 TX SP LANG VOICE COMM INDIV: CPT

## 2022-09-25 PROCEDURE — 93005 ELECTROCARDIOGRAM TRACING: CPT

## 2022-09-25 PROCEDURE — 74018 RADEX ABDOMEN 1 VIEW: CPT

## 2022-09-25 PROCEDURE — U0005: CPT

## 2022-09-25 PROCEDURE — U0003: CPT

## 2022-09-25 PROCEDURE — 92610 EVALUATE SWALLOWING FUNCTION: CPT

## 2022-09-25 PROCEDURE — 97535 SELF CARE MNGMENT TRAINING: CPT

## 2022-09-25 PROCEDURE — 82088 ASSAY OF ALDOSTERONE: CPT

## 2022-09-25 PROCEDURE — 82533 TOTAL CORTISOL: CPT

## 2022-09-25 PROCEDURE — 84156 ASSAY OF PROTEIN URINE: CPT

## 2022-09-25 PROCEDURE — 70450 CT HEAD/BRAIN W/O DYE: CPT

## 2022-09-25 PROCEDURE — 84484 ASSAY OF TROPONIN QUANT: CPT

## 2022-09-25 PROCEDURE — 87635 SARS-COV-2 COVID-19 AMP PRB: CPT

## 2022-09-25 PROCEDURE — 80053 COMPREHEN METABOLIC PANEL: CPT

## 2022-09-25 PROCEDURE — 36415 COLL VENOUS BLD VENIPUNCTURE: CPT

## 2022-09-25 PROCEDURE — 97124 MASSAGE THERAPY: CPT

## 2022-09-26 ENCOUNTER — NON-APPOINTMENT (OUTPATIENT)
Age: 62
End: 2022-09-26

## 2022-09-26 RX ORDER — ONDANSETRON 8 MG/1
8 TABLET, ORALLY DISINTEGRATING ORAL
Qty: 1 | Refills: 3 | Status: COMPLETED | COMMUNITY
Start: 2021-11-12 | End: 2022-09-26

## 2022-09-26 RX ORDER — ASPIRIN ENTERIC COATED TABLETS 81 MG 81 MG/1
81 TABLET, DELAYED RELEASE ORAL
Refills: 0 | Status: ACTIVE | COMMUNITY
Start: 2022-09-26

## 2022-09-26 RX ORDER — SUMATRIPTAN 20 MG/1
20 SPRAY NASAL
Qty: 2 | Refills: 3 | Status: DISCONTINUED | COMMUNITY
Start: 2021-11-12 | End: 2022-09-26

## 2022-09-27 ENCOUNTER — APPOINTMENT (OUTPATIENT)
Dept: INTERNAL MEDICINE | Facility: CLINIC | Age: 62
End: 2022-09-27

## 2022-09-27 ENCOUNTER — APPOINTMENT (OUTPATIENT)
Dept: FAMILY MEDICINE | Facility: CLINIC | Age: 62
End: 2022-09-27

## 2022-09-27 VITALS
OXYGEN SATURATION: 98 % | HEIGHT: 62 IN | BODY MASS INDEX: 30.36 KG/M2 | TEMPERATURE: 97.9 F | SYSTOLIC BLOOD PRESSURE: 155 MMHG | DIASTOLIC BLOOD PRESSURE: 90 MMHG | HEART RATE: 95 BPM | WEIGHT: 165 LBS

## 2022-09-27 VITALS — SYSTOLIC BLOOD PRESSURE: 150 MMHG | DIASTOLIC BLOOD PRESSURE: 85 MMHG

## 2022-09-27 DIAGNOSIS — R94.31 ABNORMAL ELECTROCARDIOGRAM [ECG] [EKG]: ICD-10-CM

## 2022-09-27 DIAGNOSIS — Z09 ENCOUNTER FOR FOLLOW-UP EXAMINATION AFTER COMPLETED TREATMENT FOR CONDITIONS OTHER THAN MALIGNANT NEOPLASM: ICD-10-CM

## 2022-09-27 PROCEDURE — 99496 TRANSJ CARE MGMT HIGH F2F 7D: CPT | Mod: 25

## 2022-09-27 PROCEDURE — 36415 COLL VENOUS BLD VENIPUNCTURE: CPT

## 2022-09-27 RX ORDER — ATORVASTATIN CALCIUM 40 MG/1
40 TABLET, FILM COATED ORAL
Qty: 30 | Refills: 0 | Status: COMPLETED | COMMUNITY
Start: 2022-09-26 | End: 2022-09-27

## 2022-09-27 RX ORDER — LOSARTAN POTASSIUM 25 MG/1
25 TABLET, FILM COATED ORAL
Qty: 30 | Refills: 0 | Status: COMPLETED | COMMUNITY
Start: 2022-09-26 | End: 2022-09-27

## 2022-09-27 NOTE — ASSESSMENT
[FreeTextEntry1] : #PRESsyndrome\par - CT and MRI reviewed from hospital\par - Hospital Admission & DIscharge Notes/Labs/Imaging reviewed and interpreted \par - recommend f/u neurology\par - increased losartan to 50mg from 25mg qd\par - f/u cardiology, neurology and PM&R\par - continue statin and asa\par - f/u vascular due to incidental finding of Cartoid Imaging\par \par #HTN\par losartan increased from 25mg to 50mg qd\par f/u BMP\par DASH DIET \par Exercise as tolerated\par Lower your salt intake.\par Reduce your alcohol consumption.\par Learn relaxation methods.\par Eating is a very important part of controlling blood pressure. Recommend Total salt intake to 2.4g/day.\par Do not add salt while preparing or eating your meals.\par Try to avoid red meats, sweets and sugar containing beverages.\par Ensure you are eating 5 fruits and vegetables a day as well as whole grains.\par \par #Migrane Headaches\par - continue Topimax\par #Obesity\par Diet and Exercise\par Setting realistic weight loss goals, such as a one- to two-pound weight loss per week.\par Eating fewer calories by cutting down on portion sizes. \par Aiming for at least five small handfuls of fruits and vegetables per day.\par Choosing foods high in fiber, such as whole grain breads, cereals, pasta, rice, fruits and vegetables. These foods will give you more chewing satisfaction, while the higher fiber content may make you feel eisenberg on fewer calories.\par Keep Food Journal\par 15 min spent on obesity discussion\par \par #anxiety\par lorazepam - prn\par Discussion held about controlling worries, learn ways to relax, breathing exercise, exercise, sleep hygiene, cut down caffeine.\par \par f/u in 2 weeks for BP check\par \par pt agrees and understands plan via teach back method. all questions answered.\par \par \par \par

## 2022-09-27 NOTE — HISTORY OF PRESENT ILLNESS
[Post-hospitalization from ___ Hospital] : Post-hospitalization from [unfilled] Hospital [Admitted on: ___] : The patient was admitted on [unfilled] [Discharged on ___] : discharged on [unfilled] [FreeTextEntry2] : 60yo Female with PMHx of HTN, migraines who went  to [facility] on\par 08/31/2022 with HA, nausea, vomiting, and word-finding difficulties; was found\par to have findings on CT and MRI head consistent with PRES. No tPA was given, as\par she presented outside of therapeutic window. Patient was followed by neurology and\par cardiology during her admission. As per paper work, TTE and EEG were unremarkable; started on ASA.\par Course c/b hyponatremia, likely SIADH now resolved.  Pt was later transferred and evaluated by PM&R and therapy for functional deficits and gait/ ADLimpairments and recommended acute rehabilitation. Patient was medically\par optimized for discharge to [facility] on 09/06/2022 and discharged on 9/24/2022.\par She was started on Topamax for HAs, Ativan PRN for nausea 2/2\par prolonged QTc, and adjustments were made to her blood pressure medications, as\par managed by nephrology. Neurology was consulted to evaluate atypical HAs with\par labile BP. As per neurology, Topamax was increased to 50mg BID and workup was\par started to rule out other possible pathologies that may cause symptoms (i.e\par pheochromocytoma, hyperthyroidism, etc.). 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 Patient tolerated course of\par inpatient PT/OT/SLP rehab with significant functional improvements and met\par rehab goals prior to discharge. Patient was medically cleared on 9/24/22 for\par discharged to home.\par \par Pt today reports her symptoms have improved from initial admission, however, is still having elevated BP readings at home and intermittent headaches S 150's and D 100's. Pt denies any fever, fatigue, sob, chest pain, palpitations, nausea, vomiting, acute vision loss, epistaxsis, dizziness, abdominal pain, n/v,  and swelling of her lower extremities.\par Pt is following low salt diet and reports she also has this brain fog" but denies any acute changes in speech, movement, weakness or loss in strength in all her extremities. Pt is not having a headache today, but reports she feels when her blood pressure gets high her headaches get worse. Pt is currently taking losartan 25mg at bedtime since d/c from hospital. \par Pt has pending f/u with neurology, cardiology and vascular \par \par

## 2022-09-27 NOTE — PHYSICAL EXAM
[No Acute Distress] : no acute distress [Well Nourished] : well nourished [Well Developed] : well developed [Well-Appearing] : well-appearing [Normal Sclera/Conjunctiva] : normal sclera/conjunctiva [PERRL] : pupils equal round and reactive to light [EOMI] : extraocular movements intact [Normal Oropharynx] : the oropharynx was normal [No Lymphadenopathy] : no lymphadenopathy [Supple] : supple [Thyroid Normal, No Nodules] : the thyroid was normal and there were no nodules present [No Respiratory Distress] : no respiratory distress  [No Accessory Muscle Use] : no accessory muscle use [Clear to Auscultation] : lungs were clear to auscultation bilaterally [Normal Rate] : normal rate  [Regular Rhythm] : with a regular rhythm [Normal S1, S2] : normal S1 and S2 [Soft] : abdomen soft [Non Tender] : non-tender [Non-distended] : non-distended [No HSM] : no HSM [Normal Bowel Sounds] : normal bowel sounds [No CVA Tenderness] : no CVA  tenderness [No Spinal Tenderness] : no spinal tenderness [Grossly Normal Strength/Tone] : grossly normal strength/tone [Coordination Grossly Intact] : coordination grossly intact [No Focal Deficits] : no focal deficits [Normal Gait] : normal gait [Deep Tendon Reflexes (DTR)] : deep tendon reflexes were 2+ and symmetric [Normal Affect] : the affect was normal [Alert and Oriented x3] : oriented to person, place, and time [Normal Insight/Judgement] : insight and judgment were intact [Declined Breast Exam] : declined breast exam  [Speech Grossly Normal] : speech grossly normal [de-identified] : + ecchymoces diffusely spread

## 2022-09-27 NOTE — REVIEW OF SYSTEMS
[Headache] : headache [Anxiety] : anxiety [Negative] : Integumentary [Discharge] : no discharge [Pain] : no pain [Redness] : no redness [Dryness] : no dryness  [Vision Problems] : no vision problems [Itching] : no itching [Dizziness] : no dizziness [Fainting] : no fainting [Confusion] : no confusion [Memory Loss] : no memory loss [Unsteady Walking] : no ataxia [de-identified] : intermittent headaches

## 2022-09-28 LAB
ANION GAP SERPL CALC-SCNC: 13 MMOL/L
BUN SERPL-MCNC: 13 MG/DL
CALCIUM SERPL-MCNC: 9.3 MG/DL
CHLORIDE SERPL-SCNC: 103 MMOL/L
CO2 SERPL-SCNC: 24 MMOL/L
CREAT SERPL-MCNC: 0.95 MG/DL
EGFR: 68 ML/MIN/1.73M2
GLUCOSE SERPL-MCNC: 98 MG/DL
POTASSIUM SERPL-SCNC: 4.4 MMOL/L
SODIUM SERPL-SCNC: 139 MMOL/L

## 2022-10-11 ENCOUNTER — APPOINTMENT (OUTPATIENT)
Dept: INTERNAL MEDICINE | Facility: CLINIC | Age: 62
End: 2022-10-11

## 2022-10-13 ENCOUNTER — APPOINTMENT (OUTPATIENT)
Dept: INTERNAL MEDICINE | Facility: CLINIC | Age: 62
End: 2022-10-13

## 2022-10-13 VITALS
WEIGHT: 164 LBS | TEMPERATURE: 98.4 F | OXYGEN SATURATION: 99 % | HEIGHT: 62 IN | DIASTOLIC BLOOD PRESSURE: 100 MMHG | BODY MASS INDEX: 30.18 KG/M2 | HEART RATE: 91 BPM | SYSTOLIC BLOOD PRESSURE: 140 MMHG

## 2022-10-13 PROCEDURE — 36415 COLL VENOUS BLD VENIPUNCTURE: CPT

## 2022-10-13 PROCEDURE — 99214 OFFICE O/P EST MOD 30 MIN: CPT | Mod: 25

## 2022-10-13 RX ORDER — LORAZEPAM 0.5 MG/1
0.5 TABLET ORAL
Refills: 0 | Status: COMPLETED | COMMUNITY
Start: 2022-09-26 | End: 2022-10-13

## 2022-10-13 RX ORDER — TOPIRAMATE 50 MG/1
50 TABLET, FILM COATED ORAL
Refills: 0 | Status: COMPLETED | COMMUNITY
Start: 2022-09-26 | End: 2022-10-13

## 2022-10-13 RX ORDER — TRAMADOL HYDROCHLORIDE 50 MG/1
50 TABLET, COATED ORAL
Refills: 0 | Status: COMPLETED | COMMUNITY
Start: 2022-09-26 | End: 2022-10-13

## 2022-10-13 RX ORDER — POLYETHYLENE GLYCOL 3350 17 G/17G
17 POWDER, FOR SOLUTION ORAL
Refills: 0 | Status: COMPLETED | COMMUNITY
Start: 2022-09-26 | End: 2022-10-13

## 2022-10-13 RX ORDER — CYCLOBENZAPRINE HYDROCHLORIDE 10 MG/1
10 TABLET, FILM COATED ORAL 3 TIMES DAILY
Qty: 45 | Refills: 0 | Status: COMPLETED | COMMUNITY
Start: 2022-09-26 | End: 2022-10-13

## 2022-10-13 NOTE — HISTORY OF PRESENT ILLNESS
[de-identified] : 61 year old female here for f/u for her hx of PRES syndrome and HTN.\par Pt  following Dr. Leonard Dominguez - [address] - neurology - increased topirimate to 200mg qd and denies any headache\par EEG was done - no result yet.\par Pt had 24 hour EEG done and pending results\par Pt is due for transcranial and carotid sonogram. \par Yesterday, saw cardiologist Dr. Gabriele Briscoe - pt is currently on holter and f/u nuclear stress test, venous/arterial dopplers and carotids - scheduled in few weeks. Pt reports cardiologist recommended to stop all BP medication.\par Pt previously was on losartan on 25mg and increased to 50mg due to elevated BP reading and is tolerating well but did not take last night due to cardiology consult.\par Pt is anxious today due to advice from cardiology.\par Pt has no acute complaints and reports her neurologist is on vacation.\par Tolerating Atorvastatin 40mg and Aspirin 81mg well.\par Pt taking topiramate 50mg for migrane no acute attacks\par \par Pt denies any fever, chills, headache, vision changes, epistaxis, hearing loss,  sob, chest pain, palpitations, nausea, vomiting, abdominal pain, diarrhea, constipation, leg swelling, numbness, tingling, weakness.\par

## 2022-10-13 NOTE — ASSESSMENT
[FreeTextEntry1] : #PRESsyndrome\par #HTN\par - watch salt intake\par - recommend due 25mg in am and 25mg in pm of losartan \par - check bp twice a day do not take medication if SBP < 110 and DBP < 80\par - recommend f/u neurology and cardiology for further testing as recommended\par - pt has appt next tuesday with HTN/nephrology \par - monitor neuro sx: headache, dizziness, change in speech, weakness which pt denies today\par - monitor sx of chest pain, palpitations and leg swelling\par - if any occur go to ED\par - continue asa and statin\par \par #Hx of migranes\par increased topiramate 200mg \par - denies any headache or aura\par - tolerating well\par - following neurology\par \par #Anxiety\par - xanax prn at bedtime\par - do not drive or operate any machinery - pt is not driving at moment\par - Discussion held about controlling worries, learn ways to relax, breathing exercise, exercise, sleep hygiene, cut down caffeine.\par \par #obesity\par Diet and Exercise\par Setting realistic weight loss goals, such as a one- to two-pound weight loss per week.\par Eating fewer calories by cutting down on portion sizes. \par Aiming for at least five small handfuls of fruits and vegetables per day.\par Choosing foods high in fiber, such as whole grain breads, cereals, pasta, rice, fruits and vegetables. These foods will give you more chewing satisfaction, while the higher fiber content may make you feel eisenberg on fewer calories.\par Keep Food Journal\par \par #Elevated PC\par - trending down from hospital\par - f/u repeat CBC and EPO\par - if eleavted recommend heme f/u\par pt agrees and understands plan via teach back method. all questions answered.\par \par pt agrees and understands plan via teach back method. all questions answered.\par \par \par \par

## 2022-10-13 NOTE — REVIEW OF SYSTEMS
[Anxiety] : anxiety [Negative] : Integumentary [Headache] : no headache [Dizziness] : no dizziness [Fainting] : no fainting [Confusion] : no confusion [Memory Loss] : no memory loss [Unsteady Walking] : no ataxia [Suicidal] : not suicidal [Insomnia] : no insomnia [Depression] : no depression [de-identified] : hx of migranes

## 2022-10-14 ENCOUNTER — OUTPATIENT (OUTPATIENT)
Dept: OUTPATIENT SERVICES | Facility: HOSPITAL | Age: 62
LOS: 1 days | End: 2022-10-14
Payer: COMMERCIAL

## 2022-10-14 ENCOUNTER — TRANSCRIPTION ENCOUNTER (OUTPATIENT)
Age: 62
End: 2022-10-14

## 2022-10-14 ENCOUNTER — APPOINTMENT (OUTPATIENT)
Dept: MRI IMAGING | Facility: CLINIC | Age: 62
End: 2022-10-14

## 2022-10-14 DIAGNOSIS — Z98.890 OTHER SPECIFIED POSTPROCEDURAL STATES: Chronic | ICD-10-CM

## 2022-10-14 DIAGNOSIS — Z98.891 HISTORY OF UTERINE SCAR FROM PREVIOUS SURGERY: Chronic | ICD-10-CM

## 2022-10-14 DIAGNOSIS — Z00.8 ENCOUNTER FOR OTHER GENERAL EXAMINATION: ICD-10-CM

## 2022-10-14 LAB
BASOPHILS # BLD AUTO: 0.09 K/UL
BASOPHILS NFR BLD AUTO: 1.3 %
EOSINOPHIL # BLD AUTO: 0.59 K/UL
EOSINOPHIL NFR BLD AUTO: 8.8 %
HCT VFR BLD CALC: 42.8 %
HGB BLD-MCNC: 13.9 G/DL
IMM GRANULOCYTES NFR BLD AUTO: 0.1 %
LYMPHOCYTES # BLD AUTO: 2.2 K/UL
LYMPHOCYTES NFR BLD AUTO: 32.8 %
MAN DIFF?: NORMAL
MCHC RBC-ENTMCNC: 31.7 PG
MCHC RBC-ENTMCNC: 32.5 GM/DL
MCV RBC AUTO: 97.7 FL
MONOCYTES # BLD AUTO: 0.47 K/UL
MONOCYTES NFR BLD AUTO: 7 %
NEUTROPHILS # BLD AUTO: 3.35 K/UL
NEUTROPHILS NFR BLD AUTO: 50 %
PLATELET # BLD AUTO: 409 K/UL
RBC # BLD: 4.38 M/UL
RBC # FLD: 12.4 %
WBC # FLD AUTO: 6.71 K/UL

## 2022-10-14 PROCEDURE — A9585: CPT

## 2022-10-14 PROCEDURE — 70553 MRI BRAIN STEM W/O & W/DYE: CPT

## 2022-10-14 PROCEDURE — 70553 MRI BRAIN STEM W/O & W/DYE: CPT | Mod: 26

## 2022-10-16 ENCOUNTER — TRANSCRIPTION ENCOUNTER (OUTPATIENT)
Age: 62
End: 2022-10-16

## 2022-10-16 LAB — EPO SERPL-MCNC: 12.4 MIU/ML

## 2022-11-14 ENCOUNTER — APPOINTMENT (OUTPATIENT)
Dept: ULTRASOUND IMAGING | Facility: IMAGING CENTER | Age: 62
End: 2022-11-14

## 2022-11-14 ENCOUNTER — OUTPATIENT (OUTPATIENT)
Dept: OUTPATIENT SERVICES | Facility: HOSPITAL | Age: 62
LOS: 1 days | End: 2022-11-14
Payer: COMMERCIAL

## 2022-11-14 DIAGNOSIS — Z00.8 ENCOUNTER FOR OTHER GENERAL EXAMINATION: ICD-10-CM

## 2022-11-14 DIAGNOSIS — Z98.891 HISTORY OF UTERINE SCAR FROM PREVIOUS SURGERY: Chronic | ICD-10-CM

## 2022-11-14 DIAGNOSIS — Z98.890 OTHER SPECIFIED POSTPROCEDURAL STATES: Chronic | ICD-10-CM

## 2022-11-14 DIAGNOSIS — I15.9 SECONDARY HYPERTENSION, UNSPECIFIED: ICD-10-CM

## 2022-11-14 PROCEDURE — 93975 VASCULAR STUDY: CPT

## 2022-11-14 PROCEDURE — 93975 VASCULAR STUDY: CPT | Mod: 26

## 2022-11-15 NOTE — H&P PST ADULT - BREASTS
CHW Note:    Type of contact: Phone      Reason for contact:  UTR     Action taken:  CHW called patient but was unsuccessful. Unable to leave vm.     Patient next steps:  n.a    CHW next steps: follow up in 1  days.   detailed exam

## 2022-11-19 ENCOUNTER — APPOINTMENT (OUTPATIENT)
Dept: CT IMAGING | Facility: CLINIC | Age: 62
End: 2022-11-19

## 2022-11-19 PROCEDURE — 74178 CT ABD&PLV WO CNTR FLWD CNTR: CPT

## 2022-11-21 ENCOUNTER — APPOINTMENT (OUTPATIENT)
Dept: INTERNAL MEDICINE | Facility: CLINIC | Age: 62
End: 2022-11-21
Payer: COMMERCIAL

## 2022-11-21 DIAGNOSIS — R79.89 OTHER SPECIFIED ABNORMAL FINDINGS OF BLOOD CHEMISTRY: ICD-10-CM

## 2022-11-21 PROCEDURE — 99442: CPT

## 2022-11-21 NOTE — HISTORY OF PRESENT ILLNESS
[Home] : at home, [unfilled] , at the time of the visit. [Medical Office: ([facility])___] : at the medical office located in  [Verbal consent obtained from patient] : the patient, [unfilled] [de-identified] : 61 year old female here hx of PRES syndrome here for f/u telephone visit to discuss updates in health \par Pt reports tolerating Losartan 25 mg BID - keeping BP within range \par Following - Dr. Lashawn Elizalde (HTN nephrologist)\par Topomax - D/Cd - side effects Flank pain R/O kidney stones- hair loss - severe Brain fog - Dr. Reynolds - neuro stopepd medication \par Lipitor- D/Cd - Liver function results out of range \par went for CT scan/ urogram - also will do repeat bloods and 24 hr urine in 2 weeks  \par Tests for FMD (FIBROMUSCULAR DYSPLAGIA)- negative - My latest liver function results are all concerning - They should be on the portal \par Seeing  R/o Pheochromocytoma\par Referred to Urologist for L- Hydronephrosis \par Last week blood work with Dr Farah- PLTS -441 (increasing) - \par will f/u with Heme\par Pt has no further complaints.\par Pt reports  and  - pt going for CT abdomen and pelvis \par

## 2022-11-21 NOTE — ASSESSMENT
[FreeTextEntry1] : #PRESsyndrome\par #HTN\par - watch salt intake\par - recommend due 25mg in am and 25mg in pm of losartan \par - check bp twice a day do not take medication if SBP < 110 and DBP < 80\par - following HTN/nephrology - saw elevated LFTs - stop statin (reviewed and interperted portal notes)\par - work up for phehocromocytomia\par - pt reports has CT of abdomen and pelvis scheduled \par - monitor neuro sx: headache, dizziness, change in speech, weakness which pt denies today\par - monitor sx of chest pain, palpitations and leg swelling\par - if any occur go to ED\par - continue asa \par \par #Hx of migranes\par - neurology stopped topimax \par - denies any headache or aura\par - tolerating well\par - following neurology\par \par #Anxiety\par - xanax prn at bedtime\par - do not drive or operate any machinery - pt is not driving at moment\par - Discussion held about controlling worries, learn ways to relax, breathing exercise, exercise, sleep hygiene, cut down caffeine.\par \par #Elevated PC\par -f/u heme \par \par Referred to Urologist for L- Hydronephrosis - as per HTN/Nephrologist\par \par pt agrees and understands plan via teach back method. all questions answered.

## 2022-11-30 ENCOUNTER — APPOINTMENT (OUTPATIENT)
Dept: MRI IMAGING | Facility: CLINIC | Age: 62
End: 2022-11-30

## 2022-11-30 PROCEDURE — A9585: CPT

## 2022-11-30 PROCEDURE — 74183 MRI ABD W/O CNTR FLWD CNTR: CPT

## 2022-12-06 ENCOUNTER — APPOINTMENT (OUTPATIENT)
Dept: UROLOGY | Facility: CLINIC | Age: 62
End: 2022-12-06

## 2022-12-06 VITALS
DIASTOLIC BLOOD PRESSURE: 77 MMHG | HEART RATE: 74 BPM | RESPIRATION RATE: 17 BRPM | WEIGHT: 155 LBS | SYSTOLIC BLOOD PRESSURE: 131 MMHG | BODY MASS INDEX: 28.52 KG/M2 | HEIGHT: 62 IN | TEMPERATURE: 98.3 F

## 2022-12-06 PROCEDURE — 99204 OFFICE O/P NEW MOD 45 MIN: CPT

## 2022-12-07 ENCOUNTER — NON-APPOINTMENT (OUTPATIENT)
Age: 62
End: 2022-12-07

## 2022-12-07 ENCOUNTER — APPOINTMENT (OUTPATIENT)
Dept: ENDOCRINOLOGY | Facility: CLINIC | Age: 62
End: 2022-12-07

## 2022-12-07 VITALS
RESPIRATION RATE: 16 BRPM | HEART RATE: 75 BPM | WEIGHT: 155 LBS | BODY MASS INDEX: 28.52 KG/M2 | OXYGEN SATURATION: 95 % | TEMPERATURE: 98.3 F | SYSTOLIC BLOOD PRESSURE: 121 MMHG | DIASTOLIC BLOOD PRESSURE: 74 MMHG | HEIGHT: 62 IN

## 2022-12-07 PROCEDURE — 99204 OFFICE O/P NEW MOD 45 MIN: CPT

## 2022-12-07 RX ORDER — ATORVASTATIN CALCIUM 40 MG/1
40 TABLET, FILM COATED ORAL
Qty: 1 | Refills: 1 | Status: COMPLETED | COMMUNITY
Start: 2022-09-27 | End: 2022-12-07

## 2022-12-07 RX ORDER — TOPIRAMATE 100 MG/1
100 TABLET, FILM COATED ORAL
Qty: 44 | Refills: 0 | Status: DISCONTINUED | COMMUNITY
Start: 2022-10-06

## 2022-12-07 NOTE — DATA REVIEWED
[FreeTextEntry1] : Labs 12/2/2022:\par Glucose 104\par CMP otherwise normal\par \par Labs 11/2022:\par CMP normal except ; ; ALKP 295\par Plasma norepinephrine 1018\par Epinephrine 37\par Dopamine <30\par Plasma metanephrines neg. \par Plasma normetanephrines 90.4\par Aldosterone 18.7\par TSH 0.92\par

## 2022-12-07 NOTE — HISTORY OF PRESENT ILLNESS
[None] : no symptoms [FreeTextEntry1] : 63 y/o F referred for adrenal adenoma and elevated plasma norepinephrine.\par +hx of uncontrolled HTN with labile blood pressures now on losartan 25mg BID\par +hx of migraines improved when postmenopausal. \par Around 11/2021 had TIA subsequently admitted to [facility] 8/2022 with PRES\par Workup for secondary causes of hypertension revealed elevated norepinephrine of 1018 (upper limit of normal 875), was on Topamax and Lipitor. Of note on Tylenol prn which she takes on average every other day for right flank pain. Was taking around the time of the labs. Also with elevated renin level. Not yet screened for Cushing's. \par CT performed revealing adrenal adenoma on left 1.1cm -3HU. Prior CT abdomen and pelvis in 2016 without evidence of adrenal lesions\par Has heat intolerance with diaphoresis at night. +palpitations, anxiety, pallor. \par +improved headaches at this time.\par +weight loss s/p hospitalizations. Now stable. \par Denies striae, easy bruising, diabetes. \par No hx of osteoporosis or hx of fragility fractures.

## 2022-12-07 NOTE — ASSESSMENT
[FreeTextEntry1] : 61 y/o F w/ uncontrolled HTN found to have mildly elevated plasma norepinephrine and 1.1cm left adrenal adenoma with HU of -3. Suspicion for pheo is low despite her labile BP and symptoms. Pheochromocytoma associated with adrenal adenoma with HU this low is extremely rare. Also her norepinephrine was only mildly elevated, not 2-3x the upper limit of normal we would tend to see with pheo or paragangliomas. She was taking Tyenol for right flank pain around the time of the labs which could have falsely elevated the levels as well. Awaiting repeat plasma metanephrines and 24 hour urine catecholamines. Would hold off on PET scan at this time until repeat labs are resulted. Would screen for Cushing's with midnight salivary cortisol given hx of HTN and hypokalemia. No evidence of primary hyperaldosteronism with elevated renin level. \par \par \par \par \par \par \par \par \par Prep time with review of labs and interval progress notes and consultations \par Discussion with patient regarding adrenal adenoma with management plan, answering all patients questions and addressing all concerns \par Post-visit completion, consultation, charting and review\par Total Time 45 min\par \par Specifically causes, evaluation, treatment options, risks, complications, and benefits of available therapies were discussed. Questions were answered.\par \par The submitted E/M billing level for this visit reflects the total time spent on the day of the visit including face-to-face time spent with the patient, non-face-to-face review of medical records and relevant information, documentation, and asynchronous communication with the patient after a visit via phone, email, or patient’s EHR portal after the visit. \par The medical records reviewed are either scanned into the chart or reviewed with the patient using a patient’s electronic medical records portal for patients with records not available to [facility] via electronic transmission platforms from other institutions and labs. \par Time spend counseling and performing coordination of care was also included in determining the appropriate EM billing level.\par \par I have reviewed and verified information regarding the chief complaint and history recorded by the ancillary staff and/or the patient. I have independently reviewed and interpreted tests performed by other physicians and facilities as necessary. \par \par I have discussed with the patient differential diagnosis, reason for auxiliary tests if ordered, risks, benefits, alternatives, and complications of each form of therapy were discussed. \par \par

## 2022-12-07 NOTE — HISTORY OF PRESENT ILLNESS
[None] : no symptoms [FreeTextEntry1] : 63 y/o F referred for adrenal adenoma and elevated plasma norepinephrine.\par +hx of uncontrolled HTN with labile blood pressures now on losartan 25mg BID\par +hx of migraines improved when postmenopausal. \par Around 11/2021 had TIA subsequently admitted to [facility] 8/2022 with PRES\par Workup for secondary causes of hypertension revealed elevated norepinephrine of 1018 (upper limit of normal 875), was on Topamax and Lipitor. Of note on Tylenol prn which she takes on average every other day for right flank pain. Was taking around the time of the labs. Also with elevated renin level. Not yet screened for Cushing's. \par CT performed revealing adrenal adenoma on left 1.1cm -3HU. Prior CT abdomen and pelvis in 2016 without evidence of adrenal lesions\par Has heat intolerance with diaphoresis at night. +palpitations, anxiety, pallor. \par +improved headaches at this time.\par +weight loss s/p hospitalizations. Now stable. \par Denies striae, easy bruising, diabetes. \par No hx of osteoporosis or hx of fragility fractures.

## 2022-12-07 NOTE — PHYSICAL EXAM
[General Appearance - Well Developed] : well developed [Heart Rate And Rhythm] : Heart rate and rhythm were normal [] : no respiratory distress [Bowel Sounds] : normal bowel sounds [Skin Color & Pigmentation] : normal skin color and pigmentation [Alert] : alert [Well Nourished] : well nourished [Healthy Appearance] : healthy appearance [No Acute Distress] : no acute distress [Well Developed] : well developed [EOMI] : extra ocular movement intact [Normal Hearing] : hearing was normal [Supple] : the neck was supple [Thyroid Not Enlarged] : the thyroid was not enlarged [No Respiratory Distress] : no respiratory distress [No Accessory Muscle Use] : no accessory muscle use [Normal Rate and Effort] : normal respiratory rate and effort [Clear to Auscultation] : lungs were clear to auscultation bilaterally [Normal S1, S2] : normal S1 and S2 [Normal Rate] : heart rate was normal [Regular Rhythm] : with a regular rhythm [No Edema] : no peripheral edema [Normal Bowel Sounds] : normal bowel sounds [Not Tender] : non-tender [Not Distended] : not distended [Soft] : abdomen soft [No Stigmata of Cushings Syndrome] : no stigmata of Cushings Syndrome [No Involuntary Movements] : no involuntary movements were seen [Oriented x3] : oriented to person, place, and time [Normal Affect] : the affect was normal [Normal Mood] : the mood was normal

## 2022-12-07 NOTE — REVIEW OF SYSTEMS
[Feeling Poorly] : feeling poorly [Negative] : Integumentary [Recent Weight Loss (___ Lbs)] : recent weight loss: [unfilled] lbs [Palpitations] : palpitations [Back Pain] : back pain [Headaches] : headaches [Anxiety] : anxiety [Heat Intolerance] : heat intolerance [All other systems negative] : All other systems negative [Fatigue] : no fatigue [Recent Weight Gain (___ Lbs)] : no recent weight gain [Visual Field Defect] : no visual field defect [Dysphonia] : no dysphonia [Chest Pain] : no chest pain [Lower Ext Edema] : no lower extremity edema [Shortness Of Breath] : no shortness of breath [Nausea] : no nausea [Constipation] : no constipation [Vomiting] : no vomiting [Diarrhea] : no diarrhea [Polydipsia] : no polydipsia

## 2022-12-07 NOTE — CONSULT LETTER
[Dear  ___] : Dear  [unfilled], [Consult Letter:] : I had the pleasure of evaluating your patient, [unfilled]. [Please see my note below.] : Please see my note below. [Consult Closing:] : Thank you very much for allowing me to participate in the care of this patient.  If you have any questions, please do not hesitate to contact me. [Sincerely,] : Sincerely, [FreeTextEntry2] : Dr. Jimmie Farah\par [address]\[place] [address] [FreeTextEntry3] : Joshua Paulson DO\par Division of Endocrinology\par [facility]\par [facility]\par  of Medicine\par [employer] [facility] [employer]\par Director of [facility]

## 2022-12-07 NOTE — REASON FOR VISIT
[Initial Visit ___] : [unfilled] is here today for an initial visit  for [unfilled] [Initial Evaluation] : an initial evaluation [Adrenal Evaluation/Adrenal Disorder] : adrenal evaluation/adrenal disorder [FreeTextEntry2] : Dr. Jimmie Farah

## 2022-12-07 NOTE — ASSESSMENT
[FreeTextEntry1] : 61 y/o F w/ uncontrolled HTN found to have mildly elevated plasma norepinephrine and 1.1cm left adrenal adenoma with HU of -3. Suspicion for pheo is low despite her labile BP and symptoms. Pheochromocytoma associated with adrenal adenoma with HU this low is extremely rare. Also her norepinephrine was only mildly elevated, not 2-3x the upper limit of normal we would tend to see with pheo or paragangliomas. She was taking Tyenol for right flank pain around the time of the labs which could have falsely elevated the levels as well. Awaiting repeat plasma metanephrines and 24 hour urine catecholamines. Would hold off on PET scan at this time until repeat labs are resulted. Would screen for Cushing's with midnight salivary cortisol given hx of HTN and hypokalemia. No evidence of primary hyperaldosteronism with elevated renin level. \par \par \par \par \par \par \par \par \par Prep time with review of labs and interval progress notes and consultations \par Discussion with patient regarding adrenal adenoma with management plan, answering all patients questions and addressing all concerns \par Post-visit completion, consultation, charting and review\par Total Time 45 min\par \par Specifically causes, evaluation, treatment options, risks, complications, and benefits of available therapies were discussed. Questions were answered.\par \par The submitted E/M billing level for this visit reflects the total time spent on the day of the visit including face-to-face time spent with the patient, non-face-to-face review of medical records and relevant information, documentation, and asynchronous communication with the patient after a visit via phone, email, or patient’s EHR portal after the visit. \par The medical records reviewed are either scanned into the chart or reviewed with the patient using a patient’s electronic medical records portal for patients with records not available to [facility] via electronic transmission platforms from other institutions and labs. \par Time spend counseling and performing coordination of care was also included in determining the appropriate EM billing level.\par \par I have reviewed and verified information regarding the chief complaint and history recorded by the ancillary staff and/or the patient. I have independently reviewed and interpreted tests performed by other physicians and facilities as necessary. \par \par I have discussed with the patient differential diagnosis, reason for auxiliary tests if ordered, risks, benefits, alternatives, and complications of each form of therapy were discussed. \par \par

## 2022-12-07 NOTE — CONSULT LETTER
[Dear  ___] : Dear  [unfilled], [Consult Letter:] : I had the pleasure of evaluating your patient, [unfilled]. [Please see my note below.] : Please see my note below. [Consult Closing:] : Thank you very much for allowing me to participate in the care of this patient.  If you have any questions, please do not hesitate to contact me. [Sincerely,] : Sincerely, [FreeTextEntry2] : Dr. Jimmie Farah\par [address]\[place] [address] [FreeTextEntry3] : Joshua Paulson DO\par Division of Endocrinology\par [facility]\par [facility]\par  of Medicine\par [employer] [employer]\par Director of [facility]

## 2022-12-13 ENCOUNTER — APPOINTMENT (OUTPATIENT)
Dept: UROLOGY | Facility: CLINIC | Age: 62
End: 2022-12-13

## 2022-12-13 ENCOUNTER — APPOINTMENT (OUTPATIENT)
Dept: NUCLEAR MEDICINE | Facility: CLINIC | Age: 62
End: 2022-12-13

## 2022-12-13 LAB — CORTIS SAL-MCNC: NORMAL

## 2022-12-15 ENCOUNTER — APPOINTMENT (OUTPATIENT)
Dept: PHYSICAL MEDICINE AND REHAB | Facility: CLINIC | Age: 62
End: 2022-12-15

## 2022-12-15 ENCOUNTER — NON-APPOINTMENT (OUTPATIENT)
Age: 62
End: 2022-12-15

## 2022-12-15 VITALS
HEART RATE: 80 BPM | HEIGHT: 62 IN | SYSTOLIC BLOOD PRESSURE: 137 MMHG | TEMPERATURE: 98 F | BODY MASS INDEX: 29.44 KG/M2 | DIASTOLIC BLOOD PRESSURE: 87 MMHG | WEIGHT: 160 LBS | OXYGEN SATURATION: 96 %

## 2022-12-15 PROCEDURE — 99213 OFFICE O/P EST LOW 20 MIN: CPT

## 2022-12-16 NOTE — ASSESSMENT
[FreeTextEntry1] : Follow up with Endocrinology and urology for management and surgical procedure.\par \par Patient's symptoms likely and hopefully will improve/resolve after she recovers from her procedure.  She may benefit from cognitive speech therapy and OT after she is stable from her surgery.   \par \par Will reassess patient in late Jan. after surgery for need for continued therapy.\par \par Pt. agrees with plan\par \par All questions answered.\par \par \par

## 2022-12-16 NOTE — HISTORY OF PRESENT ILLNESS
[FreeTextEntry1] : 	\par BIU Admission Date	06-Sep-2022 to  24-Sep-2022 \par Reason for Admission	PRES \[facility] Course	 \par JAMES MARIE is a 60yo Female with PMHx of HTN, migraines, and TIA (last seen at [facility] in 11/2021), who presented to [facility] on 08/31/2022 with HA, nausea, vomiting, and word-finding difficulties; was found to have findings on CT and MRI head consistent with PRES. No tPA was given, as \par she presented outside of therapeutic window and LVO not performed, as no LVO was identified on CT angio head/neck. Patient was followed by neurology and cardiology during her admission. TTE and EEG were unremarkable; started on ASA. Course c/b hyponatremia, likely SIADH now resolved. \par \par Patient was evaluated by PM&R and therapy for functional deficits and gait/ ADL impairments and recommended acute rehabilitation. Patient was medically optimized for discharge to [facility] on 09/06/2022. Patient was seen and examined at the bedside upon arrival. Currently endorses 3-4/10 HA and anxiety. Reports that while ambulating, experiences blurry vision, dizziness, and lightheadedness. VS on admission unremarkable. \par \par Patient was stable upon rehab admission to  Inpatient Rehabilitation Facility. Admitted with gait instabilty, ADL, and functional impairments. \par \par Rehab Course significant for labile blood pressure; patient frequently orthostatic with changes in positioning and at other times, was hypertensive. During these episodes, would experience HAs described as feeling different than \par her typical migraine HAs, with associated nausea; would also report episodes of blurry vision with ambulation or experiencing diaphoresis with flushed sensation. She was started on Topamax for HAs, Ativan PRN for nausea 2/2 prolonged QTc, and adjustments were made to her blood pressure medications, as managed by nephrology. Neurology was consulted to evaluate atypical HAs with labile BP. As per neurology, Topamax was increased to 50mg BID and workup was started to rule out other possible pathologies that may cause symptoms (i.e pheochromocytoma, hyperthyroidism, etc.). 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 \par \par All other medical co-morbidities were stable. Patient tolerated course of inpatient PT/OT/SLP rehab with significant functional improvements and met rehab goals prior to discharge. Patient was medically cleared on 9/24/22 for \par discharged to home. \par \par \par Patient had been attending PT, OT and speech at [facility]. Notes reviewed--\par \par OT--11/16--focused on functional use of Right UE,  and cognition\par \par PT 11/16-- balance, gait training, stair negotiation\par \par Speech --Delayed memory 5/5 with min A; using CRAVE strategy;  Writing-- 70-75% accuracy given mod - max A\par \par Patient was discharged from therapy 11/29/22 after difficulty keeping and missing appts due to several frequent MD appts. \par \par Headaches and dizziness she was having inpatient have resolved. \par \par She has undergone extensive workup of her condition. 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 Endocrinology and Urology feel these are likely  causing her symptoms. \par \par Pt. ambulating independently inside and outside home.    Has some trouble descending steps as cannot sense where last step is. \par ADLs independently\par Driving short distances independently\par No visual symptoms\par \par Reports fatigue and difficulty multitasking.  \par

## 2022-12-16 NOTE — PHYSICAL EXAM
[FreeTextEntry1] : Constitutional: Alert, awake, no acute distress\par HEENT: EOMI, NC/AT, \par Cardiovascular: All extremities warm and well perfused\par Pulmonary: No respiratory distress, normal chest wall expansion\par Gastrointestinal: No abdominal distention\par \par Neuro:\par AAOx3, MOCA 29/30\par Recall 4/5 spontaneously. \par Attention--able to complete MOYB, reverse digit span and serial 7's\par Executive function--able to complete Trails B and clock drawing. \par \par CN: intact no nystagmus, visual fields intact, PERRLA, EOMI, no facial weakness or sensory deficit, shoulder jessica intact, tongue midline\par Motor 5/5 bilat UE and LE\par Sens--slight impairment on right face and right UE, intact bilat. LEs. \par Coordination intact--FNF and HTS intact\par Proprioception: intact bilat. LEs\par

## 2022-12-20 ENCOUNTER — NON-APPOINTMENT (OUTPATIENT)
Age: 62
End: 2022-12-20

## 2022-12-21 ENCOUNTER — RX RENEWAL (OUTPATIENT)
Age: 62
End: 2022-12-21

## 2022-12-23 ENCOUNTER — APPOINTMENT (OUTPATIENT)
Dept: NUCLEAR MEDICINE | Facility: CLINIC | Age: 62
End: 2022-12-23

## 2022-12-23 ENCOUNTER — OUTPATIENT (OUTPATIENT)
Dept: OUTPATIENT SERVICES | Facility: HOSPITAL | Age: 62
LOS: 1 days | End: 2022-12-23
Payer: COMMERCIAL

## 2022-12-23 DIAGNOSIS — Z98.890 OTHER SPECIFIED POSTPROCEDURAL STATES: Chronic | ICD-10-CM

## 2022-12-23 DIAGNOSIS — Z98.891 HISTORY OF UTERINE SCAR FROM PREVIOUS SURGERY: Chronic | ICD-10-CM

## 2022-12-23 DIAGNOSIS — E27.8 OTHER SPECIFIED DISORDERS OF ADRENAL GLAND: ICD-10-CM

## 2022-12-23 PROCEDURE — 78815 PET IMAGE W/CT SKULL-THIGH: CPT | Mod: 26,PI

## 2022-12-23 PROCEDURE — 78815 PET IMAGE W/CT SKULL-THIGH: CPT

## 2022-12-23 PROCEDURE — A9592: CPT

## 2022-12-25 NOTE — PATIENT PROFILE ADULT - HAS THE PATIENT RECEIVED THE INFLUENZA VACCINE THIS SEASON?
**ADVANCED PRACTICE PROVIDER, I HAVE EVALUATED THIS [facility]  ED  EMERGENCY DEPARTMENT ENCOUNTER      Pt Name: Ingrid Zapata  XOH:[id number]  [place] 1966  Date of evaluation: 12/24/2022  Provider: RICO Taylor CNP  Note Started: 7:16 PM EST 12/25/2022        Chief Complaint:    Chief Complaint   Patient presents with    Fall     Slipped on side walk and her can hit her lip; lac to lip         Nursing Notes, Past Medical Hx, Past Surgical Hx, Social Hx, Allergies, and Family Hx were all reviewed and agreed with or any disagreements were addressed in the HPI.    HPI: (Location, Duration, Timing, Severity, Quality, Assoc Sx, Context, Modifying factors)    Chief Complaint of lip laceration and right ankle pain    This is a  64 y.o. female who presents today with right ankle pain and lip laceration after a fall, patient states that she was walking in the snow, using her cane, slipped and fell and the cane hit her in the bottom lip and twisted her right ankle when she fell. She is complaining of lip laceration and right ankle pain, rates that pain a 6 out of 10. She denies hitting her head or having loss of conscious. No head neck or back pain, no numbness tingling or paresthesias. She denies any additional injuries or complaints. No additional aggravating relieving factors.   Patient presents awake, alert and in no acute respiratory distress or toxic appearance    PastMedical/Surgical History:      Diagnosis Date    Arthritis     [place] (hard of hearing)     Hypertension     UTI (urinary tract infection)          Procedure Laterality Date    ANKLE SURGERY      ANTERIOR CRUCIATE LIGAMENT REPAIR      ELBOW SURGERY      HERNIA REPAIR      HYSTERECTOMY (CERVIX STATUS UNKNOWN)      KNEE SURGERY      WRIST SURGERY         Medications:  Discharge Medication List as of 12/24/2022  8:26 PM        CONTINUE these medications which have NOT CHANGED    Details   Oxybutynin Chloride (DITROPAN XL PO) Take 7.5 mg by mouth dailyHistorical Med      ibuprofen (ADVIL;MOTRIN) 800 MG tablet Take 1 tablet by mouth every 6 hours as needed for Pain, Disp-40 tablet, R-0Print      HYDROcodone-acetaminophen (NORCO) 5-325 MG per tablet Take 1 tablet by mouth every 8 hours as needed for Pain ., Disp-12 tablet, R-0Print      tamsulosin (FLOMAX) 0.4 MG capsule Take 1 capsule by mouth daily for 14 days, Disp-14 capsule, R-0Print      losartan (COZAAR) 50 MG tablet Take 100 mg by mouth daily Historical Med      meloxicam (MOBIC) 15 MG tablet Take 15 mg by mouth daily      metoprolol (TOPROL-XL) 50 MG XL tablet Take 75 mg by mouth daily Historical Med               Review of Systems:  (2-9 systems needed)  Review of Systems   Constitutional:  Negative for chills and fever. HENT:  Negative for congestion. Respiratory:  Negative for cough, shortness of breath and wheezing. Cardiovascular:  Negative for chest pain. Gastrointestinal:  Negative for abdominal pain, diarrhea, nausea and vomiting. Genitourinary:  Negative for difficulty urinating, dysuria, frequency and hematuria. Musculoskeletal:  Positive for joint swelling. Negative for back pain. Patient complains of right ankle pain associated with fall states that she was walking in the snow when she accidentally fell. States when she fell she rolled her right ankle, she denies any head neck or back pain. Skin:  Positive for wound. Negative for color change. Patient complains of upper and lower mid lip laceration status post fall, no broken teeth or dislocation. Neurological:  Negative for weakness, numbness and headaches. \"Positives and Pertinent negatives as per HPI\"    Physical Exam:  Physical Exam  Vitals and nursing note reviewed. Constitutional:       Appearance: She is well-developed. She is not diaphoretic. HENT:      Head: Normocephalic.       Right Ear: External ear normal.      Left Ear: External ear normal. Eyes:      General: No scleral icterus. Right eye: No discharge. Left eye: No discharge. Cardiovascular:      Rate and Rhythm: Normal rate and regular rhythm. Pulmonary:      Effort: Pulmonary effort is normal. No respiratory distress. Breath sounds: Normal breath sounds. Abdominal:      Palpations: Abdomen is soft. Musculoskeletal:         General: Tenderness present. Normal range of motion. Cervical back: Normal range of motion and neck supple. Comments: Patient has right ankle pain and swelling, mild swelling noted to the right lateral malleoli, no obvious deformity, break in skin integrity or skin tenting, pedal pulses 2+, normal flexion extension of toes, Achilles is intact. He has no numbness tingling or paresthesias. Compartments in the extremity are soft. Skin:     General: Skin is warm. Capillary Refill: Capillary refill takes less than 2 seconds. Coloration: Skin is not pale. Comments: Patient has a 0.5 very superficial lip laceration in the middle of the lower lip and upper lip, there is no deep laceration, active bleeding, and does not require suturing. Wound will heal on its own. Neurological:      General: No focal deficit present. Mental Status: She is alert and oriented to person, place, and time. GCS: GCS eye subscore is 4. GCS verbal subscore is 5. GCS motor subscore is 6. Comments: Although patient fell and injured the ankle, patient is awake, alert, following commands correctly neurologically intact no focal deficits. Denies any head neck or back pain.   No numbness or tingling or paresthesias   Psychiatric:         Behavior: Behavior normal.       MEDICAL DECISION MAKING    Vitals:    Vitals:    12/24/22 1844 12/24/22 2030   BP: (!) 166/88 (!) 159/81   Pulse: 80 81   Resp: 16 19   Temp: 98.5 °F (36.9 °C)    TempSrc: Oral    SpO2: 97% 97%   Weight: (!) 365 lb (165.6 kg)    Height: 5' 6\" (1.676 m)        LABS:Labs Reviewed - No data to display     Remainder of labs reviewed and were negative at this time or not returned at the time of this note. RADIOLOGY:   Non-plain film images such as CT, Ultrasound and MRI are read by the radiologist. Belén OLSON APRN - CNP have directly visualized the radiologic plain film image(s) with the below findings:      Interpretation per the Radiologist below, if available at the time of this note:    XR ANKLE RIGHT (MIN 3 VIEWS)   Final Result   1. No convincing acute osseous abnormality. 2. Bimalleolar fixation hardware appears grossly intact and seated. MEDICAL DECISION MAKING / ED COURSE:      PROCEDURES:   Procedures    None    Patient was given:  Medications   tetanus-diphth-acell pertussis (BOOSTRIX) injection 0.5 mL (0.5 mLs IntraMUSCular Given 12/24/22 2013)   HYDROcodone-acetaminophen (NORCO) 5-325 MG per tablet 1 tablet (1 tablet Oral Given 12/24/22 2013)     Patient complains of right ankle pain and lip laceration after a fall, patient states that she was walking in the snow, using her cane, slipped and fell and the cane hit her in the bottom lip and twisted her right ankle when she fell. She is complaining of lip laceration and right ankle pain, rates that pain a 6 out of 10. She denies hitting her head or having loss of conscious. No head neck or back pain. After evaluation and examination the patient the 2 upper and lower lip lacerations that are very minimal not actively bleeding, I do believe they will heal on their own. Lip laceration will heal on its own, there is no obvious deep laceration that require suturing. I educated patient about keeping the area clean and dry, will heal on its own, right ankle x-ray shows no acute osseous abnormality, no acute fracture or dislocation.   At this time lip laceration will heal on its own, ankle rewrapped in an Ace wrap and Aircast.    Therefore, shared medical decision was made for the patient myself we agreed to be discharged home with outpatient follow-up. Patient was discharged home with referral back to PCP, also follow-up with orthopedic specialist next week if she still having pain in her ankle. Return to the ER for worsening or concerning symptoms. She can take Tylenol or Motrin for any discomfort. The patient tolerated their visit well. I evaluated the patient. The physician was available for consultation as needed. The patient and / or the family were informed of the results of any tests, a time was given to answer questions, a plan was proposed and they agreed with plan. Patient verbalized understanding of discharge instructions and the patient was discharged from the department in stable condition. I am the Primary Clinician of Record. CLINICAL IMPRESSION:  1. Fall, initial encounter    2. Lip laceration, initial encounter    3.  Acute right ankle pain        DISPOSITION Decision To Discharge 12/24/2022 08:22:06 PM      PATIENT REFERRED TO:  Elsy Wei, [address] [place] [address] [address]  [address] [place] [address]  [phone]      Family doctor in the next 2 to 3 days for reevaluation    Adria Finnegan MD  [facility]  [phone]      Follow-up with your orthopedic specialist or our orthopedic specialist in the next 3 days for reevaluation of your right ankle    [facility] [address]  [phone]  Go to   If symptoms worsen    DISCHARGE MEDICATIONS:  Discharge Medication List as of 12/24/2022  8:26 PM          DISCONTINUED MEDICATIONS:  Discharge Medication List as of 12/24/2022  8:26 PM                 (Please note the MDM and HPI sections of this note were completed with a voice recognition program.  Efforts were made to edit the dictations but occasionally words are mis-transcribed.)    Electronically signed, RICO Pizano CNP,          RICO Pizano CNP  12/25/22 3034 unable to assess immunization status...

## 2022-12-28 ENCOUNTER — NON-APPOINTMENT (OUTPATIENT)
Age: 62
End: 2022-12-28

## 2022-12-29 ENCOUNTER — APPOINTMENT (OUTPATIENT)
Dept: VASCULAR SURGERY | Facility: CLINIC | Age: 62
End: 2022-12-29
Payer: COMMERCIAL

## 2022-12-29 ENCOUNTER — NON-APPOINTMENT (OUTPATIENT)
Age: 62
End: 2022-12-29

## 2022-12-29 ENCOUNTER — APPOINTMENT (OUTPATIENT)
Dept: INTERNAL MEDICINE | Facility: CLINIC | Age: 62
End: 2022-12-29

## 2022-12-29 VITALS
WEIGHT: 155 LBS | BODY MASS INDEX: 28.52 KG/M2 | HEIGHT: 62 IN | DIASTOLIC BLOOD PRESSURE: 82 MMHG | SYSTOLIC BLOOD PRESSURE: 137 MMHG | TEMPERATURE: 98 F | HEART RATE: 76 BPM

## 2022-12-29 PROCEDURE — 99204 OFFICE O/P NEW MOD 45 MIN: CPT

## 2022-12-29 PROCEDURE — 93880 EXTRACRANIAL BILAT STUDY: CPT

## 2022-12-29 PROCEDURE — 93975 VASCULAR STUDY: CPT

## 2022-12-30 ENCOUNTER — OUTPATIENT (OUTPATIENT)
Dept: OUTPATIENT SERVICES | Facility: HOSPITAL | Age: 62
LOS: 1 days | End: 2022-12-30
Payer: COMMERCIAL

## 2022-12-30 VITALS
HEART RATE: 76 BPM | RESPIRATION RATE: 16 BRPM | TEMPERATURE: 98 F | DIASTOLIC BLOOD PRESSURE: 81 MMHG | WEIGHT: 164.02 LBS | HEIGHT: 63 IN | OXYGEN SATURATION: 97 % | SYSTOLIC BLOOD PRESSURE: 146 MMHG

## 2022-12-30 DIAGNOSIS — E27.8 OTHER SPECIFIED DISORDERS OF ADRENAL GLAND: ICD-10-CM

## 2022-12-30 DIAGNOSIS — I10 ESSENTIAL (PRIMARY) HYPERTENSION: ICD-10-CM

## 2022-12-30 DIAGNOSIS — I67.83 POSTERIOR REVERSIBLE ENCEPHALOPATHY SYNDROME: ICD-10-CM

## 2022-12-30 DIAGNOSIS — I77.71 DISSECTION OF CAROTID ARTERY: ICD-10-CM

## 2022-12-30 DIAGNOSIS — Z98.890 OTHER SPECIFIED POSTPROCEDURAL STATES: Chronic | ICD-10-CM

## 2022-12-30 LAB
BLD GP AB SCN SERPL QL: NEGATIVE — SIGNIFICANT CHANGE UP
RH IG SCN BLD-IMP: POSITIVE — SIGNIFICANT CHANGE UP

## 2022-12-30 PROCEDURE — 93010 ELECTROCARDIOGRAM REPORT: CPT

## 2022-12-30 NOTE — H&P PST ADULT - OTHER CARE PROVIDERS
Vascular - Dr. Cordero, Cardio - Dr. Gabriele Olivier [phone],  Endocrine- Dr. Burris [phone], Neurovascular - Dr. Dominguez [phone] Vascular - Dr. Cordero [phone], Cardio - Dr. Gabriele Olivier [phone],  Endocrine- Dr. Burris [phone], Neurovascular - Dr. Dominguez [phone], Hypertensive nephrologist Dr. Jimmie Farah [phone]

## 2022-12-30 NOTE — H&P PST ADULT - CARDIOVASCULAR COMMENTS
activity - walking, climbs stairs, household chores METS 5 activity - walking, climbs stairs, household chores METS 5. Pt states she was recently evaluated by Dr. Cordero for a small carotid artery dissection

## 2022-12-30 NOTE — H&P PST ADULT - HISTORY OF PRESENT ILLNESS
62 year old female with adrenal mass found on CT scan about two months ago. Pt presents today for presurgical evaluation for ... 62 year old female with hx TIA 11/2021, PRES 8/31/22, hypertension and left hydronephrosis and adrenal mass. Pt presents today for presurgical evaluation for Laparoscopic Adrenalectomy, Laparoscopic Left Pyeloplasty.

## 2022-12-30 NOTE — H&P PST ADULT - PROBLEM SELECTOR PLAN 2
Hx PRES 8/2022. Pt states she is obtaining neurology clearance prior to surgery -will request copy.   Emailed Dr. Ozuna to determine whether okay for pt to remain on Aspirin preop.

## 2022-12-30 NOTE — H&P PST ADULT - NSICDXFAMILYHX_GEN_ALL_CORE_FT
FAMILY HISTORY:  Father  Still living? No  Family history of heart disease, Age at diagnosis: Age Unknown    Mother  Still living? No  Family history of diabetes mellitus, Age at diagnosis: Age Unknown  FH: breast cancer, Age at diagnosis: Age Unknown    Sibling  Still living? Yes, Estimated age: Age Unknown  FH: breast cancer, Age at diagnosis: Age Unknown  FH: HTN (hypertension), Age at diagnosis: Age Unknown

## 2022-12-30 NOTE — H&P PST ADULT - NSICDXPASTMEDICALHX_GEN_ALL_CORE_FT
PAST MEDICAL HISTORY:  Acquired hammertoe of left foot     Hallux valgus (acquired), right foot s/p correction 11/2018    HV (hallux valgus), left     Hypertension     Internal carotid artery dissection     Migraine     Posterior reversible encephalopathy syndrome (PRES)     TIA (transient ischemic attack)

## 2022-12-30 NOTE — H&P PST ADULT - PAIN ASSESSMENT COMPLETED
This RN spoke with Isabel Escobar in pharmacy in regards to titration of pt insulin drip. No change needed at this time.       Rafael Stephenson RN  10/06/20 2020 Yes

## 2022-12-30 NOTE — H&P PST ADULT - NSICDXPASTSURGICALHX_GEN_ALL_CORE_FT
PAST SURGICAL HISTORY:  H/O bilateral breast reduction surgery     H/O breast biopsy     H/O  section X 2    H/O endoscopy & colonoscopy    S/P bunionectomy Right and hammertoe correction 2018

## 2022-12-30 NOTE — H&P PST ADULT - ENDOCRINE COMMENTS
being worked up for adrenal mass to r/o pheochromocytoma - seeing endocrine and hypertensive nephrologist

## 2022-12-30 NOTE — H&P PST ADULT - PROBLEM SELECTOR PLAN 1
Pt scheduled for surgery on 1/9/23.  Pre-op instructions provided. Pt verbalized understanding.   Pepcid provided for GI prophylaxis.   Pt given detailed verbal and written instructions on chlorhexidine wash. Pt verbalized understanding with teachback.   Order placed for preop COVID PCR testing.   Pt being worked up to r/o pheochromocytoma - copy of endocrine note in chart. Pt states she is also obtaining clearance from her hypertensive nephrologist prior to surgery - will request copy.

## 2023-01-03 PROBLEM — G45.9 TRANSIENT CEREBRAL ISCHEMIC ATTACK, UNSPECIFIED: Chronic | Status: ACTIVE | Noted: 2022-12-30

## 2023-01-03 PROBLEM — I67.83 POSTERIOR REVERSIBLE ENCEPHALOPATHY SYNDROME: Chronic | Status: ACTIVE | Noted: 2022-12-30

## 2023-01-03 PROBLEM — I77.71 DISSECTION OF CAROTID ARTERY: Chronic | Status: ACTIVE | Noted: 2022-12-30

## 2023-01-04 ENCOUNTER — NON-APPOINTMENT (OUTPATIENT)
Age: 63
End: 2023-01-04

## 2023-01-04 ENCOUNTER — LABORATORY RESULT (OUTPATIENT)
Age: 63
End: 2023-01-04

## 2023-01-04 ENCOUNTER — APPOINTMENT (OUTPATIENT)
Dept: INTERNAL MEDICINE | Facility: CLINIC | Age: 63
End: 2023-01-04
Payer: COMMERCIAL

## 2023-01-04 VITALS
HEART RATE: 87 BPM | DIASTOLIC BLOOD PRESSURE: 80 MMHG | HEIGHT: 62 IN | SYSTOLIC BLOOD PRESSURE: 118 MMHG | BODY MASS INDEX: 29.63 KG/M2 | OXYGEN SATURATION: 96 % | TEMPERATURE: 98.8 F | WEIGHT: 161 LBS | RESPIRATION RATE: 16 BRPM

## 2023-01-04 DIAGNOSIS — Z01.818 ENCOUNTER FOR OTHER PREPROCEDURAL EXAMINATION: ICD-10-CM

## 2023-01-04 PROCEDURE — 36415 COLL VENOUS BLD VENIPUNCTURE: CPT

## 2023-01-04 PROCEDURE — 99214 OFFICE O/P EST MOD 30 MIN: CPT | Mod: 25

## 2023-01-04 PROCEDURE — 93000 ELECTROCARDIOGRAM COMPLETE: CPT

## 2023-01-04 NOTE — HISTORY OF PRESENT ILLNESS
[No Pertinent Cardiac History] : no history of aortic stenosis, atrial fibrillation, coronary artery disease, recent myocardial infarction, or implantable device/pacemaker [No Pertinent Pulmonary History] : no history of asthma, COPD, sleep apnea, or smoking [No Adverse Anesthesia Reaction] : no adverse anesthesia reaction in self or family member [(Patient denies any chest pain, claudication, dyspnea on exertion, orthopnea, palpitations or syncope)] : Patient denies any chest pain, claudication, dyspnea on exertion, orthopnea, palpitations or syncope [Good (7-10 METs)] : Good (7-10 METs) [Anti-Platelet Agents: _____] : Anti-Platelet Agents: [unfilled] [Aortic Stenosis] : no aortic stenosis [Atrial Fibrillation] : no atrial fibrillation [Coronary Artery Disease] : no coronary artery disease [Recent Myocardial Infarction] : no recent myocardial infarction [Implantable Device/Pacemaker] : no implantable device/pacemaker [Asthma] : no asthma [COPD] : no COPD [Sleep Apnea] : no sleep apnea [Smoker] : not a smoker [Chronic Anticoagulation] : no chronic anticoagulation [Chronic Kidney Disease] : no chronic kidney disease [Diabetes] : no diabetes [FreeTextEntry4] : 62 year F with hx of HTN, Anxiety and Pres Syndrome with finding of Hydrocephalus of L kidney and Hx of Right sided nephrolithiasis\par here for preop clearance for potential TBD pyeloplasty with Dr Ozuna, within upcoming month due to \par *  Moderate left hydronephrosis to the UPJ most consistent with to \par 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 Reports feels like she will need to go to bathroom repetitively. Pt denies any sx of dysuria, fever, or urinary incontinence. However, reports has to intermittent go back to bathroom to urinate but has no urine to void. Following Urology\par Denies any vaginal bleeding, or gross hematuria. \par \par Following Vascular - Dr. Cordero hx L carotid dissection (from previous hospitilization for PRES syndrome) \par Dr. Paulson - Endo - work up for pheochromocytoma due to adrenal mass\par another appt with Urology Dr. Wilson ([facility]) - issa 10\par Neuro: Dr. Guzman ([facility]) - reports saw yesterday 1/3/2023\par HTN/Nephrology: clearance - Dr. Jimmie Elizalde - 12/14/2022\par \par Pt denies any fever, chills, body aches, headache, vision changes, epistaxis, hearing loss, sore throat, sinus congestion, nasal congestion, rhinorrhea, otalgia, cough, sob, chest pain, palpitations, nausea, vomiting, abdominal pain, diarrhea, constipation, dysuria, urinary frequency, urgency, hematuria, urinary or fecal incontinence, numbness, tingling, weakness.\par \par Denies history of MI, arrhythmia or valvular disease. The patient has greater than 4 mets exercise tolerance, never experiencing chest pains or dyspnea. There is no history of asthma or copd. There is no history of bleeding diathesis, DVT's or PE's. There is no history of adverse reactions to anesthesia or complications with prior surgeries.\par \par

## 2023-01-04 NOTE — ASSESSMENT
[FreeTextEntry4] : #HTN\par #PRES syndrome\par - continue medication, f/u neurology \par - resent medication losartan 25 mg BID, \par DASH DIET \par Exercise as tolerated \par Lower your salt intake.\par Reduce your alcohol consumption.\par Learn relaxation methods.\par Eating is a very important part of controlling blood pressure. Recommend Total salt intake to 2.4g/day.\par Do not add salt while preparing or eating your meals.\par Try to avoid red meats, sweets and sugar containing beverages.\par Ensure you are eating 5 fruits and vegetables a day as well as whole grains.\par \par #Left Hydronephrosis\par - f/u urology\par \par #Anxiety\par Discussion held about controlling worries, learn ways to relax, breathing exercise, exercise, sleep hygiene, cut down caffeine.\par continue meds prn \par \par #Preoperative Exam\par - TBD L pyeloplasty with Dr Ozuna\par - previous imaging and labs reviewed\par - f/u presurgical testing blood and urine \par - ekg reviewed and interpreted - NSR no acute st or t wave changes\par - Denies history of MI, arrhythmia or valvular disease. The patient has greater than 4 mets exercise tolerance, never experiencing chest pains or dyspnea. There is no history of asthma or copd. There is no history of bleeding diathesis, DVT's or PE's. There is no history of adverse reactions to anesthesia or complications with prior surgeries.\par - pt is low risk for intermediate/moderate procedure\par - f/u vascular, neuro, endo and nephro/htn clearance\par \par \par pt agrees and understands plan via teach back method. all questions answered.\par

## 2023-01-04 NOTE — REVIEW OF SYSTEMS
[Negative] : Heme/Lymph [Dysuria] : no dysuria [Incontinence] : no incontinence [Nocturia] : no nocturia [Poor Libido] : libido not poor [Hematuria] : no hematuria [Vaginal Discharge] : no vaginal discharge [Dysmenorrhea] : no dysmenorrhea [FreeTextEntry8] : hx of urinary sx and intermittent L flank pain

## 2023-01-05 LAB
ALBUMIN SERPL ELPH-MCNC: 4.5 G/DL
ALP BLD-CCNC: 93 U/L
ALT SERPL-CCNC: 9 U/L
ANION GAP SERPL CALC-SCNC: 14 MMOL/L
APPEARANCE: CLEAR
APTT BLD: 30.6 SEC
AST SERPL-CCNC: 14 U/L
BASOPHILS # BLD AUTO: 0.08 K/UL
BASOPHILS NFR BLD AUTO: 0.9 %
BILIRUB SERPL-MCNC: 0.4 MG/DL
BILIRUBIN URINE: NEGATIVE
BLOOD URINE: NEGATIVE
BUN SERPL-MCNC: 26 MG/DL
CALCIUM SERPL-MCNC: 9.4 MG/DL
CHLORIDE SERPL-SCNC: 100 MMOL/L
CO2 SERPL-SCNC: 24 MMOL/L
COLOR: YELLOW
CREAT SERPL-MCNC: 0.84 MG/DL
EGFR: 79 ML/MIN/1.73M2
EOSINOPHIL # BLD AUTO: 0.15 K/UL
EOSINOPHIL NFR BLD AUTO: 1.7 %
GLUCOSE QUALITATIVE U: NEGATIVE
GLUCOSE SERPL-MCNC: 94 MG/DL
HCG SERPL-MCNC: 2 MIU/ML
HCT VFR BLD CALC: 46.4 %
HGB BLD-MCNC: 14.8 G/DL
IMM GRANULOCYTES NFR BLD AUTO: 0.3 %
INR PPP: 0.99 RATIO
KETONES URINE: NEGATIVE
LEUKOCYTE ESTERASE URINE: NEGATIVE
LYMPHOCYTES # BLD AUTO: 2.13 K/UL
LYMPHOCYTES NFR BLD AUTO: 23.5 %
MAN DIFF?: NORMAL
MCHC RBC-ENTMCNC: 31.8 PG
MCHC RBC-ENTMCNC: 31.9 GM/DL
MCV RBC AUTO: 99.6 FL
MONOCYTES # BLD AUTO: 0.75 K/UL
MONOCYTES NFR BLD AUTO: 8.3 %
NEUTROPHILS # BLD AUTO: 5.91 K/UL
NEUTROPHILS NFR BLD AUTO: 65.3 %
NITRITE URINE: NEGATIVE
PH URINE: 6
PLATELET # BLD AUTO: 457 K/UL
POTASSIUM SERPL-SCNC: 4.1 MMOL/L
PROT SERPL-MCNC: 7.4 G/DL
PROTEIN URINE: ABNORMAL
PT BLD: 11.5 SEC
RBC # BLD: 4.66 M/UL
RBC # FLD: 12.9 %
SODIUM SERPL-SCNC: 138 MMOL/L
SPECIFIC GRAVITY URINE: 1.03
UROBILINOGEN URINE: NORMAL
WBC # FLD AUTO: 9.05 K/UL

## 2023-01-09 ENCOUNTER — APPOINTMENT (OUTPATIENT)
Dept: UROLOGY | Facility: HOSPITAL | Age: 63
End: 2023-01-09

## 2023-01-10 ENCOUNTER — APPOINTMENT (OUTPATIENT)
Dept: SURGERY | Facility: CLINIC | Age: 63
End: 2023-01-10
Payer: COMMERCIAL

## 2023-01-10 ENCOUNTER — APPOINTMENT (OUTPATIENT)
Dept: UROLOGY | Facility: CLINIC | Age: 63
End: 2023-01-10
Payer: COMMERCIAL

## 2023-01-10 ENCOUNTER — TRANSCRIPTION ENCOUNTER (OUTPATIENT)
Age: 63
End: 2023-01-10

## 2023-01-10 DIAGNOSIS — E27.8 OTHER SPECIFIED DISORDERS OF ADRENAL GLAND: ICD-10-CM

## 2023-01-10 PROCEDURE — 99204 OFFICE O/P NEW MOD 45 MIN: CPT

## 2023-01-10 PROCEDURE — 99212 OFFICE O/P EST SF 10 MIN: CPT

## 2023-01-11 ENCOUNTER — NON-APPOINTMENT (OUTPATIENT)
Age: 63
End: 2023-01-11

## 2023-01-12 ENCOUNTER — NON-APPOINTMENT (OUTPATIENT)
Age: 63
End: 2023-01-12

## 2023-01-12 ENCOUNTER — TRANSCRIPTION ENCOUNTER (OUTPATIENT)
Age: 63
End: 2023-01-12

## 2023-01-12 LAB — BACTERIA UR CULT: NORMAL

## 2023-01-13 LAB — SARS-COV-2 N GENE NPH QL NAA+PROBE: NOT DETECTED

## 2023-01-13 NOTE — ASU PATIENT PROFILE, ADULT - FALL HARM RISK - UNIVERSAL INTERVENTIONS
Bed in lowest position, wheels locked, appropriate side rails in place/Call bell, personal items and telephone in reach/Instruct patient to call for assistance before getting out of bed or chair/[place] to call system/Physically safe environment - no spills, clutter or unnecessary equipment/Purposeful Proactive Rounding/Room/bathroom lighting operational, light cord in reach

## 2023-01-15 ENCOUNTER — TRANSCRIPTION ENCOUNTER (OUTPATIENT)
Age: 63
End: 2023-01-15

## 2023-01-16 ENCOUNTER — INPATIENT (INPATIENT)
Facility: HOSPITAL | Age: 63
LOS: 1 days | Discharge: HOME | End: 2023-01-18
Attending: UROLOGY | Admitting: UROLOGY
Payer: COMMERCIAL

## 2023-01-16 ENCOUNTER — RESULT REVIEW (OUTPATIENT)
Age: 63
End: 2023-01-16

## 2023-01-16 ENCOUNTER — APPOINTMENT (OUTPATIENT)
Dept: UROLOGY | Facility: HOSPITAL | Age: 63
End: 2023-01-16

## 2023-01-16 VITALS
TEMPERATURE: 99 F | DIASTOLIC BLOOD PRESSURE: 73 MMHG | WEIGHT: 164.02 LBS | RESPIRATION RATE: 16 BRPM | HEIGHT: 63 IN | HEART RATE: 72 BPM | SYSTOLIC BLOOD PRESSURE: 107 MMHG | OXYGEN SATURATION: 96 %

## 2023-01-16 DIAGNOSIS — Z98.890 OTHER SPECIFIED POSTPROCEDURAL STATES: Chronic | ICD-10-CM

## 2023-01-16 DIAGNOSIS — Z98.891 HISTORY OF UTERINE SCAR FROM PREVIOUS SURGERY: Chronic | ICD-10-CM

## 2023-01-16 DIAGNOSIS — Q62.39 OTHER OBSTRUCTIVE DEFECTS OF RENAL PELVIS AND URETER: ICD-10-CM

## 2023-01-16 PROBLEM — E27.8 LEFT ADRENAL MASS: Status: ACTIVE | Noted: 2022-12-06

## 2023-01-16 PROCEDURE — 88305 TISSUE EXAM BY PATHOLOGIST: CPT | Mod: 26

## 2023-01-16 DEVICE — CLIP LIGATING VESOLOCK LGE PRPL: Type: IMPLANTABLE DEVICE | Site: LEFT | Status: FUNCTIONAL

## 2023-01-16 DEVICE — STENT URET 7FRX28: Type: IMPLANTABLE DEVICE | Site: LEFT | Status: FUNCTIONAL

## 2023-01-16 DEVICE — INTRO SHEATH PEELAWAY 9FR 14CM: Type: IMPLANTABLE DEVICE | Site: LEFT | Status: FUNCTIONAL

## 2023-01-16 DEVICE — SURGICEL 2 X 14": Type: IMPLANTABLE DEVICE | Site: LEFT | Status: FUNCTIONAL

## 2023-01-16 DEVICE — GUIDEWIRE SENSOR DUAL-FLEX NITINOL STRAIGHT .038" X 150CM: Type: IMPLANTABLE DEVICE | Site: LEFT | Status: FUNCTIONAL

## 2023-01-16 DEVICE — CLIP APPLIER COVIDIEN ENDOCLIP II 10MM MED/LG: Type: IMPLANTABLE DEVICE | Site: LEFT | Status: FUNCTIONAL

## 2023-01-16 DEVICE — CLIP APPLIER COVIDIEN ENDOCLIP III 5MM: Type: IMPLANTABLE DEVICE | Site: LEFT | Status: FUNCTIONAL

## 2023-01-16 RX ORDER — OXYCODONE HYDROCHLORIDE 5 MG/1
5 TABLET ORAL EVERY 4 HOURS
Refills: 0 | Status: DISCONTINUED | OUTPATIENT
Start: 2023-01-16 | End: 2023-01-16

## 2023-01-16 RX ORDER — ACETAMINOPHEN 500 MG
975 TABLET ORAL EVERY 6 HOURS
Refills: 0 | Status: DISCONTINUED | OUTPATIENT
Start: 2023-01-16 | End: 2023-01-16

## 2023-01-16 RX ORDER — LOSARTAN POTASSIUM 100 MG/1
25 TABLET, FILM COATED ORAL
Refills: 0 | Status: DISCONTINUED | OUTPATIENT
Start: 2023-01-16 | End: 2023-01-18

## 2023-01-16 RX ORDER — ONDANSETRON 8 MG/1
4 TABLET, FILM COATED ORAL EVERY 8 HOURS
Refills: 0 | Status: DISCONTINUED | OUTPATIENT
Start: 2023-01-16 | End: 2023-01-16

## 2023-01-16 RX ORDER — METOCLOPRAMIDE HCL 10 MG
10 TABLET ORAL ONCE
Refills: 0 | Status: COMPLETED | OUTPATIENT
Start: 2023-01-16 | End: 2023-01-16

## 2023-01-16 RX ORDER — ASPIRIN/CALCIUM CARB/MAGNESIUM 324 MG
81 TABLET ORAL DAILY
Refills: 0 | Status: DISCONTINUED | OUTPATIENT
Start: 2023-01-16 | End: 2023-01-18

## 2023-01-16 RX ORDER — TRAMADOL HYDROCHLORIDE 50 MG/1
25 TABLET ORAL EVERY 4 HOURS
Refills: 0 | Status: DISCONTINUED | OUTPATIENT
Start: 2023-01-16 | End: 2023-01-18

## 2023-01-16 RX ORDER — TRAMADOL HYDROCHLORIDE 50 MG/1
50 TABLET ORAL EVERY 6 HOURS
Refills: 0 | Status: DISCONTINUED | OUTPATIENT
Start: 2023-01-16 | End: 2023-01-18

## 2023-01-16 RX ORDER — ONDANSETRON 8 MG/1
4 TABLET, FILM COATED ORAL ONCE
Refills: 0 | Status: COMPLETED | OUTPATIENT
Start: 2023-01-16 | End: 2023-01-16

## 2023-01-16 RX ORDER — LANOLIN ALCOHOL/MO/W.PET/CERES
1 CREAM (GRAM) TOPICAL
Qty: 0 | Refills: 0 | DISCHARGE

## 2023-01-16 RX ORDER — ALPRAZOLAM 0.25 MG
0.12 TABLET ORAL EVERY 8 HOURS
Refills: 0 | Status: DISCONTINUED | OUTPATIENT
Start: 2023-01-16 | End: 2023-01-18

## 2023-01-16 RX ORDER — SODIUM CHLORIDE 9 MG/ML
1000 INJECTION, SOLUTION INTRAVENOUS
Refills: 0 | Status: DISCONTINUED | OUTPATIENT
Start: 2023-01-16 | End: 2023-01-17

## 2023-01-16 RX ORDER — HEPARIN SODIUM 5000 [USP'U]/ML
5000 INJECTION INTRAVENOUS; SUBCUTANEOUS EVERY 8 HOURS
Refills: 0 | Status: DISCONTINUED | OUTPATIENT
Start: 2023-01-16 | End: 2023-01-18

## 2023-01-16 RX ORDER — LANOLIN ALCOHOL/MO/W.PET/CERES
10 CREAM (GRAM) TOPICAL AT BEDTIME
Refills: 0 | Status: DISCONTINUED | OUTPATIENT
Start: 2023-01-16 | End: 2023-01-18

## 2023-01-16 RX ORDER — ACETAMINOPHEN 500 MG
1000 TABLET ORAL EVERY 6 HOURS
Refills: 0 | Status: COMPLETED | OUTPATIENT
Start: 2023-01-16 | End: 2023-01-17

## 2023-01-16 RX ORDER — OXYCODONE HYDROCHLORIDE 5 MG/1
10 TABLET ORAL EVERY 4 HOURS
Refills: 0 | Status: DISCONTINUED | OUTPATIENT
Start: 2023-01-16 | End: 2023-01-16

## 2023-01-16 RX ORDER — HYDROMORPHONE HYDROCHLORIDE 2 MG/ML
1 INJECTION INTRAMUSCULAR; INTRAVENOUS; SUBCUTANEOUS
Refills: 0 | Status: DISCONTINUED | OUTPATIENT
Start: 2023-01-16 | End: 2023-01-16

## 2023-01-16 RX ORDER — HYDROMORPHONE HYDROCHLORIDE 2 MG/ML
0.5 INJECTION INTRAMUSCULAR; INTRAVENOUS; SUBCUTANEOUS
Refills: 0 | Status: DISCONTINUED | OUTPATIENT
Start: 2023-01-16 | End: 2023-01-16

## 2023-01-16 RX ADMIN — HEPARIN SODIUM 5000 UNIT(S): 5000 INJECTION INTRAVENOUS; SUBCUTANEOUS at 13:48

## 2023-01-16 RX ADMIN — ONDANSETRON 4 MILLIGRAM(S): 8 TABLET, FILM COATED ORAL at 20:11

## 2023-01-16 RX ADMIN — SODIUM CHLORIDE 125 MILLILITER(S): 9 INJECTION, SOLUTION INTRAVENOUS at 10:45

## 2023-01-16 RX ADMIN — Medication 1 TABLET(S): at 18:09

## 2023-01-16 RX ADMIN — Medication 10 MILLIGRAM(S): at 16:26

## 2023-01-16 RX ADMIN — ONDANSETRON 4 MILLIGRAM(S): 8 TABLET, FILM COATED ORAL at 13:26

## 2023-01-16 RX ADMIN — HYDROMORPHONE HYDROCHLORIDE 0.5 MILLIGRAM(S): 2 INJECTION INTRAMUSCULAR; INTRAVENOUS; SUBCUTANEOUS at 12:05

## 2023-01-16 RX ADMIN — Medication 400 MILLIGRAM(S): at 22:59

## 2023-01-16 RX ADMIN — LOSARTAN POTASSIUM 25 MILLIGRAM(S): 100 TABLET, FILM COATED ORAL at 18:08

## 2023-01-16 RX ADMIN — TRAMADOL HYDROCHLORIDE 50 MILLIGRAM(S): 50 TABLET ORAL at 20:11

## 2023-01-16 RX ADMIN — HYDROMORPHONE HYDROCHLORIDE 0.5 MILLIGRAM(S): 2 INJECTION INTRAMUSCULAR; INTRAVENOUS; SUBCUTANEOUS at 11:50

## 2023-01-16 RX ADMIN — Medication 975 MILLIGRAM(S): at 16:55

## 2023-01-16 RX ADMIN — Medication 10 MILLIGRAM(S): at 23:00

## 2023-01-16 NOTE — PHYSICAL EXAM
[Normal Thyroid] : the thyroid was normal [Normal Rate and Rhythm] : normal rate and rhythm [Abdominal Masses] : No abdominal masses [Abdomen Tenderness] : ~T ~M No abdominal tenderness [No Rash or Lesion] : No rash or lesion [Oriented to Person] : oriented to person [Oriented to Place] : oriented to place [Oriented to Time] : oriented to time [Calm] : calm [de-identified] : WD/WN woman in NAD [de-identified] : NCAT no scleral icterus

## 2023-01-16 NOTE — PROVIDER CONTACT NOTE (OTHER) - BACKGROUND
Pt is post op day 0 post lap pyeloplasty. Pt has been complaining of continuous nausea and feeling dizzy.

## 2023-01-16 NOTE — ASSESSMENT
[FreeTextEntry1] : The patient has a recently identified left adrenal adenoma (1.1 cm). There is a history of hypertension, however it is unclear that this is related to the adrenal adenoma. Workup is at best equivocal that there is a hormonal basis for  her symptoms. The adrenal lesion does not meet criteria for excision based on size. I recommend repeat imaging and continued urological and medical followup.

## 2023-01-16 NOTE — PROVIDER CONTACT NOTE (OTHER) - ASSESSMENT
Pt is a&o x4, on RA, denies chest pain and SOB. Pt appears pale and complaining of nausea and a sudden onset of a headache that she hasn't had since her last stroke in August 2022. VSS temp 97.9F, HR 73 bpm, /74, O2 95%.

## 2023-01-16 NOTE — PATIENT PROFILE ADULT - FUNCTIONAL ASSESSMENT - BASIC MOBILITY 4.
Attempted to call patient to inform her that a bag of clothes were found in her room after discharge. Called  listed-Rubén, she will  bag of clothes tomorrow (cell phone NOT in bag.   4 = No assist / stand by assistance

## 2023-01-16 NOTE — CHART NOTE - NSCHARTNOTEFT_GEN_A_CORE
Post op Check    Pt seen and examined without complaints. Pain is controlled. Denies SOB/CP. Feels a little nauseous after anesthesia, given zofran.     Vital Signs Last 24 Hrs  T(C): 36.6 (16 Jan 2023 14:44), Max: 37 (16 Jan 2023 06:38)  T(F): 97.8 (16 Jan 2023 14:44), Max: 98.6 (16 Jan 2023 06:38)  HR: 76 (16 Jan 2023 14:44) (53 - 76)  BP: 113/60 (16 Jan 2023 14:44) (89/62 - 116/71)  BP(mean): 82 (16 Jan 2023 13:30) (64 - 82)  RR: 17 (16 Jan 2023 14:44) (14 - 23)  SpO2: 97% (16 Jan 2023 14:44) (94% - 100%)    Parameters below as of 16 Jan 2023 14:44  Patient On (Oxygen Delivery Method): room air        I&O's Summary    16 Jan 2023 07:01  -  16 Jan 2023 15:50  --------------------------------------------------------  IN: 500 mL / OUT: 430 mL / NET: 70 mL        Physical Exam  Gen: NAD, A&Ox3  Pulm: No respiratory distress, no subcostal retractions  CV: RRR, no JVD  : kohli draining CYU, OSMIN in place; incisions c/d/i    A/P: 62y Female s/p left robotic pyeloplasty   DVT prophylaxis/OOB  Incentive spirometry  ASA  Augmentin  CLD  TOV in am   Strict I&O's  Analgesia and antiemetics as needed  AM labs

## 2023-01-16 NOTE — PATIENT PROFILE ADULT - FALL HARM RISK - HARM RISK INTERVENTIONS

## 2023-01-16 NOTE — HISTORY OF PRESENT ILLNESS
[de-identified] : The patient presents for evaluation of a 1.1 cm left adrenal adenoma.  She has been evaluated for left flank pain but has had a number of medical issues which included an episode of headache and hypertension, thought to be Press syndrome. Due to left hydronephrosis due to upj obstruction a pyeloplasty was scheduled. Further workup was performed and revealed a left carotid dissection and the adrenal adenoma. Labs initially revealed elevated liver enzymes, renin levels and potassium. Recent labs reveal normal 24 metanephrines, slight elevations in aldosterone, dopamine. Recent PET CT shows symmetrical adrenal glands. At present her meds include xanax and losartan 25mg. She has had multiple operations since 2018 without any episodes of hypertension

## 2023-01-16 NOTE — ASU PREOP CHECKLIST - LAST TOOK
last solids sat night and liquids til midinght last night/clears last solids sat night and liquids til midnight last night/clears

## 2023-01-17 ENCOUNTER — TRANSCRIPTION ENCOUNTER (OUTPATIENT)
Age: 63
End: 2023-01-17

## 2023-01-17 DIAGNOSIS — Z29.9 ENCOUNTER FOR PROPHYLACTIC MEASURES, UNSPECIFIED: ICD-10-CM

## 2023-01-17 DIAGNOSIS — I77.71 DISSECTION OF CAROTID ARTERY: ICD-10-CM

## 2023-01-17 DIAGNOSIS — I10 ESSENTIAL (PRIMARY) HYPERTENSION: ICD-10-CM

## 2023-01-17 DIAGNOSIS — N13.5 CROSSING VESSEL AND STRICTURE OF URETER WITHOUT HYDRONEPHROSIS: ICD-10-CM

## 2023-01-17 LAB
ANION GAP SERPL CALC-SCNC: 12 MMOL/L — SIGNIFICANT CHANGE UP (ref 7–14)
BUN SERPL-MCNC: 7 MG/DL — SIGNIFICANT CHANGE UP (ref 7–23)
CALCIUM SERPL-MCNC: 9 MG/DL — SIGNIFICANT CHANGE UP (ref 8.4–10.5)
CHLORIDE SERPL-SCNC: 103 MMOL/L — SIGNIFICANT CHANGE UP (ref 98–107)
CO2 SERPL-SCNC: 25 MMOL/L — SIGNIFICANT CHANGE UP (ref 22–31)
CREAT FLD-MCNC: 0.66 MG/DL — SIGNIFICANT CHANGE UP
CREAT SERPL-MCNC: 0.7 MG/DL — SIGNIFICANT CHANGE UP (ref 0.5–1.3)
EGFR: 98 ML/MIN/1.73M2 — SIGNIFICANT CHANGE UP
GLUCOSE SERPL-MCNC: 97 MG/DL — SIGNIFICANT CHANGE UP (ref 70–99)
HCT VFR BLD CALC: 38.3 % — SIGNIFICANT CHANGE UP (ref 34.5–45)
HGB BLD-MCNC: 12.1 G/DL — SIGNIFICANT CHANGE UP (ref 11.5–15.5)
MCHC RBC-ENTMCNC: 30.8 PG — SIGNIFICANT CHANGE UP (ref 27–34)
MCHC RBC-ENTMCNC: 31.6 GM/DL — LOW (ref 32–36)
MCV RBC AUTO: 97.5 FL — SIGNIFICANT CHANGE UP (ref 80–100)
NRBC # BLD: 0 /100 WBCS — SIGNIFICANT CHANGE UP (ref 0–0)
NRBC # FLD: 0 K/UL — SIGNIFICANT CHANGE UP (ref 0–0)
PLATELET # BLD AUTO: 323 K/UL — SIGNIFICANT CHANGE UP (ref 150–400)
POTASSIUM SERPL-MCNC: 3.8 MMOL/L — SIGNIFICANT CHANGE UP (ref 3.5–5.3)
POTASSIUM SERPL-SCNC: 3.8 MMOL/L — SIGNIFICANT CHANGE UP (ref 3.5–5.3)
RBC # BLD: 3.93 M/UL — SIGNIFICANT CHANGE UP (ref 3.8–5.2)
RBC # FLD: 12 % — SIGNIFICANT CHANGE UP (ref 10.3–14.5)
SODIUM SERPL-SCNC: 140 MMOL/L — SIGNIFICANT CHANGE UP (ref 135–145)
WBC # BLD: 10.38 K/UL — SIGNIFICANT CHANGE UP (ref 3.8–10.5)
WBC # FLD AUTO: 10.38 K/UL — SIGNIFICANT CHANGE UP (ref 3.8–10.5)

## 2023-01-17 PROCEDURE — 93010 ELECTROCARDIOGRAM REPORT: CPT

## 2023-01-17 RX ORDER — SODIUM CHLORIDE 9 MG/ML
500 INJECTION INTRAMUSCULAR; INTRAVENOUS; SUBCUTANEOUS ONCE
Refills: 0 | Status: COMPLETED | OUTPATIENT
Start: 2023-01-17 | End: 2023-01-17

## 2023-01-17 RX ORDER — SODIUM CHLORIDE 9 MG/ML
1000 INJECTION, SOLUTION INTRAVENOUS
Refills: 0 | Status: DISCONTINUED | OUTPATIENT
Start: 2023-01-17 | End: 2023-01-17

## 2023-01-17 RX ADMIN — HEPARIN SODIUM 5000 UNIT(S): 5000 INJECTION INTRAVENOUS; SUBCUTANEOUS at 18:17

## 2023-01-17 RX ADMIN — Medication 81 MILLIGRAM(S): at 12:25

## 2023-01-17 RX ADMIN — Medication 400 MILLIGRAM(S): at 18:17

## 2023-01-17 RX ADMIN — Medication 0.12 MILLIGRAM(S): at 01:58

## 2023-01-17 RX ADMIN — HEPARIN SODIUM 5000 UNIT(S): 5000 INJECTION INTRAVENOUS; SUBCUTANEOUS at 01:58

## 2023-01-17 RX ADMIN — Medication 0.12 MILLIGRAM(S): at 22:38

## 2023-01-17 RX ADMIN — HEPARIN SODIUM 5000 UNIT(S): 5000 INJECTION INTRAVENOUS; SUBCUTANEOUS at 10:31

## 2023-01-17 RX ADMIN — Medication 400 MILLIGRAM(S): at 06:00

## 2023-01-17 RX ADMIN — Medication 1 TABLET(S): at 06:00

## 2023-01-17 RX ADMIN — SODIUM CHLORIDE 500 MILLILITER(S): 9 INJECTION INTRAMUSCULAR; INTRAVENOUS; SUBCUTANEOUS at 08:06

## 2023-01-17 RX ADMIN — Medication 400 MILLIGRAM(S): at 12:25

## 2023-01-17 RX ADMIN — Medication 1 TABLET(S): at 18:17

## 2023-01-17 NOTE — DISCHARGE NOTE NURSING/CASE MANAGEMENT/SOCIAL WORK - NSDCPECAREGIVERED_GEN_ALL_CORE
pyeloplasty, pain after surgery, incision action plan, d/c medications, side effects handout pyeloplasty  bactrim  pain management

## 2023-01-17 NOTE — DISCHARGE NOTE NURSING/CASE MANAGEMENT/SOCIAL WORK - NSDCPNINST_GEN_ALL_CORE
Notify Dr Ozuna  if you experience any increase in pain not relived with medication, any redness, drainage or swelling around incisions, any persistent nausea vomiting or any fever >100.5.  drink plenty of fluids.  No heavy lifting or straining.  Use over the counter stool softeners to assist with constipation.

## 2023-01-17 NOTE — DISCHARGE NOTE NURSING/CASE MANAGEMENT/SOCIAL WORK - NSDCPEFALRISK_GEN_ALL_CORE
For information on Fall & Injury Prevention, visit: [email or web address] OR  [email or web address] OR  https://www.cdc.gov/steadi/patient.html

## 2023-01-17 NOTE — CONSULT NOTE ADULT - PROBLEM SELECTOR RECOMMENDATION 3
h/o chronic left ICA flap dissection, outpt f/u vascular Dr. Cordero  pt reports stroke/tia/press syndrome in 2021, transient right sided weakness, right vision loss and aphasia, now resolved,   outpt f/u neurology  preop echo hyperdynamic LV, EF 75%, no PFO

## 2023-01-17 NOTE — CONSULT NOTE ADULT - SUBJECTIVE AND OBJECTIVE BOX
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     Pt c/o dizziness this morning, bp soft, given fluid bolus, denies chest pain, but reports hx prolonged QT and fluttering sensation in her chest. Also had diarrhea yesterday, was taking augmentin preop.        PAST MEDICAL & SURGICAL HISTORY:  Hypertension      Hallux valgus (acquired), right foot  s/p correction 2018      HV (hallux valgus), left      Acquired hammertoe of left foot      Migraine      TIA (transient ischemic attack)      Posterior reversible encephalopathy syndrome (PRES)      Internal carotid artery dissection      H/O  section  X 2      H/O bilateral breast reduction surgery      H/O endoscopy  &amp; colonoscopy      S/P bunionectomy  Right and hammertoe correction 2018      H/O breast biopsy          Review of Systems:   CONSTITUTIONAL: No fever, weight loss, or fatigue  EYES: No eye pain, visual disturbances, or discharge  ENMT:  No difficulty hearing, tinnitus, vertigo; No sinus or throat pain  NECK: No pain or stiffness  BREASTS: No pain, masses, or nipple discharge  RESPIRATORY: No cough, wheezing, chills or hemoptysis; No shortness of breath  CARDIOVASCULAR: No chest pain, palpitations, dizziness, or leg swelling  GASTROINTESTINAL: No abdominal or epigastric pain. No nausea, vomiting, or hematemesis; + diarrhea or constipation. No melena or hematochezia.  GENITOURINARY: No dysuria, frequency, hematuria, or incontinence  NEUROLOGICAL: No headaches, memory loss, loss of strength, numbness, or tremors  SKIN: No itching, burning, rashes, or lesions   LYMPH NODES: No enlarged glands  ENDOCRINE: No heat or cold intolerance; No hair loss  MUSCULOSKELETAL: No joint pain or swelling; No muscle, back, or extremity pain  PSYCHIATRIC: No depression, +anxiety, mood swings, or difficulty sleeping  HEME/LYMPH: No easy bruising, or bleeding gums  ALLERY AND IMMUNOLOGIC: No hives or eczema    Allergies    No Known Allergies    Intolerances        Social History: [employer], denies smoking, 1-2 drinks wine monthly or less    FAMILY HISTORY:  Family history of diabetes mellitus (Mother)    Family history of heart disease (Father)    FH: HTN (hypertension) (Sibling)    FH: breast cancer (Mother, Sibling)        Home Medications:  aspirin 81 mg oral delayed release tablet: 1 tab(s) orally once a day (2023 06:47)  losartan 25 mg oral tablet: 1 tab(s) orally 2 times a day (2023 06:47)  melatonin 10 mg oral capsule: 1 cap(s) orally once a day (at bedtime), last dose 23 (2023 06:47)  Xanax 0.25 mg oral tablet: 0.5 tab(s) orally every 8 hours, As Needed (2023 06:47)      MEDICATIONS  (STANDING):  acetaminophen   IVPB .. 1000 milliGRAM(s) IV Intermittent every 6 hours  aspirin enteric coated 81 milliGRAM(s) Oral daily  dextrose 5% + sodium chloride 0.45%. 1000 milliLiter(s) (75 mL/Hr) IV Continuous <Continuous>  heparin   Injectable 5000 Unit(s) SubCutaneous every 8 hours  losartan 25 milliGRAM(s) Oral two times a day  melatonin 10 milliGRAM(s) Oral at bedtime  trimethoprim  160 mG/sulfamethoxazole 800 mG 1 Tablet(s) Oral daily    MEDICATIONS  (PRN):  ALPRAZolam 0.125 milliGRAM(s) Oral every 8 hours PRN as needed  traMADol 50 milliGRAM(s) Oral every 6 hours PRN Severe Pain (7 - 10)  traMADol 25 milliGRAM(s) Oral every 4 hours PRN Moderate Pain (4 - 6)      Vital Signs Last 24 Hrs  T(C): 36.7 (2023 09:51), Max: 36.7 (2023 09:51)  T(F): 98 (2023 09:51), Max: 98 (2023 09:51)  HR: 76 (2023 09:51) (61 - 81)  BP: 99/81 (2023 09:51) (99/81 - 139/74)  BP(mean): 82 (2023 13:30) (69 - 82)  RR: 17 (2023 09:51) (16 - 23)  SpO2: 98% (2023 09:51) (94% - 100%)    Parameters below as of 2023 09:51  Patient On (Oxygen Delivery Method): room air      CAPILLARY BLOOD GLUCOSE        I&O's Summary    2023 07:01  -  2023 07:00  --------------------------------------------------------  IN: 500 mL / OUT: 2792 mL / NET: -2292 mL    2023 07:01  -  2023 12:21  --------------------------------------------------------  IN: 0 mL / OUT: 350 mL / NET: -350 mL        PHYSICAL EXAM:  GENERAL: NAD, well-developed  HEAD:  Atraumatic, Normocephalic  EYES: EOMI, PERRLA, conjunctiva and sclera clear  NECK: Supple, No JVD  CHEST/LUNG: Clear to auscultation bilaterally; No wheeze  HEART: Regular rate and rhythm; No murmurs, rubs, or gallops  ABDOMEN: Soft, incisional tenderness, OSMIN with serosanguinous output  kohli removed  EXTREMITIES:  2+ Peripheral Pulses, No clubbing, cyanosis, or edema  PSYCH: AAOx3  NEUROLOGY: non-focal  SKIN: No rashes or lesions    LABS:                        12.1   10.38 )-----------( 323      ( 2023 10:00 )             38.3     01-17    140  |  103  |  7   ----------------------------<  97  3.8   |  25  |  0.70    Ca    9.0      2023 10:00        Microbiology     RADIOLOGY & ADDITIONAL TESTS:    Imaging Personally Reviewed:  < from: NM PET/CT Skull to Thigh Dotatate, Initial (22 @ 14:54) >  IMPRESSION: Somatostatin receptor characterization of the LEFT adrenal   gland is equivocal; it demonstrates physiologic activity compared with   the remainder of the gland as well as with the RIGHT-sided adrenal.   Remaining findings appear to be within physiologic limits.No additional   suspicious findings noted.    < from: US Duplex Kidneys (14. @ 11:45) >  No evidence of a significant renal artery stenosis.    Moderate left hydronephrosis of unclear etiology not demonstrated on   prior CT of 10/16      Consultant(s) Notes Reviewed:      Care Discussed with Consultants/Other Providers:   Caity Barrera MD  Pager [phone]    HPI:    63 y/o female with h/o anxiety, HTN, migraine, right nonobstructing nephrolithiasis, TIA/PRESS syndrome in 2021 , chronic Left ICA flap dissection, then had left flank pain, found to have 1.1 cm left adrenal adenoma, L hydro and right hydronephrosis d/t congenital UPJ obstruction/crossing vessels, admitted for lap left pyeloplasty, stent , EBL 300cc.     Pt c/o dizziness this morning, bp soft, given fluid bolus, denies chest pain, but reports hx prolonged QT and fluttering sensation in her chest. Also had diarrhea yesterday, was taking augmentin preop.        PAST MEDICAL & SURGICAL HISTORY:  Hypertension      Hallux valgus (acquired), right foot  s/p correction 2018      HV (hallux valgus), left      Acquired hammertoe of left foot      Migraine      TIA (transient ischemic attack)      Posterior reversible encephalopathy syndrome (PRES)      Internal carotid artery dissection      H/O  section  X 2      H/O bilateral breast reduction surgery      H/O endoscopy  &amp; colonoscopy      S/P bunionectomy  Right and hammertoe correction 2018      H/O breast biopsy          Review of Systems:   CONSTITUTIONAL: No fever, weight loss, or fatigue  EYES: No eye pain, visual disturbances, or discharge  ENMT:  No difficulty hearing, tinnitus, vertigo; No sinus or throat pain  NECK: No pain or stiffness  BREASTS: No pain, masses, or nipple discharge  RESPIRATORY: No cough, wheezing, chills or hemoptysis; No shortness of breath  CARDIOVASCULAR: No chest pain, palpitations, dizziness, or leg swelling  GASTROINTESTINAL: No abdominal or epigastric pain. No nausea, vomiting, or hematemesis; + diarrhea or constipation. No melena or hematochezia.  GENITOURINARY: No dysuria, frequency, hematuria, or incontinence  NEUROLOGICAL: No headaches, memory loss, loss of strength, numbness, or tremors  SKIN: No itching, burning, rashes, or lesions   LYMPH NODES: No enlarged glands  ENDOCRINE: No heat or cold intolerance; No hair loss  MUSCULOSKELETAL: No joint pain or swelling; No muscle, back, or extremity pain  PSYCHIATRIC: No depression, +anxiety, mood swings, or difficulty sleeping  HEME/LYMPH: No easy bruising, or bleeding gums  ALLERY AND IMMUNOLOGIC: No hives or eczema    Allergies    No Known Allergies    Intolerances        Social History: [employer], denies smoking, 1-2 drinks wine monthly or less    FAMILY HISTORY:  Family history of diabetes mellitus (Mother)    Family history of heart disease (Father)    FH: HTN (hypertension) (Sibling)    FH: breast cancer (Mother, Sibling)        Home Medications:  aspirin 81 mg oral delayed release tablet: 1 tab(s) orally once a day (2023 06:47)  losartan 25 mg oral tablet: 1 tab(s) orally 2 times a day (2023 06:47)  melatonin 10 mg oral capsule: 1 cap(s) orally once a day (at bedtime), last dose 23 (2023 06:47)  Xanax 0.25 mg oral tablet: 0.5 tab(s) orally every 8 hours, As Needed (2023 06:47)      MEDICATIONS  (STANDING):  acetaminophen   IVPB .. 1000 milliGRAM(s) IV Intermittent every 6 hours  aspirin enteric coated 81 milliGRAM(s) Oral daily  dextrose 5% + sodium chloride 0.45%. 1000 milliLiter(s) (75 mL/Hr) IV Continuous <Continuous>  heparin   Injectable 5000 Unit(s) SubCutaneous every 8 hours  losartan 25 milliGRAM(s) Oral two times a day  melatonin 10 milliGRAM(s) Oral at bedtime  trimethoprim  160 mG/sulfamethoxazole 800 mG 1 Tablet(s) Oral daily    MEDICATIONS  (PRN):  ALPRAZolam 0.125 milliGRAM(s) Oral every 8 hours PRN as needed  traMADol 50 milliGRAM(s) Oral every 6 hours PRN Severe Pain (7 - 10)  traMADol 25 milliGRAM(s) Oral every 4 hours PRN Moderate Pain (4 - 6)      Vital Signs Last 24 Hrs  T(C): 36.7 (2023 09:51), Max: 36.7 (2023 09:51)  T(F): 98 (2023 09:51), Max: 98 (2023 09:51)  HR: 76 (2023 09:51) (61 - 81)  BP: 99/81 (2023 09:51) (99/81 - 139/74)  BP(mean): 82 (2023 13:30) (69 - 82)  RR: 17 (2023 09:51) (16 - 23)  SpO2: 98% (2023 09:51) (94% - 100%)    Parameters below as of 2023 09:51  Patient On (Oxygen Delivery Method): room air      CAPILLARY BLOOD GLUCOSE        I&O's Summary    2023 07:01  -  2023 07:00  --------------------------------------------------------  IN: 500 mL / OUT: 2792 mL / NET: -2292 mL    2023 07:01  -  2023 12:21  --------------------------------------------------------  IN: 0 mL / OUT: 350 mL / NET: -350 mL        PHYSICAL EXAM:  GENERAL: NAD, well-developed  HEAD:  Atraumatic, Normocephalic  EYES: EOMI, PERRLA, conjunctiva and sclera clear  NECK: Supple, No JVD  CHEST/LUNG: Clear to auscultation bilaterally; No wheeze  HEART: Regular rate and rhythm; No murmurs, rubs, or gallops  ABDOMEN: Soft, incisional tenderness, OSMIN with serosanguinous output  kohli removed  EXTREMITIES:  2+ Peripheral Pulses, No clubbing, cyanosis, or edema  PSYCH: AAOx3  NEUROLOGY: non-focal  SKIN: No rashes or lesions    LABS:                        12.1   10.38 )-----------( 323      ( 2023 10:00 )             38.3     01-17    140  |  103  |  7   ----------------------------<  97  3.8   |  25  |  0.70    Ca    9.0      2023 10:00        Microbiology     RADIOLOGY & ADDITIONAL TESTS:    Imaging Personally Reviewed:  < from: NM PET/CT Skull to Thigh Dotatate, Initial (22 @ 14:54) >  IMPRESSION: Somatostatin receptor characterization of the LEFT adrenal   gland is equivocal; it demonstrates physiologic activity compared with   the remainder of the gland as well as with the RIGHT-sided adrenal.   Remaining findings appear to be within physiologic limits.No additional   suspicious findings noted.    < from: US Duplex Kidneys (22 @ 11:45) >  No evidence of a significant renal artery stenosis.    Moderate left hydronephrosis of unclear etiology not demonstrated on   prior CT of 10/16      Consultant(s) Notes Reviewed:      Care Discussed with Consultants/Other Providers:

## 2023-01-17 NOTE — CONSULT NOTE ADULT - PROBLEM SELECTOR RECOMMENDATION 2
BP soft with dizziness, given fluid bolus  takes losartan 25mg bid at home, Hold Losartan for now  Previously labile bp, f/u nephro and endo as outpt, endo doesn't think pt has pheochromocytoma  pt with fluttering sensation in her chest this morning, denies chest pain or sob, EKG reviewed, nsr w/o ischemic changes, QTc 445ms wnl, labs reviewed BP soft with dizziness, given fluid bolus  takes losartan 25mg bid at home, Hold Losartan for now  Previously labile bp, left adrenal adenoma, f/u nephro and endo as outpt,   adrenal adenoma workup is not consistent with pheochromocytoma  pt with fluttering sensation in her chest this morning, denies chest pain or sob, EKG reviewed, nsr w/o ischemic changes, QTc 445ms wnl, labs reviewed

## 2023-01-17 NOTE — CONSULT NOTE ADULT - ASSESSMENT
63 y/o female with h/o anxiety, HTN, migraine, TIA/PRESS syndrome in august 2021 , chronic Left ICA flap dissection, then had left flank pain, found to have 1.1 cm left renal adenoma, L hydro and right hydronephrosis d/t congenital UPJ obstruction/crossing vessels, admitted for lap left pyeloplasty, stent , EBL 300cc.  63 y/o female with h/o anxiety, HTN, migraine, TIA/PRESS syndrome in august 2021 , chronic Left ICA flap dissection, then had left flank pain, found to have 1.1 cm left adrenal adenoma, L hydro and right hydronephrosis d/t congenital UPJ obstruction/crossing vessels, admitted for lap left pyeloplasty, stent , EBL 300cc.

## 2023-01-17 NOTE — CONSULT NOTE ADULT - PROBLEM SELECTOR RECOMMENDATION 9
-s/p lap left pyeloplasty, stent on 1/16, EBL 300cc  -postop management per urology, kohli removed, pain control, IVF

## 2023-01-17 NOTE — PROGRESS NOTE ADULT - SUBJECTIVE AND OBJECTIVE BOX
Subjective  Nauseous overnight, improved. Positive gas and BM, has not gotten OOB.     Objective    Vital signs  T(F): , Max: 97.9 (01-16-23 @ 22:00)  HR: 77 (01-17-23 @ 06:08)  BP: 109/91 (01-17-23 @ 06:08)  SpO2: 99% (01-17-23 @ 06:08)  Wt(kg): --    Output     OUT:    Bulb (mL): 12 mL    Indwelling Catheter - Urethral (mL): 2780 mL  Total OUT: 2792 mL    Total NET: -2792 mL      OUT:    Voided (mL): 125 mL  Total OUT: 125 mL    Total NET: -125 mL          Gen: NAD  Abd: soft, nontender, nondistended; OSMIN with SS output   : kohli removed    Labs pending

## 2023-01-17 NOTE — DISCHARGE NOTE NURSING/CASE MANAGEMENT/SOCIAL WORK - PATIENT PORTAL LINK FT
You can access the FollowMyHealth Patient Portal offered by [facility] by registering at the following website: [email or web address]. By joining [facility]’s FollowMyHealth portal, you will also be able to view your health information using other applications (apps) compatible with our system.

## 2023-01-18 ENCOUNTER — TRANSCRIPTION ENCOUNTER (OUTPATIENT)
Age: 63
End: 2023-01-18

## 2023-01-18 VITALS
OXYGEN SATURATION: 100 % | SYSTOLIC BLOOD PRESSURE: 112 MMHG | HEART RATE: 86 BPM | RESPIRATION RATE: 16 BRPM | TEMPERATURE: 98 F | DIASTOLIC BLOOD PRESSURE: 61 MMHG

## 2023-01-18 RX ORDER — ALPRAZOLAM 0.25 MG
0.5 TABLET ORAL
Qty: 0 | Refills: 0 | DISCHARGE

## 2023-01-18 RX ORDER — TRIMETHOPRIM HYDROCHLORIDE 50 MG/5ML
1 SOLUTION ORAL
Qty: 40 | Refills: 0
Start: 2023-01-18 | End: 2023-02-26

## 2023-01-18 RX ORDER — LOSARTAN POTASSIUM 100 MG/1
1 TABLET, FILM COATED ORAL
Qty: 0 | Refills: 0 | DISCHARGE

## 2023-01-18 RX ORDER — ALPRAZOLAM 0.25 MG
1 TABLET ORAL
Qty: 6 | Refills: 0
Start: 2023-01-18 | End: 2023-01-19

## 2023-01-18 RX ORDER — SODIUM CHLORIDE 9 MG/ML
500 INJECTION INTRAMUSCULAR; INTRAVENOUS; SUBCUTANEOUS ONCE
Refills: 0 | Status: COMPLETED | OUTPATIENT
Start: 2023-01-18 | End: 2023-01-18

## 2023-01-18 RX ORDER — ACETAMINOPHEN 500 MG
1000 TABLET ORAL ONCE
Refills: 0 | Status: COMPLETED | OUTPATIENT
Start: 2023-01-18 | End: 2023-01-18

## 2023-01-18 RX ADMIN — HEPARIN SODIUM 5000 UNIT(S): 5000 INJECTION INTRAVENOUS; SUBCUTANEOUS at 02:19

## 2023-01-18 RX ADMIN — Medication 400 MILLIGRAM(S): at 03:48

## 2023-01-18 RX ADMIN — SODIUM CHLORIDE 1000 MILLILITER(S): 9 INJECTION INTRAMUSCULAR; INTRAVENOUS; SUBCUTANEOUS at 05:42

## 2023-01-18 NOTE — DISCHARGE NOTE PROVIDER - CARE PROVIDER_API CALL
Jesús Ozuna; JOSSELINE)  Urology  [address] [place] [address]  Phone: [phone]  Fax: [phone]  Follow Up Time:

## 2023-01-18 NOTE — PROGRESS NOTE ADULT - ASSESSMENT
62 year old female s/p laparoscopic left pyeloplasty and stent placement     1/17: Nauseous overnight, improved. Positive gas and BM, has not gotten OOB.     Plan:  -Follow up AM CBC/BMP  -Ambi/OOB/IS  -IVF  -Regular diet   -TOV followed by OSMIN creatinine after voiding  -Bactrim, trimethoprim on DC  -DVT ppx  
62 year old female s/p laparoscopic left pyeloplasty and stent placement     1/17: Nauseous overnight, improved. Positive gas and BM, has not gotten OOB.   1/18: doing well. OSMIN drain removed. tolerating regular diet.    Plan:  -Ambi/OOB/IS  -IVF  -Regular diet   -Bactrim, trimethoprim on DC  -DVT ppx

## 2023-01-18 NOTE — DISCHARGE NOTE PROVIDER - NSDCFUSCHEDAPPT_GEN_ALL_CORE_FT
[facility]  [facility] [address]  Scheduled Appointment: 01/24/2023

## 2023-01-18 NOTE — DISCHARGE NOTE PROVIDER - NSDCCPCAREPLAN_GEN_ALL_CORE_FT
PRINCIPAL DISCHARGE DIAGNOSIS  Diagnosis: Ureteropelvic junction (UPJ) obstruction, left  Assessment and Plan of Treatment: Drink plenty of fluids.  No heavy lifting (greater than 10 pounds) or straining for 4 to 6 weeks.  You may shower, just pat white strips dry, they will fall off in a few weeks. Change dressing at drain site daily or as needed until dry.   's  office will call to schedule a follow up appointment.  Call the office if you have fever greater than 101, difficulty urinating, pain not relieved with pain medication, nausea/vomiting..

## 2023-01-18 NOTE — DISCHARGE NOTE PROVIDER - HOSPITAL COURSE
Pt had lap L. pyeloplasty; did well; had some nausea post op but resolved; ambulating; pain controlled; passed gas and el reg diet; labs stable; voided well, PVR minimal; OSMIN removed; home today.

## 2023-01-18 NOTE — DISCHARGE NOTE PROVIDER - NSDCMRMEDTOKEN_GEN_ALL_CORE_FT
aspirin 81 mg oral delayed release tablet: 1 tab(s) orally once a day  melatonin 10 mg oral capsule: 1 cap(s) orally once a day (at bedtime), last dose 1/1/23  trimethoprim 100 mg oral tablet: 1 tab(s) orally once a day   Xanax 0.25 mg oral tablet: 1 tab(s) orally every 8 hours, As Needed MDD:3

## 2023-01-18 NOTE — PROGRESS NOTE ADULT - SUBJECTIVE AND OBJECTIVE BOX
UROLOGY DAILY PROGRESS NOTE:     Subjective:    No events overnight.  Feels well.  SBP soft to 90s this AM. HR normal. Otherwise BP has been stable    Objective:    NAD, awake and alert  Respirations nonlabored  Abdomen soft, appropriately tender, nondistended  Incisions dressed    MEDICATIONS  (STANDING):  aspirin enteric coated 81 milliGRAM(s) Oral daily  heparin   Injectable 5000 Unit(s) SubCutaneous every 8 hours  losartan 25 milliGRAM(s) Oral two times a day  melatonin 10 milliGRAM(s) Oral at bedtime  trimethoprim  160 mG/sulfamethoxazole 800 mG 1 Tablet(s) Oral daily    MEDICATIONS  (PRN):  ALPRAZolam 0.125 milliGRAM(s) Oral every 8 hours PRN as needed  traMADol 50 milliGRAM(s) Oral every 6 hours PRN Severe Pain (7 - 10)  traMADol 25 milliGRAM(s) Oral every 4 hours PRN Moderate Pain (4 - 6)      Vital Signs Last 24 Hrs  T(C): 36.8 (18 Jan 2023 05:02), Max: 37.3 (18 Jan 2023 01:52)  T(F): 98.2 (18 Jan 2023 05:02), Max: 99.2 (18 Jan 2023 01:52)  HR: 77 (18 Jan 2023 05:02) (71 - 92)  BP: 94/43 (18 Jan 2023 05:02) (94/43 - 118/71)  BP(mean): --  RR: 16 (18 Jan 2023 05:02) (16 - 18)  SpO2: 95% (18 Jan 2023 05:02) (95% - 100%)    Parameters below as of 18 Jan 2023 05:02  Patient On (Oxygen Delivery Method): room air        I&O's Detail    16 Jan 2023 07:01  -  17 Jan 2023 07:00  --------------------------------------------------------  IN:    Lactated Ringers: 500 mL  Total IN: 500 mL    OUT:    Bulb (mL): 12 mL    Indwelling Catheter - Urethral (mL): 2780 mL  Total OUT: 2792 mL    Total NET: -2292 mL      17 Jan 2023 07:01 - 18 Jan 2023 05:47  --------------------------------------------------------  IN:  Total IN: 0 mL    OUT:    Bulb (mL): 5 mL    Voided (mL): 1595 mL  Total OUT: 1600 mL    Total NET: -1600 mL          Daily     Daily     LABS:                        12.1   10.38 )-----------( 323      ( 17 Jan 2023 10:00 )             38.3     01-17    140  |  103  |  7   ----------------------------<  97  3.8   |  25  |  0.70    Ca    9.0      17 Jan 2023 10:00

## 2023-01-19 ENCOUNTER — NON-APPOINTMENT (OUTPATIENT)
Age: 63
End: 2023-01-19

## 2023-01-19 LAB — RENIN DIRECT, PLASMA: 34.7 PG/ML — HIGH

## 2023-01-20 LAB — SURGICAL PATHOLOGY STUDY: SIGNIFICANT CHANGE UP

## 2023-01-24 ENCOUNTER — APPOINTMENT (OUTPATIENT)
Dept: PHYSICAL MEDICINE AND REHAB | Facility: CLINIC | Age: 63
End: 2023-01-24
Payer: COMMERCIAL

## 2023-01-24 VITALS
DIASTOLIC BLOOD PRESSURE: 57 MMHG | BODY MASS INDEX: 27.97 KG/M2 | WEIGHT: 152 LBS | HEART RATE: 84 BPM | OXYGEN SATURATION: 100 % | TEMPERATURE: 97.7 F | SYSTOLIC BLOOD PRESSURE: 139 MMHG | HEIGHT: 62 IN

## 2023-01-24 VITALS — DIASTOLIC BLOOD PRESSURE: 69 MMHG | SYSTOLIC BLOOD PRESSURE: 126 MMHG

## 2023-01-24 DIAGNOSIS — R47.01 APHASIA: ICD-10-CM

## 2023-01-24 DIAGNOSIS — R41.89 OTHER SYMPTOMS AND SIGNS INVOLVING COGNITIVE FUNCTIONS AND AWARENESS: ICD-10-CM

## 2023-01-24 DIAGNOSIS — I67.83 POSTERIOR REVERSIBLE ENCEPHALOPATHY SYNDROME: ICD-10-CM

## 2023-01-24 DIAGNOSIS — R26.89 OTHER ABNORMALITIES OF GAIT AND MOBILITY: ICD-10-CM

## 2023-01-24 PROCEDURE — 99213 OFFICE O/P EST LOW 20 MIN: CPT

## 2023-01-24 NOTE — ASSESSMENT
[FreeTextEntry1] : Improving but still with some deficits.\par \par --Will benefit from course of PT and speech therapy\par \par -- f/u in 2 months.  \par \par

## 2023-01-24 NOTE — HISTORY OF PRESENT ILLNESS
[FreeTextEntry1] : BIU Admission Date	06-Sep-2022 to 24-Sep-2022 \par Reason for Admission	PRES \par Hospital Course	 \par ADDIE/AURORA MARIE is a 60yo Female with PMHx of HTN, migraines, and TIA (last seen at [facility] in 11/2021), who presented to [facility] on 08/31/2022 with HA, nausea, vomiting, and word-finding difficulties; was found to have findings on CT and MRI head consistent with PRES. No tPA was given, as \par she presented outside of therapeutic window and LVO not performed, as no LVO was identified on CT angio head/neck. Patient was followed by neurology and cardiology during her admission. TTE and EEG were unremarkable; started on ASA.\par \par last seen 12/15/22\par \par At that time pt. was not ready for outpatient therapy as she needed f/u with Endocrinology and urology for management and surgical procedure.\par \par Pt had lap L. laparotic pyeloplasty, cytoscopy, and stent placement on 1/16/22 for Left ureteropelvic Junction obstruction.  \par \par Pt. states she is recovering well from her surgery.  \par \par Pt. ambulating independently inside and outside home. Has some trouble descending steps as cannot sense where last step is. \par ADLs independently\par Driving short distances independently\par No visual symptoms\par \par Reports fatigue is somewhat better but still present.  Still difficulty multitasking.  Reports some slight anomia.  Trying to gradually increase activity and working on cognitve tasks. \par \par No dizziness, lightheadedness. \par

## 2023-01-24 NOTE — PHYSICAL EXAM
[FreeTextEntry1] : Constitutional: Alert, awake, no acute distress\par HEENT: EOMI, NC/AT, \par Cardiovascular: All extremities warm and well perfused\par Pulmonary: No respiratory distress, normal chest wall expansion\par Gastrointestinal: No abdominal distention\par \par Neuro:\par AAOx3--knew month, day and year but not date, MOCA 28/30\par Recall 5/5 spontaneously. \par Attention--able to complete MOYB, reverse digit span and serial 7's\par Executive function--able to complete Trails B and clock drawing. \par Speech-- Mild difficulty with repetition\par \par CN: intact no nystagmus, visual fields intact, PERRLA, EOMI, no facial weakness or sensory deficit, shoulder jessica intact, tongue midline\par Motor 5/5 bilat UE and LE\par Sens--Intact face and bilat.  UE, intact bilat. LEs. \par Coordination intact--FNF and HTS intact\par Proprioception: intact bilat. LEs\par \par Gait--normal gait pattern

## 2023-01-28 LAB — RENIN PLAS-CCNC: 2.71 NG/ML/HR — SIGNIFICANT CHANGE UP (ref 0.17–5.38)

## 2023-01-30 DIAGNOSIS — L08.9 OTHER INJURY OF UNSPECIFIED BODY REGION, INITIAL ENCOUNTER: ICD-10-CM

## 2023-01-30 DIAGNOSIS — T14.8XXA OTHER INJURY OF UNSPECIFIED BODY REGION, INITIAL ENCOUNTER: ICD-10-CM

## 2023-02-03 ENCOUNTER — APPOINTMENT (OUTPATIENT)
Dept: UROLOGY | Facility: CLINIC | Age: 63
End: 2023-02-03
Payer: COMMERCIAL

## 2023-02-03 VITALS
RESPIRATION RATE: 16 BRPM | TEMPERATURE: 97.2 F | SYSTOLIC BLOOD PRESSURE: 132 MMHG | HEART RATE: 87 BPM | DIASTOLIC BLOOD PRESSURE: 84 MMHG

## 2023-02-03 DIAGNOSIS — Q62.11 CONGENITAL OCCLUSION OF URETEROPELVIC JUNCTION: ICD-10-CM

## 2023-02-03 PROCEDURE — 99024 POSTOP FOLLOW-UP VISIT: CPT

## 2023-02-03 NOTE — ASSESSMENT
[FreeTextEntry1] : Small wound opened with white eschar . Silver nitrate applied with Band-Aid. She want to return to work Feb 28th

## 2023-02-03 NOTE — HISTORY OF PRESENT ILLNESS
[FreeTextEntry1] : Left pyeloplasty done Jan 16th  She noticed a small opening on her abdominal wound . \par She was applying bacitracin .  [None] : no symptoms

## 2023-02-03 NOTE — PHYSICAL EXAM
[General Appearance - Well Developed] : well developed [General Appearance - Well Nourished] : well nourished [Abdomen Soft] : soft [Skin Color & Pigmentation] : normal skin color and pigmentation [Heart Rate And Rhythm] : Heart rate and rhythm were normal [] : no respiratory distress [Not Anxious] : not anxious [Normal Station and Gait] : the gait and station were normal for the patient's age

## 2023-02-24 ENCOUNTER — APPOINTMENT (OUTPATIENT)
Dept: UROLOGY | Facility: CLINIC | Age: 63
End: 2023-02-24
Payer: COMMERCIAL

## 2023-02-24 ENCOUNTER — OUTPATIENT (OUTPATIENT)
Dept: OUTPATIENT SERVICES | Facility: HOSPITAL | Age: 63
LOS: 1 days | End: 2023-02-24
Payer: COMMERCIAL

## 2023-02-24 DIAGNOSIS — Z98.890 OTHER SPECIFIED POSTPROCEDURAL STATES: Chronic | ICD-10-CM

## 2023-02-24 DIAGNOSIS — R35.0 FREQUENCY OF MICTURITION: ICD-10-CM

## 2023-02-24 DIAGNOSIS — Q62.39 OTHER OBSTRUCTIVE DEFECTS OF RENAL PELVIS AND URETER: ICD-10-CM

## 2023-02-24 DIAGNOSIS — Z98.891 HISTORY OF UTERINE SCAR FROM PREVIOUS SURGERY: Chronic | ICD-10-CM

## 2023-02-24 PROCEDURE — 52310 CYSTOSCOPY AND TREATMENT: CPT | Mod: 58

## 2023-02-24 PROCEDURE — 52310 CYSTOSCOPY AND TREATMENT: CPT | Mod: 58,LT

## 2023-02-24 PROCEDURE — 52310 CYSTOSCOPY AND TREATMENT: CPT

## 2023-02-27 DIAGNOSIS — Q62.39 OTHER OBSTRUCTIVE DEFECTS OF RENAL PELVIS AND URETER: ICD-10-CM

## 2023-03-09 ENCOUNTER — APPOINTMENT (OUTPATIENT)
Dept: BARIATRICS/WEIGHT MGMT | Facility: CLINIC | Age: 63
End: 2023-03-09
Payer: COMMERCIAL

## 2023-03-09 VITALS — HEIGHT: 61 IN | BODY MASS INDEX: 31.49 KG/M2 | WEIGHT: 166.8 LBS

## 2023-03-09 DIAGNOSIS — R53.83 OTHER FATIGUE: ICD-10-CM

## 2023-03-09 DIAGNOSIS — E66.3 OVERWEIGHT: ICD-10-CM

## 2023-03-09 PROCEDURE — 99205 OFFICE O/P NEW HI 60 MIN: CPT | Mod: 95

## 2023-03-09 NOTE — ASSESSMENT
[FreeTextEntry1] : 62 y.o F with Class 1 obesity, HTN, TIA/PRES syndrome now HTN controlled, snoring - no sleep screen, HLD, pre\par \par - rec NELLIE screen - will give pulm reqs\par - email handouts\par - start Wegovy. Side effects reviewed, patient did not have further questions.\par - have snack before leaving work - banana/nuts, or hummus/carrots\par - continue water intake habits. \par - incr peloton/bike/steps as tolerated\par - daughter also doing program\par - plan family outings based on an activity instead of food\par - add high fiber carb to lunch\par \par f/u 4-6 weeks - will call

## 2023-03-09 NOTE — REASON FOR VISIT
[Home] : at home, [unfilled] , at the time of the visit. [Medical Office: ([facility])___] : at the medical office located in  [Initial Evaluation] : an initial evaluation

## 2023-03-09 NOTE — HISTORY OF PRESENT ILLNESS
[FreeTextEntry1] : Bariatric surgery history: none\par Obesity co-morbidities: HTN, TIA, GERD - prn meds only, hiatal hernia\par Comorbidities improved or resolved: none\par Anti-obesity medications: none\par Obesity medication side effects: none\par \par PATIENT WAS NOTIFIED THAT ANYTHING WE DISCUSSED IN OUR MEETING TODAY MAY BE REFLECTED IN WRITING IN THE VISIT NOTE WHICH WILL BE AVAILABLE TO OTHER MEDICAL PROVIDERS TO REVIEW AS PART OF ROUTINE PATIENT CARE.  PATIENT VERBALLY AGREED. \par \par Ms. EZRA MARIE is a 62 year year old female who presents for evaluation and treatment of Class 1 obesity.\par \par Obesity related co- morbidities: HTN, TIA/PRES syndrome, GERD - prn meds only, hiatal hernia\par Jan 2023 - L pyeloplasty, healing\par \par Patient lives -  and 2 adult children\par Employment status -  RN\par \par Weight History:\par Lowest adult weight: 120\par Highest adult weight: 180\par \par Has lost 10 pounds over the past year with self-dieting, working out\par \par Obesity began in young adulthood.  Weight gain has occurred with: social events are around food, emotinoal eating w life stressors, menopause\par \par Past weight loss attempts include:  WW, liquid diet, Анна Cyrus. These have produced a maximum of 20 pounds of weight loss.  \par Anti-obesity medications in the past: fen-phen\par \par Reasons for desiring weight loss: health, improve HTN\par \par Sleep: 7 hours. some snoring.  hasn't had sleep study done. \par \par Has 2 regular meals a day.  \par \par Diet history:\par wakes up at:\par B: 645 am 2 hard boiled eggs, Fage yogurt. Or Lean24 shake w almond milk and strawberries, \par L: 1-2pm brings lunch to work - salad w protein.  Or salmon and salad.  \par snack - at home\par D: eats while she's cooking - 6-7 pm - seafood, veg during the week\par 10pm - ice cream bc it helps her go to sleep\par The weekends - frequently doesn't eat as much during the day\par \par snacks: evening, late night - ice cream\par eating after dinner: yes sometimes\par overeating episodes: sometimes at dinner, take out\par \par Sodas/fast food/processed foods: weekly - all types\par \par Water intake per day: 6-8 cups per day. \par tea 1-2 cups \par \par Physical activity:\par Patient enjoys: bike riding, Peloton\par Current physical activity: 2 times a week Peloton 30-45 min. previously going to the gym.\par \par \par Habits patient would like to change: incr activity, Peloton\par Level of interest in losing weight: 4/5\par Community support: 5/5\par \par Factors that have helped in the past with losing weight and keeping it off: none\par \par

## 2023-03-24 ENCOUNTER — APPOINTMENT (OUTPATIENT)
Dept: UROLOGY | Facility: CLINIC | Age: 63
End: 2023-03-24
Payer: COMMERCIAL

## 2023-03-24 ENCOUNTER — OUTPATIENT (OUTPATIENT)
Dept: OUTPATIENT SERVICES | Facility: HOSPITAL | Age: 63
LOS: 1 days | End: 2023-03-24
Payer: COMMERCIAL

## 2023-03-24 DIAGNOSIS — N13.30 UNSPECIFIED HYDRONEPHROSIS: ICD-10-CM

## 2023-03-24 DIAGNOSIS — Z98.890 OTHER SPECIFIED POSTPROCEDURAL STATES: Chronic | ICD-10-CM

## 2023-03-24 DIAGNOSIS — Z98.891 HISTORY OF UTERINE SCAR FROM PREVIOUS SURGERY: Chronic | ICD-10-CM

## 2023-03-24 DIAGNOSIS — R35.0 FREQUENCY OF MICTURITION: ICD-10-CM

## 2023-03-24 PROCEDURE — 99213 OFFICE O/P EST LOW 20 MIN: CPT | Mod: 24

## 2023-03-24 PROCEDURE — 76775 US EXAM ABDO BACK WALL LIM: CPT | Mod: 26

## 2023-03-24 PROCEDURE — 76775 US EXAM ABDO BACK WALL LIM: CPT

## 2023-03-27 DIAGNOSIS — N13.30 UNSPECIFIED HYDRONEPHROSIS: ICD-10-CM

## 2023-03-29 ENCOUNTER — RX RENEWAL (OUTPATIENT)
Age: 63
End: 2023-03-29

## 2023-04-01 ENCOUNTER — EMERGENCY (EMERGENCY)
Facility: HOSPITAL | Age: 63
LOS: 1 days | Discharge: HOME | End: 2023-04-01
Attending: PERSONAL EMERGENCY RESPONSE ATTENDANT | Admitting: PERSONAL EMERGENCY RESPONSE ATTENDANT
Payer: COMMERCIAL

## 2023-04-01 VITALS
DIASTOLIC BLOOD PRESSURE: 93 MMHG | SYSTOLIC BLOOD PRESSURE: 147 MMHG | TEMPERATURE: 98 F | RESPIRATION RATE: 16 BRPM | OXYGEN SATURATION: 100 % | HEART RATE: 87 BPM

## 2023-04-01 DIAGNOSIS — Z98.890 OTHER SPECIFIED POSTPROCEDURAL STATES: Chronic | ICD-10-CM

## 2023-04-01 DIAGNOSIS — Z98.891 HISTORY OF UTERINE SCAR FROM PREVIOUS SURGERY: Chronic | ICD-10-CM

## 2023-04-01 LAB
ALBUMIN SERPL ELPH-MCNC: 4.4 G/DL — SIGNIFICANT CHANGE UP (ref 3.3–5)
ALP SERPL-CCNC: 91 U/L — SIGNIFICANT CHANGE UP (ref 40–120)
ALT FLD-CCNC: 6 U/L — SIGNIFICANT CHANGE UP (ref 4–33)
ANION GAP SERPL CALC-SCNC: 12 MMOL/L — SIGNIFICANT CHANGE UP (ref 7–14)
AST SERPL-CCNC: 16 U/L — SIGNIFICANT CHANGE UP (ref 4–32)
BASOPHILS # BLD AUTO: 0.06 K/UL — SIGNIFICANT CHANGE UP (ref 0–0.2)
BASOPHILS NFR BLD AUTO: 0.5 % — SIGNIFICANT CHANGE UP (ref 0–2)
BILIRUB SERPL-MCNC: 0.6 MG/DL — SIGNIFICANT CHANGE UP (ref 0.2–1.2)
BUN SERPL-MCNC: 13 MG/DL — SIGNIFICANT CHANGE UP (ref 7–23)
CALCIUM SERPL-MCNC: 9.7 MG/DL — SIGNIFICANT CHANGE UP (ref 8.4–10.5)
CHLORIDE SERPL-SCNC: 100 MMOL/L — SIGNIFICANT CHANGE UP (ref 98–107)
CO2 SERPL-SCNC: 24 MMOL/L — SIGNIFICANT CHANGE UP (ref 22–31)
CREAT SERPL-MCNC: 0.73 MG/DL — SIGNIFICANT CHANGE UP (ref 0.5–1.3)
EGFR: 93 ML/MIN/1.73M2 — SIGNIFICANT CHANGE UP
EOSINOPHIL # BLD AUTO: 0.08 K/UL — SIGNIFICANT CHANGE UP (ref 0–0.5)
EOSINOPHIL NFR BLD AUTO: 0.7 % — SIGNIFICANT CHANGE UP (ref 0–6)
GLUCOSE SERPL-MCNC: 86 MG/DL — SIGNIFICANT CHANGE UP (ref 70–99)
HCT VFR BLD CALC: 43.9 % — SIGNIFICANT CHANGE UP (ref 34.5–45)
HGB BLD-MCNC: 14.2 G/DL — SIGNIFICANT CHANGE UP (ref 11.5–15.5)
IANC: 8.3 K/UL — HIGH (ref 1.8–7.4)
IMM GRANULOCYTES NFR BLD AUTO: 0.4 % — SIGNIFICANT CHANGE UP (ref 0–0.9)
LYMPHOCYTES # BLD AUTO: 2.73 K/UL — SIGNIFICANT CHANGE UP (ref 1–3.3)
LYMPHOCYTES # BLD AUTO: 22.2 % — SIGNIFICANT CHANGE UP (ref 13–44)
MCHC RBC-ENTMCNC: 30.2 PG — SIGNIFICANT CHANGE UP (ref 27–34)
MCHC RBC-ENTMCNC: 32.3 GM/DL — SIGNIFICANT CHANGE UP (ref 32–36)
MCV RBC AUTO: 93.4 FL — SIGNIFICANT CHANGE UP (ref 80–100)
MONOCYTES # BLD AUTO: 1.05 K/UL — HIGH (ref 0–0.9)
MONOCYTES NFR BLD AUTO: 8.6 % — SIGNIFICANT CHANGE UP (ref 2–14)
NEUTROPHILS # BLD AUTO: 8.3 K/UL — HIGH (ref 1.8–7.4)
NEUTROPHILS NFR BLD AUTO: 67.6 % — SIGNIFICANT CHANGE UP (ref 43–77)
NRBC # BLD: 0 /100 WBCS — SIGNIFICANT CHANGE UP (ref 0–0)
NRBC # FLD: 0 K/UL — SIGNIFICANT CHANGE UP (ref 0–0)
PLATELET # BLD AUTO: 383 K/UL — SIGNIFICANT CHANGE UP (ref 150–400)
POTASSIUM SERPL-MCNC: 3.7 MMOL/L — SIGNIFICANT CHANGE UP (ref 3.5–5.3)
POTASSIUM SERPL-SCNC: 3.7 MMOL/L — SIGNIFICANT CHANGE UP (ref 3.5–5.3)
PROT SERPL-MCNC: 7.6 G/DL — SIGNIFICANT CHANGE UP (ref 6–8.3)
RBC # BLD: 4.7 M/UL — SIGNIFICANT CHANGE UP (ref 3.8–5.2)
RBC # FLD: 11.7 % — SIGNIFICANT CHANGE UP (ref 10.3–14.5)
SODIUM SERPL-SCNC: 136 MMOL/L — SIGNIFICANT CHANGE UP (ref 135–145)
WBC # BLD: 12.27 K/UL — HIGH (ref 3.8–10.5)
WBC # FLD AUTO: 12.27 K/UL — HIGH (ref 3.8–10.5)

## 2023-04-01 PROCEDURE — 70496 CT ANGIOGRAPHY HEAD: CPT | Mod: 26,MA

## 2023-04-01 PROCEDURE — 70498 CT ANGIOGRAPHY NECK: CPT | Mod: 26,MA

## 2023-04-01 PROCEDURE — 99285 EMERGENCY DEPT VISIT HI MDM: CPT

## 2023-04-01 RX ORDER — VALACYCLOVIR 500 MG/1
1 TABLET, FILM COATED ORAL
Qty: 21 | Refills: 0
Start: 2023-04-01 | End: 2023-04-07

## 2023-04-01 RX ORDER — VALACYCLOVIR 500 MG/1
1000 TABLET, FILM COATED ORAL ONCE
Refills: 0 | Status: COMPLETED | OUTPATIENT
Start: 2023-04-01 | End: 2023-04-01

## 2023-04-01 RX ADMIN — VALACYCLOVIR 1000 MILLIGRAM(S): 500 TABLET, FILM COATED ORAL at 16:57

## 2023-04-01 RX ADMIN — Medication 1 TABLET(S): at 16:57

## 2023-04-01 NOTE — ED PROVIDER NOTE - CLINICAL SUMMARY MEDICAL DECISION MAKING FREE TEXT BOX
62-year-old female with past medical history of HTN, migraines, TIA, history of stroke CVA in the past, history of pres presents to the ED with new onset left-sided facial swelling. Facial sweating acute in onset and localized over the left cheek following the V2 distribution.  She endorses associated pain in the posterior scalp and in the left-sided neck.  Initial vitals nonactionable.  Satting 100% on room air, breathing comfortably at bedside.  Physical exam as noted above there is a erythematous blanchable raised coalesced lesion over the left V2 distribution of the face.  No vesicular lesions noted, patient endorses paresthesias.  Cervical lymphadenopathy palpated along with preauricular lymphadenopathy.  No tenderness to the floor the mouth.  Patient seen ambulating at bedside without any acute focal neurologic deficits.  Differential diagnosis includes but not limited to possible vascular injury versus shingles versus autoimmune causes of her rash.  Will order labs, imaging, and reassess. 62-year-old female with past medical history of HTN, migraines, TIA, history of stroke CVA in the past, history of pres presents to the ED with new onset left-sided facial swelling. Facial sweating acute in onset and localized over the left cheek following the V2 distribution.  She endorses associated pain in the posterior scalp and in the left-sided neck.  Initial vitals nonactionable.  Satting 100% on room air, breathing comfortably at bedside.  Physical exam as noted above there is a erythematous blanchable raised coalesced lesion over the left V2 distribution of the face.  No vesicular lesions noted, patient endorses paresthesias.  Cervical lymphadenopathy palpated along with preauricular lymphadenopathy.  No tenderness to the floor the mouth.  Patient seen ambulating at bedside without any acute focal neurologic deficits.  Differential diagnosis includes but not limited to possible vascular injury versus shingles versus autoimmune causes of her rash.  Will order labs, imaging, and reassess.    Attending MD Hanks.  Agree with above.  PT is a 63 yo fem with pmhx of HTN, migraines, TIA, CVA, adrenal mass surgically evaluated and ureter stented ~3 mos ago.  Pt presents to ED with complaint of L facial swelling, erythema, warmth since this morning.  Pt went to bed in usual state of health last night and awoke with L facial swelling, fullness and erythema this morning.  She takes losartan and wegovy (started 2 wks ago, 2nd weekly dose last night).  Pt endorses L cheek erythema that began this morning and has now grown to include L cheek and pain to L ear.  Swelling/fullness noted L submandibular as well without extension of erythema submandibularly.  Trachea midline without deviation, no resp distress.  Pt endorses fullness when she swallows but is tolerating secretions with normal phonation.  FROM of head/neck.  Palpable fullness inferior to L mandible and multiple discrete palpable lymph nodes.  Pt with pmhx of L carotid dissection but no neuro deficits noted on exam.  No discrete pulsatility on exam though area of fullness overlies L carotid and pulse is palpable over this site without discretely pulsatile mass.  Planned imaging to eval vessels 2/2 PMhx and poss discontinuation of losartan/wegovy as precaution.  At this time no oral involvement.  Isolated L submandibular fullness without tongue deviation/airway compromise.

## 2023-04-01 NOTE — ED PROVIDER NOTE - OBJECTIVE STATEMENT
62-year-old female with past medical history of HTN, migraines, TIA, history of stroke CVA in the past, history of pres presents to the ED with new onset left-sided facial swelling.  Patient went to bed yesterday completely normal without acute complaints.  No indolent infections or symptoms noted by patient.  Patient endorsing left-sided facial swelling that extends into the neck.  She endorses redness over her cheek and numbness and paresthesias to palpation.  No sick contacts at home.  No history of chickenpox.  She denies any chest pain, shortness of breath, difficulty breathing.  She endorses some mild odynophagia.  She endorses a mild headache localized over the posterior scalp.  She denies any other symptoms at bedside.

## 2023-04-01 NOTE — ED PROVIDER NOTE - PATIENT PORTAL LINK FT
You can access the FollowMyHealth Patient Portal offered by [facility] by registering at the following website: [email or web address]. By joining [facility]’s FollowMyHealth portal, you will also be able to view your health information using other applications (apps) compatible with our system.

## 2023-04-01 NOTE — ED PROVIDER NOTE - ATTENDING CONTRIBUTION TO CARE
Attending MD Hanks:  I performed a history and physical exam of the patient and discussed their management with the resident. I reviewed the resident's note and agree with the documented findings and plan of care. My medical decision making and observations are found above.

## 2023-04-01 NOTE — ED PROVIDER NOTE - PHYSICAL EXAMINATION
GENERAL: no acute distress, non-toxic appearing  HEAD: normocephalic, atraumatic  HEENT: normal conjunctiva, oral mucosa moist, neck supple  CARDIAC: regular rate and rhythm, normal S1 and S2,  no appreciable murmurs  PULM: clear to ascultation bilaterally, no crackles, rales, rhonchi, or wheezing  GI: abdomen nondistended, soft, nontender, no guarding or rebound tenderness  : no CVA tenderness, no suprapubic tenderness  NEURO: alert and oriented x 3, normal speech, PERRLA, EOMI, no focal motor or sensory deficits, nonantalgic gait  MSK: no visible deformities, no peripheral edema, calf tenderness/redness/swelling    SKIN: erythematous blanchable raised coalesced lesion over the left V2 distribution of the face.  No vesicular lesions noted, patient endorses paresthesias.     PSYCH: appropriate mood and affect

## 2023-04-01 NOTE — ED ADULT NURSE NOTE - OBJECTIVE STATEMENT
A04, ambulatory 61 y/o female with pmhx of stroke, CAD and HTN presents to the ED with headache, ear pain, and jaw pain that started last night. When patient woke up she noticed some redness to the left side of her face along with some decreased sensation. P  Patient denies chest pain, SOB, dizziness and lightheadedness.  Breathing is spontaneous and unlabored. No drooling noted. Patient is on RA. IV placed aseptically, labs drawn and sent as per order. Bed in lowest position, call bell within reach, all other safety and comfort measures provided. awaiting labs results and CT

## 2023-04-01 NOTE — ED PROVIDER NOTE - ADDITIONAL NOTES AND INSTRUCTIONS:
Please excuse from work/school as he/she was being evaluated in the Emergency Room at [facility].

## 2023-04-01 NOTE — ED PROVIDER NOTE - NSFOLLOWUPINSTRUCTIONS_ED_ALL_ED_FT
-You were seen in the Emergency Department (ED) for facial swelling. Lab and imaging results, if performed, were discussed with you along with your discharge diagnosis.    FOLLOW-UP:  -Please follow up with your PMD if symptoms return or for any concerning matter pertaining to your facial swelling.  -Please follow up with your private physician within the next 72 hours. Tell them you were recently in the ED for an urgent issue and would like to be seen. Bring copies of your results if you were given.   -If you do not have a PMD, please call [phone] to find one convenient for you or call our clinic at [phone].    MEDICATIONS:  -Continue all other prescribed medicine, IF ANY, as per your primary care doctor's (PMD) recommendations.    PAIN CONTROL:  -Please take over the counter Tylenol (also known as acetaminophen) 650mg every 6 hours or Ibuprofen (also known as motrin, advil) 600mg every 8 hour for your pain, IF ANY, unless you are not supposed to for any reason.  -Rest, stay hydrated with plenty of fluids (drink at least 2 Liters or 64 Ounces of water each day UNLESS you are supposed to restrict fluids or ANY reason.    RETURN PRECAUTIONS:  -Please return to the Emergency Department if you experience ANY new or concerning symptoms, such as, but not limited to: worsening pain, large amount of bleeding, passing out, fever >100.F, shaking chills, inability to see or new double vision, chest pain, difficulty breathing, diffuse abdominal pain, unable to eat or drink, continuous vomiting or diarrhea, unable to move or feel part of your body

## 2023-04-01 NOTE — ED ADULT TRIAGE NOTE - CHIEF COMPLAINT QUOTE
C/o headache, ear, jaw pain that started last night. Upon waking up today noted redness to left side of the face with some decreased sensation. Endorsing difficulty swallowing on left-side. Airway intact. No drool noted. Pt with some decreased sensation to left side of face. Dr. Price for stroke eval. No code stroke activated. Pt with Hx of stroke in August 2022. Phx: migraines, TIA

## 2023-04-01 NOTE — ED PROVIDER NOTE - PROGRESS NOTE DETAILS
Patient reassessed at this time, still endorsing swelling and erythema over the left cheek.  Sepsis lab work reviewed and otherwise nonactionable.  CT reviewed and notable for old stable left distal ICA cervical C2 segment focal dissection.  Otherwise no new or acute intracranial pathology noted on imaging studies.  Autoscope exam demonstrating no lesions in the auricular or auditory canal.  There is a large amount of earwax in bilateral auditory canals and TMs were not able to be visualized.  Shared decision making with patient: CDU/observation was offered to the patient for airway monitoring and continued antibiotics/antivirals to which the patient declined.  Strict return precautions related patient.  Patient instructed to return to the ED should symptoms worsen or has increasing difficulty breathing.  Patient instructed follow-up with PMD regarding further evaluation and management.  Medications were sent to the patient's pharmacy of choice Patient reassessed at this time, still endorsing swelling and erythema over the left cheek.  Sepsis lab work reviewed and otherwise nonactionable.  CT reviewed and notable for old stable left distal ICA cervical C2 segment focal dissection.  Otherwise no new or acute intracranial pathology noted on imaging studies. Otoscope exam demonstrating no lesions in the auricular or auditory canal.  There is a large amount of earwax in bilateral auditory canals and TMs were not able to be visualized.  Shared decision making with patient: CDU/observation was offered to the patient for airway monitoring and continued antibiotics/antivirals to which the patient declined.  Strict return precautions related patient.  Patient instructed to return to the ED should symptoms worsen or has increasing difficulty breathing.  Patient instructed follow-up with PMD regarding further evaluation and management.  Medications were sent to the patient's pharmacy of choice Attending MD Hanks.  Agree with above.  Pt observed without increasing airway involvement.  L submandibular involvement and mild swallowing sensation however warrant airway obs.  Pt advised of concerns and verbalizes understanding of risk as well as need to return to ED immediately for new/worsening symptoms, any difficulty swallowing or breathing.  Pt to be txed with abxs 2/2 lack of exact clear etiology and poss cellulitis.  L ear with impacted cerumen.  No discrete/new findings on CTA.  No visual changes.  No neuro deficits.  Pt tolerating secretions, breathing unlabored.  Able to tolerate supine position for CT.  Pt deferring on obs stay and to return to nearest ED for new/worsening sxs.  Hold ARB and wygovale for concern for poss contribution to current presentation.  No light-headedness, hypotension, wheezing, SOB, vomiting/nausea assoc with these sxs.

## 2023-04-03 ENCOUNTER — APPOINTMENT (OUTPATIENT)
Dept: DERMATOLOGY | Facility: CLINIC | Age: 63
End: 2023-04-03
Payer: COMMERCIAL

## 2023-04-03 ENCOUNTER — APPOINTMENT (OUTPATIENT)
Dept: INTERNAL MEDICINE | Facility: CLINIC | Age: 63
End: 2023-04-03
Payer: COMMERCIAL

## 2023-04-03 VITALS
HEIGHT: 61 IN | BODY MASS INDEX: 31.15 KG/M2 | WEIGHT: 165 LBS | DIASTOLIC BLOOD PRESSURE: 73 MMHG | HEART RATE: 90 BPM | OXYGEN SATURATION: 97 % | SYSTOLIC BLOOD PRESSURE: 118 MMHG | TEMPERATURE: 98.2 F

## 2023-04-03 DIAGNOSIS — R21 RASH AND OTHER NONSPECIFIC SKIN ERUPTION: ICD-10-CM

## 2023-04-03 DIAGNOSIS — R60.0 LOCALIZED EDEMA: ICD-10-CM

## 2023-04-03 DIAGNOSIS — D48.5 NEOPLASM OF UNCERTAIN BEHAVIOR OF SKIN: ICD-10-CM

## 2023-04-03 PROCEDURE — 99213 OFFICE O/P EST LOW 20 MIN: CPT

## 2023-04-03 PROCEDURE — 99204 OFFICE O/P NEW MOD 45 MIN: CPT

## 2023-04-03 NOTE — PHYSICAL EXAM
[Normal] : normal rate, regular rhythm, normal S1 and S2 and no murmur heard [de-identified] : local mp erythematous rash on left check--not warm or tender

## 2023-04-03 NOTE — HISTORY OF PRESENT ILLNESS
[FreeTextEntry1] : f/u rash [de-identified] : went to ER for left facial rash 4/1--[facility]--labs and imaging reviewed--on empiric antiviral/antibacterial\par no topical irritants\par no fever or other location rash\par started wegovy 2 wks ago\par

## 2023-04-07 ENCOUNTER — APPOINTMENT (OUTPATIENT)
Dept: UROLOGY | Facility: CLINIC | Age: 63
End: 2023-04-07

## 2023-04-07 ENCOUNTER — TRANSCRIPTION ENCOUNTER (OUTPATIENT)
Age: 63
End: 2023-04-07

## 2023-04-10 ENCOUNTER — TRANSCRIPTION ENCOUNTER (OUTPATIENT)
Age: 63
End: 2023-04-10

## 2023-04-11 ENCOUNTER — NON-APPOINTMENT (OUTPATIENT)
Age: 63
End: 2023-04-11

## 2023-04-12 ENCOUNTER — APPOINTMENT (OUTPATIENT)
Dept: VASCULAR SURGERY | Facility: CLINIC | Age: 63
End: 2023-04-12
Payer: COMMERCIAL

## 2023-04-12 VITALS
WEIGHT: 160 LBS | SYSTOLIC BLOOD PRESSURE: 114 MMHG | HEART RATE: 74 BPM | BODY MASS INDEX: 30.21 KG/M2 | TEMPERATURE: 98.4 F | HEIGHT: 61 IN | DIASTOLIC BLOOD PRESSURE: 82 MMHG

## 2023-04-12 PROCEDURE — 93970 EXTREMITY STUDY: CPT

## 2023-04-12 PROCEDURE — 99214 OFFICE O/P EST MOD 30 MIN: CPT

## 2023-04-12 NOTE — PHYSICAL EXAM
[2+] : left 2+ [Ankle Swelling (On Exam)] : present [Ankle Swelling On The Right] : of the right ankle [Varicose Veins Of Lower Extremities] : present [Varicose Veins Of The Right Leg] : of the right leg [No Rash or Lesion] : No rash or lesion [Alert] : alert [Oriented to Person] : oriented to person [Oriented to Place] : oriented to place [Oriented to Time] : oriented to time [Calm] : calm [de-identified] : NAD

## 2023-04-12 NOTE — ASSESSMENT
[FreeTextEntry1] : Ms. EZRA MARIE is a 62 year with persistent and worsening  right lower extremity venous insufficiency, CEAP classification C _3__which is  unresponsive to at least 3 months of compression stockings 20-30 mmHg, leg elevation, exercise and over the counter pain medication_(Ibuprofen).  Patient has complaints of worsening right leg discomfort with swelling, fatigue, heaviness, achiness, cramping, restlessness, and painful varicosities.  The patient’s symptoms interfere with their ADL’s, such as walking, shopping and house work, and their ability to work and exercise. Patient has intact pulses in both legs without evidence of arterial insufficiency.  \par \par Treatment is indicated not for cosmetic reasons but for symptomatic venous reflux disease with symptoms which is refractory to conservative therapy. Venous duplex study demonstrates  right  lower extremity venous insufficiency. The need for definitive effective treatment is based on severe, interfering and worsening reflux symptoms with evidence of venous insufficiency on venous ultrasound. \par \par Patient is a candidate for endovenous ablation treatment of the  right GSV and right leg stab. \par The risks and benefits of endovenous ablation treatment versus continued conservative management were discussed with the patient.  Patient chooses endovenous ablation treatments. Treatment plan to be scheduled. \par \par

## 2023-04-12 NOTE — HISTORY OF PRESENT ILLNESS
[FreeTextEntry1] : Ms. EZRA MARIE is a 62 year who presents for  evaluation of right leg pain for the past _12__ months. \par Patient's leg discomfort is associated with leg swelling, fatigue, heaviness, achiness and painful varicose veins.\par Patient's symptoms have persisted despite conservative management with leg elevation, over-the-counter medications (ibuprofen), and compression stocking use for more than 3 months. \par Patient's symptoms are aggravated by weight bearing, prolonged standing.\par Patient's symptoms are alleviated by leg elevation. \par \par Patient's symptoms interfere with the patient's ADLs such as work, shopping and housekeeping.  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 \par \par Previous treatment, vein procedures and vein surgeries include: wearing compression stockings for more than 3 months with little or no relief.\par \par \par

## 2023-04-19 RX ORDER — SODIUM BICARBONATE 84 MG/ML
8.4 INJECTION, SOLUTION INTRAVENOUS
Qty: 5 | Refills: 0 | Status: COMPLETED | COMMUNITY
Start: 2023-04-19 | End: 2023-04-24

## 2023-04-19 RX ORDER — ALPRAZOLAM 0.5 MG/1
0.5 TABLET ORAL
Qty: 1 | Refills: 0 | Status: COMPLETED | COMMUNITY
Start: 2023-04-19 | End: 2023-04-24

## 2023-04-19 RX ORDER — LIDOCAINE HYDROCHLORIDE 10 MG/ML
1 INJECTION, SOLUTION INFILTRATION; PERINEURAL
Qty: 50 | Refills: 0 | Status: COMPLETED | COMMUNITY
Start: 2023-04-19 | End: 2023-04-24

## 2023-04-24 ENCOUNTER — APPOINTMENT (OUTPATIENT)
Dept: VASCULAR SURGERY | Facility: CLINIC | Age: 63
End: 2023-04-24
Payer: COMMERCIAL

## 2023-04-24 PROCEDURE — 36475 ENDOVENOUS RF 1ST VEIN: CPT | Mod: RT

## 2023-04-24 RX ORDER — MINOCYCLINE HYDROCHLORIDE 100 MG/1
100 CAPSULE ORAL TWICE DAILY
Qty: 60 | Refills: 3 | Status: COMPLETED | COMMUNITY
Start: 2023-04-03 | End: 2023-04-24

## 2023-04-24 NOTE — PROCEDURE
[FreeTextEntry1] : right GSV RFA [FreeTextEntry3] : Procedural safety checklist and time out completed:\par Confirmed patient identification (Patient Name, , and/or medical record number including when possible affirmation by patient or parent/family/other).\par Confirmed procedure with the patient. Consent present, accurate and signed. \par Confirmed special equipment and supplies are present.\par Sterility confirmed. Position verified. \par Site/ side is marked and visible and confirmed. \par Procedure confirmed by consent. Accurate consent including side and site.\par Review of medical records, including venous ultrasound, noting correct procedure including site and side.\par MD/PA verifies presence and review of imaging studies and or written report of imaging studies.\par Agreement on the procedure to be performed\par Time out completed.\par All of the above has been confirmed by the team.\par All patient-specific concerns have been addressed. \par \par Indication: right lower extremity varicose veins with inflammation, leg pain, leg swelling, and leg cramping.  Venous insufficiency/ reflux.\par \par Procedure: radiofrequency ablation of the right great saphenous vein. \par 	\par Ms. EZRA MARIE is a 62 year old F with a history of right lower extremity varicose veins previously seen in the office.  Ultrasound examination demonstrated venous insufficiency. A trial of compression stockings, exercise, elevation, and pain medication was attempted without relief and definitive treatment with radiofrequency ablation was offered. \par \par The patient has come for radiofrequency ablation treatment of the right great saphenous vein.\par I have discussed the risks of the procedure at length with the patient. The risks discussed were inclusive of but not limited to infection, irritation at the site of infiltration of local anesthesia, and also rare risk of deep venous thrombosis and pulmonary emboli. The patient agrees to proceed with the procedure. \par The patient was escorted into the procedure room and a time out called.\par The entire limb was prepped and draped in sterile fashion. The RF fiber was placed on the sterile field and connected by a sterile cable. Actuation, temperature, and impedance testing were performed to ensure that all components were connected and operating properly. \par The patient was placed on the procedure table and local anesthesia was instilled in the skin overlying the access site. Under ultrasound guidance, the vein was punctured with a micropuncture needle, using the anterior wall technique. A guide wire was now introduced through the needle, and the needle was then exchanged over the guide wire for a 7F sheath. The guide wire was removed and the RF probe was then placed into the right great saphenous vein through the sheath and position confirmed using ultrasound guidance. After the RF probe position was verified by ultrasound, tumescent anesthesia consisting of normal saline, 1% lidocaine with 8.4% sodium bicarbonate was infiltrated, under ultrasound guidance, precisely into the perivenous compartment along the entire length of the vein until a “halo” of fluid was noted around the vein. After RF probe position was again confirmed with ultrasound imaging, RF energy was applied. The probe was gradually and carefully withdrawn at a rate of 6.5cm/20seconds. \par \par 4_cycles of RF performed using the _7 cm probe\par Total treatment time was _80_ seconds.\par The total volume injected was 250 cc\par Treatment length was _20 cm and \par The probe is _3.7 cm from the SFJ.\par \par Estimated Blood Loss: minimal\par Repeat ultrasound of the treated vein was performed confirming successful treatment. The catheter and sheath were withdrawn and hemostasis established with direct pressure. After assuring hemostasis, a sterile 4x4 was placed on the access site and an ACE compression wrap was applied. Patient tolerated procedure well. Patient was given post-procedure instructions and follow up appointment was scheduled.\par \par \par

## 2023-04-28 ENCOUNTER — APPOINTMENT (OUTPATIENT)
Dept: VASCULAR SURGERY | Facility: CLINIC | Age: 63
End: 2023-04-28
Payer: COMMERCIAL

## 2023-04-28 PROCEDURE — 93971 EXTREMITY STUDY: CPT

## 2023-05-02 RX ORDER — ALPRAZOLAM 0.5 MG/1
0.5 TABLET ORAL
Qty: 1 | Refills: 0 | Status: COMPLETED | COMMUNITY
Start: 2023-05-02 | End: 2023-05-08

## 2023-05-05 ENCOUNTER — APPOINTMENT (OUTPATIENT)
Dept: BARIATRICS/WEIGHT MGMT | Facility: CLINIC | Age: 63
End: 2023-05-05
Payer: COMMERCIAL

## 2023-05-05 VITALS — BODY MASS INDEX: 31.08 KG/M2 | WEIGHT: 164.5 LBS

## 2023-05-05 DIAGNOSIS — R11.0 NAUSEA: ICD-10-CM

## 2023-05-05 PROCEDURE — 99214 OFFICE O/P EST MOD 30 MIN: CPT | Mod: 95

## 2023-05-05 NOTE — HISTORY OF PRESENT ILLNESS
[FreeTextEntry1] : Bariatric surgery history: none\par Obesity co-morbidities: HTN, TIA, GERD - prn meds only, hiatal hernia\par Comorbidities improved or resolved: none\par Anti-obesity medications: Wegovy\par Obesity medication side effects: none\par \par PATIENT WAS NOTIFIED THAT ANYTHING WE DISCUSSED IN OUR MEETING TODAY MAY BE REFLECTED IN WRITING IN THE VISIT NOTE WHICH WILL BE AVAILABLE TO OTHER MEDICAL PROVIDERS TO REVIEW AS PART OF ROUTINE PATIENT CARE.  PATIENT VERBALLY AGREED. \par \par Ms. EZRA MARIE is a 62 year year old female who presents for evaluation and treatment of Class 1 obesity.\par \par Obesity related co- morbidities: HTN, TIA/PRES syndrome, GERD - prn meds only, hiatal hernia\par Jan 2023 - L pyeloplasty, healing\par \par Patient lives -  and 2 adult children\par Employment status -  RN\par \par Weight History:\par Lowest adult weight: 120\par Highest adult weight: 180\par \par Has lost 10 pounds over the past year with self-dieting, working out\par \par Interim:\par - started Wegovy, has been on it for about 6 weeks\par - had some nausea in the beginning, now resolved.\par - last 2 weeks, has had incr cravings toward end of the week\par - decr taste for cabernet, so drinking less on weekends w meals\par \par Obesity began in young adulthood.  Weight gain has occurred with: social events are around food, emotinoal eating w life stressors, menopause\par \par Past weight loss attempts include:  WW, liquid diet, Анна Cyrus. These have produced a maximum of 20 pounds of weight loss.  \par Anti-obesity medications in the past: fen-phen\par \par Reasons for desiring weight loss: health, improve HTN\par \par Sleep: 7 hours. some snoring.  hasn't had sleep study done. \par \par Has 2 regular meals a day.  \par \par Diet history:\par wakes up at:\par B: 645 am 2 hard boiled eggs, Fage yogurt. Or Lean24 shake w almond milk and strawberries, \par carrots or another yogurt or eggs. \par L: 1-2pm brings lunch to work - salad w protein.  Or salmon and salad.  \par snack - at home\par D: eats while she's cooking - 6-7 pm - seafood, veg during the week\par 10pm - ice cream bc it helps her go to sleep\par The weekends - frequently doesn't eat as much during the day\par \par snacks: evening, late night - ice cream\par eating after dinner: yes sometimes\par overeating episodes: sometimes at dinner, take out\par \par Sodas/fast food/processed foods: weekly - all types\par \par Water intake per day: 6-8 cups per day. \par tea 1-2 cups \par \par Physical activity:\par Patient enjoys: bike riding, Peloton\par Current physical activity: 2 times a week Peloton 30-45 min. previously going to the gym.\par \par Factors that have helped in the past with losing weight and keeping it off: none\par \par

## 2023-05-05 NOTE — ASSESSMENT
[FreeTextEntry1] : 62 y.o F with Class 1 obesity, HTN, TIA/PRES syndrome now HTN controlled, snoring - no sleep screen, HLD, pre\par \par - incr wegovy to 0.5mg.  seeing decr appetite, but wearing off and saw 3-4# wt gain\par - she's having salads w beans for dinner usually\par - have snack before leaving work - banana/nuts, or hummus/carrots\par - continue water intake habits. \par - incr peloton/bike/steps as tolerated\par - daughter also doing program\par - plan family outings based on an activity instead of food\par - add high fiber carb to lunch\par \par f/u 6 weeks - will call

## 2023-05-05 NOTE — REASON FOR VISIT
[Initial Evaluation] : an initial evaluation [Home] : at home, [unfilled] , at the time of the visit. [Medical Office: ([facility])___] : at the medical office located in

## 2023-05-08 ENCOUNTER — APPOINTMENT (OUTPATIENT)
Dept: VASCULAR SURGERY | Facility: CLINIC | Age: 63
End: 2023-05-08
Payer: COMMERCIAL

## 2023-05-08 DIAGNOSIS — I83.891 VARICOSE VEINS OF RIGHT LOWER EXTREMITY WITH OTHER COMPLICATIONS: ICD-10-CM

## 2023-05-08 PROCEDURE — 37765 STAB PHLEB VEINS XTR 10-20: CPT | Mod: RT

## 2023-05-08 NOTE — PROCEDURE
[FreeTextEntry1] : right stab phlebectomy [FreeTextEntry3] : Procedural safety checklist and time out completed:\par Confirmed patient identification (Patient Name, , and/or medical record number including when possible affirmation by patient or parent/family/other.\par Confirmed procedure with the patient. Consent present, accurate and signed. \par Confirmed special equipment and supplies are present.\par Sterility confirmed. Position verified. \par Site/ side is marked and visible and confirmed. \par Procedure confirmed by consent. Accurate consent including side and site.\par Review of medical records noting correct procedure including site and side.\par MD/PA verifies presence and review of imaging studies and or written report of imaging studies.\par Specify equipment are available for the planned procedure.\par MD/PA has marked the patient's procedural site and side.\par Agreement on the procedure to be performed\par Time out completed.\par All of the above has been confirmed by the team.\par All patient-specific concerns have been addressed. \par \par Indication:  right lower extremity varicose veins with inflammation, leg pain, leg swelling, and leg cramping.  \par \par Procedure: Stab phlebectomy right lower extremity\par \par  Ms. EZRA MARIE is a 62 year old F with a history of symptomatic right lower extremity varicose veins. A trial of compression stockings, exercise, elevation, and pain medication was attempted without relief and definitive treatment with microphlebectomy was offered. \par \par I have discussed the risks of the procedure at length with the patient. The risks discussed were inclusive of but not limited to infection, irritation at the site of infiltration of local anesthesia, and also rare risk of deep venous thrombosis and pulmonary emboli. The patient agrees to proceed with the procedure. \par The patient was escorted into the procedure room, the varicose veins for treatment were marked out and the patient placed on the examination table. The entire limb was prepped and draped in sterile fashion and a  time out was called. \par \par Local anesthesia using _10_ cc 1% lidocaine was infiltrated using a 25 gauge needle over the previously marked prominent varicose vein sites.\par Multiple small stab incisions, each less than 1 cm in length was made at the noted sites. With the help of a vein hook, the vein was fished out at each of these sites, rolled over a narrow-tipped mosquito clamp and removed. Hemostasis was secured with leg elevation and application of manual pressure. After assuring hemostasis, a sterile 4x4 was placed on the access sites and an ACE compression wrap was applied. Estimated blood loss: minimal. Patient tolerated procedure well. Patient was given post-procedure instructions and follow up appointment was scheduled. \par \par SIDE - RIGHT\par SITE - CALF \par LOCATION - MEDIAL / POSTERIOR	\par Total Stab Incisions 10-20\par \par

## 2023-06-01 ENCOUNTER — APPOINTMENT (OUTPATIENT)
Dept: PHYSICAL MEDICINE AND REHAB | Facility: CLINIC | Age: 63
End: 2023-06-01

## 2023-06-01 RX ORDER — ALPRAZOLAM 0.5 MG/1
0.5 TABLET ORAL
Qty: 1 | Refills: 0 | Status: COMPLETED | COMMUNITY
Start: 2023-06-01 | End: 2023-06-05

## 2023-06-05 ENCOUNTER — APPOINTMENT (OUTPATIENT)
Dept: DERMATOLOGY | Facility: CLINIC | Age: 63
End: 2023-06-05

## 2023-06-05 ENCOUNTER — APPOINTMENT (OUTPATIENT)
Dept: VASCULAR SURGERY | Facility: CLINIC | Age: 63
End: 2023-06-05
Payer: SELF-PAY

## 2023-06-05 PROCEDURE — 36468 NJX SCLRSNT SPIDER VEINS: CPT | Mod: 50

## 2023-06-05 NOTE — PROCEDURE
[FreeTextEntry1] : right leg sclerotherapy [FreeTextEntry3] : Procedural safety checklist and time out completed:\par Confirmed patient identification (Patient Name, , and/or medical record number including when possible affirmation by patient or parent/family/other).\par Confirmed procedure with the patient. Consent present, accurate and signed. \par Confirmed special equipment and supplies are present.\par Sterility confirmed. Position verified. \par Site/ side is marked and visible and confirmed. \par Procedure confirmed by consent. Accurate consent including side and site.\par Review of medical records, including venous ultrasound, noting correct procedure including site and side.\par MD/PA verifies presence and review of imaging studies and or written report of imaging studies.\par Agreement on the procedure to be performed\par Time out completed.\par All of the above has been confirmed by the team.\par All patient-specific concerns have been addressed. \par \par Indication:  right lower extremity spider veins.\par \par Procedure: right lower extremity sclerotherapy. \par \par Procedure Note:  Ms. EZRA MARIE is a 62 year old F with right lower extremity spider and branch veins. \par \par I have discussed the risks of the procedure with the patient. Detailed discussion was held with the patient. Risks of itching, superficial thrombophlebitis, hyperpigmentation (darkening of the skin), allergic reactions, skin irritation due to extravasation of the sclerosant, air-embolism, and in rare cases deep vein thrombosis were all discussed with the patient.  Reviewed with patient that sclerotherapy is the injection of a sterile agent (polidocanol) into the spider and small branch varicose veins. It works by damaging the inner wall of the vein. Eventually, the vein collapses on itself and over time, the damaged vein is replaced with fibrous connective tissue and prevents blood flow through the vessel. Sclerotherapy does not prevent the development of new veins. Before the treatment, photos may be obtained.\par The patient agrees to the procedure. The patient was escorted into the procedure room, placed on the procedure table and a time out was called. The right leg for sclerotherapy treatment was cleaned with 70% isopropyl alcohol. \par \par Sclerosant used was 1.0 % polidocanol (Asclera). Each session consists of a total dose of  4 mL of 1.0 % Asclera (polidocanol) which is directly injected using a 30 gauge needle into the superficial spider veins and small branch veins. \par \par 1st session\par The following areas were injected bl thighs_ \par 1.0 % Asclera  lot# [id number] _, expiration  _\par \par Dry gauze was applied to the treated areas of the leg and a compression bandage was applied. Patient instructed to wear the bandage for 72 hours and then wear 20-30mmHg compression stockings daily for minimum of 2 weeks. Patient may remove compression bandage after 24 hrs, shower and don compression stockings for continuous compression x 72 hrs. Patient tolerated the procedure well. A follow-up appt is scheduled for the patient and instructions provided. \par  \par \par \par

## 2023-06-15 ENCOUNTER — APPOINTMENT (OUTPATIENT)
Dept: BARIATRICS/WEIGHT MGMT | Facility: CLINIC | Age: 63
End: 2023-06-15
Payer: COMMERCIAL

## 2023-06-15 VITALS — WEIGHT: 156 LBS | BODY MASS INDEX: 29.48 KG/M2

## 2023-06-15 PROCEDURE — 99214 OFFICE O/P EST MOD 30 MIN: CPT | Mod: 95

## 2023-06-15 RX ORDER — ALPRAZOLAM 0.25 MG/1
0.25 TABLET ORAL
Qty: 30 | Refills: 0 | Status: DISCONTINUED | COMMUNITY
Start: 2022-10-13 | End: 2023-06-15

## 2023-06-15 RX ORDER — AMOXICILLIN AND CLAVULANATE POTASSIUM 875; 125 MG/1; MG/1
875-125 TABLET, COATED ORAL
Qty: 14 | Refills: 0 | Status: DISCONTINUED | COMMUNITY
Start: 2023-01-12 | End: 2023-06-15

## 2023-06-15 RX ORDER — CEPHALEXIN 500 MG/1
500 TABLET ORAL
Qty: 30 | Refills: 0 | Status: DISCONTINUED | COMMUNITY
Start: 2023-01-30 | End: 2023-06-15

## 2023-06-15 NOTE — ASSESSMENT
[FreeTextEntry1] : 62 y.o F with Class 1 obesity now overweight bmi, HTN, TIA/PRES syndrome now HTN controlled, snoring - no sleep screen, HLD, pre\par \par - continue wegovy 1mg. incr as tolerated\par - she's having salads w beans for dinner usually\par - have snack before leaving work - banana/nuts, or hummus/carrots\par - continue water intake habits. \par - incr peloton/bike/steps as tolerated\par - daughter also doing program\par - plan family outings based on an activity instead of food\par - add high fiber carb to lunch\par \par f/u 6-8 weeks

## 2023-06-15 NOTE — REASON FOR VISIT
[Initial Evaluation] : an initial evaluation [Home] : at home, [unfilled] , at the time of the visit. [Medical Office: ([facility])___] : at the medical office located in

## 2023-06-15 NOTE — HISTORY OF PRESENT ILLNESS
[FreeTextEntry1] : Bariatric surgery history: none\par Obesity co-morbidities: HTN, TIA, GERD - prn meds only, hiatal hernia\par Comorbidities improved or resolved: none\par Anti-obesity medications: Wegovy\par Obesity medication side effects: none\par \par PATIENT WAS NOTIFIED THAT ANYTHING WE DISCUSSED IN OUR MEETING TODAY MAY BE REFLECTED IN WRITING IN THE VISIT NOTE WHICH WILL BE AVAILABLE TO OTHER MEDICAL PROVIDERS TO REVIEW AS PART OF ROUTINE PATIENT CARE.  PATIENT VERBALLY AGREED. \par \par Ms. EZRA MARIE is a 62 year year old female who presents for evaluation and treatment of Class 1 obesity.\par \par Obesity related co- morbidities: HTN, TIA/PRES syndrome, GERD - prn meds only, hiatal hernia\par Jan 2023 - L pyeloplasty, healing\par \par Patient lives -  and 2 adult children\par Employment status -  RN\par \par Weight History:\par Lowest adult weight: 120\par Highest adult weight: 180\par \par Has lost 10 pounds over the past year with self-dieting, working out\par \par Interim:\par - taking wegovy 1mg, doing well\par - had some nausea in the beginning, now resolved.\par - last 2 weeks, has had incr cravings toward end of the week\par - decr taste for cabernet, so drinking less on weekends w meals\par \par Obesity began in young adulthood.  Weight gain has occurred with: social events are around food, emotinoal eating w life stressors, menopause\par \par Past weight loss attempts include:  WW, liquid diet, Анна Cyrus. These have produced a maximum of 20 pounds of weight loss.  \par Anti-obesity medications in the past: fen-phen\par \par Reasons for desiring weight loss: health, improve HTN\par \par Sleep: 7 hours. some snoring.  hasn't had sleep study done. \par \par Has 2 regular meals a day.  \par \par Diet history:\par wakes up at:\par B: 645 am 2 hard boiled eggs, Fage yogurt. Or Lean24 shake w almond milk and strawberries, \par carrots or another yogurt or eggs. \par L: 1-2pm brings lunch to work - salad w protein.  Or salmon and salad.  \par snack - at home\par D: eats while she's cooking - 6-7 pm - seafood, veg during the week\par 10pm - ice cream bc it helps her go to sleep\par The weekends - frequently doesn't eat as much during the day\par \par snacks: evening, late night - ice cream\par eating after dinner: yes sometimes\par overeating episodes: sometimes at dinner, take out\par \par Sodas/fast food/processed foods: weekly - all types\par \par Water intake per day: 6-8 cups per day.  vitamin water, less coconut water\par tea 1-2 cups \par \par Physical activity:\par Patient enjoys: bike riding, Peloton\par Current physical activity: 2 times a week Peloton 30-45 min. previously going to the gym.\par

## 2023-06-28 ENCOUNTER — APPOINTMENT (OUTPATIENT)
Dept: VASCULAR SURGERY | Facility: CLINIC | Age: 63
End: 2023-06-28

## 2023-06-30 ENCOUNTER — OUTPATIENT (OUTPATIENT)
Dept: OUTPATIENT SERVICES | Facility: HOSPITAL | Age: 63
LOS: 1 days | Discharge: HOME | End: 2023-06-30

## 2023-06-30 DIAGNOSIS — E87.5 HYPERKALEMIA: ICD-10-CM

## 2023-06-30 DIAGNOSIS — Z98.890 OTHER SPECIFIED POSTPROCEDURAL STATES: Chronic | ICD-10-CM

## 2023-06-30 DIAGNOSIS — I15.9 SECONDARY HYPERTENSION, UNSPECIFIED: ICD-10-CM

## 2023-06-30 DIAGNOSIS — Z98.891 HISTORY OF UTERINE SCAR FROM PREVIOUS SURGERY: Chronic | ICD-10-CM

## 2023-06-30 LAB
ANION GAP SERPL CALC-SCNC: 2 MMOL/L — LOW (ref 5–17)
BUN SERPL-MCNC: 16 MG/DL — SIGNIFICANT CHANGE UP (ref 7–23)
CALCIUM SERPL-MCNC: 9.2 MG/DL — SIGNIFICANT CHANGE UP (ref 8.5–10.1)
CHLORIDE SERPL-SCNC: 104 MMOL/L — SIGNIFICANT CHANGE UP (ref 96–108)
CO2 SERPL-SCNC: 32 MMOL/L — HIGH (ref 22–31)
CREAT SERPL-MCNC: 0.79 MG/DL — SIGNIFICANT CHANGE UP (ref 0.5–1.3)
EGFR: 85 ML/MIN/1.73M2 — SIGNIFICANT CHANGE UP
GLUCOSE SERPL-MCNC: 94 MG/DL — SIGNIFICANT CHANGE UP (ref 70–99)
POTASSIUM SERPL-MCNC: 3.9 MMOL/L — SIGNIFICANT CHANGE UP (ref 3.5–5.3)
POTASSIUM SERPL-SCNC: 3.9 MMOL/L — SIGNIFICANT CHANGE UP (ref 3.5–5.3)
SODIUM SERPL-SCNC: 138 MMOL/L — SIGNIFICANT CHANGE UP (ref 135–145)

## 2023-07-13 ENCOUNTER — APPOINTMENT (OUTPATIENT)
Dept: VASCULAR SURGERY | Facility: CLINIC | Age: 63
End: 2023-07-13
Payer: COMMERCIAL

## 2023-07-13 VITALS
BODY MASS INDEX: 28.52 KG/M2 | HEART RATE: 73 BPM | DIASTOLIC BLOOD PRESSURE: 88 MMHG | WEIGHT: 155 LBS | SYSTOLIC BLOOD PRESSURE: 122 MMHG | HEIGHT: 62 IN

## 2023-07-13 VITALS — HEART RATE: 70 BPM | DIASTOLIC BLOOD PRESSURE: 80 MMHG | TEMPERATURE: 97.8 F | SYSTOLIC BLOOD PRESSURE: 116 MMHG

## 2023-07-13 PROCEDURE — 99213 OFFICE O/P EST LOW 20 MIN: CPT

## 2023-07-13 PROCEDURE — 93880 EXTRACRANIAL BILAT STUDY: CPT

## 2023-07-15 NOTE — HISTORY OF PRESENT ILLNESS
[FreeTextEntry1] : EZRA MARIE (: Dec  3 1960) is a 62 year old female who presents today for carotid follow-up. \par \par She has history of a L carotid artery dissection near C2; at that time in 2022 she was evaluated in the ER for malignant HTN with signs of acute stroke (aphasia, UE discoordination, confusion). She was found to have PRES syndrome. \par \par Today the patient reports feeling well and denies residual symptoms. She reports that blood pressure is well controlled on losartan. She is also on low dose aspirin. Has regular follow up with neurologist Dr. Leonard Dominguez. \par \par Interval events:\par - urological procedure with Dr. Ozuna in 2023 L laparoscopic pyeloplasty and stent for UPJ obstruction\par - h/o L adrenal nodule, followed by Dr. Wilson, per patient negative workup for pheochromocytoma, no surgical intervention at this time\par - s/p RLE GSV RFA, stab phlebectomy, and sclerotherapy with Dr. Vazquez (Apr-2023), doing well with improvement in symptoms\par \par 2023 \par Carotid duplex with normal appearing ICA and velocities within normal limits. No evidence of dissection. \par

## 2023-07-15 NOTE — ASSESSMENT
[FreeTextEntry1] : L carotid artery dissection (C2) and PRES syndrome. Patient is doing well with no residual symptoms. Follow up with TTM in 6 months for re-evaluation. Will obtain CTA H/N at that to re-evaluate dissection (if patient has not had repeat imaging with neuro by that time). \par \par Patient seen and examined with Dr. Cordero.

## 2023-07-15 NOTE — PHYSICAL EXAM
[2+] : left 2+ [Alert] : alert [Oriented to Person] : oriented to person [Oriented to Place] : oriented to place [Oriented to Time] : oriented to time [Calm] : calm [de-identified] : well appearing female, NAD [de-identified] : NC/AT [de-identified] : unlabored [FreeTextEntry1] : well healed RLE incisions s/p stab phlebectomy\par legs warm and well perfused

## 2023-08-04 ENCOUNTER — APPOINTMENT (OUTPATIENT)
Dept: BARIATRICS/WEIGHT MGMT | Facility: CLINIC | Age: 63
End: 2023-08-04
Payer: COMMERCIAL

## 2023-08-04 VITALS — WEIGHT: 150 LBS | BODY MASS INDEX: 27.44 KG/M2

## 2023-08-04 DIAGNOSIS — G43.909 MIGRAINE, UNSPECIFIED, NOT INTRACTABLE, W/OUT STATUS MIGRAINOSUS: ICD-10-CM

## 2023-08-04 PROCEDURE — 99214 OFFICE O/P EST MOD 30 MIN: CPT | Mod: 95

## 2023-08-04 NOTE — HISTORY OF PRESENT ILLNESS
[FreeTextEntry1] : Bariatric surgery history: none Obesity co-morbidities: HTN, TIA, GERD - prn meds only, hiatal hernia Comorbidities improved or resolved: none Anti-obesity medications: Wegovy Obesity medication side effects: none  PATIENT WAS NOTIFIED THAT ANYTHING WE DISCUSSED IN OUR MEETING TODAY MAY BE REFLECTED IN WRITING IN THE VISIT NOTE WHICH WILL BE AVAILABLE TO OTHER MEDICAL PROVIDERS TO REVIEW AS PART OF ROUTINE PATIENT CARE.  PATIENT VERBALLY AGREED.   Ms. EZRA MARIE is a 62 year year old female who presents for evaluation and treatment of Class 1 obesity.  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 - constipation - miralax, senna, prunes.  very tiny nik - last 2 weeks, has had incr cravings toward end of the week - decr taste for cabernet, so drinking less on weekends w meals - drinking plenty of water 6-8 bottles of water  Obesity began in young adulthood.  Weight gain has occurred with: social events are around food, emotinoal eating w life stressors, menopause  Past weight loss attempts include:  WW, liquid diet, Анна Cyrus. These have produced a maximum of 20 pounds of weight loss.   Anti-obesity medications in the past: fen-phen  Reasons for desiring weight loss: health, improve HTN  Sleep: 7 hours. some snoring.  hasn't had sleep study done.   Has 2 regular meals a day.    Diet history: wakes up at: B: 645 am 2 hard boiled eggs, Fage yogurt. Or Lean24 shake w almond milk and strawberries,  carrots or another yogurt or eggs.  L: 1-2pm brings lunch to work - salad w protein.  Or salmon and salad.   snack - at home D: eats while she's cooking - 6-7 pm - seafood, veg during the week 10pm - ice cream bc it helps her go to sleep The weekends - frequently doesn't eat as much during the day  snacks: evening, late night - ice cream eating after dinner: yes sometimes overeating episodes: sometimes at dinner, take out  Sodas/fast food/processed foods: weekly - all types  Water intake per day: 6-8 cups per day.  vitamin water, less coconut water tea 1-2 cups   Physical activity: Patient enjoys: bike riding, Peloton Current physical activity: 2 times a week Peloton 30-45 min. previously going to the gym.

## 2023-08-04 NOTE — ASSESSMENT
[FreeTextEntry1] : 62 y.o F with Class 1 obesity now overweight bmi, HTN, TIA/PRES syndrome now HTN controlled, snoring - no sleep screen, HLD, pre  - continue wegovy 1mg. incr to 1.7mg - she's having salads w beans for dinner usually - have snack before leaving work - banana/nuts, or hummus/carrots - continue water intake habits.  - incr peloton/bike/steps as tolerated - daughter also doing program - plan family outings based on an activity instead of food - add high fiber carb to lunch  f/u 6-8 weeks

## 2023-08-04 NOTE — REASON FOR VISIT
[Initial Evaluation] : an initial evaluation [Home] : at home, [unfilled] , at the time of the visit. [Medical Office: ([facility])___] : at the medical office located in

## 2023-08-10 ENCOUNTER — OUTPATIENT (OUTPATIENT)
Dept: OUTPATIENT SERVICES | Facility: HOSPITAL | Age: 63
LOS: 1 days | Discharge: HOME | End: 2023-08-10

## 2023-08-10 DIAGNOSIS — Z98.890 OTHER SPECIFIED POSTPROCEDURAL STATES: Chronic | ICD-10-CM

## 2023-08-10 DIAGNOSIS — I15.9 SECONDARY HYPERTENSION, UNSPECIFIED: ICD-10-CM

## 2023-08-10 DIAGNOSIS — Z98.891 HISTORY OF UTERINE SCAR FROM PREVIOUS SURGERY: Chronic | ICD-10-CM

## 2023-08-25 NOTE — H&P ADULT - MLM HIDDEN
Addended by: TACO WATTS on: 8/25/2023 07:45 AM     Modules accepted: Orders    
yes
Abdomen soft, nontender, nondistended, bowel sounds present in all 4 quadrants.

## 2023-09-18 ENCOUNTER — APPOINTMENT (OUTPATIENT)
Dept: BARIATRICS/WEIGHT MGMT | Facility: CLINIC | Age: 63
End: 2023-09-18
Payer: COMMERCIAL

## 2023-09-18 VITALS — BODY MASS INDEX: 26.8 KG/M2 | WEIGHT: 146.5 LBS

## 2023-09-18 DIAGNOSIS — G43.909 MIGRAINE, UNSPECIFIED, NOT INTRACTABLE, W/OUT STATUS MIGRAINOSUS: ICD-10-CM

## 2023-09-18 PROCEDURE — 99214 OFFICE O/P EST MOD 30 MIN: CPT | Mod: 95

## 2023-09-22 ENCOUNTER — RX RENEWAL (OUTPATIENT)
Age: 63
End: 2023-09-22

## 2023-09-26 ENCOUNTER — OUTPATIENT (OUTPATIENT)
Dept: OUTPATIENT SERVICES | Facility: HOSPITAL | Age: 63
LOS: 1 days | End: 2023-09-26
Payer: COMMERCIAL

## 2023-09-26 ENCOUNTER — APPOINTMENT (OUTPATIENT)
Dept: UROLOGY | Facility: CLINIC | Age: 63
End: 2023-09-26
Payer: COMMERCIAL

## 2023-09-26 DIAGNOSIS — R35.0 FREQUENCY OF MICTURITION: ICD-10-CM

## 2023-09-26 DIAGNOSIS — Z98.890 OTHER SPECIFIED POSTPROCEDURAL STATES: Chronic | ICD-10-CM

## 2023-09-26 DIAGNOSIS — Z98.891 HISTORY OF UTERINE SCAR FROM PREVIOUS SURGERY: Chronic | ICD-10-CM

## 2023-09-26 PROCEDURE — 99214 OFFICE O/P EST MOD 30 MIN: CPT

## 2023-09-26 PROCEDURE — 76775 US EXAM ABDO BACK WALL LIM: CPT

## 2023-09-26 PROCEDURE — 76775 US EXAM ABDO BACK WALL LIM: CPT | Mod: 26

## 2023-09-27 DIAGNOSIS — Q62.11 CONGENITAL OCCLUSION OF URETEROPELVIC JUNCTION: ICD-10-CM

## 2023-10-04 ENCOUNTER — TRANSCRIPTION ENCOUNTER (OUTPATIENT)
Age: 63
End: 2023-10-04

## 2023-10-04 ENCOUNTER — NON-APPOINTMENT (OUTPATIENT)
Age: 63
End: 2023-10-04

## 2023-10-20 NOTE — DISCHARGE NOTE PROVIDER - NS AS DC PROVIDER CONTACT Y/N MULTI
Joceline from [facility] calls. They are treating pt for her Pancreatic Cancer. They noted that her Blood sugars are rising.     Non fasting BS recently, 378.   Checked a FASTING BS, 280    The doctor thinks she may have developed diabetes and is asking if Francine Lombardi CNP can help manage this?     Pt has not been into [facility] Clinic since 6/2022. Advised Nurse that pt will most likely need OV. She agrees, and if we can call pt to schedule?     Pts Tumor markers are rising and they are changing her chemo regimen.        Yes

## 2023-11-12 ENCOUNTER — OUTPATIENT (OUTPATIENT)
Dept: OUTPATIENT SERVICES | Facility: HOSPITAL | Age: 63
LOS: 1 days | End: 2023-11-12
Payer: COMMERCIAL

## 2023-11-12 ENCOUNTER — APPOINTMENT (OUTPATIENT)
Dept: MRI IMAGING | Facility: CLINIC | Age: 63
End: 2023-11-12
Payer: COMMERCIAL

## 2023-11-12 DIAGNOSIS — Z98.890 OTHER SPECIFIED POSTPROCEDURAL STATES: Chronic | ICD-10-CM

## 2023-11-12 DIAGNOSIS — R42 DIZZINESS AND GIDDINESS: ICD-10-CM

## 2023-11-12 DIAGNOSIS — Z98.891 HISTORY OF UTERINE SCAR FROM PREVIOUS SURGERY: Chronic | ICD-10-CM

## 2023-11-12 DIAGNOSIS — Z00.8 ENCOUNTER FOR OTHER GENERAL EXAMINATION: ICD-10-CM

## 2023-11-12 PROCEDURE — 70551 MRI BRAIN STEM W/O DYE: CPT

## 2023-11-12 PROCEDURE — 70551 MRI BRAIN STEM W/O DYE: CPT | Mod: 26

## 2023-11-20 ENCOUNTER — APPOINTMENT (OUTPATIENT)
Dept: BARIATRICS/WEIGHT MGMT | Facility: CLINIC | Age: 63
End: 2023-11-20
Payer: COMMERCIAL

## 2023-11-20 VITALS — WEIGHT: 142 LBS | BODY MASS INDEX: 25.97 KG/M2

## 2023-11-20 DIAGNOSIS — E66.9 OBESITY, UNSPECIFIED: ICD-10-CM

## 2023-11-20 DIAGNOSIS — R79.89 OTHER SPECIFIED ABNORMAL FINDINGS OF BLOOD CHEMISTRY: ICD-10-CM

## 2023-11-20 PROCEDURE — 99214 OFFICE O/P EST MOD 30 MIN: CPT | Mod: 95

## 2023-11-30 NOTE — H&P PST ADULT - NEGATIVE BREAST SYMPTOMS
11/30/23 1346   Anesthetic   Anesthetic 4% Lidocaine Liquid Topical   Left Leg Edema Point of Measurement   Leg circumference 28 cm   Ankle circumference 26 cm   Compression Therapy Tubular elastic support bandage   Tubular Elastic Support Bandage Compression Pressure Medium   RLE Neurovascular Assessment   Capillary Refill Less than/Equal to 3 seconds   Color Appropriate for Ethnicity   Temperature Cool   R Pedal Pulse +3   LLE Neurovascular Assessment   Capillary Refill Less than/Equal to 3 seconds   Color Appropriate for Ethnicity   Temperature Cool   L Pedal Pulse +3   Wound 11/16/23 Leg Left; Anterior #2   Date First Assessed/Time First Assessed: 11/16/23 1320   Present on Original Admission: No  Location: Leg  Wound Location Orientation: Left; Anterior  Wound Description (Comments): #2   Wound Image    Dressing Status Old drainage noted   Wound Cleansed Cleansed with saline   Wound Length (cm) 2.8 cm   Wound Width (cm) 2.9 cm   Wound Depth (cm) 0.3 cm   Wound Surface Area (cm^2) 8.12 cm^2   Change in Wound Size % (l*w) 15.42   Wound Volume (cm^3) 2.436 cm^3   Wound Healing % 79   Wound Assessment Pink/red;Slough;Granulation tissue   Drainage Amount Moderate (25-50%)   Drainage Description Serosanguinous   Odor None   Debbie-wound Assessment Intact   Margins Epibole (rolled edges)   Wound Thickness Description not for Pressure Injury Full thickness   Pain Assessment   Pain Assessment 0-10   Pain Level 0 no breast lump L/no breast tenderness L/no breast tenderness R/no breast lump R

## 2024-01-17 ENCOUNTER — APPOINTMENT (OUTPATIENT)
Dept: VASCULAR SURGERY | Facility: CLINIC | Age: 64
End: 2024-01-17

## 2024-01-25 ENCOUNTER — APPOINTMENT (OUTPATIENT)
Dept: BARIATRICS/WEIGHT MGMT | Facility: CLINIC | Age: 64
End: 2024-01-25
Payer: COMMERCIAL

## 2024-01-25 VITALS — BODY MASS INDEX: 26.13 KG/M2 | HEIGHT: 62 IN | WEIGHT: 142 LBS

## 2024-01-25 PROCEDURE — 99214 OFFICE O/P EST MOD 30 MIN: CPT | Mod: 95

## 2024-01-26 ENCOUNTER — TRANSCRIPTION ENCOUNTER (OUTPATIENT)
Age: 64
End: 2024-01-26

## 2024-01-26 NOTE — HISTORY OF PRESENT ILLNESS
[FreeTextEntry1] : This is a 63 year old female  being seen obesity followup visit. pmh: htn, stroke   Patient is on wegovy 1.7mg, but is now backordered,  continues to report constipation linzess not working,  B: shake snack: yogurt L: salad  Goal weight in the 120's  Cleared for exercise hx stroke 2022  On pelaton 30 3x/week  When the weather is warm will ride bike  Doing body weight squats and planks most days  6k-10k steps  Sleeps well

## 2024-01-26 NOTE — ASSESSMENT
[FreeTextEntry1] : PMH: Bariatric surgery history: none Obesity co-morbidities: HTN, TIA, GERD - prn meds only, hiatal hernia Comorbidities improved or resolved: none Anti-obesity medications: Wegovy Obesity medication side effects: none  Plan:  Increase fiber intake  increase water intake  recommend colace, benefiber and miralax f/u with pcp/gi if constipation is not resolved will switch wegovy to zepboud 5mg.  continue current exercise regiment   f/u 3-4 weeks

## 2024-01-29 DIAGNOSIS — I77.71 DISSECTION OF CAROTID ARTERY: ICD-10-CM

## 2024-01-30 ENCOUNTER — APPOINTMENT (OUTPATIENT)
Dept: INTERNAL MEDICINE | Facility: CLINIC | Age: 64
End: 2024-01-30

## 2024-01-30 ENCOUNTER — NON-APPOINTMENT (OUTPATIENT)
Age: 64
End: 2024-01-30

## 2024-01-30 ENCOUNTER — OUTPATIENT (OUTPATIENT)
Dept: OUTPATIENT SERVICES | Facility: HOSPITAL | Age: 64
LOS: 1 days | End: 2024-01-30

## 2024-01-30 DIAGNOSIS — Z98.890 OTHER SPECIFIED POSTPROCEDURAL STATES: Chronic | ICD-10-CM

## 2024-01-30 DIAGNOSIS — Z98.891 HISTORY OF UTERINE SCAR FROM PREVIOUS SURGERY: Chronic | ICD-10-CM

## 2024-02-09 NOTE — PATIENT PROFILE ADULT - LEGAL HELP
Abiola Borjas is a 59 y.o.  presents with complaint of postmenopausal  vaginal bleeding once which is light and bright red .   She denies pelvic pain and  denies urinary symptoms.  She noticed blood on the toilet paper when she wiped yesterday.  An ultrasound today shows an EMS of 5 mm and no abnormalities.  She has been on combined HRT for years.  She is pretty sure it came from vagina buts wants urine dip just in case.    Past Medical History:   Diagnosis Date    Anxiety     Deviated septum     Diverticulosis of sigmoid colon     Focal nodular hyperplasia of liver     Hypoglycemia     PONV (postoperative nausea and vomiting)     Snores     Thyroid disease      Past Surgical History:   Procedure Laterality Date    ABDOMINAL HERNIA REPAIR       SECTION      CHOLECYSTECTOMY      COLECTOMY  2015    MD Rousseau for stricture    COLON SURGERY      HERNIA REPAIR       Social History   Substance Use Topics    Smoking status: Never Smoker    Smokeless tobacco: Never Used    Alcohol use 0.0 oz/week      Comment: occasional     Family History   Problem Relation Age of Onset    Diabetes Mother     Hypertension Mother     Cancer Father      kidney    Depression Sister     Breast cancer Paternal Aunt 70    Colon cancer Neg Hx     Miscarriages / Stillbirths Neg Hx      OB History    Para Term  AB Living   3 3 3     3   SAB TAB Ectopic Multiple Live Births                  # Outcome Date GA Lbr Mark/2nd Weight Sex Delivery Anes PTL Lv   3 Term      CS-LTranv         Complications: Fetal Intolerance   2 Term      CS-LTranv         Complications: Breech presentation   1 Term      CS-LTranv             Review of Systems:  General: No fever, chills, fatigue or weight loss.  Chest: No chest pain, shortness of breath, or palpitations.  Breast: No pain, masses, or nipple discharge.  Vulva: No pain, lesions, or itching.  Vagina: No relaxation, pain, itching, or lesions.  Abdomen:  "No pain, nausea, vomiting, diarrhea, or constipation.  Urinary: No incontinence, nocturia, frequency, or dysuria.  Extremities:  No leg cramps, edema, or calf pain.  Neurologic: No headaches, dizziness, or visual changes.    Vitals:  Vitals:    03/08/18 1546   BP: 136/84   Weight: 88.6 kg (195 lb 5.2 oz)   Height: 5' 6" (1.676 m)   PainSc: 0-No pain     Body mass index is 31.53 kg/m².    Physical exam:   External genitalia:  No lesions, erythema, rashes, or other abnormalities.  Urethra:  No lesions or discharge.  Vagina:  No lesions, discharge, or tenderness.   Cervix:  No lesions, discharge, or cervical motion tenderness.   Uterus:  Normal in size and shape.  Mobile and nontender.   Adnexa:  No masses or tenderness.    Assessment and Plan:  Postmenopausal bleeding  -     US Pelvis Comp with Transvag NON-OB (xpd; Future; Expected date: 03/08/2018    Options of EMB vs observation discussed.  The risks/benefits, and alternatives were discussed.  The patient desires observation.  Will repaet ultrasound in 3 months to reassess EMS.  If continues with PMB or EMS increases, biopsy.  I spent 15 minutes face to face with the patient today.  " Male no

## 2024-02-21 ENCOUNTER — APPOINTMENT (OUTPATIENT)
Dept: INTERNAL MEDICINE | Facility: CLINIC | Age: 64
End: 2024-02-21
Payer: COMMERCIAL

## 2024-02-21 DIAGNOSIS — R09.81 NASAL CONGESTION: ICD-10-CM

## 2024-02-21 DIAGNOSIS — J10.1 INFLUENZA DUE TO OTHER IDENTIFIED INFLUENZA VIRUS WITH OTHER RESPIRATORY MANIFESTATIONS: ICD-10-CM

## 2024-02-21 PROCEDURE — 99212 OFFICE O/P EST SF 10 MIN: CPT

## 2024-03-06 ENCOUNTER — APPOINTMENT (OUTPATIENT)
Dept: BARIATRICS/WEIGHT MGMT | Facility: CLINIC | Age: 64
End: 2024-03-06
Payer: COMMERCIAL

## 2024-03-06 ENCOUNTER — OUTPATIENT (OUTPATIENT)
Dept: OUTPATIENT SERVICES | Facility: HOSPITAL | Age: 64
LOS: 1 days | End: 2024-03-06
Payer: COMMERCIAL

## 2024-03-06 VITALS — WEIGHT: 142 LBS | HEIGHT: 61 IN | BODY MASS INDEX: 26.81 KG/M2

## 2024-03-06 DIAGNOSIS — Z98.891 HISTORY OF UTERINE SCAR FROM PREVIOUS SURGERY: Chronic | ICD-10-CM

## 2024-03-06 DIAGNOSIS — Z98.890 OTHER SPECIFIED POSTPROCEDURAL STATES: Chronic | ICD-10-CM

## 2024-03-06 DIAGNOSIS — I10 ESSENTIAL (PRIMARY) HYPERTENSION: ICD-10-CM

## 2024-03-06 PROCEDURE — G0463: CPT

## 2024-03-06 PROCEDURE — 99213 OFFICE O/P EST LOW 20 MIN: CPT | Mod: 95

## 2024-03-06 NOTE — HISTORY OF PRESENT ILLNESS
[FreeTextEntry1] : This is a 63 year old female  being seen obesity followup visit. pmh: htn, stroke   Patient was able to get wegovy 1.7mg constipation has resolved, started magnesium oxide   Patient is very mindful of hunger cues  3 meals a day  smaller portions  dinner is her smallest meal  B: protein shake, yogurt and fruit. rolled oats L: salad with chicken D: Has stopped snacking between lunch and dinner  No waking up after midnight to eat   Goal weight in the 120's  Cleared for exercise hx stroke 2022  On pelaton 30 minutes  3x/week  When the weather is warm will ride bike  Doing body weight squats and planks most days  6k-10k steps  Sleeps well

## 2024-03-06 NOTE — REASON FOR VISIT
[Home] : at home, [unfilled] , at the time of the visit. [Other Location: e.g. Home (Enter Location, City,State)___] : at [unfilled] [Patient] : the patient [Follow-Up] : a follow-up visit [Self] : self

## 2024-03-06 NOTE — ASSESSMENT
[FreeTextEntry1] : PMH: Bariatric surgery history: none Obesity co-morbidities: HTN, TIA, GERD - prn meds only, hiatal hernia Comorbidities improved or resolved: none Anti-obesity medications: Wegovy Obesity medication side effects: none  Plan:  continue 3 meals a day, front load meals  keep food journal  will continue wegovy 1.7mg  continue current exercise regiment, goal 10k steps a day   f/u 3-4 weeks

## 2024-03-08 ENCOUNTER — TRANSCRIPTION ENCOUNTER (OUTPATIENT)
Age: 64
End: 2024-03-08

## 2024-03-20 ENCOUNTER — APPOINTMENT (OUTPATIENT)
Dept: INTERNAL MEDICINE | Facility: CLINIC | Age: 64
End: 2024-03-20
Payer: COMMERCIAL

## 2024-03-20 ENCOUNTER — NON-APPOINTMENT (OUTPATIENT)
Age: 64
End: 2024-03-20

## 2024-03-20 VITALS
HEART RATE: 78 BPM | WEIGHT: 138 LBS | OXYGEN SATURATION: 99 % | TEMPERATURE: 97.8 F | BODY MASS INDEX: 25.08 KG/M2 | SYSTOLIC BLOOD PRESSURE: 116 MMHG | HEIGHT: 62.2 IN | DIASTOLIC BLOOD PRESSURE: 70 MMHG

## 2024-03-20 DIAGNOSIS — Z13.31 ENCOUNTER FOR SCREENING FOR DEPRESSION: ICD-10-CM

## 2024-03-20 DIAGNOSIS — F41.9 ANXIETY DISORDER, UNSPECIFIED: ICD-10-CM

## 2024-03-20 DIAGNOSIS — Z29.9 ENCOUNTER FOR PROPHYLACTIC MEASURES, UNSPECIFIED: ICD-10-CM

## 2024-03-20 DIAGNOSIS — Z00.00 ENCOUNTER FOR GENERAL ADULT MEDICAL EXAMINATION W/OUT ABNORMAL FINDINGS: ICD-10-CM

## 2024-03-20 DIAGNOSIS — Z12.39 ENCOUNTER FOR OTHER SCREENING FOR MALIGNANT NEOPLASM OF BREAST: ICD-10-CM

## 2024-03-20 DIAGNOSIS — Z13.6 ENCOUNTER FOR SCREENING FOR CARDIOVASCULAR DISORDERS: ICD-10-CM

## 2024-03-20 DIAGNOSIS — Z12.11 ENCOUNTER FOR SCREENING FOR MALIGNANT NEOPLASM OF COLON: ICD-10-CM

## 2024-03-20 DIAGNOSIS — E66.9 OBESITY, UNSPECIFIED: ICD-10-CM

## 2024-03-20 PROCEDURE — 36415 COLL VENOUS BLD VENIPUNCTURE: CPT

## 2024-03-20 PROCEDURE — 93000 ELECTROCARDIOGRAM COMPLETE: CPT

## 2024-03-20 PROCEDURE — 99396 PREV VISIT EST AGE 40-64: CPT | Mod: 25

## 2024-03-20 RX ORDER — METRONIDAZOLE 7.5 MG/G
0.75 CREAM TOPICAL TWICE DAILY
Qty: 1 | Refills: 2 | Status: COMPLETED | COMMUNITY
Start: 2023-04-03 | End: 2024-03-20

## 2024-03-20 RX ORDER — LINACLOTIDE 145 UG/1
145 CAPSULE, GELATIN COATED ORAL
Qty: 90 | Refills: 0 | Status: COMPLETED | COMMUNITY
Start: 2023-11-20 | End: 2024-03-20

## 2024-03-20 RX ORDER — LINACLOTIDE 72 UG/1
72 CAPSULE, GELATIN COATED ORAL
Qty: 30 | Refills: 2 | Status: COMPLETED | COMMUNITY
Start: 2023-08-04 | End: 2024-03-20

## 2024-03-20 RX ORDER — OSELTAMIVIR PHOSPHATE 75 MG/1
75 CAPSULE ORAL TWICE DAILY
Qty: 1 | Refills: 0 | Status: COMPLETED | COMMUNITY
Start: 2024-02-21 | End: 2024-03-20

## 2024-03-20 RX ORDER — ONDANSETRON 8 MG/1
8 TABLET, ORALLY DISINTEGRATING ORAL
Qty: 90 | Refills: 0 | Status: COMPLETED | COMMUNITY
Start: 2023-05-05 | End: 2024-03-20

## 2024-03-20 RX ORDER — FLUTICASONE PROPIONATE 50 UG/1
50 SPRAY, METERED NASAL TWICE DAILY
Qty: 1 | Refills: 0 | Status: COMPLETED | COMMUNITY
Start: 2024-02-21 | End: 2024-03-20

## 2024-03-20 RX ORDER — DOXYCYCLINE HYCLATE 100 MG/1
100 CAPSULE ORAL
Qty: 14 | Refills: 0 | Status: COMPLETED | COMMUNITY
Start: 2022-12-21 | End: 2024-03-20

## 2024-03-20 RX ORDER — LOSARTAN POTASSIUM 50 MG/1
50 TABLET, FILM COATED ORAL
Qty: 30 | Refills: 0 | Status: COMPLETED | COMMUNITY
Start: 2022-12-21 | End: 2024-03-20

## 2024-03-20 RX ORDER — LOSARTAN POTASSIUM 25 MG/1
25 TABLET, FILM COATED ORAL
Qty: 180 | Refills: 3 | Status: ACTIVE | COMMUNITY
Start: 2022-09-27 | End: 1900-01-01

## 2024-03-20 NOTE — HISTORY OF PRESENT ILLNESS
[de-identified] : 63 F here for CPE. The patient's blood work was drawn and medications were reviewed. The patient's past medical history was reviewed, allergies verified and problems were identified and assessed. Patient medications were reviewed. The patient is feeling well with no new or active complaints at this time.  Vaccinations: covid - utd flu - utd tdap got in past 10 years shingles - deferred pcv - 65    Screening: colonoscopy: due need referral    obgyn: Dr. Brynn Taylor  LMP: Post menopausal  last pap: due  last mammo and US of breast - due family hx of breast cancer  Pregnancy:   Pt has no acute complaints   on wegovy for weight loss - no acute complaints BP control Pt denies any ha, dizziness, tinnitus, lightheadedness, epistaxis, vision changes, chest pain, palpitations,  sob, syncopal episodes, BRITO, or leg swelling, n/v abdominal pain. Anxiety controlled xanax prn no further complaints.

## 2024-03-20 NOTE — ASSESSMENT
[FreeTextEntry1] : #CPE f/u blood and urine ekg - NSR HR 74 immunizations - utd I spent 5 minutes with the patient conducting a screen using approved screening tool (PHQ2) and discussing results of said screen with patient during this encounter. The patient was counseled to get regular exercise and sleep, wear sunblock and wear a safety belt in the car. Check CBC, CMP Hgba1c , TSH and UA Pap Smear Mammo Colonoscopy  Ophtho Derm Dental #HTN Continue Medication losartan 25mg qd DASH DIET Exercise. Lower your salt intake. Learn relaxation methods. Do not add salt while preparing or eating your meals. Try to avoid red meats, sweets and sugar containing beverages. Ensure you are eating 5 fruits and vegetables a day as well as whole grains. #Anxiety xanax prn - do not mix with etoh mood stable denies si/hi #Weigh loss - on wegovy 1.7 - tolerating well - following obesity medicine pt agrees and understands plan via teach back method. all questions answered.

## 2024-03-20 NOTE — HEALTH RISK ASSESSMENT
[No] : No [No falls in past year] : Patient reported no falls in the past year [0] : 2) Feeling down, depressed, or hopeless: Not at all (0) [PHQ-2 Negative - No further assessment needed] : PHQ-2 Negative - No further assessment needed [HIV Test offered] : HIV Test offered [Hepatitis C test offered] : Hepatitis C test offered [None] : None [With Family] : lives with family [Employed] : employed [] :  [# Of Children ___] : has [unfilled] children [Sexually Active] : sexually active [Feels Safe at Home] : Feels safe at home [Fully functional (using the telephone, shopping, preparing meals, housekeeping, doing laundry, using] : Fully functional and needs no help or supervision to perform IADLs (using the telephone, shopping, preparing meals, housekeeping, doing laundry, using transportation, managing medications and managing finances) [Fully functional (bathing, dressing, toileting, transferring, walking, feeding)] : Fully functional (bathing, dressing, toileting, transferring, walking, feeding) [Reports normal functional visual acuity (ie: able to read med bottle)] : Reports normal functional visual acuity [Carbon Monoxide Detector] : carbon monoxide detector [Smoke Detector] : smoke detector [Safety elements used in home] : safety elements used in home [Seat Belt] :  uses seat belt [Sunscreen] : uses sunscreen [Never] : Never [NKT9Oigih] : 0 [Language] : denies difficulty with language [Change in mental status noted] : No change in mental status noted [Behavior] : denies difficulty with behavior [Learning/Retaining New Information] : denies difficulty learning/retaining new information [Handling Complex Tasks] : denies difficulty handling complex tasks [Reasoning] : denies difficulty with reasoning [Spatial Ability and Orientation] : denies difficulty with spatial ability and orientation [High Risk Behavior] : no high risk behavior [Reports changes in hearing] : Reports no changes in hearing [Reports changes in vision] : Reports no changes in vision [Reports changes in dental health] : Reports no changes in dental health [Guns at Home] : no guns at home [Travel to Developing Areas] : does not  travel to developing areas [TB Exposure] : is not being exposed to tuberculosis [Caregiver Concerns] : does not have caregiver concerns

## 2024-03-20 NOTE — PHYSICAL EXAM
[No Acute Distress] : no acute distress [Well Nourished] : well nourished [Well Developed] : well developed [Well-Appearing] : well-appearing [Normal Sclera/Conjunctiva] : normal sclera/conjunctiva [PERRL] : pupils equal round and reactive to light [Normal Outer Ear/Nose] : the outer ears and nose were normal in appearance [EOMI] : extraocular movements intact [Normal Oropharynx] : the oropharynx was normal [No Lymphadenopathy] : no lymphadenopathy [No JVD] : no jugular venous distention [Supple] : supple [No Respiratory Distress] : no respiratory distress  [No Accessory Muscle Use] : no accessory muscle use [Clear to Auscultation] : lungs were clear to auscultation bilaterally [Normal Rate] : normal rate  [Regular Rhythm] : with a regular rhythm [Normal S1, S2] : normal S1 and S2 [No Edema] : there was no peripheral edema [Declined Breast Exam] : declined breast exam  [Soft] : abdomen soft [Non Tender] : non-tender [Non-distended] : non-distended [No HSM] : no HSM [Normal Bowel Sounds] : normal bowel sounds [Declined Rectal Exam] : declined rectal exam [Normal Posterior Cervical Nodes] : no posterior cervical lymphadenopathy [Normal Anterior Cervical Nodes] : no anterior cervical lymphadenopathy [No CVA Tenderness] : no CVA  tenderness [No Joint Swelling] : no joint swelling [No Spinal Tenderness] : no spinal tenderness [Grossly Normal Strength/Tone] : grossly normal strength/tone [No Rash] : no rash [Coordination Grossly Intact] : coordination grossly intact [No Focal Deficits] : no focal deficits [Normal Gait] : normal gait [Normal Affect] : the affect was normal [Deep Tendon Reflexes (DTR)] : deep tendon reflexes were 2+ and symmetric [Normal Insight/Judgement] : insight and judgment were intact [No Masses] : no abdominal mass palpated [de-identified] : declined [de-identified] : mood stable denies si/hi

## 2024-03-25 ENCOUNTER — TRANSCRIPTION ENCOUNTER (OUTPATIENT)
Age: 64
End: 2024-03-25

## 2024-03-25 LAB
25(OH)D3 SERPL-MCNC: 26.3 NG/ML
ALBUMIN SERPL ELPH-MCNC: 4.6 G/DL
ALP BLD-CCNC: 77 U/L
ALT SERPL-CCNC: 7 U/L
ANION GAP SERPL CALC-SCNC: 10 MMOL/L
APPEARANCE: CLEAR
AST SERPL-CCNC: 17 U/L
BACTERIA: NEGATIVE /HPF
BILIRUB SERPL-MCNC: 0.3 MG/DL
BILIRUBIN URINE: NEGATIVE
BLOOD URINE: NEGATIVE
BUN SERPL-MCNC: 15 MG/DL
CALCIUM SERPL-MCNC: 9.6 MG/DL
CAST: 0 /LPF
CHLORIDE SERPL-SCNC: 104 MMOL/L
CHOLEST SERPL-MCNC: 192 MG/DL
CO2 SERPL-SCNC: 27 MMOL/L
COLOR: YELLOW
CREAT SERPL-MCNC: 0.88 MG/DL
EGFR: 74 ML/MIN/1.73M2
EPITHELIAL CELLS: 1 /HPF
ESTIMATED AVERAGE GLUCOSE: 103 MG/DL
FOLATE SERPL-MCNC: 11.3 NG/ML
GLUCOSE QUALITATIVE U: NEGATIVE MG/DL
GLUCOSE SERPL-MCNC: 90 MG/DL
HBA1C MFR BLD HPLC: 5.2 %
HCT VFR BLD CALC: 44.7 %
HDLC SERPL-MCNC: 58 MG/DL
HGB BLD-MCNC: 14.3 G/DL
IRON SATN MFR SERPL: 27 %
IRON SERPL-MCNC: 71 UG/DL
KETONES URINE: NEGATIVE MG/DL
LDLC SERPL CALC-MCNC: 123 MG/DL
LEUKOCYTE ESTERASE URINE: NEGATIVE
MCHC RBC-ENTMCNC: 31.8 PG
MCHC RBC-ENTMCNC: 32 GM/DL
MCV RBC AUTO: 99.6 FL
MICROSCOPIC-UA: NORMAL
NITRITE URINE: NEGATIVE
NONHDLC SERPL-MCNC: 135 MG/DL
PH URINE: 6
PLATELET # BLD AUTO: 332 K/UL
POTASSIUM SERPL-SCNC: 4.4 MMOL/L
PROT SERPL-MCNC: 7.2 G/DL
PROTEIN URINE: NEGATIVE MG/DL
RBC # BLD: 4.49 M/UL
RBC # FLD: 12 %
RED BLOOD CELLS URINE: 1 /HPF
SODIUM SERPL-SCNC: 141 MMOL/L
SPECIFIC GRAVITY URINE: 1.02
TIBC SERPL-MCNC: 264 UG/DL
TRIGL SERPL-MCNC: 62 MG/DL
TSH SERPL-ACNC: 1.06 UIU/ML
UIBC SERPL-MCNC: 193 UG/DL
UROBILINOGEN URINE: 0.2 MG/DL
VIT B12 SERPL-MCNC: 686 PG/ML
WBC # FLD AUTO: 5.37 K/UL
WHITE BLOOD CELLS URINE: 0 /HPF

## 2024-03-26 ENCOUNTER — TRANSCRIPTION ENCOUNTER (OUTPATIENT)
Age: 64
End: 2024-03-26

## 2024-04-09 NOTE — ED ADULT NURSE NOTE - NSSUHOSCREENINGYN_ED_ALL_ED
No - the patient is unable to be screened due to medical condition
<-- Click to add NO pertinent Past Medical History

## 2024-04-17 ENCOUNTER — APPOINTMENT (OUTPATIENT)
Dept: BARIATRICS/WEIGHT MGMT | Facility: CLINIC | Age: 64
End: 2024-04-17
Payer: COMMERCIAL

## 2024-04-17 ENCOUNTER — OUTPATIENT (OUTPATIENT)
Dept: OUTPATIENT SERVICES | Facility: HOSPITAL | Age: 64
LOS: 1 days | End: 2024-04-17
Payer: COMMERCIAL

## 2024-04-17 VITALS — WEIGHT: 142 LBS | HEIGHT: 62 IN | BODY MASS INDEX: 26.13 KG/M2

## 2024-04-17 DIAGNOSIS — I10 ESSENTIAL (PRIMARY) HYPERTENSION: ICD-10-CM

## 2024-04-17 DIAGNOSIS — Z98.891 HISTORY OF UTERINE SCAR FROM PREVIOUS SURGERY: Chronic | ICD-10-CM

## 2024-04-17 DIAGNOSIS — Z98.890 OTHER SPECIFIED POSTPROCEDURAL STATES: Chronic | ICD-10-CM

## 2024-04-17 PROCEDURE — 99213 OFFICE O/P EST LOW 20 MIN: CPT | Mod: 95

## 2024-04-17 PROCEDURE — G0463: CPT

## 2024-04-17 RX ORDER — SEMAGLUTIDE 1.7 MG/.75ML
1.7 INJECTION, SOLUTION SUBCUTANEOUS
Qty: 3 | Refills: 1 | Status: DISCONTINUED | COMMUNITY
Start: 2023-03-09 | End: 2024-04-17

## 2024-04-17 NOTE — HISTORY OF PRESENT ILLNESS
[FreeTextEntry1] : This is a 63 year old female  being seen obesity followup visit. pmh: htn, stroke   Patient was able to get wegovy 1.7mg-  ey tired on wegovy   constipation has resolved, started magnesium oxide- having normal BMS    Exercise consistently  On pelaton 30 minutes  3x/week  plan  Doing body weight squats and planks most days  6k-10k steps  Sleeps well

## 2024-04-24 DIAGNOSIS — E66.9 OBESITY, UNSPECIFIED: ICD-10-CM

## 2024-04-24 DIAGNOSIS — R79.89 OTHER SPECIFIED ABNORMAL FINDINGS OF BLOOD CHEMISTRY: ICD-10-CM

## 2024-05-20 ENCOUNTER — APPOINTMENT (OUTPATIENT)
Dept: BARIATRICS/WEIGHT MGMT | Facility: CLINIC | Age: 64
End: 2024-05-20
Payer: COMMERCIAL

## 2024-05-20 VITALS — BODY MASS INDEX: 25.86 KG/M2 | HEIGHT: 62 IN | WEIGHT: 140.5 LBS

## 2024-05-20 DIAGNOSIS — I10 ESSENTIAL (PRIMARY) HYPERTENSION: ICD-10-CM

## 2024-05-20 DIAGNOSIS — E66.9 OBESITY, UNSPECIFIED: ICD-10-CM

## 2024-05-20 PROCEDURE — 99213 OFFICE O/P EST LOW 20 MIN: CPT

## 2024-05-20 RX ORDER — SEMAGLUTIDE 1.7 MG/.75ML
1.7 INJECTION, SOLUTION SUBCUTANEOUS
Qty: 3 | Refills: 1 | Status: ACTIVE | COMMUNITY
Start: 2024-05-20 | End: 1900-01-01

## 2024-05-24 RX ORDER — ALPRAZOLAM 0.25 MG/1
0.25 TABLET ORAL
Qty: 30 | Refills: 0 | Status: ACTIVE | COMMUNITY
Start: 2022-06-16 | End: 1900-01-01

## 2024-05-28 ENCOUNTER — TRANSCRIPTION ENCOUNTER (OUTPATIENT)
Age: 64
End: 2024-05-28

## 2024-06-12 ENCOUNTER — APPOINTMENT (OUTPATIENT)
Dept: OTOLARYNGOLOGY | Facility: CLINIC | Age: 64
End: 2024-06-12
Payer: COMMERCIAL

## 2024-06-12 VITALS
HEART RATE: 75 BPM | DIASTOLIC BLOOD PRESSURE: 81 MMHG | SYSTOLIC BLOOD PRESSURE: 116 MMHG | WEIGHT: 134 LBS | OXYGEN SATURATION: 97 % | BODY MASS INDEX: 24.66 KG/M2 | HEIGHT: 62 IN

## 2024-06-12 DIAGNOSIS — H93.12 TINNITUS, LEFT EAR: ICD-10-CM

## 2024-06-12 DIAGNOSIS — H61.23 IMPACTED CERUMEN, BILATERAL: ICD-10-CM

## 2024-06-12 DIAGNOSIS — H69.92 UNSPECIFIED EUSTACHIAN TUBE DISORDER, LEFT EAR: ICD-10-CM

## 2024-06-12 PROCEDURE — 92567 TYMPANOMETRY: CPT

## 2024-06-12 PROCEDURE — 99203 OFFICE O/P NEW LOW 30 MIN: CPT | Mod: 25

## 2024-06-12 PROCEDURE — 92557 COMPREHENSIVE HEARING TEST: CPT

## 2024-06-12 PROCEDURE — G0268 REMOVAL OF IMPACTED WAX MD: CPT

## 2024-06-12 NOTE — HISTORY OF PRESENT ILLNESS
[de-identified] : Patient comes in with a few days of pain in the left ear and noise in the left ear. She described the noise as a drilling and a fluttering and thought there was a bug in the ear. She has had mild dizziness since this started. She has not put anything in the ears.  She does not have any nasal a congestion or runny nose

## 2024-06-12 NOTE — ASSESSMENT
[FreeTextEntry1] : Patient with some buzzing in her left ear on examination massive cerumen impaction curetted out both ears alleviated her symptoms audiogram was performed was essentially perfectly normal follow-up and see us as needed.

## 2024-06-12 NOTE — END OF VISIT
[FreeTextEntry3] : I, Dr. Zepeda personally performed the evaluation and management (E/M) services including all necessary procedures, for this new patient. That E/M includes conducting the clinically appropriate initial history &/or exam, assessing all conditions, and establishing the plan of care. Today, my PAM, Irene Lorenzo, was here to observe &/or participate in the visit & follow plan of care established by me.

## 2024-06-12 NOTE — REVIEW OF SYSTEMS
[As Noted in HPI] : as noted in HPI [Ear Pain] : ear pain [Ear Noises] : ear noises [Negative] : Heme/Lymph

## 2024-06-13 ENCOUNTER — TRANSCRIPTION ENCOUNTER (OUTPATIENT)
Age: 64
End: 2024-06-13

## 2024-06-18 ENCOUNTER — APPOINTMENT (OUTPATIENT)
Dept: BARIATRICS/WEIGHT MGMT | Facility: CLINIC | Age: 64
End: 2024-06-18
Payer: COMMERCIAL

## 2024-06-18 ENCOUNTER — OUTPATIENT (OUTPATIENT)
Dept: OUTPATIENT SERVICES | Facility: HOSPITAL | Age: 64
LOS: 1 days | End: 2024-06-18
Payer: COMMERCIAL

## 2024-06-18 VITALS — HEIGHT: 62 IN | BODY MASS INDEX: 25.58 KG/M2 | WEIGHT: 139 LBS

## 2024-06-18 DIAGNOSIS — Z98.890 OTHER SPECIFIED POSTPROCEDURAL STATES: Chronic | ICD-10-CM

## 2024-06-18 DIAGNOSIS — I10 ESSENTIAL (PRIMARY) HYPERTENSION: ICD-10-CM

## 2024-06-18 DIAGNOSIS — Z98.891 HISTORY OF UTERINE SCAR FROM PREVIOUS SURGERY: Chronic | ICD-10-CM

## 2024-06-18 PROCEDURE — G0463: CPT

## 2024-06-18 PROCEDURE — 99213 OFFICE O/P EST LOW 20 MIN: CPT | Mod: 95

## 2024-06-18 RX ORDER — TIRZEPATIDE 7.5 MG/.5ML
7.5 INJECTION, SOLUTION SUBCUTANEOUS
Qty: 1 | Refills: 2 | Status: DISCONTINUED | COMMUNITY
Start: 2024-01-25 | End: 2024-06-18

## 2024-06-18 NOTE — ASSESSMENT
[FreeTextEntry1] : PMH: Bariatric surgery history: none Obesity co-morbidities: HTN, TIA, GERD - prn meds only, hiatal hernia Comorbidities improved or resolved: none Anti-obesity medications: Wegovy Obesity medication side effects: none  Plan:  continue 3 meals a day, front load meals  keep food journal  continue wegovy 1.7mg, ordered 3 month supply. patient is aware this medication will require a new PA.  continue current exercise regiment, goal 10k steps a day   f/u 3-4 weeks

## 2024-06-18 NOTE — HISTORY OF PRESENT ILLNESS
[FreeTextEntry1] : This is a 63 year old female  being seen obesity followup visit. pmh: htn, stroke  Initial BMI 32.  Patient is on wegovy 1.7mg tolerating well  no side effects   hungry during the day but hunger better controlled at night, so she is no longer snacking   Exercise is consistent using pelaton and walking more since the weather is nice, plans to start riding bike 10k steps a day participating in work walk challenge    Sleeps well  will be switching

## 2024-06-19 NOTE — HISTORY OF PRESENT ILLNESS
[FreeTextEntry1] : This is a 63 year old female  being seen obesity followup visit. pmh: htn, stroke   Patient got zepbound 5mg for 1 month and preferred how she felt on zepbound vs wegovy.  she prefers to stay on zepbound she got a notification from [employer] that she will be getting a 3 month supply of 7.5mg dose   weight down since last visit   Exercise is consistent eating well, portion sizes are controlled   Sleeps well  Patient will be starting a new job at [employer] in july

## 2024-06-19 NOTE — ASSESSMENT
[FreeTextEntry1] : PMH: Bariatric surgery history: none Obesity co-morbidities: HTN, TIA, GERD - prn meds only, hiatal hernia Comorbidities improved or resolved: none Anti-obesity medications: zepbound Obesity medication side effects: none  Plan:  continue 3 meals a day, front load meals  keep food journal  start zepbound 7.5mg.  continue current exercise regiment, goal 10k steps a day   f/u 3-4 weeks

## 2024-06-24 DIAGNOSIS — E66.9 OBESITY, UNSPECIFIED: ICD-10-CM

## 2024-07-22 ENCOUNTER — TRANSCRIPTION ENCOUNTER (OUTPATIENT)
Age: 64
End: 2024-07-22

## 2024-07-25 ENCOUNTER — APPOINTMENT (OUTPATIENT)
Dept: BARIATRICS/WEIGHT MGMT | Facility: CLINIC | Age: 64
End: 2024-07-25
Payer: COMMERCIAL

## 2024-07-25 VITALS — WEIGHT: 137 LBS | HEIGHT: 62 IN | BODY MASS INDEX: 25.21 KG/M2

## 2024-07-25 DIAGNOSIS — E66.9 OBESITY, UNSPECIFIED: ICD-10-CM

## 2024-07-25 DIAGNOSIS — I10 ESSENTIAL (PRIMARY) HYPERTENSION: ICD-10-CM

## 2024-07-25 PROCEDURE — 99213 OFFICE O/P EST LOW 20 MIN: CPT | Mod: 95

## 2024-07-25 NOTE — HISTORY OF PRESENT ILLNESS
[FreeTextEntry1] : This is a 63 year old female  being seen obesity followup visit. pmh: htn, stroke   Weight trending down Patient is on  zepbound 7.5mg, tolerating well  occasional nausea 1-2 days post injection- will take zofran, which helps    Report severe GERD and nausea the day after she overate at a party high fat food   Slightly less Exercise in the last month She has been busy with work, preparing to leave her current roll. Too tired to exercise when she gets home from work   Patient will be starting a new job at [employer] in 2 weeks

## 2024-07-25 NOTE — ASSESSMENT
[FreeTextEntry1] : PMH: Bariatric surgery history: none Obesity co-morbidities: HTN, TIA, GERD - prn meds only, hiatal hernia Comorbidities improved or resolved: none Anti-obesity medications: zepbound Obesity medication side effects: none  Plan:  continue 3 meals a day, front load meals  avoid high fat food focus on high fiber, lean animal protein keep food journal  continue zepbound 7.5mg.  return to exercise regiment, goal 10k steps a day   f/u 3-4 weeks

## 2024-07-26 ENCOUNTER — TRANSCRIPTION ENCOUNTER (OUTPATIENT)
Age: 64
End: 2024-07-26

## 2024-08-26 ENCOUNTER — OUTPATIENT (OUTPATIENT)
Dept: OUTPATIENT SERVICES | Facility: HOSPITAL | Age: 64
LOS: 1 days | End: 2024-08-26
Payer: COMMERCIAL

## 2024-08-26 ENCOUNTER — APPOINTMENT (OUTPATIENT)
Dept: BARIATRICS/WEIGHT MGMT | Facility: CLINIC | Age: 64
End: 2024-08-26
Payer: COMMERCIAL

## 2024-08-26 VITALS — HEIGHT: 62 IN | WEIGHT: 134.31 LBS | BODY MASS INDEX: 24.71 KG/M2

## 2024-08-26 DIAGNOSIS — Z98.891 HISTORY OF UTERINE SCAR FROM PREVIOUS SURGERY: Chronic | ICD-10-CM

## 2024-08-26 DIAGNOSIS — Z98.890 OTHER SPECIFIED POSTPROCEDURAL STATES: Chronic | ICD-10-CM

## 2024-08-26 DIAGNOSIS — E66.9 OBESITY, UNSPECIFIED: ICD-10-CM

## 2024-08-26 DIAGNOSIS — I10 ESSENTIAL (PRIMARY) HYPERTENSION: ICD-10-CM

## 2024-08-26 PROCEDURE — G2211 COMPLEX E/M VISIT ADD ON: CPT | Mod: 95

## 2024-08-26 PROCEDURE — 99213 OFFICE O/P EST LOW 20 MIN: CPT | Mod: 95

## 2024-08-26 PROCEDURE — G0463: CPT

## 2024-08-26 NOTE — ASSESSMENT
[FreeTextEntry1] : PMH: Bariatric surgery history: none Obesity co-morbidities: HTN, TIA, GERD - prn meds only, hiatal hernia Comorbidities improved or resolved: none Anti-obesity medications: zepbound Obesity medication side effects: none  Plan:  focus on whole food high fiber, lean animal protein  continue 3 meals a day, front load meals  keep food journal  continue zepbound 7.5mg for now return to exercise regiment, goal 10k steps a day   f/u 3-4 weeks

## 2024-08-26 NOTE — HISTORY OF PRESENT ILLNESS
[FreeTextEntry1] : This is a 63 year old female  being seen obesity followup visit. pmh: htn, stroke   Weight trending down Patient is on  zepbound 7.5mg, tolerating well  happy with her progress, would like to lose another 5-10 pounds   Patient started new job at [employer]- working long hours, adjusting to schedule  has not been prepping food  hungrier at night, not eating enough during the day? concerned she is not getting enough protein needs to increase water     Went to [place] for wedding,  walked a lot and went hiking  uses pelaton at home  not tracking steps, but on average 8k

## 2024-09-03 DIAGNOSIS — E66.9 OBESITY, UNSPECIFIED: ICD-10-CM

## 2024-09-24 ENCOUNTER — APPOINTMENT (OUTPATIENT)
Dept: UROLOGY | Facility: CLINIC | Age: 64
End: 2024-09-24
Payer: COMMERCIAL

## 2024-09-24 ENCOUNTER — OUTPATIENT (OUTPATIENT)
Dept: OUTPATIENT SERVICES | Facility: HOSPITAL | Age: 64
LOS: 1 days | End: 2024-09-24
Payer: COMMERCIAL

## 2024-09-24 DIAGNOSIS — Z98.890 OTHER SPECIFIED POSTPROCEDURAL STATES: Chronic | ICD-10-CM

## 2024-09-24 DIAGNOSIS — R35.0 FREQUENCY OF MICTURITION: ICD-10-CM

## 2024-09-24 DIAGNOSIS — Q62.39 OTHER OBSTRUCTIVE DEFECTS OF RENAL PELVIS AND URETER: ICD-10-CM

## 2024-09-24 PROCEDURE — 99213 OFFICE O/P EST LOW 20 MIN: CPT

## 2024-09-24 PROCEDURE — G2211 COMPLEX E/M VISIT ADD ON: CPT

## 2024-09-24 PROCEDURE — 76775 US EXAM ABDO BACK WALL LIM: CPT

## 2024-09-24 PROCEDURE — 76775 US EXAM ABDO BACK WALL LIM: CPT | Mod: 26

## 2024-09-24 NOTE — HISTORY OF PRESENT ILLNESS
[FreeTextEntry1] : 62 yo F s/p L pyeloplasty Jan, 2023 US 9/2023 now here no hydronphrosis at that time bilaterally. No lesions Recent creatinine 0.88 March, 2024. Here today for follow up 1 yr ultrasound

## 2024-09-24 NOTE — PHYSICAL EXAM
[General Appearance - Well Developed] : well developed [General Appearance - Well Nourished] : well nourished [Heart Rate And Rhythm] : heart rate and rhythm were normal [] : no respiratory distress [Abdomen Soft] : soft [Costovertebral Angle Tenderness] : no ~M costovertebral angle tenderness

## 2024-09-25 DIAGNOSIS — Q62.39 OTHER OBSTRUCTIVE DEFECTS OF RENAL PELVIS AND URETER: ICD-10-CM

## 2024-09-30 ENCOUNTER — APPOINTMENT (OUTPATIENT)
Dept: BARIATRICS/WEIGHT MGMT | Facility: CLINIC | Age: 64
End: 2024-09-30

## 2024-10-07 ENCOUNTER — APPOINTMENT (OUTPATIENT)
Dept: BARIATRICS/WEIGHT MGMT | Facility: CLINIC | Age: 64
End: 2024-10-07
Payer: COMMERCIAL

## 2024-10-07 DIAGNOSIS — E66.811 OBESITY, CLASS 1: ICD-10-CM

## 2024-10-07 DIAGNOSIS — I10 ESSENTIAL (PRIMARY) HYPERTENSION: ICD-10-CM

## 2024-10-07 PROCEDURE — 99213 OFFICE O/P EST LOW 20 MIN: CPT | Mod: 95

## 2024-10-07 PROCEDURE — G2211 COMPLEX E/M VISIT ADD ON: CPT | Mod: 95

## 2024-10-08 ENCOUNTER — OUTPATIENT (OUTPATIENT)
Dept: OUTPATIENT SERVICES | Facility: HOSPITAL | Age: 64
LOS: 1 days | End: 2024-10-08

## 2024-10-08 DIAGNOSIS — Z98.890 OTHER SPECIFIED POSTPROCEDURAL STATES: Chronic | ICD-10-CM

## 2024-10-08 DIAGNOSIS — Z98.891 HISTORY OF UTERINE SCAR FROM PREVIOUS SURGERY: Chronic | ICD-10-CM

## 2024-10-08 DIAGNOSIS — I10 ESSENTIAL (PRIMARY) HYPERTENSION: ICD-10-CM

## 2024-10-08 PROCEDURE — G0463: CPT

## 2024-10-08 NOTE — H&P PST ADULT - NSANTHOSAYNRD_GEN_A_CORE
No restrictions No. NELLIE screening performed.  STOP BANG Legend: 0-2 = LOW Risk; 3-4 = INTERMEDIATE Risk; 5-8 = HIGH Risk

## 2024-10-11 PROBLEM — E66.811 CLASS 1 OBESITY: Status: ACTIVE | Noted: 2023-03-09

## 2024-11-08 ENCOUNTER — APPOINTMENT (OUTPATIENT)
Dept: BARIATRICS/WEIGHT MGMT | Facility: CLINIC | Age: 64
End: 2024-11-08
Payer: COMMERCIAL

## 2024-11-08 ENCOUNTER — OUTPATIENT (OUTPATIENT)
Dept: OUTPATIENT SERVICES | Facility: HOSPITAL | Age: 64
LOS: 1 days | End: 2024-11-08
Payer: COMMERCIAL

## 2024-11-08 VITALS — HEIGHT: 62 IN | WEIGHT: 138 LBS | BODY MASS INDEX: 25.4 KG/M2

## 2024-11-08 DIAGNOSIS — Z98.891 HISTORY OF UTERINE SCAR FROM PREVIOUS SURGERY: Chronic | ICD-10-CM

## 2024-11-08 DIAGNOSIS — Z98.890 OTHER SPECIFIED POSTPROCEDURAL STATES: Chronic | ICD-10-CM

## 2024-11-08 DIAGNOSIS — E66.811 OBESITY, CLASS 1: ICD-10-CM

## 2024-11-08 DIAGNOSIS — I10 ESSENTIAL (PRIMARY) HYPERTENSION: ICD-10-CM

## 2024-11-08 PROCEDURE — G2211 COMPLEX E/M VISIT ADD ON: CPT | Mod: 95

## 2024-11-08 PROCEDURE — 99213 OFFICE O/P EST LOW 20 MIN: CPT | Mod: 95

## 2024-11-08 PROCEDURE — G0463: CPT

## 2024-11-11 DIAGNOSIS — E66.811 OBESITY, CLASS 1: ICD-10-CM

## 2024-11-15 ENCOUNTER — TRANSCRIPTION ENCOUNTER (OUTPATIENT)
Age: 64
End: 2024-11-15

## 2024-11-20 ENCOUNTER — APPOINTMENT (OUTPATIENT)
Dept: INTERNAL MEDICINE | Facility: CLINIC | Age: 64
End: 2024-11-20
Payer: COMMERCIAL

## 2024-11-20 VITALS
SYSTOLIC BLOOD PRESSURE: 126 MMHG | HEIGHT: 62 IN | HEART RATE: 84 BPM | DIASTOLIC BLOOD PRESSURE: 77 MMHG | OXYGEN SATURATION: 99 % | WEIGHT: 135 LBS | BODY MASS INDEX: 24.84 KG/M2 | RESPIRATION RATE: 16 BRPM | TEMPERATURE: 97.5 F

## 2024-11-20 DIAGNOSIS — E27.8 OTHER SPECIFIED DISORDERS OF ADRENAL GLAND: ICD-10-CM

## 2024-11-20 DIAGNOSIS — F41.9 ANXIETY DISORDER, UNSPECIFIED: ICD-10-CM

## 2024-11-20 DIAGNOSIS — I67.83 POSTERIOR REVERSIBLE ENCEPHALOPATHY SYNDROME: ICD-10-CM

## 2024-11-20 DIAGNOSIS — I10 ESSENTIAL (PRIMARY) HYPERTENSION: ICD-10-CM

## 2024-11-20 PROCEDURE — G2211 COMPLEX E/M VISIT ADD ON: CPT

## 2024-11-20 PROCEDURE — 99214 OFFICE O/P EST MOD 30 MIN: CPT

## 2024-11-20 RX ORDER — PROPRANOLOL HYDROCHLORIDE 10 MG/1
10 TABLET ORAL
Qty: 30 | Refills: 0 | Status: ACTIVE | COMMUNITY
Start: 2024-11-20 | End: 1900-01-01

## 2024-11-21 ENCOUNTER — TRANSCRIPTION ENCOUNTER (OUTPATIENT)
Age: 64
End: 2024-11-21

## 2024-12-10 ENCOUNTER — OUTPATIENT (OUTPATIENT)
Dept: OUTPATIENT SERVICES | Facility: HOSPITAL | Age: 64
LOS: 1 days | End: 2024-12-10
Payer: COMMERCIAL

## 2024-12-10 ENCOUNTER — APPOINTMENT (OUTPATIENT)
Dept: BARIATRICS/WEIGHT MGMT | Facility: CLINIC | Age: 64
End: 2024-12-10
Payer: COMMERCIAL

## 2024-12-10 DIAGNOSIS — Z98.891 HISTORY OF UTERINE SCAR FROM PREVIOUS SURGERY: Chronic | ICD-10-CM

## 2024-12-10 DIAGNOSIS — Z98.890 OTHER SPECIFIED POSTPROCEDURAL STATES: Chronic | ICD-10-CM

## 2024-12-10 DIAGNOSIS — E66.811 OBESITY, CLASS 1: ICD-10-CM

## 2024-12-10 DIAGNOSIS — E66.9 OBESITY, UNSPECIFIED: ICD-10-CM

## 2024-12-10 DIAGNOSIS — I10 ESSENTIAL (PRIMARY) HYPERTENSION: ICD-10-CM

## 2024-12-10 PROCEDURE — G0463: CPT

## 2024-12-10 PROCEDURE — 99213 OFFICE O/P EST LOW 20 MIN: CPT | Mod: 95

## 2024-12-11 ENCOUNTER — TRANSCRIPTION ENCOUNTER (OUTPATIENT)
Age: 64
End: 2024-12-11

## 2024-12-13 ENCOUNTER — TRANSCRIPTION ENCOUNTER (OUTPATIENT)
Age: 64
End: 2024-12-13

## 2024-12-18 DIAGNOSIS — E66.811 OBESITY, CLASS 1: ICD-10-CM

## 2024-12-18 DIAGNOSIS — E66.9 OBESITY, UNSPECIFIED: ICD-10-CM

## 2024-12-23 ENCOUNTER — TRANSCRIPTION ENCOUNTER (OUTPATIENT)
Age: 64
End: 2024-12-23

## 2025-01-14 ENCOUNTER — OUTPATIENT (OUTPATIENT)
Dept: OUTPATIENT SERVICES | Facility: HOSPITAL | Age: 65
LOS: 1 days | End: 2025-01-14

## 2025-01-14 ENCOUNTER — APPOINTMENT (OUTPATIENT)
Dept: BARIATRICS/WEIGHT MGMT | Facility: CLINIC | Age: 65
End: 2025-01-14
Payer: COMMERCIAL

## 2025-01-14 VITALS — HEIGHT: 62 IN | BODY MASS INDEX: 24.66 KG/M2 | WEIGHT: 134 LBS

## 2025-01-14 DIAGNOSIS — Z98.890 OTHER SPECIFIED POSTPROCEDURAL STATES: Chronic | ICD-10-CM

## 2025-01-14 DIAGNOSIS — Z98.891 HISTORY OF UTERINE SCAR FROM PREVIOUS SURGERY: Chronic | ICD-10-CM

## 2025-01-14 DIAGNOSIS — I10 ESSENTIAL (PRIMARY) HYPERTENSION: ICD-10-CM

## 2025-01-14 DIAGNOSIS — E66.811 OBESITY, CLASS 1: ICD-10-CM

## 2025-01-14 PROCEDURE — 99213 OFFICE O/P EST LOW 20 MIN: CPT | Mod: 95

## 2025-01-21 ENCOUNTER — TRANSCRIPTION ENCOUNTER (OUTPATIENT)
Age: 65
End: 2025-01-21

## 2025-01-29 ENCOUNTER — NON-APPOINTMENT (OUTPATIENT)
Age: 65
End: 2025-01-29

## 2025-02-21 ENCOUNTER — TRANSCRIPTION ENCOUNTER (OUTPATIENT)
Age: 65
End: 2025-02-21

## 2025-02-24 ENCOUNTER — RX RENEWAL (OUTPATIENT)
Age: 65
End: 2025-02-24

## 2025-02-26 ENCOUNTER — OUTPATIENT (OUTPATIENT)
Dept: OUTPATIENT SERVICES | Facility: HOSPITAL | Age: 65
LOS: 1 days | End: 2025-02-26

## 2025-02-26 ENCOUNTER — TRANSCRIPTION ENCOUNTER (OUTPATIENT)
Age: 65
End: 2025-02-26

## 2025-02-26 ENCOUNTER — APPOINTMENT (OUTPATIENT)
Dept: BARIATRICS/WEIGHT MGMT | Facility: CLINIC | Age: 65
End: 2025-02-26
Payer: COMMERCIAL

## 2025-02-26 DIAGNOSIS — E66.9 OBESITY, UNSPECIFIED: ICD-10-CM

## 2025-02-26 DIAGNOSIS — Z98.890 OTHER SPECIFIED POSTPROCEDURAL STATES: Chronic | ICD-10-CM

## 2025-02-26 DIAGNOSIS — Z98.891 HISTORY OF UTERINE SCAR FROM PREVIOUS SURGERY: Chronic | ICD-10-CM

## 2025-02-26 DIAGNOSIS — I10 ESSENTIAL (PRIMARY) HYPERTENSION: ICD-10-CM

## 2025-02-26 PROCEDURE — 99213 OFFICE O/P EST LOW 20 MIN: CPT | Mod: 95

## 2025-03-03 ENCOUNTER — RX RENEWAL (OUTPATIENT)
Age: 65
End: 2025-03-03

## 2025-03-03 DIAGNOSIS — E66.9 OBESITY, UNSPECIFIED: ICD-10-CM

## 2025-03-17 ENCOUNTER — TRANSCRIPTION ENCOUNTER (OUTPATIENT)
Age: 65
End: 2025-03-17

## 2025-03-19 ENCOUNTER — TRANSCRIPTION ENCOUNTER (OUTPATIENT)
Age: 65
End: 2025-03-19

## 2025-03-20 ENCOUNTER — APPOINTMENT (OUTPATIENT)
Dept: UROLOGY | Facility: CLINIC | Age: 65
End: 2025-03-20

## 2025-04-09 ENCOUNTER — OUTPATIENT (OUTPATIENT)
Dept: OUTPATIENT SERVICES | Facility: HOSPITAL | Age: 65
LOS: 1 days | End: 2025-04-09

## 2025-04-09 ENCOUNTER — APPOINTMENT (OUTPATIENT)
Dept: BARIATRICS/WEIGHT MGMT | Facility: CLINIC | Age: 65
End: 2025-04-09
Payer: COMMERCIAL

## 2025-04-09 VITALS — WEIGHT: 133.5 LBS | HEIGHT: 62 IN | BODY MASS INDEX: 24.56 KG/M2

## 2025-04-09 DIAGNOSIS — Z98.890 OTHER SPECIFIED POSTPROCEDURAL STATES: Chronic | ICD-10-CM

## 2025-04-09 DIAGNOSIS — E66.9 OBESITY, UNSPECIFIED: ICD-10-CM

## 2025-04-09 DIAGNOSIS — Z98.891 HISTORY OF UTERINE SCAR FROM PREVIOUS SURGERY: Chronic | ICD-10-CM

## 2025-04-09 DIAGNOSIS — I10 ESSENTIAL (PRIMARY) HYPERTENSION: ICD-10-CM

## 2025-04-09 PROCEDURE — 99213 OFFICE O/P EST LOW 20 MIN: CPT | Mod: 95

## 2025-04-16 DIAGNOSIS — E66.9 OBESITY, UNSPECIFIED: ICD-10-CM

## 2025-05-06 NOTE — ASU DISCHARGE PLAN (ADULT/PEDIATRIC). - ITEMS TO FOLLOWUP WITH YOUR PHYSICIAN'S
Left distal femur fx Please follow up with Dr. Grijalva within 1-2 weeks. You may call [phone] to schedule an appointment.    Please do not participate in heavy lifting or strenuous exercise. Do not drive while taking pain medication.    Please go to the emergency department if you experience pain not controlled by pain medication, bleeding that will not stop, fevers or chills. Please follow up with Dr. Grijalva within 1-2 weeks. You may call [phone] to schedule an appointment.    Please Remain Non-Weight Bearing on your right foot. Please do not participate in heavy lifting or strenuous exercise. Do not drive while taking pain medication.    Please go to the emergency department if you experience pain not controlled by pain medication, bleeding that will not stop, fevers or chills.

## 2025-05-28 ENCOUNTER — OUTPATIENT (OUTPATIENT)
Dept: OUTPATIENT SERVICES | Facility: HOSPITAL | Age: 65
LOS: 1 days | End: 2025-05-28

## 2025-05-28 ENCOUNTER — APPOINTMENT (OUTPATIENT)
Dept: BARIATRICS/WEIGHT MGMT | Facility: CLINIC | Age: 65
End: 2025-05-28
Payer: COMMERCIAL

## 2025-05-28 VITALS — HEIGHT: 62 IN | BODY MASS INDEX: 24.71 KG/M2 | WEIGHT: 134.31 LBS

## 2025-05-28 DIAGNOSIS — E66.811 OBESITY, CLASS 1: ICD-10-CM

## 2025-05-28 DIAGNOSIS — Z98.890 OTHER SPECIFIED POSTPROCEDURAL STATES: Chronic | ICD-10-CM

## 2025-05-28 DIAGNOSIS — Z98.891 HISTORY OF UTERINE SCAR FROM PREVIOUS SURGERY: Chronic | ICD-10-CM

## 2025-05-28 DIAGNOSIS — E66.9 OBESITY, UNSPECIFIED: ICD-10-CM

## 2025-05-28 DIAGNOSIS — I10 ESSENTIAL (PRIMARY) HYPERTENSION: ICD-10-CM

## 2025-05-28 PROCEDURE — 99213 OFFICE O/P EST LOW 20 MIN: CPT | Mod: 95

## 2025-05-29 ENCOUNTER — RX RENEWAL (OUTPATIENT)
Age: 65
End: 2025-05-29

## 2025-06-04 DIAGNOSIS — E66.811 OBESITY, CLASS 1: ICD-10-CM

## 2025-06-04 DIAGNOSIS — E66.9 OBESITY, UNSPECIFIED: ICD-10-CM

## 2025-06-16 ENCOUNTER — NON-APPOINTMENT (OUTPATIENT)
Age: 65
End: 2025-06-16

## 2025-06-23 ENCOUNTER — APPOINTMENT (OUTPATIENT)
Dept: BARIATRICS/WEIGHT MGMT | Facility: CLINIC | Age: 65
End: 2025-06-23
Payer: COMMERCIAL

## 2025-06-23 ENCOUNTER — OUTPATIENT (OUTPATIENT)
Dept: OUTPATIENT SERVICES | Facility: HOSPITAL | Age: 65
LOS: 1 days | End: 2025-06-23

## 2025-06-23 VITALS — BODY MASS INDEX: 24.53 KG/M2 | HEIGHT: 62 IN | WEIGHT: 133.31 LBS

## 2025-06-23 DIAGNOSIS — Z98.890 OTHER SPECIFIED POSTPROCEDURAL STATES: Chronic | ICD-10-CM

## 2025-06-23 DIAGNOSIS — Z98.891 HISTORY OF UTERINE SCAR FROM PREVIOUS SURGERY: Chronic | ICD-10-CM

## 2025-06-23 DIAGNOSIS — I10 ESSENTIAL (PRIMARY) HYPERTENSION: ICD-10-CM

## 2025-06-23 PROCEDURE — 99213 OFFICE O/P EST LOW 20 MIN: CPT | Mod: 95

## 2025-07-01 DIAGNOSIS — E66.811 OBESITY, CLASS 1: ICD-10-CM

## 2025-07-08 ENCOUNTER — TRANSCRIPTION ENCOUNTER (OUTPATIENT)
Age: 65
End: 2025-07-08

## 2025-07-08 ENCOUNTER — APPOINTMENT (OUTPATIENT)
Dept: UROLOGY | Facility: CLINIC | Age: 65
End: 2025-07-08

## 2025-07-10 ENCOUNTER — TRANSCRIPTION ENCOUNTER (OUTPATIENT)
Age: 65
End: 2025-07-10

## 2025-07-14 ENCOUNTER — TRANSCRIPTION ENCOUNTER (OUTPATIENT)
Age: 65
End: 2025-07-14

## 2025-07-16 ENCOUNTER — NON-APPOINTMENT (OUTPATIENT)
Age: 65
End: 2025-07-16

## 2025-07-18 ENCOUNTER — APPOINTMENT (OUTPATIENT)
Dept: INTERNAL MEDICINE | Facility: CLINIC | Age: 65
End: 2025-07-18
Payer: COMMERCIAL

## 2025-07-18 VITALS
RESPIRATION RATE: 16 BRPM | OXYGEN SATURATION: 98 % | HEIGHT: 62 IN | SYSTOLIC BLOOD PRESSURE: 124 MMHG | TEMPERATURE: 97.7 F | BODY MASS INDEX: 25.03 KG/M2 | WEIGHT: 136 LBS | HEART RATE: 84 BPM | DIASTOLIC BLOOD PRESSURE: 77 MMHG

## 2025-07-18 PROBLEM — R20.0 NUMBNESS: Status: ACTIVE | Noted: 2025-07-18

## 2025-07-18 PROCEDURE — 99214 OFFICE O/P EST MOD 30 MIN: CPT

## 2025-07-18 PROCEDURE — G2211 COMPLEX E/M VISIT ADD ON: CPT

## 2025-07-20 LAB — VIT B12 SERPL-MCNC: 446 PG/ML

## 2025-07-23 ENCOUNTER — OUTPATIENT (OUTPATIENT)
Dept: OUTPATIENT SERVICES | Facility: HOSPITAL | Age: 65
LOS: 1 days | End: 2025-07-23

## 2025-07-23 ENCOUNTER — APPOINTMENT (OUTPATIENT)
Dept: BARIATRICS/WEIGHT MGMT | Facility: CLINIC | Age: 65
End: 2025-07-23
Payer: COMMERCIAL

## 2025-07-23 VITALS — BODY MASS INDEX: 24.48 KG/M2 | WEIGHT: 133 LBS | HEIGHT: 62 IN

## 2025-07-23 DIAGNOSIS — Z98.890 OTHER SPECIFIED POSTPROCEDURAL STATES: Chronic | ICD-10-CM

## 2025-07-23 DIAGNOSIS — Z98.891 HISTORY OF UTERINE SCAR FROM PREVIOUS SURGERY: Chronic | ICD-10-CM

## 2025-07-23 DIAGNOSIS — E66.811 OBESITY, CLASS 1: ICD-10-CM

## 2025-07-23 DIAGNOSIS — I10 ESSENTIAL (PRIMARY) HYPERTENSION: ICD-10-CM

## 2025-07-23 LAB — ANA TITR SER: NEGATIVE

## 2025-07-23 PROCEDURE — 99213 OFFICE O/P EST LOW 20 MIN: CPT | Mod: 95

## 2025-07-28 DIAGNOSIS — E66.811 OBESITY, CLASS 1: ICD-10-CM

## 2025-08-14 ENCOUNTER — APPOINTMENT (OUTPATIENT)
Dept: MRI IMAGING | Facility: CLINIC | Age: 65
End: 2025-08-14

## 2025-08-14 PROCEDURE — 70551 MRI BRAIN STEM W/O DYE: CPT

## 2025-08-19 NOTE — H&P ADULT - NSHPPOAPULMEMBOLUS_GEN_A_CORE
Writer and MD rounded on patient bedside. Patient evaluated with thin liquid swallow. MD ordered to continue PO medications crushed in applesauce and dysphagia diet unless patient shows signs of aspiration or coughing.    Patient given medications in applesauce with no issues. Will continue to monitor patient's swallowing efforts with eating and medications administration.   no

## 2025-08-27 ENCOUNTER — TRANSCRIPTION ENCOUNTER (OUTPATIENT)
Age: 65
End: 2025-08-27

## 2025-09-04 ENCOUNTER — OUTPATIENT (OUTPATIENT)
Dept: OUTPATIENT SERVICES | Facility: HOSPITAL | Age: 65
LOS: 1 days | End: 2025-09-04

## 2025-09-04 ENCOUNTER — APPOINTMENT (OUTPATIENT)
Dept: BARIATRICS/WEIGHT MGMT | Facility: CLINIC | Age: 65
End: 2025-09-04
Payer: COMMERCIAL

## 2025-09-04 VITALS — HEIGHT: 62 IN | BODY MASS INDEX: 24.29 KG/M2 | WEIGHT: 132 LBS

## 2025-09-04 DIAGNOSIS — Z98.890 OTHER SPECIFIED POSTPROCEDURAL STATES: Chronic | ICD-10-CM

## 2025-09-04 DIAGNOSIS — I10 ESSENTIAL (PRIMARY) HYPERTENSION: ICD-10-CM

## 2025-09-04 DIAGNOSIS — E66.811 OBESITY, CLASS 1: ICD-10-CM

## 2025-09-04 DIAGNOSIS — Z98.891 HISTORY OF UTERINE SCAR FROM PREVIOUS SURGERY: Chronic | ICD-10-CM

## 2025-09-04 PROCEDURE — 99213 OFFICE O/P EST LOW 20 MIN: CPT | Mod: 95

## 2025-09-08 DIAGNOSIS — E66.811 OBESITY, CLASS 1: ICD-10-CM

## (undated) DEVICE — SYR LUER LOK 10CC

## (undated) DEVICE — DRSG STERISTRIPS 0.5 X 4"

## (undated) DEVICE — LUBRICATING JELLY ONESHOT 1.25OZ

## (undated) DEVICE — D HELP - CLEARVIEW CLEARIFY SYSTEM

## (undated) DEVICE — LIJ/LIA-HOLDER SCOPE: Type: DURABLE MEDICAL EQUIPMENT

## (undated) DEVICE — CANISTER DISPOSABLE THIN WALL 3000CC

## (undated) DEVICE — SUT VICRYL 2-0 27" SH UNDYED

## (undated) DEVICE — TAPE SILK 3"

## (undated) DEVICE — ELCTR GROUNDING PAD ADULT COVIDIEN

## (undated) DEVICE — GOWN XL

## (undated) DEVICE — FOLEY CATH 2-WAY 16FR 5CC SILICONE

## (undated) DEVICE — PREP BETADINE SPONGE STICKS

## (undated) DEVICE — SUT VICRYL 0 27" UR-6

## (undated) DEVICE — SUT ENDOSTITCH DEVICE 10MM

## (undated) DEVICE — SUT ENDOSTITCH POLYSORB 2-0 48" ES-9

## (undated) DEVICE — ADAPTER ESCP CK FLO 9FR STERILE

## (undated) DEVICE — POSITIONER STRAP ARMBOARD VELCRO TS-30

## (undated) DEVICE — CATH INSERTION TRAY W 10CC SYRINGE

## (undated) DEVICE — TROCAR SURGIQUEST AIRSEAL 12MMX100MM

## (undated) DEVICE — LABELS BLANK W PEN

## (undated) DEVICE — POSITIONER FOAM EGG CRATE ULNAR 2PCS (PINK)

## (undated) DEVICE — POSITIONER FOAM EGGCRATE PADS 20 X 72 X 2"

## (undated) DEVICE — BASIN SET SINGLE

## (undated) DEVICE — DRAIN JACKSON PRATT 7FR ROUND END NO TROCAR

## (undated) DEVICE — SOL IRR BAG NS 0.9% 3000ML

## (undated) DEVICE — INSUFFLATION NDL COVIDIEN SURGINEEDLE VERESS 120MM

## (undated) DEVICE — VENODYNE/SCD SLEEVE CALF MEDIUM

## (undated) DEVICE — TUBING OLYMPUS INSUFFLATION

## (undated) DEVICE — TROCAR COVIDIEN VERSAONE BLADELESS FIXATION 5MM STANDARD

## (undated) DEVICE — TUBING STRYKEFLOW II SUCTION / IRRIGATOR

## (undated) DEVICE — DRSG BENZOIN 0.6CC

## (undated) DEVICE — TUBING AIRSEAL TRI-LUMEN FILTERED

## (undated) DEVICE — SUT VICRYL 4-0 27" SH UNDYED

## (undated) DEVICE — SUT CAPROSYN 4-0 P-12 UNDYED

## (undated) DEVICE — DRAPE TOWEL BLUE 17" X 24"

## (undated) DEVICE — LIGASURE MARYLAND 37CM

## (undated) DEVICE — DRSG CURITY GAUZE SPONGE 4 X 4" 12-PLY

## (undated) DEVICE — TUBING IRR SET FOR CYSTOSCOPY 77"

## (undated) DEVICE — DRAIN RESERVOIR FOR JACKSON PRATT 100CC CARDINAL

## (undated) DEVICE — PACK GENERAL LAPAROSCOPY

## (undated) DEVICE — SUT VICRYL 1 36" CT-1 UNDYED

## (undated) DEVICE — SHEARS COVIDIEN ENDO SHEAR 5MM X 45CM LONG W MONOPOLAR CAUTERY

## (undated) DEVICE — LAP PAD 4 X 18"

## (undated) DEVICE — PROTECTOR HEEL / ELBOW FLUFFY

## (undated) DEVICE — SOL IRR BAG H2O 3000ML

## (undated) DEVICE — BAG URINE W METER 2L